# Patient Record
Sex: MALE | Race: WHITE | NOT HISPANIC OR LATINO | Employment: FULL TIME | URBAN - METROPOLITAN AREA
[De-identification: names, ages, dates, MRNs, and addresses within clinical notes are randomized per-mention and may not be internally consistent; named-entity substitution may affect disease eponyms.]

---

## 2020-07-22 ENCOUNTER — HOSPITAL ENCOUNTER (OUTPATIENT)
Dept: LAB | Facility: MEDICAL CENTER | Age: 55
End: 2020-07-22
Payer: COMMERCIAL

## 2020-07-23 LAB
SARS-COV-2 RNA RESP QL NAA+PROBE: DETECTED
SPECIMEN SOURCE: ABNORMAL

## 2021-06-11 ENCOUNTER — APPOINTMENT (OUTPATIENT)
Dept: RADIOLOGY | Facility: MEDICAL CENTER | Age: 56
DRG: 065 | End: 2021-06-11
Attending: EMERGENCY MEDICINE
Payer: COMMERCIAL

## 2021-06-11 ENCOUNTER — APPOINTMENT (OUTPATIENT)
Dept: CARDIOLOGY | Facility: MEDICAL CENTER | Age: 56
DRG: 065 | End: 2021-06-11
Attending: INTERNAL MEDICINE
Payer: COMMERCIAL

## 2021-06-11 ENCOUNTER — APPOINTMENT (OUTPATIENT)
Dept: CARDIOLOGY | Facility: MEDICAL CENTER | Age: 56
DRG: 065 | End: 2021-06-11
Attending: STUDENT IN AN ORGANIZED HEALTH CARE EDUCATION/TRAINING PROGRAM
Payer: COMMERCIAL

## 2021-06-11 ENCOUNTER — HOSPITAL ENCOUNTER (INPATIENT)
Facility: MEDICAL CENTER | Age: 56
LOS: 6 days | DRG: 065 | End: 2021-06-18
Attending: EMERGENCY MEDICINE | Admitting: INTERNAL MEDICINE
Payer: COMMERCIAL

## 2021-06-11 DIAGNOSIS — R93.1 ABNORMAL ECHOCARDIOGRAM: ICD-10-CM

## 2021-06-11 DIAGNOSIS — I63.89 CEREBROVASCULAR ACCIDENT (CVA) DUE TO OTHER MECHANISM (HCC): ICD-10-CM

## 2021-06-11 DIAGNOSIS — R47.9 SPEECH DISTURBANCE, UNSPECIFIED TYPE: ICD-10-CM

## 2021-06-11 DIAGNOSIS — I63.9 CEREBROVASCULAR ACCIDENT (CVA), UNSPECIFIED MECHANISM (HCC): ICD-10-CM

## 2021-06-11 DIAGNOSIS — R07.9 CHEST PAIN, UNSPECIFIED TYPE: ICD-10-CM

## 2021-06-11 LAB
ALBUMIN SERPL BCP-MCNC: 4 G/DL (ref 3.2–4.9)
ALBUMIN/GLOB SERPL: 1.3 G/DL
ALP SERPL-CCNC: 73 U/L (ref 30–99)
ALT SERPL-CCNC: 23 U/L (ref 2–50)
ANION GAP SERPL CALC-SCNC: 10 MMOL/L (ref 7–16)
APTT PPP: 26.4 SEC (ref 24.7–36)
AST SERPL-CCNC: 26 U/L (ref 12–45)
BASOPHILS # BLD AUTO: 0.4 % (ref 0–1.8)
BASOPHILS # BLD: 0.03 K/UL (ref 0–0.12)
BILIRUB SERPL-MCNC: 0.7 MG/DL (ref 0.1–1.5)
BUN SERPL-MCNC: 14 MG/DL (ref 8–22)
CALCIUM SERPL-MCNC: 8.9 MG/DL (ref 8.5–10.5)
CHLORIDE SERPL-SCNC: 102 MMOL/L (ref 96–112)
CO2 SERPL-SCNC: 24 MMOL/L (ref 20–33)
CREAT SERPL-MCNC: 1.37 MG/DL (ref 0.5–1.4)
EKG IMPRESSION: NORMAL
EOSINOPHIL # BLD AUTO: 0.07 K/UL (ref 0–0.51)
EOSINOPHIL NFR BLD: 0.9 % (ref 0–6.9)
ERYTHROCYTE [DISTWIDTH] IN BLOOD BY AUTOMATED COUNT: 47.4 FL (ref 35.9–50)
EST. AVERAGE GLUCOSE BLD GHB EST-MCNC: 100 MG/DL
GLOBULIN SER CALC-MCNC: 3.2 G/DL (ref 1.9–3.5)
GLUCOSE SERPL-MCNC: 169 MG/DL (ref 65–99)
HBA1C MFR BLD: 5.1 % (ref 4–5.6)
HCT VFR BLD AUTO: 40.6 % (ref 42–52)
HGB BLD-MCNC: 14.8 G/DL (ref 14–18)
IMM GRANULOCYTES # BLD AUTO: 0.02 K/UL (ref 0–0.11)
IMM GRANULOCYTES NFR BLD AUTO: 0.3 % (ref 0–0.9)
INR PPP: 0.99 (ref 0.87–1.13)
LV EJECT FRACT  99904: 60
LV EJECT FRACT MOD 2C 99903: 65.05
LV EJECT FRACT MOD 4C 99902: 55.79
LV EJECT FRACT MOD BP 99901: 61.53
LYMPHOCYTES # BLD AUTO: 1.44 K/UL (ref 1–4.8)
LYMPHOCYTES NFR BLD: 18.8 % (ref 22–41)
MCH RBC QN AUTO: 36.7 PG (ref 27–33)
MCHC RBC AUTO-ENTMCNC: 36.5 G/DL (ref 33.7–35.3)
MCV RBC AUTO: 100.7 FL (ref 81.4–97.8)
MONOCYTES # BLD AUTO: 0.29 K/UL (ref 0–0.85)
MONOCYTES NFR BLD AUTO: 3.8 % (ref 0–13.4)
NEUTROPHILS # BLD AUTO: 5.79 K/UL (ref 1.82–7.42)
NEUTROPHILS NFR BLD: 75.8 % (ref 44–72)
NRBC # BLD AUTO: 0 K/UL
NRBC BLD-RTO: 0 /100 WBC
PLATELET # BLD AUTO: 214 K/UL (ref 164–446)
PMV BLD AUTO: 8.5 FL (ref 9–12.9)
POTASSIUM SERPL-SCNC: 4.1 MMOL/L (ref 3.6–5.5)
PROT SERPL-MCNC: 7.2 G/DL (ref 6–8.2)
PROTHROMBIN TIME: 12.8 SEC (ref 12–14.6)
RBC # BLD AUTO: 4.03 M/UL (ref 4.7–6.1)
SARS-COV-2 RNA RESP QL NAA+PROBE: NOTDETECTED
SODIUM SERPL-SCNC: 136 MMOL/L (ref 135–145)
SPECIMEN SOURCE: NORMAL
TROPONIN T SERPL-MCNC: <6 NG/L (ref 6–19)
WBC # BLD AUTO: 7.6 K/UL (ref 4.8–10.8)

## 2021-06-11 PROCEDURE — 93306 TTE W/DOPPLER COMPLETE: CPT

## 2021-06-11 PROCEDURE — 94760 N-INVAS EAR/PLS OXIMETRY 1: CPT

## 2021-06-11 PROCEDURE — 99220 PR INITIAL OBSERVATION CARE,LEVL III: CPT | Mod: GC | Performed by: INTERNAL MEDICINE

## 2021-06-11 PROCEDURE — 80307 DRUG TEST PRSMV CHEM ANLYZR: CPT

## 2021-06-11 PROCEDURE — 92610 EVALUATE SWALLOWING FUNCTION: CPT

## 2021-06-11 PROCEDURE — 96372 THER/PROPH/DIAG INJ SC/IM: CPT

## 2021-06-11 PROCEDURE — 80053 COMPREHEN METABOLIC PANEL: CPT

## 2021-06-11 PROCEDURE — 70450 CT HEAD/BRAIN W/O DYE: CPT

## 2021-06-11 PROCEDURE — 85730 THROMBOPLASTIN TIME PARTIAL: CPT

## 2021-06-11 PROCEDURE — U0003 INFECTIOUS AGENT DETECTION BY NUCLEIC ACID (DNA OR RNA); SEVERE ACUTE RESPIRATORY SYNDROME CORONAVIRUS 2 (SARS-COV-2) (CORONAVIRUS DISEASE [COVID-19]), AMPLIFIED PROBE TECHNIQUE, MAKING USE OF HIGH THROUGHPUT TECHNOLOGIES AS DESCRIBED BY CMS-2020-01-R: HCPCS

## 2021-06-11 PROCEDURE — G0378 HOSPITAL OBSERVATION PER HR: HCPCS

## 2021-06-11 PROCEDURE — 84484 ASSAY OF TROPONIN QUANT: CPT

## 2021-06-11 PROCEDURE — 93005 ELECTROCARDIOGRAM TRACING: CPT | Performed by: EMERGENCY MEDICINE

## 2021-06-11 PROCEDURE — 36415 COLL VENOUS BLD VENIPUNCTURE: CPT

## 2021-06-11 PROCEDURE — 85610 PROTHROMBIN TIME: CPT

## 2021-06-11 PROCEDURE — U0005 INFEC AGEN DETEC AMPLI PROBE: HCPCS

## 2021-06-11 PROCEDURE — 99285 EMERGENCY DEPT VISIT HI MDM: CPT

## 2021-06-11 PROCEDURE — 85025 COMPLETE CBC W/AUTO DIFF WBC: CPT

## 2021-06-11 PROCEDURE — A9270 NON-COVERED ITEM OR SERVICE: HCPCS | Performed by: STUDENT IN AN ORGANIZED HEALTH CARE EDUCATION/TRAINING PROGRAM

## 2021-06-11 PROCEDURE — 70498 CT ANGIOGRAPHY NECK: CPT

## 2021-06-11 PROCEDURE — 700117 HCHG RX CONTRAST REV CODE 255: Performed by: EMERGENCY MEDICINE

## 2021-06-11 PROCEDURE — 71045 X-RAY EXAM CHEST 1 VIEW: CPT

## 2021-06-11 PROCEDURE — 83036 HEMOGLOBIN GLYCOSYLATED A1C: CPT

## 2021-06-11 PROCEDURE — 700102 HCHG RX REV CODE 250 W/ 637 OVERRIDE(OP): Performed by: STUDENT IN AN ORGANIZED HEALTH CARE EDUCATION/TRAINING PROGRAM

## 2021-06-11 PROCEDURE — 93306 TTE W/DOPPLER COMPLETE: CPT | Mod: 26 | Performed by: INTERNAL MEDICINE

## 2021-06-11 PROCEDURE — 0042T CT-CEREBRAL PERFUSION ANALYSIS: CPT

## 2021-06-11 PROCEDURE — 99204 OFFICE O/P NEW MOD 45 MIN: CPT | Performed by: PSYCHIATRY & NEUROLOGY

## 2021-06-11 PROCEDURE — 70496 CT ANGIOGRAPHY HEAD: CPT

## 2021-06-11 PROCEDURE — 99204 OFFICE O/P NEW MOD 45 MIN: CPT | Performed by: INTERNAL MEDICINE

## 2021-06-11 RX ORDER — HYDRALAZINE HYDROCHLORIDE 20 MG/ML
10 INJECTION INTRAMUSCULAR; INTRAVENOUS
Status: DISCONTINUED | OUTPATIENT
Start: 2021-06-11 | End: 2021-06-12

## 2021-06-11 RX ORDER — ATORVASTATIN CALCIUM 80 MG/1
80 TABLET, FILM COATED ORAL EVERY EVENING
Status: DISCONTINUED | OUTPATIENT
Start: 2021-06-11 | End: 2021-06-18 | Stop reason: HOSPADM

## 2021-06-11 RX ORDER — LABETALOL HYDROCHLORIDE 5 MG/ML
10 INJECTION, SOLUTION INTRAVENOUS
Status: DISCONTINUED | OUTPATIENT
Start: 2021-06-11 | End: 2021-06-12

## 2021-06-11 RX ADMIN — IOHEXOL 40 ML: 350 INJECTION, SOLUTION INTRAVENOUS at 09:10

## 2021-06-11 RX ADMIN — ATORVASTATIN CALCIUM 80 MG: 80 TABLET, FILM COATED ORAL at 17:15

## 2021-06-11 RX ADMIN — IOHEXOL 80 ML: 350 INJECTION, SOLUTION INTRAVENOUS at 09:11

## 2021-06-11 ASSESSMENT — ENCOUNTER SYMPTOMS
SORE THROAT: 0
CHILLS: 0
TINGLING: 1
LOSS OF CONSCIOUSNESS: 0
COUGH: 0
BACK PAIN: 0
POLYDIPSIA: 0
FEVER: 0
SHORTNESS OF BREATH: 0
INSOMNIA: 0
PALPITATIONS: 0
VOMITING: 0
SENSORY CHANGE: 1
PHOTOPHOBIA: 0
SPEECH CHANGE: 1
BLURRED VISION: 0
ABDOMINAL PAIN: 0
MEMORY LOSS: 0
HEADACHES: 0
FOCAL WEAKNESS: 1
BRUISES/BLEEDS EASILY: 0

## 2021-06-11 ASSESSMENT — LIFESTYLE VARIABLES
TOTAL SCORE: 0
TOTAL SCORE: 0
ON A TYPICAL DAY WHEN YOU DRINK ALCOHOL HOW MANY DRINKS DO YOU HAVE: 2
EVER HAD A DRINK FIRST THING IN THE MORNING TO STEADY YOUR NERVES TO GET RID OF A HANGOVER: NO
HOW MANY TIMES IN THE PAST YEAR HAVE YOU HAD 5 OR MORE DRINKS IN A DAY: 0
HAVE PEOPLE ANNOYED YOU BY CRITICIZING YOUR DRINKING: NO
AVERAGE NUMBER OF DAYS PER WEEK YOU HAVE A DRINK CONTAINING ALCOHOL: 1
ALCOHOL_USE: YES
EVER FELT BAD OR GUILTY ABOUT YOUR DRINKING: NO
CONSUMPTION TOTAL: NEGATIVE
TOTAL SCORE: 0
HAVE YOU EVER FELT YOU SHOULD CUT DOWN ON YOUR DRINKING: NO
DO YOU DRINK ALCOHOL: NO

## 2021-06-11 ASSESSMENT — COGNITIVE AND FUNCTIONAL STATUS - GENERAL
HELP NEEDED FOR BATHING: A LITTLE
STANDING UP FROM CHAIR USING ARMS: A LITTLE
MOVING FROM LYING ON BACK TO SITTING ON SIDE OF FLAT BED: A LITTLE
SUGGESTED CMS G CODE MODIFIER DAILY ACTIVITY: CJ
TURNING FROM BACK TO SIDE WHILE IN FLAT BAD: A LITTLE
TOILETING: A LITTLE
DRESSING REGULAR LOWER BODY CLOTHING: A LITTLE
CLIMB 3 TO 5 STEPS WITH RAILING: A LITTLE
MOVING TO AND FROM BED TO CHAIR: A LITTLE
CLIMB 3 TO 5 STEPS WITH RAILING: A LITTLE
SUGGESTED CMS G CODE MODIFIER MOBILITY: CK
DAILY ACTIVITIY SCORE: 21
DRESSING REGULAR LOWER BODY CLOTHING: A LITTLE
HELP NEEDED FOR BATHING: A LITTLE
STANDING UP FROM CHAIR USING ARMS: A LITTLE
TURNING FROM BACK TO SIDE WHILE IN FLAT BAD: A LITTLE
MOVING TO AND FROM BED TO CHAIR: A LITTLE
TOILETING: A LITTLE
MOVING FROM LYING ON BACK TO SITTING ON SIDE OF FLAT BED: A LITTLE
MOBILITY SCORE: 18
WALKING IN HOSPITAL ROOM: A LITTLE

## 2021-06-11 ASSESSMENT — FIBROSIS 4 INDEX: FIB4 SCORE: 1.34

## 2021-06-11 ASSESSMENT — PATIENT HEALTH QUESTIONNAIRE - PHQ9
2. FEELING DOWN, DEPRESSED, IRRITABLE, OR HOPELESS: NOT AT ALL
SUM OF ALL RESPONSES TO PHQ9 QUESTIONS 1 AND 2: 0
1. LITTLE INTEREST OR PLEASURE IN DOING THINGS: NOT AT ALL

## 2021-06-11 NOTE — ED NOTES
Per EMS radio report, STEMI pre alert.    Upon arrival, possible stroke IR.  Paged stroke IR per ERP.

## 2021-06-11 NOTE — ED NOTES
56 y/o male biba from home pt reports onset CP apx 1130 last night, left chest intermittent non radiating. Pt also reports onset of slurred speech.   On assessment pt has right facial droop and weakness to right arm. Pt reports pain to right arm. Code stemi canceled by Dr Milton, stroke IR nazario KIM to bedside. NIH 5.

## 2021-06-11 NOTE — ED PROVIDER NOTES
ED Provider Note    Scribed for Sharif Feliciano M.D. by Oscar Leija. 6/11/2021  8:40 AM    Primary care provider: Pcp Pt States None  Means of arrival: EMS  History obtained from: patient  History limited by: none    CHIEF COMPLAINT  Chief Complaint   Patient presents with    Chest Pain     intermittent onset 11-12 last night non radiating    Slurred Speech     last noc    Facial Droop     right side       HPI  Paul Hopper is a 55 y.o. female who presents to the Emergency Department for intermittent chest pain since around 2230 last night.  Also noticed that he had slurred speech starting at 1030 last night.  Patient called the ambulance.  EMS did an EKG.  There is felt to be some slight ST segment elevation in 1 and aVL as well as some depression inferiorly.  Therefore a code STEMI was called.  Patient has a slurred speech started last night.  Is not have any fever headache.  He has some weakness of the right upper extremity.  No trauma.  No chest pain at the time of evaluation.  No back pain.  No migration of pain.    REVIEW OF SYSTEMS  Pertinent positives include slurred speech, chest pain, right arm weakness.   Pertinent negatives include no current chest pain, back pain, migration of pain, headache, fevers.    All other systems reviewed and negative. See HPI for further details.     PAST MEDICAL HISTORY       SURGICAL HISTORY  patient denies any surgical history    SOCIAL HISTORY  Social History     Tobacco Use    Smoking status: None noted   Substance Use Topics    Alcohol use: None noted    Drug use: None noted      Social History     Substance and Sexual Activity   Drug Use None noted       FAMILY HISTORY  None noted    CURRENT MEDICATIONS  Home Medications       Reviewed by Ami Raza R.N. (Registered Nurse) on 06/11/21 at 0851  Med List Status: Complete     Medication Last Dose Status        Patient Alexei Taking any Medications                           ALLERGIES  No Known  "Allergies    PHYSICAL EXAM  VITAL SIGNS: BP (!) 161/88   Pulse (!) 59   Resp (!) 25   Ht 1.727 m (5' 8\")   Wt 86.2 kg (190 lb)   SpO2 93%   BMI 28.89 kg/m²     Nursing note and vitals reviewed.  Constitutional: Well-developed and well-nourished. No distress.   HENT: Head is normocephalic and atraumatic. Oropharynx is clear and moist without exudate or erythema.   Eyes: Pupils are equal, round, and reactive to light. Conjunctiva are normal.   Cardiovascular: Normal rate and regular rhythm. No murmur heard. Normal radial pulses.   Pulmonary/Chest: Breath sounds normal. No wheezes or rales. No chest wall tenderness.   Abdominal: Soft and non-tender. No distention   Musculoskeletal: Extremities exhibit normal range of motion without edema or tenderness. No calf tenderness or palpable cords.   Neurological: Awake, alert and oriented to person, place, and time.  4 out of 5 strength in the right upper extremity, mildly slurred speech, seems to have some mild word finding difficulty although he is able to identify pictures on the neuro examination performed without difficulty.  Skin: Skin is warm and dry. No rash.   Psychiatric: Normal mood and affect. Appropriate for clinical situation      DIAGNOSTIC STUDIES / PROCEDURES    EKG Interpretation  Interpreted by me as below    LABS  Results for orders placed or performed during the hospital encounter of 06/11/21   CBC WITH DIFFERENTIAL   Result Value Ref Range    WBC 7.6 4.8 - 10.8 K/uL    RBC 4.03 (L) 4.70 - 6.10 M/uL    Hemoglobin 14.8 14.0 - 18.0 g/dL    Hematocrit 40.6 (L) 42.0 - 52.0 %    .7 (H) 81.4 - 97.8 fL    MCH 36.7 (H) 27.0 - 33.0 pg    MCHC 36.5 (H) 33.7 - 35.3 g/dL    RDW 47.4 35.9 - 50.0 fL    Platelet Count 214 164 - 446 K/uL    MPV 8.5 (L) 9.0 - 12.9 fL    Neutrophils-Polys 75.80 (H) 44.00 - 72.00 %    Lymphocytes 18.80 (L) 22.00 - 41.00 %    Monocytes 3.80 0.00 - 13.40 %    Eosinophils 0.90 0.00 - 6.90 %    Basophils 0.40 0.00 - 1.80 %    " Immature Granulocytes 0.30 0.00 - 0.90 %    Nucleated RBC 0.00 /100 WBC    Neutrophils (Absolute) 5.79 1.82 - 7.42 K/uL    Lymphs (Absolute) 1.44 1.00 - 4.80 K/uL    Monos (Absolute) 0.29 0.00 - 0.85 K/uL    Eos (Absolute) 0.07 0.00 - 0.51 K/uL    Baso (Absolute) 0.03 0.00 - 0.12 K/uL    Immature Granulocytes (abs) 0.02 0.00 - 0.11 K/uL    NRBC (Absolute) 0.00 K/uL   COMP METABOLIC PANEL   Result Value Ref Range    Sodium 136 135 - 145 mmol/L    Potassium 4.1 3.6 - 5.5 mmol/L    Chloride 102 96 - 112 mmol/L    Co2 24 20 - 33 mmol/L    Anion Gap 10.0 7.0 - 16.0    Glucose 169 (H) 65 - 99 mg/dL    Bun 14 8 - 22 mg/dL    Creatinine 1.37 0.50 - 1.40 mg/dL    Calcium 8.9 8.5 - 10.5 mg/dL    AST(SGOT) 26 12 - 45 U/L    ALT(SGPT) 23 2 - 50 U/L    Alkaline Phosphatase 73 30 - 99 U/L    Total Bilirubin 0.7 0.1 - 1.5 mg/dL    Albumin 4.0 3.2 - 4.9 g/dL    Total Protein 7.2 6.0 - 8.2 g/dL    Globulin 3.2 1.9 - 3.5 g/dL    A-G Ratio 1.3 g/dL   PROTHROMBIN TIME   Result Value Ref Range    PT 12.8 12.0 - 14.6 sec    INR 0.99 0.87 - 1.13   APTT   Result Value Ref Range    APTT 26.4 24.7 - 36.0 sec   TROPONIN   Result Value Ref Range    Troponin T <6 6 - 19 ng/L   ESTIMATED GFR   Result Value Ref Range    GFR If African American >60 >60 mL/min/1.73 m 2    GFR If Non African American 54 (A) >60 mL/min/1.73 m 2   EKG (NOW)   Result Value Ref Range    Report       [facility] Emergency Dept.    Test Date:  2021  Pt Name:    MIRANDA REARDON                Department: ER  MRN:        [id number]                      Room:       RD 01  Gender:     Female                       Technician: [id number]  :        1965                   Requested By:ER TRIAGE PROTOCOL  Order #:    [id number]                    Reading MD:    Measurements  Intervals                                Axis  Rate:       65                           P:          52  KS:         152                          QRS:        -1  QRSD:       80                            T:          35  QT:         448  QTc:        466    Interpretive Statements  SINUS RHYTHM  CONSIDER LEFT VENTRICULAR HYPERTROPHY  BASELINE WANDER IN LEAD(S) II,aVR,V5  No previous ECG available for comparison         All labs reviewed by me.    RADIOLOGY  EC-ECHOCARDIOGRAM COMPLETE W/O CONT         CT-CTA NECK WITH & W/O-POST PROCESSING   Final Result      1.  Atherosclerotic plaque in the proximal right ICA with less than 50% stenosis.   2.  Atherosclerotic plaque in the proximal left subclavian artery with less than 50% stenosis.      CT-CTA HEAD WITH & W/O-POST PROCESS   Final Result      No thrombosis is seen within the [place].      CT-CEREBRAL PERFUSION ANALYSIS   Final Result      1.  Cerebral blood flow less than 30% likely representing completed infarct = 0 mL.      2.  T Max more than 6 seconds likely representing combination of completed infarct and ischemia = 16 mL.      3.  Mismatched volume likely representing ischemic brain/penumbra = 16 mL      4.  Please note that the cerebral perfusion was performed on the limited brain tissue around the basal ganglia region. Infarct/ischemia outside the CT perfusion sections can be missed in this study.      CT-HEAD W/O   Final Result         1. No acute intracranial abnormality. No evidence of acute intracranial hemorrhage or mass lesion.               DX-CHEST-PORTABLE (1 VIEW)   Final Result         1. No acute cardiopulmonary abnormalities are identified.        The radiologist's interpretation of all radiological studies have been reviewed by me.    COURSE & MEDICAL DECISION MAKING  Nursing notes, VS, PMSFHx reviewed in chart.         8:40 AM - Patient seen and examined at bedside. Ordered EC echocardiogram complete, DX chest, CT head, CT cerebral perfusion analysis, CT CTA head w/ and w/o post process, CT CTA neck w/ and w/o post process, CBC w/, CMP, prothrombin time, APTT, troponin, EKG to evaluate her symptoms. The differential  diagnoses include but are not limited to: CVA, ACS, aortic dissection, metabolic encephalopathy    9:34 AM Paged for Neurology at this time.     9:39 AM Spoke with Dr. Cortes, Neurology, about the patient's condition. He agreed to evaluate the patient at bedside.    10:35 AM - Re-paged for hospitalist since the urgent nature of a patient prevented me from fielding their first call.    10:40 AM Spoke with Dr. Lauren, Hospitalist, about the patient's condition. He agrees to evaluate the patient for hospitalization.    CT scan shows no evidence of infarct.  Spoke with the neurologist.  He feels that the perfusion mismatch is more likely to be artifact.  May be metabolic.  Patient is not an alteplase candidate due to the duration of symptoms.  He will be admitted to the hospital for further evaluation and treatment.    DISPOSITION:  Patient will be hospitalized by Dr. Lauren in guarded condition.      FINAL IMPRESSION  1. Speech disturbance, unspecified type    2. Chest pain, unspecified type          I, Oscar Leija (Amarjit), am scribing for, and in the presence of, Sharif Feliciano M.D..    Electronically signed by: Oscar Leija (Jessicaibethel), 6/11/2021    ISharif M.D. personally performed the services described in this documentation, as scribed by Oscar Leija in my presence, and it is both accurate and complete.C    The note accurately reflects work and decisions made by me.  Sharif Feliciano M.D.  6/11/2021  11:21 AM

## 2021-06-11 NOTE — ED NOTES
Med rec completed per Pt at bedside.  Allergies reviewed with Pt. No known drug allergies.  Pt denies taking any prescription medications. No vitamins/supplements. No recent over-the-counter medications. No oral antibiotics in last 14 days.  Pt's preferred pharmacy: [facility] on [address].

## 2021-06-11 NOTE — CONSULTS
Chief Complaint   Patient presents with   • Chest Pain     intermittent onset 11-12 last night non radiating   • Slurred Speech     last noc   • Facial Droop     right side       Problem List Items Addressed This Visit     None        Neurology Stroke Consultation     History of present illness:  This is a 55-year old male with no known Pmhx who presented to [facility]/11/21 for a chief complaint of chest pain. Patient reports that chest pain started appx 2300 last night 6/10/21; subsequently went to sleep. When he awoke this morning, chest pain was still present, thus he called EMS. On scene, SBP 160s; EKG not consistent with STEMI. On arrival here, patient was noted to have Right facial weakness, RUE weakness and dysarthria, thus Stroke Alert IR was called. Patient reports that he may have noted Right facial weakness last night, but he is unsure when the symptoms started. Stat CT head has revealed no acute intracranial abnormality; CTA head/neck with no LVO. CT perfusion study with likely artifactual findings; no large territorial perfusion mismatch.   Currently, patient is laying in bed; awake and alert. Admits to mild headache, holohemispheric, dull in nature. He denies dizziness or room spinning sensation. He denies numbness, paresthesia, or problem with vision or swallowing. He denies SOB, nausea or vomiting. NIHSS is currently 5.     Neurology has been consulted by Dr. Sharif Feliciano to further evaluate the findings noted above.     Past medical history:   No past medical history on file.    Past surgical history:   No past surgical history on file.    Family history:   No family history on file.    Social history:   Social History     Socioeconomic History   • Marital status: Unknown     Spouse name: Not on file   • Number of children: Not on file   • Years of education: Not on file   • Highest education level: Not on file   Occupational History   • Not on file   Tobacco Use   • Smoking status:  Not on file   Substance and Sexual Activity   • Alcohol use: Not on file   • Drug use: Not on file   • Sexual activity: Not on file   Other Topics Concern   • Not on file   Social History Narrative   • Not on file     Social Determinants of Health     Financial Resource Strain:    • Difficulty of Paying Living Expenses:    Food Insecurity:    • Worried About Running Out of Food in the Last Year:    • Ran Out of Food in the Last Year:    Transportation Needs:    • Lack of Transportation (Medical):    • Lack of Transportation (Non-Medical):    Physical Activity:    • Days of Exercise per Week:    • Minutes of Exercise per Session:    Stress:    • Feeling of Stress :    Social Connections:    • Frequency of Communication with Friends and Family:    • Frequency of Social Gatherings with Friends and Family:    • Attends Muslim Services:    • Active Member of Clubs or Organizations:    • Attends Club or Organization Meetings:    • Marital Status:    Intimate Partner Violence:    • Fear of Current or Ex-Partner:    • Emotionally Abused:    • Physically Abused:    • Sexually Abused:        Current medications:   Current Facility-Administered Medications   Medication Dose   • aspirin EC (ECOTRIN) tablet 325 mg  325 mg     No current outpatient medications on file.       Medication Allergy:  No Known Allergies    Review of systems:   Constitutional: denies fever, night sweats, weight loss.   Eyes: denies acute vision change, eye pain or secretion.   Ears, Nose, Mouth, Throat: denies nasal secretion, nasal bleeding, difficulty swallowing, hearing loss, tinnitus, vertigo, ear pain, acute dental problems, oral ulcers or lesions.   Endocrine: denies recent weight changes, heat or cold intolerance, polyuria, polydypsia, polyphagia,abnormal hair growth.  Cardiovascular: As noted above. Denies dyspnea.   Pulmonary: denies shortness of breath, new onset of cough, hemoptysis, wheezing, chest pain or flu-like symptoms.   GI: denies  "nausea, vomiting, diarrhea, GI bleeding, change in appetite, abdominal pain, and change in bowel habits.  : denies dysuria, urinary incontinence, hematuria.  Heme/oncology: denies history of easy bruising or bleeding. No history of cancer, DVTor PE.  Allergy/immunology: denies hives/urticaria, or itching.   Dermatologic: denies new rash, or new skin lesions.  Musculoskeletal:denies joint swelling or pain, muscle pain, neck and back pain.   Neurologic:As noted in detail above.   Psychiatric: denies symptoms of depression, anxiety, hallucinations, mood swings or changes, suicidal or homicidal thoughts.     Physical examination:   Vitals:    06/11/21 0831 06/11/21 0833 06/11/21 0835   BP: 160/91 (!) 161/88    Pulse: 66 (!) 59    Resp: 20 20    Temp:  36.6 °C (97.9 °F)    TempSrc:  Temporal    SpO2: 96% 93%    Weight:   86.2 kg (190 lb)   Height:   1.727 m (5' 8\")     General: Patient in no acute distress, pleasant and cooperative.  HEENT: Normocephalic, no signs of acute trauma.   Neck: supple, no meningeal signs or carotid bruits. There is normal range of motion. No tenderness on exam.   Chest: clear to auscultation. No cough.   CV: RRR, no murmurs.   Skin: no signs of acute rashes or trauma.   Musculoskeletal: joints exhibit full range of motion, without any pain to palpation. There are no signs of joint or muscle swelling. There is no tenderness to deep palpation of muscles.   Psychiatric: No hallucinatory behavior. Denies symptoms of depression or suicidal ideation. Mood and affect appear normal on exam.     NEUROLOGICAL EXAM:   Mental status, orientation: Awake, alert and fully oriented.   Speech and language: speech is fluent, mildly dysarthric. The patient is able to name, repeat and comprehend.   Cranial nerve exam: Pupils are 3-4 mm bilaterally and equally reactive to light. Visual fields are intact by confrontation. There is no nystagmus on primary or secondary gaze. Intact full EOM in all directions of " gaze. Right lower facial weakness appreciated with decreased sensation to Right face to light touch. Uvula is midline. Palate elevates symmetrically. Tongue is midline and without any signs of tongue biting or fasciculations.Shoulder shrug is intact bilaterally.   Motor exam: Strength is 5/5 in LUE/LLE; 4/5 to RUE with drift; 4+/ 5 to RLE with no drift. Tone is normal. No abnormal movements were seen on exam.   Sensory exam reveals normal sense of light touch and pinprick in all extremities.   Deep tendon reflexes:  Plantar responses are flexor. There is no clonus.   Coordination: Slight RUE dysmetria appreciated. Otherwise no ataxia.   Gait: Not assessed at this time as patient is a fall risk.       NIH Stroke Scale    1a Level of Consciousness   1b Orientation Questions   1c Response to Commands   2 Gaze   3 Visual Fields   4 Facial Movement 1  5 Motor Function (arm)   a Left   b Right 1  6 Motor Function (leg)   a Left   b Right   7 Limb Ataxia 1  8 Sensory 1  9 Language   10 Articulation 1  11 Extinction/Inattention     Score: 5      ANCILLARY DATA REVIEWED:     Lab Data Review:  Recent Results (from the past 24 hour(s))   CBC WITH DIFFERENTIAL    Collection Time: 06/11/21  8:27 AM   Result Value Ref Range    WBC 7.6 4.8 - 10.8 K/uL    RBC 4.03 (L) 4.70 - 6.10 M/uL    Hemoglobin 14.8 14.0 - 18.0 g/dL    Hematocrit 40.6 (L) 42.0 - 52.0 %    .7 (H) 81.4 - 97.8 fL    MCH 36.7 (H) 27.0 - 33.0 pg    MCHC 36.5 (H) 33.7 - 35.3 g/dL    RDW 47.4 35.9 - 50.0 fL    Platelet Count 214 164 - 446 K/uL    MPV 8.5 (L) 9.0 - 12.9 fL    Neutrophils-Polys 75.80 (H) 44.00 - 72.00 %    Lymphocytes 18.80 (L) 22.00 - 41.00 %    Monocytes 3.80 0.00 - 13.40 %    Eosinophils 0.90 0.00 - 6.90 %    Basophils 0.40 0.00 - 1.80 %    Immature Granulocytes 0.30 0.00 - 0.90 %    Nucleated RBC 0.00 /100 WBC    Neutrophils (Absolute) 5.79 1.82 - 7.42 K/uL    Lymphs (Absolute) 1.44 1.00 - 4.80 K/uL    Monos (Absolute) 0.29 0.00 - 0.85 K/uL     Eos (Absolute) 0.07 0.00 - 0.51 K/uL    Baso (Absolute) 0.03 0.00 - 0.12 K/uL    Immature Granulocytes (abs) 0.02 0.00 - 0.11 K/uL    NRBC (Absolute) 0.00 K/uL   COMP METABOLIC PANEL    Collection Time: 21  8:27 AM   Result Value Ref Range    Sodium 136 135 - 145 mmol/L    Potassium 4.1 3.6 - 5.5 mmol/L    Chloride 102 96 - 112 mmol/L    Co2 24 20 - 33 mmol/L    Anion Gap 10.0 7.0 - 16.0    Glucose 169 (H) 65 - 99 mg/dL    Bun 14 8 - 22 mg/dL    Creatinine 1.37 0.50 - 1.40 mg/dL    Calcium 8.9 8.5 - 10.5 mg/dL    AST(SGOT) 26 12 - 45 U/L    ALT(SGPT) 23 2 - 50 U/L    Alkaline Phosphatase 73 30 - 99 U/L    Total Bilirubin 0.7 0.1 - 1.5 mg/dL    Albumin 4.0 3.2 - 4.9 g/dL    Total Protein 7.2 6.0 - 8.2 g/dL    Globulin 3.2 1.9 - 3.5 g/dL    A-G Ratio 1.3 g/dL   PROTHROMBIN TIME    Collection Time: 21  8:27 AM   Result Value Ref Range    PT 12.8 12.0 - 14.6 sec    INR 0.99 0.87 - 1.13   APTT    Collection Time: 21  8:27 AM   Result Value Ref Range    APTT 26.4 24.7 - 36.0 sec   TROPONIN    Collection Time: 21  8:27 AM   Result Value Ref Range    Troponin T <6 6 - 19 ng/L   ESTIMATED GFR    Collection Time: 21  8:27 AM   Result Value Ref Range    GFR If African American >60 >60 mL/min/1.73 m 2    GFR If Non African American 54 (A) >60 mL/min/1.73 m 2   EKG (NOW)    Collection Time: 21  8:27 AM   Result Value Ref Range    Report       [facility] Emergency Dept.    Test Date:  2021  Pt Name:    MIRANDA REARDON                Department: ER  MRN:        [id number]                      Room:       RD 01  Gender:     Female                       Technician: [id number]  :        1965                   Requested By:ER TRIAGE PROTOCOL  Order #:    [id number]                    Reading MD:    Measurements  Intervals                                Axis  Rate:       65                           P:          52  RI:         152                          QRS:         -1  QRSD:       80                           T:          35  QT:         448  QTc:        466    Interpretive Statements  SINUS RHYTHM  CONSIDER LEFT VENTRICULAR HYPERTROPHY  BASELINE WANDER IN LEAD(S) II,aVR,V5  No previous ECG available for comparison     EC-ECHOCARDIOGRAM COMPLETE W/O CONT    Collection Time: 06/11/21  9:33 AM   Result Value Ref Range    Eject.Frac. MOD BP 61.53     Eject.Frac. MOD 4C 55.79     Eject.Frac. MOD 2C 65.05     Left Ventrical Ejection Fraction 60        Labs reviewed by me.       Imaging reviewed by me:     EC-ECHOCARDIOGRAM COMPLETE W/O CONT   Final Result      CT-CTA NECK WITH & W/O-POST PROCESSING   Final Result      1.  Atherosclerotic plaque in the proximal right ICA with less than 50% stenosis.   2.  Atherosclerotic plaque in the proximal left subclavian artery with less than 50% stenosis.      CT-CTA HEAD WITH & W/O-POST PROCESS   Final Result      No thrombosis is seen within the [place].      CT-CEREBRAL PERFUSION ANALYSIS   Final Result      1.  Cerebral blood flow less than 30% likely representing completed infarct = 0 mL.      2.  T Max more than 6 seconds likely representing combination of completed infarct and ischemia = 16 mL.      3.  Mismatched volume likely representing ischemic brain/penumbra = 16 mL      4.  Please note that the cerebral perfusion was performed on the limited brain tissue around the basal ganglia region. Infarct/ischemia outside the CT perfusion sections can be missed in this study.      CT-HEAD W/O   Final Result         1. No acute intracranial abnormality. No evidence of acute intracranial hemorrhage or mass lesion.               DX-CHEST-PORTABLE (1 VIEW)   Final Result         1. No acute cardiopulmonary abnormalities are identified.      MR-BRAIN-W/O    (Results Pending)       Presumed mechanism by TOAST:  __Large Artery Atherosclerosis  __Small Vessel ([place])  __Cardioembolic  __Other (Sickle Cell, Vasculitis,  Hypercoagulable)  _X_Unknown    Modified [place] Scale (MRS): 0 = No symptoms      ASSESSMENT AND PLAN:  55-year old male with no known Pmhx who presented to [facility] on 6/11/21 for a chief complaint of chest pain, onset 2300 last night; on arrival here, no obvious STEMI per EKG. Shortly after presentation, patient was noted to have Right facial weakness, RUE weakness and dysarthria; patient reports that he noted Right facial weakness last night before going to sleep as well; patient thus not a candidate for IV tPA given presentation time greater than 4.5 hours from time of symptom onset. STAT CT head revealed no acute intracranial abnormality; CTA head/neck with no LVO. NIHSS 5; suspect evolving small acute ischemic stroke, likely small vessel or posterior circulation.    Recommendations/Plan:     -q4h and PRN neuro assessment. VS per nursing/unit protocol. Permissive HTN ok until 6/13/21, not to exceed SBP > 220, DBP > 105. Then, BP goal < 140/90. Antihypertensives per primary team.   -Obtain MRI Brain wo contrast.   -Telemetry; currently SR. Screen for Afib/arrhythmia. Note TTE with EF 60%; no gross structural abnormalities.   - mg PO now and q day and Atorvastatin 80 mg PO q HS. Check lipid panel.   -Recommend aggressive BG management per primary team. Avoid IVF with Dextrose. BG goal 140-180. Check hemoglobin A1c.   -PT/OT/SLP eval and treat.    -Counseled patient at length regarding life style and risk factor modification for secondary stroke prevention.   -All other medical management per primary team.   -DVT PPX: SCDs.      The plan of care above has been discussed with Dr. Cortes.     GLENDA Zavala.P.R.MAGALY.  [facility]

## 2021-06-11 NOTE — CONSULTS
Cardiology Consult Note:    Ron Milton M.D.  Date & Time note created:    6/11/2021   8:55 AM     Referring MD:  Dr. Feliciano    Patient ID:   Name:             Paul Hopper     YOB: 1965  Age:                 55 y.o.  male   MRN:               [id number]                                                             Reason for Consult:      STEMI    History of Present Illness:    55-year-old male with no significant past medical history was last seen normal last night about 11 PM.  He woke up this morning and had chest pain and noticed he was also having slurred speech and word finding difficulties.  Because that he called EMS.  In transport here he was then noted to have ST elevation in one of his leads but was chest pain-free.  He did have word finding difficulties and slurred speech.  Because of this a code stroke was called.  His ECG was unremarkable.  He denies any previous cardiac history.    Review of Systems:      Constitutional: Denies fevers, Denies weight changes  Eyes: Denies changes in vision, no eye pain  Ears/Nose/Throat/Mouth: Denies nasal congestion or sore throat   Cardiovascular: + chest pain, no palpitations   Respiratory: no shortness of breath , Denies cough  Gastrointestinal/Hepatic: Denies abdominal pain, nausea, vomiting, diarrhea, constipation or GI bleeding   Genitourinary: Denies dysuria or frequency  Musculoskeletal/Rheum: Denies  joint pain and swelling, no edema  Skin: Denies rash  Neurological: Denies headache, confusion, memory loss or focal weakness/parasthesias  Psychiatric: denies mood disorder   Endocrine: Ariana thyroid problems  Heme/Oncology/Lymph Nodes: Denies enlarged lymph nodes, denies brusing or known bleeding disorder  All other systems were reviewed and are negative (AMA/CMS criteria)                Past Medical History:   No past medical history on file.  There are no active hospital problems to display for this patient.      Past Surgical  "History:  No past surgical history on file.    Hospital Medications:  No current facility-administered medications for this encounter.     No current outpatient medications on file.         Current Outpatient Medications:  (Not in a hospital admission)      Medication Allergy:  No Known Allergies    Family History:  No family history on file.    Social History:  Social History     Socioeconomic History   • Marital status: Unknown     Spouse name: Not on file   • Number of children: Not on file   • Years of education: Not on file   • Highest education level: Not on file   Occupational History   • Not on file   Tobacco Use   • Smoking status: Not on file   Substance and Sexual Activity   • Alcohol use: Not on file   • Drug use: Not on file   • Sexual activity: Not on file   Other Topics Concern   • Not on file   Social History Narrative   • Not on file     Social Determinants of Health     Financial Resource Strain:    • Difficulty of Paying Living Expenses:    Food Insecurity:    • Worried About Running Out of Food in the Last Year:    • Ran Out of Food in the Last Year:    Transportation Needs:    • Lack of Transportation (Medical):    • Lack of Transportation (Non-Medical):    Physical Activity:    • Days of Exercise per Week:    • Minutes of Exercise per Session:    Stress:    • Feeling of Stress :    Social Connections:    • Frequency of Communication with Friends and Family:    • Frequency of Social Gatherings with Friends and Family:    • Attends [employer] Services:    • Active Member of Clubs or Organizations:    • Attends Club or Organization Meetings:    • Marital Status:    Intimate Partner Violence:    • Fear of Current or Ex-Partner:    • Emotionally Abused:    • Physically Abused:    • Sexually Abused:          Physical Exam:  Vitals/ General Appearance:   Weight/BMI: Body mass index is 28.89 kg/m².  BP (!) 161/88   Pulse (!) 59   Resp (!) 25   Ht 1.727 m (5' 8\")   Wt 86.2 kg (190 lb)   SpO2 93% " "  Vitals:    06/11/21 0831 06/11/21 0833 06/11/21 0835   BP: 160/91 (!) 161/88    Pulse: 66 (!) 59    Resp: 20 (!) 25    SpO2: 96% 93%    Weight:   86.2 kg (190 lb)   Height:   1.727 m (5' 8\")     Oxygen Therapy:  Pulse Oximetry: 93 %    Constitutional:   Well developed, Well nourished, No acute distress  HENMT:  Normocephalic, Atraumatic, Oropharynx moist mucous membranes, No oral exudates, Nose normal.  No thyromegaly.  Eyes:  EOMI, Conjunctiva normal, No discharge.  Neck:  Normal range of motion, No cervical tenderness,  no JVD.  Cardiovascular:  Normal heart rate, Normal rhythm, No murmurs, No rubs, No gallops.   Extremitites with intact distal pulses, no cyanosis, or edema.  Lungs:  Normal breath sounds, breath sounds clear to auscultation bilaterally,  no crackles, no wheezing.   Abdomen: Bowel sounds normal, Soft, No tenderness, No guarding, No rebound, No masses, No hepatosplenomegaly.  Skin: Warm, Dry, No erythema, No rash, no induration.  Neurologic: Alert & oriented x 3, No focal deficits noted, cranial nerves II through X are grossly intact.  Psychiatric: Affect normal, Judgment normal, Mood normal.      MDM (Data Review):     Records reviewed and summarized in current documentation    Lab Data Review:  Recent Results (from the past 24 hour(s))   CBC WITH DIFFERENTIAL    Collection Time: 06/11/21  8:27 AM   Result Value Ref Range    WBC 7.6 4.8 - 10.8 K/uL    RBC 4.03 (L) 4.70 - 6.10 M/uL    Hemoglobin 14.8 14.0 - 18.0 g/dL    Hematocrit 40.6 (L) 42.0 - 52.0 %    .7 (H) 81.4 - 97.8 fL    MCH 36.7 (H) 27.0 - 33.0 pg    MCHC 36.5 (H) 33.7 - 35.3 g/dL    RDW 47.4 35.9 - 50.0 fL    Platelet Count 214 164 - 446 K/uL    MPV 8.5 (L) 9.0 - 12.9 fL    Neutrophils-Polys 75.80 (H) 44.00 - 72.00 %    Lymphocytes 18.80 (L) 22.00 - 41.00 %    Monocytes 3.80 0.00 - 13.40 %    Eosinophils 0.90 0.00 - 6.90 %    Basophils 0.40 0.00 - 1.80 %    Immature Granulocytes 0.30 0.00 - 0.90 %    Nucleated RBC 0.00 /100 WBC "    Neutrophils (Absolute) 5.79 1.82 - 7.42 K/uL    Lymphs (Absolute) 1.44 1.00 - 4.80 K/uL    Monos (Absolute) 0.29 0.00 - 0.85 K/uL    Eos (Absolute) 0.07 0.00 - 0.51 K/uL    Baso (Absolute) 0.03 0.00 - 0.12 K/uL    Immature Granulocytes (abs) 0.02 0.00 - 0.11 K/uL    NRBC (Absolute) 0.00 K/uL   EKG (NOW)    Collection Time: 21  8:27 AM   Result Value Ref Range    Report       [facility]    Test Date:  2021  Pt Name:    MIRANDA REARDON                Department: ER  MRN:        [id number]                      Room:        01  Gender:     Female                       Technician: [id number]  :        1965                   Requested By:ER TRIAGE PROTOCOL  Order #:    [id number]                    Reading MD:    Measurements  Intervals                                Axis  Rate:       65                           P:          52  VA:         152                          QRS:        -1  QRSD:       80                           T:          35  QT:         448  QTc:        466    Interpretive Statements  SINUS RHYTHM  CONSIDER LEFT VENTRICULAR HYPERTROPHY  BASELINE WANDER IN LEAD(S) II,aVR,V5  No previous ECG available for comparison         Imaging/Procedures Review:    Chest Xray:  Reviewed    EKG:   Dated 2021 personally viewed interpret myself showing normal sinus rhythm with ST elevation in lead II.    ECHO:  Pending    MDM (Assessment and Plan):     There are no active hospital problems to display for this patient.    55-year-old male with some chest pain and ST elevation in 1-lead not meeting criteria for STEMI.  Given he has possible strokelike symptoms word finding difficulty and slurred speech we will cancel the STEMI.  The risks of anticoagulation in a patient with an unknown stroke history are higher than the benefit of proceeding to invasive angiogram.  I have ordered a stat echocardiogram to rule out LV thrombus or heart failure to assist us as an  evaluation.

## 2021-06-11 NOTE — THERAPY
Speech Language Pathology   Clinical Swallow Evaluation     Patient Name: Paul Hopper  AGE:  55 y.o., SEX:  male  Medical Record #: [id number]  Today's Date: 6/11/2021     Precautions  Precautions: (P) Swallow Precautions ( See Comments)    Assessment  55-year-old male with no significant past medical history was last seen normal last night about 11 PM.  He woke up this morning and had chest pain and noticed he was also having slurred speech and word finding difficulties.  Because that he called EMS.  In transport here he was then noted to have ST elevation in one of his leads but was chest pain-free.  He did have word finding difficulties and slurred speech.  Because of this a code stroke was called.  His ECG was unremarkable.  Stat CT head has revealed no acute intracranial abnormality; CTA head/neck with no LVO. CT perfusion study with likely artifactual findings; no large territorial perfusion mismatch.     Pt seen at bedside alert and oriented x 4. Pt's girlfriend also present at bedside.   Oral motor exam revealed limited dentition, but no orofacial weakness. Mild dysarthria observed; pt reports lisp at baseline.  Pt completed trials of ice chips without overt s/sx of pen/asp. 3oz Water Challenge completed x2 (via cup and straw) without overt s/sx of pen/asp. Trials of pureed, bite sized and easy to chew textures completed without overt s/sx of pen/asp. Pt with good mastication skills and timely A-P transit of bolus. No pocketing/oral stasis observed. Pt reports eating softer diet 2' missing dentition.  SLP reviewed safe swallowing strategies prior to leaving the room.    Plan    Easy to chew diet w/ thin liquids. Ok to use straws.  Recommend Speech Therapy 5 times per week until therapy goals are met for the following treatments:  Dysphagia Training.    Discharge Recommendations: (P) Anticipate that the patient will have no further speech therapy needs after discharge from the hospital       Objective        "06/11/21 1512   Oral Motor Eval    Is Patient Able to Complete Oral Motor Eval Yes, Within Normal Limits   Laryngeal Function   Voice Quality Within Functional Limits   Volutional Cough Within Functional Limits   Excursion Upon Swallow Complete   Oral Food Presentation   Ice Chips Within Functional Limits   Single Swallow Moderately Thick (3) - (Honey Thick)  Not Tested   Serial Swallow Moderately Thick (3) - (Honey Thick)  Not Tested   Single Swallow Mildly Thick (2) - (Nectar Thick)  Not Tested   Serial Swallow Mildly Thick (2) - (Nectar Thick) Not Tested   Single Swallow Slightly Thick (1)  Not Tested   Serial Swallow Slightly Thick (1) Not Tested   Single Swallow Thin (0) Within Functional Limits   Serial Swallow Thin (0) Within Functional Limits   Liquidised (3) Not Tested   Pureed (4) Within Functional Limits   Soft & Bite-Sized (6) - (Dysphagia III) Within Functional Limits   Regular (7) Not Tested   Regular-Easy to Chew (7) Within Functional Limits   Self Feeding Independent   Tracheostomy   Tracheostomy  No   Dysphagia Strategies / Recommendations   Strategies / Interventions Recommended (Yes / No) Yes   Compensatory Strategies Head of Bed 90 Degrees During Eating / Drinking;Single Sips / Bites;Alternate Solids / Liquids;Head of Bed 45 Degrees After Meals   Diet / Liquid Recommendation Regular - Easy to Chew (7);Thin (0)   Medication Administration  Whole with Liquid Wash   Therapy Interventions Dysphagia Therapy By Speech Language Pathologist   Dysphagia Rating   Nutritional Liquid Intake Rating Scale Non thickened beverages   Nutritional Food Intake Rating Scale Total oral diet with multiple consistencies without special preparation but with specific food limitations   Patient / Family Goals   Patient / Family Goal #1 \"I want something to drink\"   Goal #1 Outcome Progressing as expected   Short Term Goals   Short Term Goal # 1 Pt will tolerate EC7/TN0 diet without overt s/sx of pen/asp and min cuing "   Short Term Goal # 2 Pt will follow simple swallowing strategies with min cuing and 90% accuracy

## 2021-06-12 ENCOUNTER — APPOINTMENT (OUTPATIENT)
Dept: CARDIOLOGY | Facility: MEDICAL CENTER | Age: 56
DRG: 065 | End: 2021-06-12
Attending: INTERNAL MEDICINE
Payer: COMMERCIAL

## 2021-06-12 LAB
AMPHET UR QL SCN: NEGATIVE
ANION GAP SERPL CALC-SCNC: 15 MMOL/L (ref 7–16)
BARBITURATES UR QL SCN: NEGATIVE
BENZODIAZ UR QL SCN: NEGATIVE
BUN SERPL-MCNC: 13 MG/DL (ref 8–22)
BZE UR QL SCN: NEGATIVE
CALCIUM SERPL-MCNC: 9.4 MG/DL (ref 8.5–10.5)
CANNABINOIDS UR QL SCN: NEGATIVE
CHLORIDE SERPL-SCNC: 103 MMOL/L (ref 96–112)
CHOLEST SERPL-MCNC: 165 MG/DL (ref 100–199)
CO2 SERPL-SCNC: 22 MMOL/L (ref 20–33)
CREAT SERPL-MCNC: 1.54 MG/DL (ref 0.5–1.4)
GLUCOSE SERPL-MCNC: 96 MG/DL (ref 65–99)
HDLC SERPL-MCNC: 29 MG/DL
LDLC SERPL CALC-MCNC: 118 MG/DL
METHADONE UR QL SCN: NEGATIVE
OPIATES UR QL SCN: NEGATIVE
OXYCODONE UR QL SCN: NEGATIVE
PCP UR QL SCN: NEGATIVE
POTASSIUM SERPL-SCNC: 4.3 MMOL/L (ref 3.6–5.5)
PROPOXYPH UR QL SCN: NEGATIVE
SODIUM SERPL-SCNC: 140 MMOL/L (ref 135–145)
TRIGL SERPL-MCNC: 91 MG/DL (ref 0–149)

## 2021-06-12 PROCEDURE — 99232 SBSQ HOSP IP/OBS MODERATE 35: CPT | Performed by: NURSE PRACTITIONER

## 2021-06-12 PROCEDURE — 97161 PT EVAL LOW COMPLEX 20 MIN: CPT

## 2021-06-12 PROCEDURE — A9270 NON-COVERED ITEM OR SERVICE: HCPCS | Performed by: NURSE PRACTITIONER

## 2021-06-12 PROCEDURE — A9270 NON-COVERED ITEM OR SERVICE: HCPCS | Performed by: STUDENT IN AN ORGANIZED HEALTH CARE EDUCATION/TRAINING PROGRAM

## 2021-06-12 PROCEDURE — 97166 OT EVAL MOD COMPLEX 45 MIN: CPT

## 2021-06-12 PROCEDURE — 99232 SBSQ HOSP IP/OBS MODERATE 35: CPT | Performed by: INTERNAL MEDICINE

## 2021-06-12 PROCEDURE — 770020 HCHG ROOM/CARE - TELE (206)

## 2021-06-12 PROCEDURE — 80048 BASIC METABOLIC PNL TOTAL CA: CPT

## 2021-06-12 PROCEDURE — 700111 HCHG RX REV CODE 636 W/ 250 OVERRIDE (IP): Performed by: STUDENT IN AN ORGANIZED HEALTH CARE EDUCATION/TRAINING PROGRAM

## 2021-06-12 PROCEDURE — 700102 HCHG RX REV CODE 250 W/ 637 OVERRIDE(OP): Performed by: NURSE PRACTITIONER

## 2021-06-12 PROCEDURE — 99232 SBSQ HOSP IP/OBS MODERATE 35: CPT | Mod: GC | Performed by: INTERNAL MEDICINE

## 2021-06-12 PROCEDURE — 700102 HCHG RX REV CODE 250 W/ 637 OVERRIDE(OP): Performed by: STUDENT IN AN ORGANIZED HEALTH CARE EDUCATION/TRAINING PROGRAM

## 2021-06-12 PROCEDURE — 93308 TTE F-UP OR LMTD: CPT

## 2021-06-12 PROCEDURE — 80061 LIPID PANEL: CPT

## 2021-06-12 PROCEDURE — 36415 COLL VENOUS BLD VENIPUNCTURE: CPT

## 2021-06-12 PROCEDURE — 93308 TTE F-UP OR LMTD: CPT | Mod: 26 | Performed by: INTERNAL MEDICINE

## 2021-06-12 RX ORDER — HYDRALAZINE HYDROCHLORIDE 20 MG/ML
20 INJECTION INTRAMUSCULAR; INTRAVENOUS
Status: DISCONTINUED | OUTPATIENT
Start: 2021-06-12 | End: 2021-06-12

## 2021-06-12 RX ORDER — ACETAMINOPHEN 325 MG/1
650 TABLET ORAL EVERY 6 HOURS PRN
Status: DISCONTINUED | OUTPATIENT
Start: 2021-06-12 | End: 2021-06-12

## 2021-06-12 RX ORDER — HYDRALAZINE HYDROCHLORIDE 20 MG/ML
20 INJECTION INTRAMUSCULAR; INTRAVENOUS
Status: DISCONTINUED | OUTPATIENT
Start: 2021-06-12 | End: 2021-06-13

## 2021-06-12 RX ORDER — ACETAMINOPHEN 325 MG/1
650 TABLET ORAL EVERY 6 HOURS PRN
Status: DISCONTINUED | OUTPATIENT
Start: 2021-06-12 | End: 2021-06-18 | Stop reason: HOSPADM

## 2021-06-12 RX ORDER — LABETALOL HYDROCHLORIDE 5 MG/ML
10 INJECTION, SOLUTION INTRAVENOUS
Status: DISCONTINUED | OUTPATIENT
Start: 2021-06-12 | End: 2021-06-12

## 2021-06-12 RX ADMIN — ACETAMINOPHEN 650 MG: 325 TABLET, FILM COATED ORAL at 19:43

## 2021-06-12 RX ADMIN — ASPIRIN 325 MG: 325 TABLET, COATED ORAL at 06:07

## 2021-06-12 RX ADMIN — ENOXAPARIN SODIUM 40 MG: 40 INJECTION SUBCUTANEOUS at 07:41

## 2021-06-12 RX ADMIN — HYDRALAZINE HYDROCHLORIDE 20 MG: 20 INJECTION INTRAMUSCULAR; INTRAVENOUS at 21:14

## 2021-06-12 RX ADMIN — ATORVASTATIN CALCIUM 80 MG: 80 TABLET, FILM COATED ORAL at 17:56

## 2021-06-12 ASSESSMENT — ENCOUNTER SYMPTOMS
EYE ITCHING: 0
BRUISES/BLEEDS EASILY: 0
ADENOPATHY: 0
BLURRED VISION: 0
AGITATION: 0
SENSORY CHANGE: 1
DOUBLE VISION: 0
SPEECH DIFFICULTY: 0
CONSTIPATION: 0
EYE PAIN: 0
EYE DISCHARGE: 0
CONFUSION: 0
NECK PAIN: 0
MYALGIAS: 0
NUMBNESS: 0
DECREASED CONCENTRATION: 0
JOINT SWELLING: 0
DIZZINESS: 0
DIARRHEA: 0
PALPITATIONS: 0
COUGH: 0
TREMORS: 0
LIGHT-HEADEDNESS: 0
ABDOMINAL DISTENTION: 0
HEADACHES: 0
ABDOMINAL PAIN: 0
HEADACHES: 1
EYE REDNESS: 0
APPETITE CHANGE: 0
SHORTNESS OF BREATH: 0
POLYDIPSIA: 0
DIAPHORESIS: 0
COLOR CHANGE: 0
CHOKING: 0
BACK PAIN: 0
FOCAL WEAKNESS: 0
FLANK PAIN: 0
STRIDOR: 0
VOMITING: 0
WEAKNESS: 0
SPEECH CHANGE: 1
CHEST TIGHTNESS: 0
ACTIVITY CHANGE: 0
NERVOUS/ANXIOUS: 0
FEVER: 0
CHILLS: 0
POLYPHAGIA: 0
NAUSEA: 0
SEIZURES: 0
HALLUCINATIONS: 0

## 2021-06-12 ASSESSMENT — COGNITIVE AND FUNCTIONAL STATUS - GENERAL
WALKING IN HOSPITAL ROOM: A LITTLE
SUGGESTED CMS G CODE MODIFIER MOBILITY: CJ
PERSONAL GROOMING: A LITTLE
SUGGESTED CMS G CODE MODIFIER DAILY ACTIVITY: CJ
STANDING UP FROM CHAIR USING ARMS: A LITTLE
MOBILITY SCORE: 21
DAILY ACTIVITIY SCORE: 22
CLIMB 3 TO 5 STEPS WITH RAILING: A LITTLE
HELP NEEDED FOR BATHING: A LITTLE

## 2021-06-12 ASSESSMENT — PAIN DESCRIPTION - PAIN TYPE: TYPE: ACUTE PAIN

## 2021-06-12 ASSESSMENT — GAIT ASSESSMENTS
DEVIATION: BRADYKINETIC
GAIT LEVEL OF ASSIST: SUPERVISED
DISTANCE (FEET): 200

## 2021-06-12 NOTE — THERAPY
"Physical Therapy   Initial Evaluation     Patient Name: Paul Hopper  Age:  55 y.o., Sex:  male  Medical Record #: [id number]  Today's Date: 6/12/2021     Precautions: Fall Risk, Swallow Precautions ( See Comments)    Assessment  Patient is a 56 yo M who presented with chest pain and dizziness.  Today he demos impaired balance, functional mobility and R UE weakness. He is independent at baseline but today needs close supervision in the hallway for gait. Stroke work-up is still on-going. Patient has dysarthria and word finding difficulty. Will continue to follow while in house but may need increased assistance from his girlfriend at home and possible SPC    Plan    Recommend Physical Therapy 3 times per week until therapy goals are met for the following treatments:  Equipment, Gait Training, Neuro Re-Education / Balance, Stair Training, Therapeutic Activities and Therapeutic Exercises    DC Equipment Recommendations: Single Point Cane  Discharge Recommendations: Recommend outpatient physical therapy services to address higher level deficits       Subjective    \"I'm much better than last night\"     Objective       06/12/21 1400   Precautions   Precautions Fall Risk;Swallow Precautions ( See Comments)   Pain 0 - 10 Group   Pain Rating Scale (NPRS) 0   Prior Living Situation   Prior Services None   Housing / Facility Motel   Steps Into Home 0   Steps In Home 0   Elevator Yes   Bathroom Set up Bathtub / Shower Combination   Equipment Owned None   Lives with - Patient's Self Care Capacity Alone and Able to Care For Self   Comments Works in a [employer], has a girlfriend who can assist as needed    Prior Level of Functional Mobility   Bed Mobility Independent   Transfer Status Independent   Ambulation Independent   Distance Ambulation (Feet)   (community distances )   Assistive Devices Used None   Stairs Independent   History of Falls   History of Falls No   Cognition    Cognition / Consciousness X   Speech/ Communication " Dysarthric;Word Finding Impairment   Level of Consciousness Alert   Passive ROM Lower Body   Passive ROM Lower Body WDL   Active ROM Lower Body    Active ROM Lower Body  WDL   Strength Lower Body   Lower Body Strength  WDL   Sensation Lower Body   Lower Extremity Sensation   X   Comments baseline PN in B feet    Strength Upper Body   Upper Body Strength  X   Comments R UE with generalized weakness    Coordination Upper Body   Coordination WDL   Coordination Lower Body    Coordination Lower Body  WDL   Vision   Vision Comments wears glasses for reading    Balance Assessment   Sitting Balance (Static) Fair +   Sitting Balance (Dynamic) Fair +   Standing Balance (Static) Fair   Standing Balance (Dynamic) Fair -   Weight Shift Sitting Good   Weight Shift Standing Fair   Comments without AD   Gait Analysis   Gait Level Of Assist Supervised   Assistive Device None   Distance (Feet) 200   # of Times Distance was Traveled 2   Deviation Bradykinetic   Comments ocassional scissoring and decreased gait speed with hallway obstacles    Bed Mobility    Supine to Sit Supervised   Sit to Supine Supervised   Functional Mobility   Sit to Stand Supervised   How much difficulty does the patient currently have...   Turning over in bed (including adjusting bedclothes, sheets and blankets)? 4   Sitting down on and standing up from a chair with arms (e.g., wheelchair, bedside commode, etc.) 4   Moving from lying on back to sitting on the side of the bed? 4   How much help from another person does the patient currently need...   Moving to and from a bed to a chair (including a wheelchair)? 3   Need to walk in a hospital room? 3   Climbing 3-5 steps with a railing? 3   6 clicks Mobility Score 21   Activity Tolerance   Sitting in Chair NT   Sitting Edge of Bed 10 min   Standing 10 min    Patient / Family Goals    Patient / Family Goal #1 to go home    Short Term Goals    Short Term Goal # 1 in 6 visits patient will demo all transfers Jorge for  safe DC home    Short Term Goal # 2 in 6 visits patient will ambulate 400' Jorge for safe DC home    Education Group   Education Provided Gait Training;Role of Physical Therapist   Role of Physical Therapist Patient Response Patient;Acceptance;Explanation;Demonstration;Verbal Demonstration   Gait Training Patient Response Patient;Acceptance;Explanation;Demonstration;Action Demonstration;Verbal Demonstration   Problem List    Problems Impaired Ambulation;Impaired Transfers;Impaired Balance;Impaired Coordination   Anticipated Discharge Equipment and Recommendations   DC Equipment Recommendations Single Point Cane   Discharge Recommendations Recommend outpatient physical therapy services to address higher level deficits     Caryn Shields, PT, DPT, GCS

## 2021-06-12 NOTE — PROGRESS NOTES
4 Eyes Skin Assessment Completed by OBEY Adames and OBEY Byrnes.    Head WDL  Ears WDL  Nose WDL  Mouth WDL  Neck WDL  Breast/Chest WDL  Shoulder Blades WDL  Spine WDL  (R) Arm/Elbow/Hand WDL  (L) Arm/Elbow/Hand WDL  Abdomen WDL  Groin WDL  Scrotum/Coccyx/Buttocks WDL  (R) Leg WDL  (L) Leg WDL  (R) Heel/Foot/Toe WDL  (L) Heel/Foot/Toe WDL          Devices In Places Tele Box      Interventions In Place Pillows    Possible Skin Injury No    Pictures Uploaded Into Epic N/A  Wound Consult Placed N/A  RN Wound Prevention Protocol Ordered No

## 2021-06-12 NOTE — H&P
History & Physical Note    Date of Admission: 6/11/2021  Admission Status: Observation-Outpatient  Attending: Carlos Lauren M.D.   Senior Resident: Dr. Lauren  Intern: Dr. Live  Contact Number: [phone]    Chief Complaint: slurred speech, chest pain, and facial droop    History of Present Illness (HPI):   Paul is a 55 y.o. male with PMH of HTN who presented 6/11/2021 after experiencing slurred speech and mild confusion at his workplace. Last night at around 11:30 pm he had gone to bed and about ten minutes later he experienced tingling and numbness in his R arm and R leg, light headedness, and R hand finger numbness. He had never had symptoms like this prior. He then got up to go to the bathroom and had ataxia and trouble walking. He was able to go to sleep but when he woke up at 6:15 am to go to work the symptoms persisted and he noticed he had slurred speech. When he walked from the bus stop to his workplace he noticed he was walking slow with ataxia. At his work, he had mild confusion and his coworkers noticed his slurred speech and had difficulty understanding his words so they called EMS. On arrival in the ED reported chest pain. On evaluation troponin was negative and EKG was not concerning for ACS.    Review of Systems:   Review of Systems   Constitutional: Negative for chills and fever.   HENT: Negative for hearing loss and sore throat.    Eyes: Negative for blurred vision and photophobia.   Respiratory: Negative for cough and shortness of breath.    Cardiovascular: Positive for chest pain. Negative for palpitations.   Gastrointestinal: Negative for abdominal pain and vomiting.   Genitourinary: Negative for dysuria and urgency.   Musculoskeletal: Negative for back pain and joint pain.   Skin: Negative for itching and rash.   Neurological: Positive for tingling, sensory change, speech change and focal weakness. Negative for loss of consciousness and headaches.   Endo/Heme/Allergies: Negative for  polydipsia. Does not bruise/bleed easily.   Psychiatric/Behavioral: Negative for memory loss. The patient does not have insomnia.        Past Medical History:   Past Medical History was reviewed with patient.   has a past medical history of Hypertension.    Past Surgical History: Past Surgical History was reviewed with patient.   has no past surgical history on file.    Medications: Medications have been reviewed with patient.  None        Allergies: Allergies have been reviewed with patient.  No Known Allergies    Family History: denies any family hx of chronic diseases  family history is not on file.     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  HENT:      Head: Normocephalic.      Mouth/Throat:      Pharynx: No oropharyngeal exudate or posterior oropharyngeal erythema.   Eyes:      General: No scleral icterus.     Pupils: Pupils are equal, round, and reactive to light.      Comments: EOM not intact, difficulty tracking moving object   Cardiovascular:      Rate and Rhythm: Normal rate and regular rhythm.      Heart sounds: No murmur heard.     Pulmonary:      Effort: No respiratory distress.      Breath sounds: No wheezing.   Abdominal:      General: Bowel sounds are normal. There is no distension.      Palpations: Abdomen is  soft.      Tenderness: There is no abdominal tenderness.   Musculoskeletal:      Right lower leg: No edema.      Left lower leg: No edema.   Skin:     Findings: No erythema or rash.   Neurological:      Mental Status: He is alert and oriented to person, place, and time.      Comments: Very mild RUE weakness compared to left. On cerebellar exam does demonstrate some overshoot of target, possibly suggesting a visual defect present   Psychiatric:         Mood and Affect: Mood normal.         Behavior: Behavior normal.         Labs:   Recent Labs     06/11/21  0827   WBC 7.6   RBC 4.03*   HEMOGLOBIN 14.8   HEMATOCRIT 40.6*   .7*   MCH 36.7*   RDW 47.4   PLATELETCT 214   MPV 8.5*   NEUTSPOLYS 75.80*   LYMPHOCYTES 18.80*   MONOCYTES 3.80   EOSINOPHILS 0.90   BASOPHILS 0.40     Recent Labs     06/11/21  0827   SODIUM 136   POTASSIUM 4.1   CHLORIDE 102   CO2 24   GLUCOSE 169*   BUN 14     Recent Labs     06/11/21  0827   ALBUMIN 4.0   TBILIRUBIN 0.7   ALKPHOSPHAT 73   TOTPROTEIN 7.2   ALTSGPT 23   ASTSGOT 26   CREATININE 1.37     EKG: Per my read, sinus rhythm    Imaging:   EC-ECHOCARDIOGRAM COMPLETE W/O CONT   Final Result      CT-CTA NECK WITH & W/O-POST PROCESSING   Final Result      1.  Atherosclerotic plaque in the proximal right ICA with less than 50% stenosis.   2.  Atherosclerotic plaque in the proximal left subclavian artery with less than 50% stenosis.      CT-CTA HEAD WITH & W/O-POST PROCESS   Final Result      No thrombosis is seen within the [place].      CT-CEREBRAL PERFUSION ANALYSIS   Final Result      1.  Cerebral blood flow less than 30% likely representing completed infarct = 0 mL.      2.  T Max more than 6 seconds likely representing combination of completed infarct and ischemia = 16 mL.      3.  Mismatched volume likely representing ischemic brain/penumbra = 16 mL      4.  Please note that the cerebral perfusion was performed on the limited brain tissue around the basal ganglia region.  Infarct/ischemia outside the CT perfusion sections can be missed in this study.      CT-HEAD W/O   Final Result         1. No acute intracranial abnormality. No evidence of acute intracranial hemorrhage or mass lesion.               DX-CHEST-PORTABLE (1 VIEW)   Final Result         1. No acute cardiopulmonary abnormalities are identified.      EC-ECHOCARDIOGRAM LTD W/O CONT    (Results Pending)   MR-CERVICAL SPINE-WITH & W/O    (Results Pending)   MR-BRAIN-WITH & W/O    (Results Pending)       Previous Data Review: reviewed    Problem Representation: .  This is a 54 yo M who presents with a 1 day history of slurred speech and R sided weakness that are suggestive of a possible stroke. He will be hospitalized for further evaluation and will be allowed permissive hypertension in the first 48 hours since the onset of his symptoms.     * CVA (cerebral vascular accident) ([facility])  Assessment & Plan  -permissive HTN for first 48 hours  -UDS ordered  -echocardiogram  -lipid panel  -HbA1c  -TSH  -PT and OT  -Aspirin  -Statin  -MRI Brain w/ and w/o contrast  -telemetry monitoring    Abnormal echocardiogram  Assessment & Plan  Echo from 6/11/21 shows inferior hypokinsis and thinning.    -outpatient cardiology follow up should be arranged      Chest pain  Assessment & Plan  Patient presented with some chest pain and ST elevation in 1-lead, not meeting criteria for STEMI.      -troponin <6  -Echocardiogram ordered to rule out LV thrombus or heart failure

## 2021-06-12 NOTE — PROGRESS NOTES
Cardiology Follow Up Progress Note    Date of Service  6/12/2021    Attending Physician  Rozina Newberry M.D.    Chief Complaint   STEMI    XANDER Hopper is a 55 y.o. male admitted 6/11/2021 with STEMI    Interim Events  Echo yesterday showed inferior wall motion abnormalities  No chest pain  Carotids showed no obstructive disease  No afib yet    Review of Systems  Review of Systems   Constitutional: Negative for activity change, appetite change, chills and diaphoresis.   Eyes: Negative for pain, discharge, redness and itching.   Respiratory: Negative for cough, choking, chest tightness and stridor.    Cardiovascular: Negative for palpitations.   Gastrointestinal: Negative for abdominal distention, abdominal pain, constipation, diarrhea and nausea.   Endocrine: Negative for cold intolerance, heat intolerance, polydipsia and polyphagia.   Genitourinary: Negative for difficulty urinating, dysuria, enuresis, flank pain, frequency, genital sores and hematuria.   Musculoskeletal: Negative for back pain, gait problem, joint swelling and myalgias.   Skin: Negative for color change, pallor and rash.   Neurological: Positive for headaches. Negative for tremors, seizures, syncope, speech difficulty, weakness, light-headedness and numbness.   Hematological: Negative for adenopathy. Does not bruise/bleed easily.   Psychiatric/Behavioral: Negative for agitation, behavioral problems, confusion, decreased concentration and hallucinations. The patient is not nervous/anxious.        Vital signs in last 24 hours  Temp:  [36.7 °C (98.1 °F)-37.4 °C (99.4 °F)] 37.4 °C (99.4 °F)  Pulse:  [41-75] 62  Resp:  [9-21] 16  BP: (131-207)/() 164/98  SpO2:  [96 %-99 %] 98 %    Physical Exam  Physical Exam  Vitals and nursing note reviewed.   Constitutional:       General: He is not in acute distress.     Appearance: Normal appearance. He is normal weight. He is not ill-appearing, toxic-appearing or diaphoretic.   HENT:      Head:  Normocephalic and atraumatic.      Right Ear: Ear canal and external ear normal.      Left Ear: Ear canal and external ear normal.      Nose: Nose normal. No congestion or rhinorrhea.      Mouth/Throat:      Mouth: Mucous membranes are moist.      Pharynx: Oropharynx is clear. No oropharyngeal exudate or posterior oropharyngeal erythema.   Eyes:      General: No scleral icterus.        Right eye: No discharge.         Left eye: No discharge.      Extraocular Movements: Extraocular movements intact.      Conjunctiva/sclera: Conjunctivae normal.      Pupils: Pupils are equal, round, and reactive to light.   Neck:      Vascular: No carotid bruit.   Cardiovascular:      Rate and Rhythm: Normal rate and regular rhythm.      Pulses: Normal pulses.      Heart sounds: Normal heart sounds. No murmur heard.   No gallop.    Pulmonary:      Effort: Pulmonary effort is normal. No respiratory distress.      Breath sounds: Normal breath sounds. No stridor. No wheezing, rhonchi or rales.   Abdominal:      General: Abdomen is flat. Bowel sounds are normal. There is no distension.      Palpations: Abdomen is soft. There is no mass.      Tenderness: There is no abdominal tenderness. There is no guarding or rebound.      Hernia: No hernia is present.   Musculoskeletal:         General: No swelling or tenderness. Normal range of motion.      Cervical back: Normal range of motion and neck supple. No rigidity. No muscular tenderness.      Right lower leg: No edema.      Left lower leg: No edema.   Lymphadenopathy:      Cervical: No cervical adenopathy.   Skin:     General: Skin is warm and dry.      Capillary Refill: Capillary refill takes 2 to 3 seconds.      Coloration: Skin is not jaundiced or pale.      Findings: No bruising or lesion.   Neurological:      General: No focal deficit present.      Mental Status: He is alert and oriented to person, place, and time. Mental status is at baseline.      Cranial Nerves: No cranial nerve  deficit.      Motor: No weakness.   Psychiatric:         Mood and Affect: Mood normal.         Behavior: Behavior normal.         Thought Content: Thought content normal.         Judgment: Judgment normal.         Lab Review  Lab Results   Component Value Date/Time    WBC 7.6 06/11/2021 08:27 AM    RBC 4.03 (L) 06/11/2021 08:27 AM    HEMOGLOBIN 14.8 06/11/2021 08:27 AM    HEMATOCRIT 40.6 (L) 06/11/2021 08:27 AM    .7 (H) 06/11/2021 08:27 AM    MCH 36.7 (H) 06/11/2021 08:27 AM    MCHC 36.5 (H) 06/11/2021 08:27 AM    MPV 8.5 (L) 06/11/2021 08:27 AM      Lab Results   Component Value Date/Time    SODIUM 136 06/11/2021 08:27 AM    POTASSIUM 4.1 06/11/2021 08:27 AM    CHLORIDE 102 06/11/2021 08:27 AM    CO2 24 06/11/2021 08:27 AM    GLUCOSE 169 (H) 06/11/2021 08:27 AM    BUN 14 06/11/2021 08:27 AM    CREATININE 1.37 06/11/2021 08:27 AM      Lab Results   Component Value Date/Time    ASTSGOT 26 06/11/2021 08:27 AM    ALTSGPT 23 06/11/2021 08:27 AM     Lab Results   Component Value Date/Time    CHOLSTRLTOT 165 06/12/2021 01:09 AM     (H) 06/12/2021 01:09 AM    HDL 29 (A) 06/12/2021 01:09 AM    TRIGLYCERIDE 91 06/12/2021 01:09 AM    TROPONINT <6 06/11/2021 08:27 AM       No results for input(s): NTPROBNP in the last 72 hours.    Cardiac Imaging and Procedures Review  Dated 6/11/2021 personally viewed interpret myself showing normal sinus rhythm with ST elevation in lead II.    Echocardiogram:  Dated 6/11/2021 personally reviewed and interpreted by myself showing EF 50%, inferior wall motion abnormality    Cardiac Catheterization:  NA    Imaging  Chest X-Ray:  NA     Stress Test:  NA    Assessment/Plan  No new Assessment & Plan notes have been filed under this hospital service since the last note was generated.  Service: Cardiology  56 yo M with acute ischemic stroke.  He will need an outpatient workup for his cardiac issues but he did not have a STEMI or NSTEMI so we can hold off.  Please start a low dose beta  blocker and lisinopril as DC and continue atorvastatin.  PLease have him follow up in cardiology clinic.    Thank you for allowing me to participate in the care of this patient.  Cardiology will sign off on this patient    Please contact me with any questions.    Ron Milton M.D.   Cardiologist, [facility]  [phone]

## 2021-06-12 NOTE — THERAPY
"Occupational Therapy   Initial Evaluation     Patient Name: Paul Hopper  Age:  55 y.o., Sex:  male  Medical Record #: [id number]  Today's Date: 6/12/2021     Precautions  Precautions: Fall Risk, Swallow Precautions ( See Comments)    Assessment  Patient is 55 y.o. male with a diagnosis of chest pain, slurred speech, facial droop. No findings on CT, still awaiting MRI results.  Additional factors influencing patient status / progress: Hx of hypertension. Patient doing fairly well today, he presents with minimal speech deficits, has equal strength bilaterally. His balance is mildly impacted, we used a FWW for in room mobility, which helped overall. He seems to have slightly decreased sequencing as well as visual deficits - patient unable to track finger from left to right, ran into items in the room; when asked, he reported double vision. When instructed to close on eye for improvement, he said that he can only see clearly out of his R eye. Will follow while in house.       Plan    Recommend Occupational Therapy 3 times per week until therapy goals are met for the following treatments:  Adaptive Equipment, Cognitive Skill Development, Manual Therapy Techniques, Neuro Re-Education / Balance, Self Care/Activities of Daily Living, Therapeutic Activities and Therapeutic Exercises.       Discharge Recommendations: Recommend home health for continued occupational therapy services     Subjective    \"I'm a lot better from yesterday.\"     Objective       06/12/21 0953   Prior Living Situation   Prior Services None   Housing / Facility Motel   Steps Into Home 0   Steps In Home 0   Elevator Yes   Bathroom Set up Bathtub / Shower Combination   Equipment Owned None   Lives with - Patient's Self Care Capacity Alone and Able to Care For Self   Prior Level of ADL Function   Comments Ind prior   Prior Level of IADL Function   Medication Management Independent   Laundry Independent   Kitchen Mobility Independent   Finances Independent "   Home Management Independent   Shopping Independent   Prior Level Of Mobility Independent Without Device in Community;Independent Without Device in Home   Driving / Transportation Walks;Utilizes Public Transportation   Occupation (Pre-Hospital Vocational) Other (Comments)  (works in [email or web address])   Cognition    Cognition / Consciousness X   Level of Consciousness Alert   Safety Awareness Impulsive;Impaired   Sequencing Impaired   Initiation Impaired   Balance Assessment   Sitting Balance (Static) Fair +   Sitting Balance (Dynamic) Fair +   Standing Balance (Static) Fair   Standing Balance (Dynamic) Fair -   Weight Shift Sitting Good   Weight Shift Standing Fair   Comments With FWW   Bed Mobility    Supine to Sit Supervised   Sit to Supine Supervised   ADL Assessment   Eating Supervision   Grooming Standing;Minimal Assist  (for sequencing)   Lower Body Dressing Supervision  (extra time for socks)   Functional Mobility   Sit to Stand Supervised   Transfer Method Stand Pivot   Mobility sup>sit, STS   Visual Perception   Visual Perception  X   Comments Patient running into things when walking around room, reporst double vision. With one eye closed, he says only the vision from his right eye is clear   Activity Tolerance   Sitting in Chair 10+ mins after session   Sitting Edge of Bed 5 mins   Standing 10 mins   Short Term Goals   Short Term Goal # 1 Patient will complete standing G/H without cues for sequencing   Short Term Goal # 2 Patient will complete ADL xfers Mod I

## 2021-06-12 NOTE — PROGRESS NOTES
Chief Complaint   Patient presents with   • Chest Pain     intermittent onset 11-12 last night non radiating   • Slurred Speech     last noc   • Facial Droop     right side       Problem List Items Addressed This Visit     Chest pain     Patient presented with some chest pain and ST elevation in 1-lead, not meeting criteria for STEMI.      -troponin <6  -Echocardiogram ordered to rule out LV thrombus or heart failure           Other Visit Diagnoses     Speech disturbance, unspecified type            Neurology Stroke Progress Note    History of present illness:  This is a 55-year old male with no known Pmhx who presented to [facility] on 6/11/21 for a chief complaint of chest pain. Patient reports that chest pain started appx 2300 last night 6/10/21; subsequently went to sleep. When he awoke this morning, chest pain was still present, thus he called EMS. On scene, SBP 160s; EKG not consistent with STEMI. On arrival here, patient was noted to have Right facial weakness, RUE weakness and dysarthria, thus Stroke Alert IR was called. Patient reports that he may have noted Right facial weakness last night, but he is unsure when the symptoms started. Stat CT head has revealed no acute intracranial abnormality; CTA head/neck with no LVO. CT perfusion study with likely artifactual findings; no large territorial perfusion mismatch.   Currently, patient is laying in bed; awake and alert. Admits to mild headache, holohemispheric, dull in nature. He denies dizziness or room spinning sensation. He denies numbness, paresthesia, or problem with vision or swallowing. He denies SOB, nausea or vomiting. NIHSS is currently 5.     Neurology has been consulted by Dr. Sharif Feliciano to further evaluate the findings noted above.     Interval, 6/12/21:  Patient sitting up in chair at bedside; awake and alert; eating breakfast. Feels well; facial droop improved; admits to mild persistent slurred speech and RUE weakness (also  improved). He denies headache, dizziness. Awaiting MRI Brain wo contrast today. No events overnight.     No changes to HPI as was previously documented.     Past medical history:   Past Medical History:   Diagnosis Date   • Hypertension        Past surgical history:   No past surgical history on file.    Family history:   No family history on file.    Social history:   Social History     Socioeconomic History   • Marital status: Unknown     Spouse name: Not on file   • Number of children: Not on file   • Years of education: Not on file   • Highest education level: Not on file   Occupational History   • Not on file   Tobacco Use   • Smoking status: Never Smoker   • Smokeless tobacco: Never Used   Vaping Use   • Vaping Use: Never assessed   Substance and Sexual Activity   • Alcohol use: Yes     Alcohol/week: 1.2 oz     Types: 2 Standard drinks or equivalent per week   • Drug use: Never   • Sexual activity: Not on file   Other Topics Concern   • Not on file   Social History Narrative   • Not on file     Social Determinants of Health     Financial Resource Strain:    • Difficulty of Paying Living Expenses:    Food Insecurity:    • Worried About Running Out of Food in the Last Year:    • Ran Out of Food in the Last Year:    Transportation Needs:    • Lack of Transportation (Medical):    • Lack of Transportation (Non-Medical):    Physical Activity:    • Days of Exercise per Week:    • Minutes of Exercise per Session:    Stress:    • Feeling of Stress :    Social Connections:    • Frequency of Communication with Friends and Family:    • Frequency of Social Gatherings with Friends and Family:    • Attends [employer] Services:    • Active Member of Clubs or Organizations:    • Attends Club or Organization Meetings:    • Marital Status:    Intimate Partner Violence:    • Fear of Current or Ex-Partner:    • Emotionally Abused:    • Physically Abused:    • Sexually Abused:        Current medications:   Current  Facility-Administered Medications   Medication Dose   • [START ON 6/13/2021] aspirin EC (ECOTRIN) tablet 81 mg  81 mg   • enoxaparin (LOVENOX) inj 40 mg  40 mg   • labetalol (NORMODYNE/TRANDATE) injection 10 mg  10 mg   • Allow for permissive hypertension: SBP up to 220 mmHg/DBP to 120 mmHg; If SBP greater than 220 mmHg or DBP greater than 120 mmHg administer PRN antihypertensive medications.     • atorvastatin (LIPITOR) tablet 80 mg  80 mg       Medication Allergy:  No Known Allergies    Review of systems:   Constitutional: denies fever, night sweats, weight loss.   Eyes: denies acute vision change, eye pain or secretion.   Ears, Nose, Mouth, Throat: denies nasal secretion, nasal bleeding, difficulty swallowing, hearing loss, tinnitus, vertigo, ear pain, acute dental problems, oral ulcers or lesions.   Endocrine: denies recent weight changes, heat or cold intolerance, polyuria, polydypsia, polyphagia,abnormal hair growth.  Cardiovascular: As noted above. Denies dyspnea.   Pulmonary: denies shortness of breath, new onset of cough, hemoptysis, wheezing, chest pain or flu-like symptoms.   GI: denies nausea, vomiting, diarrhea, GI bleeding, change in appetite, abdominal pain, and change in bowel habits.  : denies dysuria, urinary incontinence, hematuria.  Heme/oncology: denies history of easy bruising or bleeding. No history of cancer, DVTor PE.  Allergy/immunology: denies hives/urticaria, or itching.   Dermatologic: denies new rash, or new skin lesions.  Musculoskeletal:denies joint swelling or pain, muscle pain, neck and back pain.   Neurologic:As noted in detail above.   Psychiatric: denies symptoms of depression, anxiety, hallucinations, mood swings or changes, suicidal or homicidal thoughts.     Physical examination:   Vitals:    06/11/21 2100 06/12/21 0000 06/12/21 0500 06/12/21 0738   BP: (!) 207/117 (!) 190/104 151/93 (!) 164/98   Pulse: (!) 58 (!) 48 (!) 55 62   Resp: 16 16 16 16   Temp: 37 °C (98.6 °F) 37.1  °C (98.7 °F) 36.8 °C (98.2 °F) 37.4 °C (99.4 °F)   TempSrc: Temporal Temporal Temporal Temporal   SpO2: 97% 97% 97% 98%   Weight:       Height:         General: Patient in no acute distress, pleasant and cooperative.  HEENT: Normocephalic, no signs of acute trauma.   Neck: supple, no meningeal signs or carotid bruits. There is normal range of motion. No tenderness on exam.   Chest: clear to auscultation. No cough.   CV: RRR, no murmurs.   Skin: no signs of acute rashes or trauma.   Musculoskeletal: joints exhibit full range of motion, without any pain to palpation. There are no signs of joint or muscle swelling. There is no tenderness to deep palpation of muscles.   Psychiatric: No hallucinatory behavior. Denies symptoms of depression or suicidal ideation. Mood and affect appear normal on exam.     NEUROLOGICAL EXAM:   Mental status, orientation: Awake, alert and fully oriented.   Speech and language: speech is fluent, mildly dysarthric. The patient is able to name, repeat and comprehend.   Cranial nerve exam: Pupils are 3-4 mm bilaterally and equally reactive to light. Visual fields are intact by confrontation. There is no nystagmus on primary or secondary gaze. Intact full EOM in all directions of gaze. Right NLF present, improved/near resolved today; decreased sensation to Right face to light touch. Uvula is midline. Palate elevates symmetrically. Tongue is midline and without any signs of tongue biting or fasciculations.Shoulder shrug is intact bilaterally.   Motor exam: Strength is 5/5 in LUE/LLE; 4/5 to RUE with drift; 4+/ 5 to RLE with no drift. Tone is normal. No abnormal movements were seen on exam.   Sensory exam reveals normal sense of light touch and pinprick in all extremities.   Deep tendon reflexes:  Plantar responses are flexor. There is no clonus.   Coordination: No dysmetria/ataxia today.    Gait: Not assessed at this time as patient is a fall risk.       NIH Stroke Scale    1a Level of  Consciousness   1b Orientation Questions   1c Response to Commands   2 Gaze   3 Visual Fields   4 Facial Movement   5 Motor Function (arm)   a Left   b Right 1  6 Motor Function (leg)   a Left   b Right   7 Limb Ataxia   8 Sensory 1  9 Language   10 Articulation 1  11 Extinction/Inattention     Score: 3      ANCILLARY DATA REVIEWED:     Lab Data Review:  Recent Results (from the past 24 hour(s))   URINE DRUG SCREEN    Collection Time: 06/11/21 11:45 PM   Result Value Ref Range    Amphetamines Urine Negative Negative    Barbiturates Negative Negative    Benzodiazepines Negative Negative    Cocaine Metabolite Negative Negative    Methadone Negative Negative    Opiates Negative Negative    Oxycodone Negative Negative    Phencyclidine -Pcp Negative Negative    Propoxyphene Negative Negative    Cannabinoid Metab Negative Negative   Lipid Profile    Collection Time: 06/12/21  1:09 AM   Result Value Ref Range    Cholesterol,Tot 165 100 - 199 mg/dL    Triglycerides 91 0 - 149 mg/dL    HDL 29 (A) >=40 mg/dL     (H) <100 mg/dL   Basic Metabolic Panel    Collection Time: 06/12/21  1:09 AM   Result Value Ref Range    Sodium 140 135 - 145 mmol/L    Potassium 4.3 3.6 - 5.5 mmol/L    Chloride 103 96 - 112 mmol/L    Co2 22 20 - 33 mmol/L    Glucose 96 65 - 99 mg/dL    Bun 13 8 - 22 mg/dL    Creatinine 1.54 (H) 0.50 - 1.40 mg/dL    Calcium 9.4 8.5 - 10.5 mg/dL    Anion Gap 15.0 7.0 - 16.0   ESTIMATED GFR    Collection Time: 06/12/21  1:09 AM   Result Value Ref Range    GFR If  57 (A) >60 mL/min/1.73 m 2    GFR If Non  47 (A) >60 mL/min/1.73 m 2       Labs reviewed by me.       Imaging reviewed by me:     EC-ECHOCARDIOGRAM COMPLETE W/O CONT   Final Result      CT-CTA NECK WITH & W/O-POST PROCESSING   Final Result      1.  Atherosclerotic plaque in the proximal right ICA with less than 50% stenosis.   2.  Atherosclerotic plaque in the proximal left subclavian artery with less than 50% stenosis.       CT-CTA HEAD WITH & W/O-POST PROCESS   Final Result      No thrombosis is seen within the [place].      CT-CEREBRAL PERFUSION ANALYSIS   Final Result      1.  Cerebral blood flow less than 30% likely representing completed infarct = 0 mL.      2.  T Max more than 6 seconds likely representing combination of completed infarct and ischemia = 16 mL.      3.  Mismatched volume likely representing ischemic brain/penumbra = 16 mL      4.  Please note that the cerebral perfusion was performed on the limited brain tissue around the basal ganglia region. Infarct/ischemia outside the CT perfusion sections can be missed in this study.      CT-HEAD W/O   Final Result         1. No acute intracranial abnormality. No evidence of acute intracranial hemorrhage or mass lesion.               DX-CHEST-PORTABLE (1 VIEW)   Final Result         1. No acute cardiopulmonary abnormalities are identified.      MR-CERVICAL SPINE-WITH & W/O    (Results Pending)   MR-BRAIN-WITH & W/O    (Results Pending)   EC-ECHOCARDIOGRAM LTD W/O CONT    (Results Pending)       Presumed mechanism by TOAST:  __Large Artery Atherosclerosis  __Small Vessel (Lacunar)  __Cardioembolic  __Other (Sickle Cell, Vasculitis, Hypercoagulable)  _X_Unknown    Modified [place] Scale (MRS): 0 = No symptoms      ASSESSMENT AND PLAN:  55-year old male with no known Pmhx who presented to [facility] on 6/11/21 for a chief complaint of chest pain, onset 2300 6/10; on arrival here, no obvious STEMI per EKG. Shortly after presentation, patient was noted to have Right facial weakness, RUE weakness and dysarthria; patient reports that he noted Right facial weakness on 6/10 before going to sleep as well; patient thus not a candidate for IV tPA given presentation time greater than 4.5 hours from time of symptom onset (outside of window). STAT CT head revealed no acute intracranial abnormality; CTA head/neck with no LVO. NIHSS 5-->3 today; Right facial weakness  nearly resolved; RUE weakness, dysarthria persists. MRI Brain wo contrast is pending.  Suspect evolving small acute ischemic stroke, likely small vessel or posterior circulation.    Recommendations/Plan:     -q4h and PRN neuro assessment. VS per nursing/unit protocol. Permissive HTN ok until 6/13/21, not to exceed SBP > 220, DBP > 105. Then, BP goal < 140/90. Antihypertensives per primary team.   -Obtain MRI Brain wo contrast-- still pending.   -Telemetry; currently SR. Screen for Afib/arrhythmia. Note TTE with EF 60%; no gross structural abnormalities.   - mg PO now and q day and Atorvastatin 80 mg PO q HS. Note LDL is 118, goal < 100.   -Recommend aggressive BG management per primary team. Avoid IVF with Dextrose. BG goal 140-180. hemoglobin A1c is 5.1.   -PT/OT/SLP eval and treat.    -Counseled patient at length regarding life style and risk factor modification for secondary stroke prevention.   -All other medical management per primary team.   -DVT PPX: SCDs.      Will follow up with results of the above and make additional recommendations accordingly. The plan of care above has been discussed with Dr. Cortes.     GLENDA Zavala.P.R.N.  [facility]

## 2021-06-12 NOTE — ASSESSMENT & PLAN NOTE
Resolved. Patient presented with some chest pain and ST elevation in 1-lead, not meeting criteria for STEMI.  ACS unlikely  -troponin <6. EKGs here without acute ST-T changes.  -TTE rules out heart failure

## 2021-06-12 NOTE — CARE PLAN
Problem: Optimal Care of the Stroke Patient  Goal: Optimal emergency care for the stroke patient  Outcome: Progressing  Goal: Optimal acute care for the stroke patient  Outcome: Progressing     Problem: Knowledge Deficit - Stroke Education  Goal: Patient's knowledge of stroke and risk factors will improve  Outcome: Progressing     Problem: Psychosocial - Patient Condition  Goal: Patient's ability to verbalize feelings about condition will improve  Outcome: Progressing  Goal: Patient's ability to re-evaluate and adapt role responsibilities will improve  Outcome: Progressing     Problem: Discharge Planning - Stroke  Goal: Ensure Stroke Core Measures are met prior to discharge  Outcome: Progressing  Goal: Patient’s continuum of care needs will be met  Outcome: Progressing     Problem: Neuro Status  Goal: Neuro status will remain stable or improve  Outcome: Progressing     Problem: Hemodynamic Monitoring  Goal: Patient's hemodynamics, fluid balance and neurologic status will be stable or improve  Outcome: Progressing     Problem: Respiratory - Stroke Patient  Goal: Patient will achieve/maintain optimum respiratory rate/effort  Outcome: Progressing     Problem: Dysphagia  Goal: Dysphagia will improve  Outcome: Progressing     Problem: Risk for Aspiration  Goal: Patient's risk for aspiration will be absent or decrease  Outcome: Progressing     Problem: Urinary Elimination  Goal: Establish and maintain regular urinary output  Outcome: Progressing     Problem: Bowel Elimination  Goal: Establish and maintain regular bowel function  Outcome: Progressing     Problem: Mobility - Stroke  Goal: Patient's capacity to carry out activities will improve  Outcome: Progressing  Goal: Spasticity will be prevented or improved  Outcome: Progressing  Goal: Subluxation will be prevented or improved  Outcome: Progressing     Problem: Self Care  Goal: Patient will have the ability to perform ADLs independently or with assistance (bathe,  groom, dress, toilet and feed)  Outcome: Progressing     Problem: Knowledge Deficit - Standard  Goal: Patient and family/care givers will demonstrate understanding of plan of care, disease process/condition, diagnostic tests and medications  Outcome: Progressing   The patient is Stable - Low risk of patient condition declining or worsening    Shift Goals  Clinical Goals: complete MRI  Patient Goals: sleep  Family Goals: NA    Progress made toward(s) clinical / shift goals:  Improvement in neurological systems    Patient is not progressing towards the following goals:

## 2021-06-12 NOTE — ASSESSMENT & PLAN NOTE
Stroke was likely secondary to very uncontrolled hypertension.  UDS negative.  TTE without inter atrial shunt, 60% EF, no LVH, inferior hypokinesis, ascending aorta 3.1 cm  LDL elevated. HA1c wnl.   MRI brain with left parasagittal aspen stroke    PLAN:  -Tele monitoring for arrhythmias  -Encourage good p.o. intake of fluids, IVFs if needed  and compression stockings.   -Labile blood pressure likely due to stroke territory causing autonomic instability.  Avoid hypotension for better brain perfusion.  -ASA, statin  -PT and OT recommended post-acute placement  -PM&R consulted on 6/17  -SLP: likely no needs post-hospitalization  -Outpatient neurology stroke bridge clinic referral has been placed; plan to follow up in 2-4 weeks.   -Outpatient cardiac monitoring with Zio patch at time of discharge to formally rule out cardioembolic/cardiac arrhythmia as source of stroke.

## 2021-06-12 NOTE — CARE PLAN
The patient is Stable - Low risk of patient condition declining or worsening    Shift Goals  Clinical Goals: complete MRI  Patient Goals: rest  Family Goals: NA    Progress made toward(s) clinical / shift goals:    Problem: Optimal Care of the Stroke Patient  Goal: Optimal acute care for the stroke patient  Outcome: Progressing  Note: Stroke core measures in place. NIH score of 5. Patient educated and aware of POC. Q4 neuro checks in place.     Problem: Neuro Status  Goal: Neuro status will remain stable or improve  Outcome: Progressing  Note: Right sided weakness, tingling, and slurred speech are improving.     Problem: Mobility - Stroke  Goal: Patient's capacity to carry out activities will improve  Outcome: Progressing       Patient is not progressing towards the following goals:

## 2021-06-12 NOTE — ASSESSMENT & PLAN NOTE
Echo from 6/11/21 shows inferior hypokinsis and thinning.    -outpatient cardiology follow up possible MPI

## 2021-06-12 NOTE — CARE PLAN
The patient is Stable - Low risk of patient condition declining or worsening    Shift Goals  Clinical Goals: MRI   Patient Goals: Comfort & MRI  Family Goals: NA    Progress made toward(s) clinical / shift goals:  Plan for MRI asap. Screening form complete.    Patient is not progressing towards the following goals:

## 2021-06-12 NOTE — PROGRESS NOTES
Monitor summary: SB/SR 50-68, MN 0.17, QRS 0.09, QT 0.35, with rare PACs and bradycardic episodes as low as 42 per strip from monitor room.

## 2021-06-13 ENCOUNTER — APPOINTMENT (OUTPATIENT)
Dept: RADIOLOGY | Facility: MEDICAL CENTER | Age: 56
DRG: 065 | End: 2021-06-13
Attending: PSYCHIATRY & NEUROLOGY
Payer: COMMERCIAL

## 2021-06-13 ENCOUNTER — APPOINTMENT (OUTPATIENT)
Dept: RADIOLOGY | Facility: MEDICAL CENTER | Age: 56
DRG: 065 | End: 2021-06-13
Attending: STUDENT IN AN ORGANIZED HEALTH CARE EDUCATION/TRAINING PROGRAM
Payer: COMMERCIAL

## 2021-06-13 PROBLEM — R56.9 SEIZURE-LIKE ACTIVITY (HCC): Status: ACTIVE | Noted: 2021-06-13

## 2021-06-13 PROBLEM — R79.89 BLOOD CREATININE INCREASED COMPARED WITH PRIOR MEASUREMENT: Status: ACTIVE | Noted: 2021-06-13

## 2021-06-13 PROBLEM — E03.9 HYPOTHYROIDISM: Status: ACTIVE | Noted: 2021-06-13

## 2021-06-13 PROBLEM — I10 HYPERTENSION: Status: ACTIVE | Noted: 2021-06-13

## 2021-06-13 LAB
ALBUMIN SERPL BCP-MCNC: 3.9 G/DL (ref 3.2–4.9)
ALBUMIN/GLOB SERPL: 1.3 G/DL
ALP SERPL-CCNC: 69 U/L (ref 30–99)
ALT SERPL-CCNC: 20 U/L (ref 2–50)
ANION GAP SERPL CALC-SCNC: 14 MMOL/L (ref 7–16)
AST SERPL-CCNC: 16 U/L (ref 12–45)
BASOPHILS # BLD AUTO: 0.6 % (ref 0–1.8)
BASOPHILS # BLD: 0.05 K/UL (ref 0–0.12)
BILIRUB SERPL-MCNC: 1 MG/DL (ref 0.1–1.5)
BUN SERPL-MCNC: 18 MG/DL (ref 8–22)
CALCIUM SERPL-MCNC: 8.9 MG/DL (ref 8.5–10.5)
CHLORIDE SERPL-SCNC: 102 MMOL/L (ref 96–112)
CO2 SERPL-SCNC: 21 MMOL/L (ref 20–33)
CREAT SERPL-MCNC: 1.63 MG/DL (ref 0.5–1.4)
CREAT UR-MCNC: 162.64 MG/DL
EKG IMPRESSION: NORMAL
EOSINOPHIL # BLD AUTO: 0.22 K/UL (ref 0–0.51)
EOSINOPHIL NFR BLD: 2.5 % (ref 0–6.9)
ERYTHROCYTE [DISTWIDTH] IN BLOOD BY AUTOMATED COUNT: 46.7 FL (ref 35.9–50)
GLOBULIN SER CALC-MCNC: 3 G/DL (ref 1.9–3.5)
GLUCOSE BLD-MCNC: 124 MG/DL (ref 65–99)
GLUCOSE SERPL-MCNC: 133 MG/DL (ref 65–99)
HCT VFR BLD AUTO: 41.4 % (ref 42–52)
HGB BLD-MCNC: 15.5 G/DL (ref 14–18)
IMM GRANULOCYTES # BLD AUTO: 0.04 K/UL (ref 0–0.11)
IMM GRANULOCYTES NFR BLD AUTO: 0.5 % (ref 0–0.9)
LYMPHOCYTES # BLD AUTO: 3.3 K/UL (ref 1–4.8)
LYMPHOCYTES NFR BLD: 38.2 % (ref 22–41)
MAGNESIUM SERPL-MCNC: 2 MG/DL (ref 1.5–2.5)
MAGNESIUM SERPL-MCNC: 2 MG/DL (ref 1.5–2.5)
MCH RBC QN AUTO: 37.2 PG (ref 27–33)
MCHC RBC AUTO-ENTMCNC: 37.4 G/DL (ref 33.7–35.3)
MCV RBC AUTO: 99.3 FL (ref 81.4–97.8)
MONOCYTES # BLD AUTO: 0.56 K/UL (ref 0–0.85)
MONOCYTES NFR BLD AUTO: 6.5 % (ref 0–13.4)
NEUTROPHILS # BLD AUTO: 4.48 K/UL (ref 1.82–7.42)
NEUTROPHILS NFR BLD: 51.7 % (ref 44–72)
NRBC # BLD AUTO: 0 K/UL
NRBC BLD-RTO: 0 /100 WBC
PLATELET # BLD AUTO: 256 K/UL (ref 164–446)
PMV BLD AUTO: 8.8 FL (ref 9–12.9)
POTASSIUM SERPL-SCNC: 3.3 MMOL/L (ref 3.6–5.5)
PROT SERPL-MCNC: 6.9 G/DL (ref 6–8.2)
RBC # BLD AUTO: 4.17 M/UL (ref 4.7–6.1)
SODIUM SERPL-SCNC: 137 MMOL/L (ref 135–145)
T4 FREE SERPL-MCNC: 0.9 NG/DL (ref 0.93–1.7)
TROPONIN T SERPL-MCNC: <6 NG/L (ref 6–19)
TSH SERPL DL<=0.005 MIU/L-ACNC: 5.55 UIU/ML (ref 0.38–5.33)
WBC # BLD AUTO: 8.7 K/UL (ref 4.8–10.8)

## 2021-06-13 PROCEDURE — 80053 COMPREHEN METABOLIC PANEL: CPT

## 2021-06-13 PROCEDURE — A9576 INJ PROHANCE MULTIPACK: HCPCS | Performed by: PSYCHIATRY & NEUROLOGY

## 2021-06-13 PROCEDURE — 83735 ASSAY OF MAGNESIUM: CPT

## 2021-06-13 PROCEDURE — 700102 HCHG RX REV CODE 250 W/ 637 OVERRIDE(OP): Performed by: STUDENT IN AN ORGANIZED HEALTH CARE EDUCATION/TRAINING PROGRAM

## 2021-06-13 PROCEDURE — 700117 HCHG RX CONTRAST REV CODE 255: Performed by: PSYCHIATRY & NEUROLOGY

## 2021-06-13 PROCEDURE — 770020 HCHG ROOM/CARE - TELE (206)

## 2021-06-13 PROCEDURE — 70553 MRI BRAIN STEM W/O & W/DYE: CPT

## 2021-06-13 PROCEDURE — A9270 NON-COVERED ITEM OR SERVICE: HCPCS | Performed by: STUDENT IN AN ORGANIZED HEALTH CARE EDUCATION/TRAINING PROGRAM

## 2021-06-13 PROCEDURE — 700111 HCHG RX REV CODE 636 W/ 250 OVERRIDE (IP): Performed by: STUDENT IN AN ORGANIZED HEALTH CARE EDUCATION/TRAINING PROGRAM

## 2021-06-13 PROCEDURE — 93005 ELECTROCARDIOGRAM TRACING: CPT | Performed by: STUDENT IN AN ORGANIZED HEALTH CARE EDUCATION/TRAINING PROGRAM

## 2021-06-13 PROCEDURE — 99232 SBSQ HOSP IP/OBS MODERATE 35: CPT | Mod: GC | Performed by: INTERNAL MEDICINE

## 2021-06-13 PROCEDURE — 85025 COMPLETE CBC W/AUTO DIFF WBC: CPT

## 2021-06-13 PROCEDURE — 84520 ASSAY OF UREA NITROGEN: CPT

## 2021-06-13 PROCEDURE — 84484 ASSAY OF TROPONIN QUANT: CPT

## 2021-06-13 PROCEDURE — 72156 MRI NECK SPINE W/O & W/DYE: CPT

## 2021-06-13 PROCEDURE — 84439 ASSAY OF FREE THYROXINE: CPT

## 2021-06-13 PROCEDURE — 84443 ASSAY THYROID STIM HORMONE: CPT

## 2021-06-13 PROCEDURE — 71045 X-RAY EXAM CHEST 1 VIEW: CPT

## 2021-06-13 PROCEDURE — 700105 HCHG RX REV CODE 258: Performed by: STUDENT IN AN ORGANIZED HEALTH CARE EDUCATION/TRAINING PROGRAM

## 2021-06-13 PROCEDURE — 99232 SBSQ HOSP IP/OBS MODERATE 35: CPT | Performed by: NURSE PRACTITIONER

## 2021-06-13 PROCEDURE — 93010 ELECTROCARDIOGRAM REPORT: CPT | Performed by: INTERNAL MEDICINE

## 2021-06-13 PROCEDURE — 82570 ASSAY OF URINE CREATININE: CPT

## 2021-06-13 PROCEDURE — 36415 COLL VENOUS BLD VENIPUNCTURE: CPT

## 2021-06-13 PROCEDURE — 74018 RADEX ABDOMEN 1 VIEW: CPT

## 2021-06-13 PROCEDURE — 82962 GLUCOSE BLOOD TEST: CPT

## 2021-06-13 PROCEDURE — 70450 CT HEAD/BRAIN W/O DYE: CPT

## 2021-06-13 RX ORDER — CAPTOPRIL 12.5 MG/1
12.5 TABLET ORAL TWICE DAILY
Status: DISCONTINUED | OUTPATIENT
Start: 2021-06-13 | End: 2021-06-13

## 2021-06-13 RX ORDER — ENALAPRILAT 1.25 MG/ML
1.25 INJECTION INTRAVENOUS EVERY 6 HOURS PRN
Status: DISCONTINUED | OUTPATIENT
Start: 2021-06-13 | End: 2021-06-13

## 2021-06-13 RX ORDER — PROCHLORPERAZINE EDISYLATE 5 MG/ML
5 INJECTION INTRAMUSCULAR; INTRAVENOUS EVERY 12 HOURS PRN
Status: DISCONTINUED | OUTPATIENT
Start: 2021-06-13 | End: 2021-06-18 | Stop reason: HOSPADM

## 2021-06-13 RX ORDER — LORAZEPAM 2 MG/ML
1-2 INJECTION INTRAMUSCULAR
Status: DISCONTINUED | OUTPATIENT
Start: 2021-06-13 | End: 2021-06-14

## 2021-06-13 RX ORDER — HYDRALAZINE HYDROCHLORIDE 20 MG/ML
10 INJECTION INTRAMUSCULAR; INTRAVENOUS EVERY 6 HOURS PRN
Status: DISCONTINUED | OUTPATIENT
Start: 2021-06-13 | End: 2021-06-13

## 2021-06-13 RX ORDER — SODIUM CHLORIDE 9 MG/ML
500 INJECTION, SOLUTION INTRAVENOUS ONCE
Status: COMPLETED | OUTPATIENT
Start: 2021-06-13 | End: 2021-06-13

## 2021-06-13 RX ORDER — LEVOTHYROXINE SODIUM 0.05 MG/1
50 TABLET ORAL
Status: DISCONTINUED | OUTPATIENT
Start: 2021-06-13 | End: 2021-06-16

## 2021-06-13 RX ORDER — AMLODIPINE BESYLATE 5 MG/1
5 TABLET ORAL
Status: DISCONTINUED | OUTPATIENT
Start: 2021-06-13 | End: 2021-06-14

## 2021-06-13 RX ORDER — OMEPRAZOLE 20 MG/1
20 CAPSULE, DELAYED RELEASE ORAL DAILY
Status: DISCONTINUED | OUTPATIENT
Start: 2021-06-13 | End: 2021-06-18 | Stop reason: HOSPADM

## 2021-06-13 RX ORDER — POTASSIUM CHLORIDE 20 MEQ/1
40 TABLET, EXTENDED RELEASE ORAL ONCE
Status: COMPLETED | OUTPATIENT
Start: 2021-06-13 | End: 2021-06-13

## 2021-06-13 RX ORDER — HYDROCHLOROTHIAZIDE 25 MG/1
12.5 TABLET ORAL
Status: DISCONTINUED | OUTPATIENT
Start: 2021-06-13 | End: 2021-06-14

## 2021-06-13 RX ORDER — LORAZEPAM 2 MG/ML
INJECTION INTRAMUSCULAR
Status: ACTIVE
Start: 2021-06-13 | End: 2021-06-13

## 2021-06-13 RX ORDER — HYDRALAZINE HYDROCHLORIDE 20 MG/ML
10 INJECTION INTRAMUSCULAR; INTRAVENOUS EVERY 6 HOURS PRN
Status: DISCONTINUED | OUTPATIENT
Start: 2021-06-13 | End: 2021-06-14

## 2021-06-13 RX ADMIN — ATORVASTATIN CALCIUM 80 MG: 80 TABLET, FILM COATED ORAL at 17:08

## 2021-06-13 RX ADMIN — AMLODIPINE BESYLATE 5 MG: 5 TABLET ORAL at 10:29

## 2021-06-13 RX ADMIN — ASPIRIN 81 MG: 81 TABLET, COATED ORAL at 05:40

## 2021-06-13 RX ADMIN — SODIUM CHLORIDE 500 ML: 9 INJECTION, SOLUTION INTRAVENOUS at 02:24

## 2021-06-13 RX ADMIN — ENOXAPARIN SODIUM 40 MG: 40 INJECTION SUBCUTANEOUS at 05:41

## 2021-06-13 RX ADMIN — OMEPRAZOLE 20 MG: 20 CAPSULE, DELAYED RELEASE ORAL at 09:59

## 2021-06-13 RX ADMIN — PROCHLORPERAZINE EDISYLATE 5 MG: 5 INJECTION INTRAMUSCULAR; INTRAVENOUS at 09:59

## 2021-06-13 RX ADMIN — POTASSIUM BICARBONATE 50 MEQ: 978 TABLET, EFFERVESCENT ORAL at 05:40

## 2021-06-13 RX ADMIN — ACETAMINOPHEN 650 MG: 325 TABLET, FILM COATED ORAL at 10:37

## 2021-06-13 RX ADMIN — HYDROCHLOROTHIAZIDE 12.5 MG: 25 TABLET ORAL at 17:58

## 2021-06-13 RX ADMIN — GADOTERIDOL 18 ML: 279.3 INJECTION, SOLUTION INTRAVENOUS at 19:19

## 2021-06-13 RX ADMIN — POTASSIUM CHLORIDE 40 MEQ: 1500 TABLET, EXTENDED RELEASE ORAL at 07:57

## 2021-06-13 RX ADMIN — LEVOTHYROXINE SODIUM 50 MCG: 0.05 TABLET ORAL at 13:03

## 2021-06-13 RX ADMIN — POTASSIUM CHLORIDE 40 MEQ: 1500 TABLET, EXTENDED RELEASE ORAL at 11:03

## 2021-06-13 RX ADMIN — ENALAPRILAT 1.25 MG: 1.25 INJECTION INTRAVENOUS at 12:48

## 2021-06-13 ASSESSMENT — ENCOUNTER SYMPTOMS
HEMOPTYSIS: 0
VOMITING: 0
FEVER: 0
LOSS OF CONSCIOUSNESS: 1
COUGH: 0
EYE PAIN: 0
SPUTUM PRODUCTION: 0
TREMORS: 0
MYALGIAS: 1
EYE DISCHARGE: 0
DIARRHEA: 0
NERVOUS/ANXIOUS: 1
EYE REDNESS: 0
BRUISES/BLEEDS EASILY: 0
INSOMNIA: 0
WEAKNESS: 0
BLURRED VISION: 0
WHEEZING: 0
POLYDIPSIA: 0
WEIGHT LOSS: 0
DIAPHORESIS: 1
BACK PAIN: 1
DIZZINESS: 1
PALPITATIONS: 0
MEMORY LOSS: 0
NECK PAIN: 1
NAUSEA: 0
SEIZURES: 1
VOMITING: 1
FOCAL WEAKNESS: 0
HEADACHES: 0
SHORTNESS OF BREATH: 0
DIZZINESS: 0
HEARTBURN: 0
SPEECH CHANGE: 1
CHILLS: 0
FALLS: 1
PHOTOPHOBIA: 0
ABDOMINAL PAIN: 0
DEPRESSION: 0
CLAUDICATION: 0
DOUBLE VISION: 0
SENSORY CHANGE: 1
NAUSEA: 1
ORTHOPNEA: 0
NECK PAIN: 0
BLOOD IN STOOL: 0
MYALGIAS: 0
HEADACHES: 1
TINGLING: 1
CONSTIPATION: 0

## 2021-06-13 ASSESSMENT — PAIN DESCRIPTION - PAIN TYPE
TYPE: ACUTE PAIN

## 2021-06-13 ASSESSMENT — LIFESTYLE VARIABLES: SUBSTANCE_ABUSE: 0

## 2021-06-13 NOTE — CARE PLAN
The patient is Stable - Low risk of patient condition declining or worsening    Shift Goals  Clinical Goals: complete MRI  Patient Goals: MRI, alleviate headache  Family Goals: MRI, comfort    Progress made toward(s) clinical / shift goals:    Problem: Knowledge Deficit - Stroke Education  Goal: Patient's knowledge of stroke and risk factors will improve  Outcome: Progressing  Note: Updated patient and family on POC, including NIH, vitals, and MRI     Problem: Neuro Status  Goal: Neuro status will remain stable or improve  Outcome: Progressing     Problem: Hemodynamic Monitoring  Goal: Patient's hemodynamics, fluid balance and neurologic status will be stable or improve  Outcome: Progressing  Note: Medicated for blood pressure and collaborated with MD regarding permissive hypertension orders       Patient is not progressing towards the following goals:

## 2021-06-13 NOTE — CODE DOCUMENTATION
Rapid response called after patient had brief LOC while walking to bathroom, bedside RN assisted patient to floor. RRT arrived to find patient sitting on floor, awake with slurred speech. Then patient had a seizure last approx 60 seconds, upper extremities became very stiff and eyes had leftward fixed gaze. Post ictal phase lasted approx 5 minutes, eyes open but nonverbal. Patient assisted back to bed then patient became more responsive and oriented x4 but slurred speech seems worse, right facial droop slightly worse.

## 2021-06-13 NOTE — PROGRESS NOTES
0150: Patient called RN into the room to assist to the bathroom. Upon walking, the patient became unsteady, diaphoretic, and unresponsive. Lowered to the floor, episode quickly resolved. Staff assist initiated. Patient's speech noted to be more slurred and incomprehensable. Rapid response initiated. Patient then had another episode of seizure like activity - rigid muscles, drooling, unresponsiveness, and bladder incontinence. MD to bedside, orders received. Patient off the floor to stat CT at approx 0225. Dr. Mayo ([facility] team) updated.

## 2021-06-13 NOTE — DISCHARGE PLANNING
Anticipated Discharge Disposition: Home with HH    Action: Order for HH received. Spoke with the patient at the bedside about HH choices. Patient's choices were as follows:  1 [facility]  2 [facility]  3 [facility]  4 [facility]    The patient reported that his PCP is Dr. Sheikh but he has not seen him in over 2 years.    Choice form faxed [place] Сергей RUFF at [phone]    Barriers to Discharge: HH acceptance, medical clearance    Plan: Will need to follow up with HH

## 2021-06-13 NOTE — PROGRESS NOTES
"Daily Progress Note:     Date of Service: 6/12/2021  Primary Team: [facility] Team   Attending: Rozina Newberry M.D.   Senior Resident: Dr. Mendes   Intern: Dr. Jose  Contact:  [phone]    Interval duration:   -NAEO    Subjective:  Patient states he is feeling much better than he did yesterday, that his speech is more clear.  He states that his weakness has resolved, however he is still feeling numbness and tingling in both his right upper extremity as well as his right lower extremity.    He states that he has a history of hypertension not he was hospitalized several years ago at [facility] with a \"high\" blood pressure of 232/134.  He states that the last time he took antihypertensive medication was several years ago and that he knows he should be on it. He does not check his blood pressure at home.    He denies chest pain or palpitations.     Consultants/Specialty:  neurology  Review of Systems:  Review of Systems   Constitutional: Negative for chills and fever.   Eyes: Negative for blurred vision and double vision.   Respiratory: Negative for cough and shortness of breath.    Cardiovascular: Negative for chest pain, palpitations and leg swelling.   Gastrointestinal: Negative for abdominal pain, nausea and vomiting.   Genitourinary: Negative for dysuria and urgency.   Musculoskeletal: Negative for myalgias and neck pain.   Skin: Negative for itching and rash.   Neurological: Positive for sensory change (N/T in RUE and RLE) and speech change. Negative for dizziness, focal weakness and headaches.       Objective Data:   Physical Exam:   Vitals:   Temp:  [36.8 °C (98.2 °F)-37.4 °C (99.4 °F)] 37.1 °C (98.7 °F)  Pulse:  [48-62] 56  Resp:  [16] 16  BP: (151-207)/() 206/118  SpO2:  [96 %-98 %] 97 %    Physical Exam  Constitutional:       Appearance: Normal appearance.   HENT:      Head: Normocephalic and atraumatic.      Mouth/Throat:      Mouth: Mucous membranes are moist.      Pharynx: " Oropharynx is clear.   Eyes:      General: No scleral icterus.        Right eye: No discharge.         Left eye: No discharge.      Extraocular Movements: Extraocular movements intact.      Conjunctiva/sclera: Conjunctivae normal.      Pupils: Pupils are equal, round, and reactive to light.      Comments: Rightward gaze with some saccadic movement   Cardiovascular:      Rate and Rhythm: Normal rate and regular rhythm.      Pulses: Normal pulses.      Heart sounds: No murmur heard.   No gallop.    Pulmonary:      Effort: Pulmonary effort is normal. No respiratory distress.      Breath sounds: Normal breath sounds. No wheezing.   Abdominal:      General: There is no distension.      Palpations: Abdomen is soft.      Tenderness: There is no abdominal tenderness.   Musculoskeletal:      Cervical back: Normal range of motion.      Right lower leg: No edema.      Left lower leg: No edema.   Skin:     General: Skin is warm and dry.   Neurological:      Mental Status: He is alert.      Cranial Nerves: No cranial nerve deficit.      Sensory: Sensory deficit (numbness/decreased sensation in RUE up to upper arm and in RLE (foot and lower leg tested)) present.      Motor: No weakness.      Comments: AOx4   Psychiatric:         Mood and Affect: Mood normal.         Behavior: Behavior normal.           Labs:   See results    Imaging:   CT head wnl, CTA with left proximal subclavian and right proximal ICA stenosis of less than 50% CTA  CT perfusion study reveals infarct/ischemia in bilateral frontal lobe  MRI pending    Problem Representation:  55-year-old male with past medical history significant for uncontrolled hypertension presenting with slurred speech, confusion, gait abnormality and right upper extremity weakness concerning for ischemic stroke.    * CVA (cerebral vascular accident) (HCC)  Assessment & Plan  Neurologically improved.  Likely secondary to very uncontrolled hypertension.  UDS negative.  TTE without inter  atrial shunt, 60% EF, no LVH, inferior hypokinesis, ascending aorta 3.1 cm  LDL elevated. HA1c wnl.   PLAN:  -tele  -f/u MRI brain  -permissive HTN for first 48 hours (ending 6/13), then will start 2 BP medication regimen (lisinopril, amlodipine)  -ASA, statin  -PT and OT: home health  -f/u SLP: likely no needs post-hospitalization    Abnormal echocardiogram  Assessment & Plan  Echo from 6/11/21 shows inferior hypokinsis and thinning.    -outpatient cardiology follow up should be arranged      Chest pain  Assessment & Plan  Resolved. Patient presented with some chest pain and ST elevation in 1-lead, not meeting criteria for STEMI.    -troponin <6. EKGs here without acute ST-T changes.  -TTE rules out heart failure

## 2021-06-13 NOTE — DISCHARGE PLANNING
Received Choice form at [phone]  Agency/Facility Name: 1)[facility], 2)[facility]. 3)[facility], 4)[facility]  Referral sent per Choice form @ [phone]

## 2021-06-13 NOTE — PROGRESS NOTES
Cross Coverage Note    Saw patient after return from CT. Rapid was called for two brief seizure-like episodes w/ bladder incontinence. Blood pressure back to 150/50 post-bolus, other vitals wnl. He notes that he feels sleepy, otherwise denies any focal weakness, headache, numbness, or syncope.     Neuro exam: AOx4, R sided facial droop and slurred speech which is baseline or near baseline per RN. PERRLA, unable to cooperate with prompts for extraocular motions, vision intact. Tongue midline and moves appropriately. Rest of CN exam wnl. Mallampati class 3, unable to properly visualize uvula. L sided hearing loss which is also apparently baseline. 5+ strength bilateral upper and lower extremities, no focal weaknesses, muscle tone normal.  On cerebellar exam, found to have dysmetria with finger-to-nose testing, as well as disdiadochokinesia w/ alternating hand movements. Per chart review there was documented cerebellar deficits on admit as well. Only mild history of alcohol use per history.    CV/pulm exam: RRR, CTAB  Abd: nontender, soft    A/P    #Seizure  Loss of bladder continence, post-ictal. Patient denies any precipitating symptoms I.e feeling warm, numbness, tingling, etc. Possible post-stroke seizure vs electrolyte abnormalities  -Magnesium level ordered  -Very mild hypothyroidism day team to f/u  -CTH normal, glucose normal  -MRI still pending, suspect subacute stroke in posterior circulation per my exam, no acute worsening of neurologic status at this time, continue asa/statin daily   -Day team to follow-up with neurology to consider EEG and/or initiation of AEDs  -Seizure precautions

## 2021-06-13 NOTE — FACE TO FACE
Face to Face Supporting Documentation - Home Health    The encounter with this patient was in whole or in part the primary reason for home health admission.    Date of encounter:   Patient:                    MRN:                       YOB: 2019  Paul Hopper  [id number]  1965     Home health to see patient for:  Skilled Nursing care for assessment, interventions & education, Physical Therapy evaluation and treatment and Occupational therapy evaluation and treatment    Skilled need for:  Comment: stroke, need for PT and OT    Skilled nursing interventions to include:  Comment: PT/OT    Community Physician to provide follow up care: Pcp Pt States None     Optional Interventions? No      I certify the face to face encounter for this home health care referral meets the CMS requirements and the encounter/clinical assessment with the patient was, in whole, or in part, for the medical condition(s) listed above, which is the primary reason for home health care. Based on my clinical findings: the service(s) are medically necessary, support the need for home health care, and the homebound criteria are met.  I certify that this patient has had a face to face encounter by myself.  Fuad Mendes M.D. - NPI: [id number]

## 2021-06-13 NOTE — PROGRESS NOTES
Monitor summary: SR 47-81, DC 0.17, QRS 0.09, QT 0.37, with rare PVCs/PACs and rare trigeminal PVCs, and a 2 sec pause per strip from monitor room.

## 2021-06-13 NOTE — PROGRESS NOTES
RRT called overnight for possible seizure activity. Pt is a 54yo M w/ HTN. Admitted on 6/11 with complai nts of chest pain,  right arm numbness and slurred speech.  Admitted for possible CVA with MRI currently pending. RRT called as pt had possible seizure like activity with diffuse muscle rigidity x2. Incidences lasted ~30 seconds each, and pt with possible postictal state with confusion. Incidences resolved w/o ativan. On arrival, it appears confusion has somewhat improved, and he is AAOx3. Pt is noted to have some slurring of his speech as well as right sided facial droop. Facial droop previously documented, unclear if worsened. Per nurse at bedside, slurring speech is mildly worsened than prior. Pt follows all commands and no other obvious focal neuro deficits appreciated. Pt currently denies any further complaints.Of note, pt prevoiusly HTN with SBP into the 170-180s ~9pm the previous night, he was given Hydralazine 20mg IV x1.    PE:  Vitals: HR 48-55, BP 88/52, RR 20, O2 95%  Constitution: No acute distress  HENT: Right sided facial droop, slurring of speech  EYES: pupils equally round, reactive to light  Cardio: Bradycardic  Pulm: Clear to ascultation b/l  Abd: Soft, non distended, non tender  Muskuloskeletal: grossly normal  Neuro: AAOx 4, Decreased sensation RUE and RLE. Slurred speech and facial droop, otherwise no other focal neuro deficits  Psych: mood normal    - Will give NS 500cc bolus and monitor vitals  - c/w tele monitor  - check TSH/FT4  - stat CBC, CMP, and glucose  - will get STAT CT head  - Ativan PRN for seizure activity  - Seizure precautions

## 2021-06-13 NOTE — PROGRESS NOTES
Chief Complaint   Patient presents with   • Chest Pain     intermittent onset 11-12 last night non radiating   • Slurred Speech     last noc   • Facial Droop     right side       Problem List Items Addressed This Visit     * (Principal) CVA (cerebral vascular accident) (HCC)     Neurologically improved.  Likely secondary to very uncontrolled hypertension.  UDS negative.  TTE without inter atrial shunt, 60% EF, no LVH, inferior hypokinesis, ascending aorta 3.1 cm  LDL elevated. HA1c wnl.   PLAN:  -tele  -f/u MRI brain  -permissive HTN for first 48 hours (ending 6/13), then will start 2 BP medication regimen (lisinopril, amlodipine)  -ASA, statin  -PT and OT: home health  -f/u SLP: likely no needs post-hospitalization         Relevant Medications    atorvastatin (LIPITOR) tablet 80 mg    enoxaparin (LOVENOX) inj 40 mg    hydrALAZINE (APRESOLINE) injection 20 mg    amLODIPine (NORVASC) tablet 5 mg    Other Relevant Orders    REFERRAL TO HOME HEALTH    Chest pain     Resolved. Patient presented with some chest pain and ST elevation in 1-lead, not meeting criteria for STEMI.    -troponin <6. EKGs here without acute ST-T changes.  -TTE rules out heart failure           Other Visit Diagnoses     Speech disturbance, unspecified type            Neurology Stroke Progress Note    History of present illness:  This is a 55-year old male with no known Pmhx who presented to [facility] on 6/11/21 for a chief complaint of chest pain. Patient reports that chest pain started appx 2300 last night 6/10/21; subsequently went to sleep. When he awoke this morning, chest pain was still present, thus he called EMS. On scene, SBP 160s; EKG not consistent with STEMI. On arrival here, patient was noted to have Right facial weakness, RUE weakness and dysarthria, thus Stroke Alert IR was called. Patient reports that he may have noted Right facial weakness last night, but he is unsure when the symptoms started. Stat CT head has revealed no  "acute intracranial abnormality; CTA head/neck with no LVO. CT perfusion study with likely artifactual findings; no large territorial perfusion mismatch.   Currently, patient is laying in bed; awake and alert. Admits to mild headache, holohemispheric, dull in nature. He denies dizziness or room spinning sensation. He denies numbness, paresthesia, or problem with vision or swallowing. He denies SOB, nausea or vomiting. NIHSS is currently 5.     Neurology has been consulted by Dr. Sharif Feliciano to further evaluate the findings noted above.     Interval, 6/12/21:  Patient sitting up in chair at bedside; awake and alert; eating breakfast. Feels well; facial droop improved; admits to mild persistent slurred speech and RUE weakness (also improved). He denies headache, dizziness. Awaiting MRI Brain wo contrast today. No events overnight.     Interval, 6/13/21:  Patient sitting up in bed; awaken and alert. Reports speech, RUE weakness is improved today.     Overnight, patient had a seizure versus syncopal event; per report, the patient had to use the bathroom, stood from laying down; thereafter fell to the ground and had seizure-like activity (\"diffuse muscle rigidity\" appx 30 seconds x 2) and bladder incontinence. No trauma/head trauma. CT head with no acute intracranial abnormality. Of note, patient reports that he had febrile seizures as a child/infant, no recent or remote AEDs nor recent similar events. Still awaiting MRI Brain wo contrast.     No changes to HPI as was previously documented.     Past medical history:   Past Medical History:   Diagnosis Date   • Hypertension        Past surgical history:   No past surgical history on file.    Family history:   No family history on file.    Social history:   Social History     Socioeconomic History   • Marital status: Unknown     Spouse name: Not on file   • Number of children: Not on file   • Years of education: Not on file   • Highest education level: Not on file "   Occupational History   • Not on file   Tobacco Use   • Smoking status: Never Smoker   • Smokeless tobacco: Never Used   Vaping Use   • Vaping Use: Never assessed   Substance and Sexual Activity   • Alcohol use: Yes     Alcohol/week: 1.2 oz     Types: 2 Standard drinks or equivalent per week   • Drug use: Never   • Sexual activity: Not on file   Other Topics Concern   • Not on file   Social History Narrative   • Not on file     Social Determinants of Health     Financial Resource Strain:    • Difficulty of Paying Living Expenses:    Food Insecurity:    • Worried About Running Out of Food in the Last Year:    • Ran Out of Food in the Last Year:    Transportation Needs:    • Lack of Transportation (Medical):    • Lack of Transportation (Non-Medical):    Physical Activity:    • Days of Exercise per Week:    • Minutes of Exercise per Session:    Stress:    • Feeling of Stress :    Social Connections:    • Frequency of Communication with Friends and Family:    • Frequency of Social Gatherings with Friends and Family:    • Attends Jew Services:    • Active Member of Clubs or Organizations:    • Attends Club or Organization Meetings:    • Marital Status:    Intimate Partner Violence:    • Fear of Current or Ex-Partner:    • Emotionally Abused:    • Physically Abused:    • Sexually Abused:        Current medications:   Current Facility-Administered Medications   Medication Dose   • LORAZEPAM 2 MG/ML INJ SOLN     • LORazepam (ATIVAN) injection 1-2 mg  1-2 mg   • omeprazole (PRILOSEC) capsule 20 mg  20 mg   • prochlorperazine (COMPAZINE) injection 5 mg  5 mg   • amLODIPine (NORVASC) tablet 5 mg  5 mg   • aspirin EC (ECOTRIN) tablet 81 mg  81 mg   • enoxaparin (LOVENOX) inj 40 mg  40 mg   • acetaminophen (Tylenol) tablet 650 mg  650 mg   • [Held by provider] hydrALAZINE (APRESOLINE) injection 20 mg  20 mg   • atorvastatin (LIPITOR) tablet 80 mg  80 mg       Medication Allergy:  No Known Allergies    Review of systems:    Constitutional: denies fever, night sweats, weight loss.   Eyes: denies acute vision change, eye pain or secretion.   Ears, Nose, Mouth, Throat: denies nasal secretion, nasal bleeding, difficulty swallowing, hearing loss, tinnitus, vertigo, ear pain, acute dental problems, oral ulcers or lesions.   Endocrine: denies recent weight changes, heat or cold intolerance, polyuria, polydypsia, polyphagia,abnormal hair growth.  Cardiovascular: As noted above. Denies dyspnea.   Pulmonary: denies shortness of breath, new onset of cough, hemoptysis, wheezing, chest pain or flu-like symptoms.   GI: denies nausea, vomiting, diarrhea, GI bleeding, change in appetite, abdominal pain, and change in bowel habits.  : denies dysuria, urinary incontinence, hematuria.  Heme/oncology: denies history of easy bruising or bleeding. No history of cancer, DVTor PE.  Allergy/immunology: denies hives/urticaria, or itching.   Dermatologic: denies new rash, or new skin lesions.  Musculoskeletal:denies joint swelling or pain, muscle pain, neck and back pain.   Neurologic:As noted in detail above.   Psychiatric: denies symptoms of depression, anxiety, hallucinations, mood swings or changes, suicidal or homicidal thoughts.     Physical examination:   Vitals:    06/13/21 0215 06/13/21 0217 06/13/21 0252 06/13/21 1029   BP: (!) 88/52 109/66 155/49 (!) 174/92   Pulse: (!) 48 (!) 52 (!) 53    Resp: 20 20 18    Temp:   36.1 °C (97 °F)    TempSrc:   Temporal    SpO2: 95% 95% 100%    Weight:       Height:         General: Patient in no acute distress, pleasant and cooperative.  HEENT: Normocephalic, no signs of acute trauma.   Neck: supple, no meningeal signs or carotid bruits. There is normal range of motion. No tenderness on exam.   Chest: clear to auscultation. No cough.   CV: RRR, no murmurs.   Skin: no signs of acute rashes or trauma.   Musculoskeletal: joints exhibit full range of motion, without any pain to palpation. There are no signs of joint  or muscle swelling. There is no tenderness to deep palpation of muscles.   Psychiatric: No hallucinatory behavior. Denies symptoms of depression or suicidal ideation. Mood and affect appear normal on exam.     NEUROLOGICAL EXAM:   Mental status, orientation: Awake, alert and fully oriented.   Speech and language: speech is fluent, mildly dysarthric. The patient is able to name, repeat and comprehend.   Cranial nerve exam: Pupils are 3-4 mm bilaterally and equally reactive to light. Visual fields are intact by confrontation. There is no nystagmus on primary or secondary gaze. Intact full EOM in all directions of gaze. Face symmetrical; normal sensation. Uvula is midline. Palate elevates symmetrically. Tongue is midline and without any signs of tongue biting or fasciculations.Shoulder shrug is intact bilaterally.   Motor exam: Strength is 5/5 in LUE/LLE; 4+/5 to RUE with minimal drift; 5/ 5 to RLE with no drift. Tone is normal. No abnormal movements were seen on exam.   Sensory exam reveals normal sense of light touch and pinprick in all extremities.   Deep tendon reflexes:  Plantar responses are flexor. There is no clonus.   Coordination: No dysmetria/ataxia    Gait: Not assessed at this time as patient is a fall risk.     NIH Stroke Scale    1a Level of Consciousness   1b Orientation Questions   1c Response to Commands   2 Gaze   3 Visual Fields   4 Facial Movement   5 Motor Function (arm)   a Left   b Right 1  6 Motor Function (leg)   a Left   b Right   7 Limb Ataxia   8 Sensory   9 Language   10 Articulation 1  11 Extinction/Inattention     Score: 2      ANCILLARY DATA REVIEWED:     Lab Data Review:  Recent Results (from the past 24 hour(s))   POCT glucose device results    Collection Time: 06/13/21  2:00 AM   Result Value Ref Range    Glucose - Accu-Ck 124 (H) 65 - 99 mg/dL   TSH WITH REFLEX TO FT4    Collection Time: 06/13/21  2:23 AM   Result Value Ref Range    TSH 5.550 (H) 0.380 - 5.330 uIU/mL   CBC WITH  DIFFERENTIAL    Collection Time: 06/13/21  2:23 AM   Result Value Ref Range    WBC 8.7 4.8 - 10.8 K/uL    RBC 4.17 (L) 4.70 - 6.10 M/uL    Hemoglobin 15.5 14.0 - 18.0 g/dL    Hematocrit 41.4 (L) 42.0 - 52.0 %    MCV 99.3 (H) 81.4 - 97.8 fL    MCH 37.2 (H) 27.0 - 33.0 pg    MCHC 37.4 (H) 33.7 - 35.3 g/dL    RDW 46.7 35.9 - 50.0 fL    Platelet Count 256 164 - 446 K/uL    MPV 8.8 (L) 9.0 - 12.9 fL    Neutrophils-Polys 51.70 44.00 - 72.00 %    Lymphocytes 38.20 22.00 - 41.00 %    Monocytes 6.50 0.00 - 13.40 %    Eosinophils 2.50 0.00 - 6.90 %    Basophils 0.60 0.00 - 1.80 %    Immature Granulocytes 0.50 0.00 - 0.90 %    Nucleated RBC 0.00 /100 WBC    Neutrophils (Absolute) 4.48 1.82 - 7.42 K/uL    Lymphs (Absolute) 3.30 1.00 - 4.80 K/uL    Monos (Absolute) 0.56 0.00 - 0.85 K/uL    Eos (Absolute) 0.22 0.00 - 0.51 K/uL    Baso (Absolute) 0.05 0.00 - 0.12 K/uL    Immature Granulocytes (abs) 0.04 0.00 - 0.11 K/uL    NRBC (Absolute) 0.00 K/uL   Comp Metabolic Panel    Collection Time: 06/13/21  2:23 AM   Result Value Ref Range    Sodium 137 135 - 145 mmol/L    Potassium 3.3 (L) 3.6 - 5.5 mmol/L    Chloride 102 96 - 112 mmol/L    Co2 21 20 - 33 mmol/L    Anion Gap 14.0 7.0 - 16.0    Glucose 133 (H) 65 - 99 mg/dL    Bun 18 8 - 22 mg/dL    Creatinine 1.63 (H) 0.50 - 1.40 mg/dL    Calcium 8.9 8.5 - 10.5 mg/dL    AST(SGOT) 16 12 - 45 U/L    ALT(SGPT) 20 2 - 50 U/L    Alkaline Phosphatase 69 30 - 99 U/L    Total Bilirubin 1.0 0.1 - 1.5 mg/dL    Albumin 3.9 3.2 - 4.9 g/dL    Total Protein 6.9 6.0 - 8.2 g/dL    Globulin 3.0 1.9 - 3.5 g/dL    A-G Ratio 1.3 g/dL   ESTIMATED GFR    Collection Time: 06/13/21  2:23 AM   Result Value Ref Range    GFR If  54 (A) >60 mL/min/1.73 m 2    GFR If Non  44 (A) >60 mL/min/1.73 m 2   FREE THYROXINE    Collection Time: 06/13/21  2:23 AM   Result Value Ref Range    Free T-4 0.90 (L) 0.93 - 1.70 ng/dL   MAGNESIUM    Collection Time: 06/13/21  2:23 AM   Result Value Ref  Range    Magnesium 2.0 1.5 - 2.5 mg/dL   EKG    Collection Time: 21  8:44 AM   Result Value Ref Range    Report       [facility]    Test Date:  2021  Pt Name:    MIRANDA REARDON                Department: [facility]  MRN:        [id number]                      Room:       [facility]  Gender:     Male                         Technician: MANOHAR  :        1965                   Requested By:RONAN SWIFT  Order #:    [id number]                    Reading MD:    Measurements  Intervals                                Axis  Rate:       60                           P:          38  SC:         152                          QRS:        -24  QRSD:       86                           T:          -5  QT:         472  QTc:        472    Interpretive Statements  SINUS RHYTHM  BORDERLINE LEFT AXIS DEVIATION  BORDERLINE T ABNORMALITIES, INFERIOR LEADS  BASELINE WANDER IN LEAD(S) V3  Compared to ECG 2021 08:27:59  T-wave abnormality now present     TROPONIN    Collection Time: 21  9:32 AM   Result Value Ref Range    Troponin T <6 6 - 19 ng/L   MAGNESIUM    Collection Time: 21  9:32 AM   Result Value Ref Range    Magnesium 2.0 1.5 - 2.5 mg/dL   URINE CREATININE RANDOM    Collection Time: 21 10:30 AM   Result Value Ref Range    Creatinine, Random Urine 162.64 mg/dL   FLUID UREA NITROGEN    Collection Time: 21 10:30 AM   Result Value Ref Range    SR Source Peritoneal        Labs reviewed by me.       Imaging reviewed by me:     CT-HEAD W/O   Final Result      No CT evidence of acute infarct, hemorrhage or mass.      EC-ECHOCARDIOGRAM LTD W/O CONT   Final Result      EC-ECHOCARDIOGRAM COMPLETE W/O CONT   Final Result      CT-CTA NECK WITH & W/O-POST PROCESSING   Final Result      1.  Atherosclerotic plaque in the proximal right ICA with less than 50% stenosis.   2.  Atherosclerotic plaque in the proximal left subclavian artery with less than 50% stenosis.      CT-CTA HEAD WITH & W/O-POST PROCESS   Final  Result      No thrombosis is seen within the [place].      CT-CEREBRAL PERFUSION ANALYSIS   Final Result      1.  Cerebral blood flow less than 30% likely representing completed infarct = 0 mL.      2.  T Max more than 6 seconds likely representing combination of completed infarct and ischemia = 16 mL.      3.  Mismatched volume likely representing ischemic brain/penumbra = 16 mL      4.  Please note that the cerebral perfusion was performed on the limited brain tissue around the basal ganglia region. Infarct/ischemia outside the CT perfusion sections can be missed in this study.      CT-HEAD W/O   Final Result         1. No acute intracranial abnormality. No evidence of acute intracranial hemorrhage or mass lesion.               DX-CHEST-PORTABLE (1 VIEW)   Final Result         1. No acute cardiopulmonary abnormalities are identified.      MR-CERVICAL SPINE-WITH & W/O    (Results Pending)   MR-BRAIN-WITH & W/O    (Results Pending)   DX-CHEST-PORTABLE (1 VIEW)    (Results Pending)   PO-RDLHSAV-0 VIEW    (Results Pending)       Presumed mechanism by TOAST:  __Large Artery Atherosclerosis  __Small Vessel ([place])  __Cardioembolic  __Other (Sickle Cell, Vasculitis, Hypercoagulable)  _X_Unknown    Modified Obdulio Scale (MRS): 0 = No symptoms      ASSESSMENT AND PLAN:  55-year old male with no known Pmhx who presented to [facility] on 6/11/21 for a chief complaint of chest pain, onset 2300 6/10; on arrival here, no obvious STEMI per EKG. Shortly after presentation, patient was noted to have Right facial weakness, RUE weakness and dysarthria; patient reports that he noted Right facial weakness on 6/10 before going to sleep as well; patient thus not a candidate for IV tPA given presentation time greater than 4.5 hours from time of symptom onset (outside of window). STAT CT head revealed no acute intracranial abnormality; CTA head/neck with no LVO. NIHSS 5-->3-->2 today; Right facial weakness resolved; RUE  weakness near baseline now; mild dysarthria persists. MRI Brain wo contrast is still pending.      Overnight, patient had a seizure versus convulsive syncopal event in which he was witnessed to stand to use the bathroom, fell to the floor, had 30 seconds of whole body rigidity x 2. Received Ativan. Patient reports that he had febrile seizures as a child; he also admits to consuming alcohol 2-3 times per week (2-4 shots whiskey); thus, could consider breakthrough seizure or ETOH withdrawal seizure; could also consider a convulsive syncopal event given nature of event (occurred following position change from laying to standing).     Impression:   RUE weakness, Right facial weakness, dysarthria, improving; possible small vessel acute ischemic stroke.   Breakthrough seizure versus convulsive syncope.    History of febrile seizures.   Alcohol use/abuse.     Recommendations/Plan:     -q4h and PRN neuro assessment. VS per nursing/unit protocol.  BP goal < 140/90. Antihypertensives per primary team.   -Obtain MRI Brain wo contrast-- still pending.   -Telemetry; currently SR. Screen for Afib/arrhythmia. Note TTE with EF 60%; no gross structural abnormalities.   - mg PO now and q day and Atorvastatin 80 mg PO q HS. Note LDL is 118, goal < 100.   -Recommend aggressive BG management per primary team. Avoid IVF with Dextrose. BG goal 140-180. hemoglobin A1c is 5.1.   -PT/OT/SLP eval and treat.    -Obtain orthostatic VS.  -Obtain routine EEG. Will follow up with results and make additional recommendations accordingly. No AEDs at this time.   -Counseled patient at length regarding life style and risk factor modification for secondary stroke prevention.   -All other medical management per primary team.   -DVT PPX: SCDs.      Will follow up with results of the above and make additional recommendations accordingly. The plan of care above has been discussed with Dr. Cuevas.     Sandy Wagoner, A.P.R.N.  [facility]

## 2021-06-13 NOTE — CARE PLAN
Problem: Neuro Status  Goal: Neuro status will remain stable or improve  Outcome: Progressing     Problem: Hemodynamic Monitoring  Goal: Patient's hemodynamics, fluid balance and neurologic status will be stable or improve  Outcome: Progressing   The patient is Stable - Low risk of patient condition declining or worsening    Shift Goals  Clinical Goals: complete MRI  Patient Goals: MRI, alleviate headache  Family Goals: MRI, comfort    Progress made toward(s) clinical / shift goals:  Blood pressure monitoring and treatment, pain management    Patient is not progressing towards the following goals:

## 2021-06-14 ENCOUNTER — PATIENT OUTREACH (OUTPATIENT)
Dept: HEALTH INFORMATION MANAGEMENT | Facility: OTHER | Age: 56
End: 2021-06-14

## 2021-06-14 ENCOUNTER — APPOINTMENT (OUTPATIENT)
Dept: CARDIOLOGY | Facility: MEDICAL CENTER | Age: 56
DRG: 065 | End: 2021-06-14
Attending: NURSE PRACTITIONER
Payer: COMMERCIAL

## 2021-06-14 DIAGNOSIS — I63.9 LEFT PONTINE STROKE (HCC): ICD-10-CM

## 2021-06-14 PROBLEM — R55 SYNCOPE AND COLLAPSE: Status: ACTIVE | Noted: 2021-06-14

## 2021-06-14 LAB
ANION GAP SERPL CALC-SCNC: 10 MMOL/L (ref 7–16)
BUN SERPL-MCNC: 13 MG/DL (ref 8–22)
CALCIUM SERPL-MCNC: 8.9 MG/DL (ref 8.5–10.5)
CHLORIDE SERPL-SCNC: 105 MMOL/L (ref 96–112)
CO2 SERPL-SCNC: 22 MMOL/L (ref 20–33)
CREAT SERPL-MCNC: 1.46 MG/DL (ref 0.5–1.4)
GLUCOSE BLD-MCNC: 109 MG/DL (ref 65–99)
GLUCOSE SERPL-MCNC: 87 MG/DL (ref 65–99)
MAGNESIUM SERPL-MCNC: 2.1 MG/DL (ref 1.5–2.5)
POTASSIUM SERPL-SCNC: 3.9 MMOL/L (ref 3.6–5.5)
SODIUM SERPL-SCNC: 137 MMOL/L (ref 135–145)

## 2021-06-14 PROCEDURE — 700102 HCHG RX REV CODE 250 W/ 637 OVERRIDE(OP): Performed by: STUDENT IN AN ORGANIZED HEALTH CARE EDUCATION/TRAINING PROGRAM

## 2021-06-14 PROCEDURE — 306313 ANTI-EMBOLISM STOCKINGS XXXLG REG: Performed by: STUDENT IN AN ORGANIZED HEALTH CARE EDUCATION/TRAINING PROGRAM

## 2021-06-14 PROCEDURE — 700111 HCHG RX REV CODE 636 W/ 250 OVERRIDE (IP): Performed by: STUDENT IN AN ORGANIZED HEALTH CARE EDUCATION/TRAINING PROGRAM

## 2021-06-14 PROCEDURE — 700105 HCHG RX REV CODE 258: Performed by: STUDENT IN AN ORGANIZED HEALTH CARE EDUCATION/TRAINING PROGRAM

## 2021-06-14 PROCEDURE — 95819 EEG AWAKE AND ASLEEP: CPT | Performed by: STUDENT IN AN ORGANIZED HEALTH CARE EDUCATION/TRAINING PROGRAM

## 2021-06-14 PROCEDURE — 99232 SBSQ HOSP IP/OBS MODERATE 35: CPT | Mod: 25 | Performed by: NURSE PRACTITIONER

## 2021-06-14 PROCEDURE — 97164 PT RE-EVAL EST PLAN CARE: CPT

## 2021-06-14 PROCEDURE — 97530 THERAPEUTIC ACTIVITIES: CPT

## 2021-06-14 PROCEDURE — 95816 EEG AWAKE AND DROWSY: CPT | Mod: 26 | Performed by: STUDENT IN AN ORGANIZED HEALTH CARE EDUCATION/TRAINING PROGRAM

## 2021-06-14 PROCEDURE — 770020 HCHG ROOM/CARE - TELE (206)

## 2021-06-14 PROCEDURE — 83735 ASSAY OF MAGNESIUM: CPT

## 2021-06-14 PROCEDURE — 86376 MICROSOMAL ANTIBODY EACH: CPT

## 2021-06-14 PROCEDURE — 99233 SBSQ HOSP IP/OBS HIGH 50: CPT | Mod: GC | Performed by: INTERNAL MEDICINE

## 2021-06-14 PROCEDURE — A9270 NON-COVERED ITEM OR SERVICE: HCPCS | Performed by: STUDENT IN AN ORGANIZED HEALTH CARE EDUCATION/TRAINING PROGRAM

## 2021-06-14 PROCEDURE — 36415 COLL VENOUS BLD VENIPUNCTURE: CPT

## 2021-06-14 PROCEDURE — 97168 OT RE-EVAL EST PLAN CARE: CPT

## 2021-06-14 PROCEDURE — 82962 GLUCOSE BLOOD TEST: CPT

## 2021-06-14 PROCEDURE — 97535 SELF CARE MNGMENT TRAINING: CPT

## 2021-06-14 PROCEDURE — 80048 BASIC METABOLIC PNL TOTAL CA: CPT

## 2021-06-14 PROCEDURE — 4A10X4Z MONITORING OF CENTRAL NERVOUS ELECTRICAL ACTIVITY, EXTERNAL APPROACH: ICD-10-PCS | Performed by: STUDENT IN AN ORGANIZED HEALTH CARE EDUCATION/TRAINING PROGRAM

## 2021-06-14 RX ORDER — SODIUM CHLORIDE 9 MG/ML
1000 INJECTION, SOLUTION INTRAVENOUS ONCE
Status: COMPLETED | OUTPATIENT
Start: 2021-06-14 | End: 2021-06-14

## 2021-06-14 RX ORDER — POTASSIUM CHLORIDE 20 MEQ/1
20 TABLET, EXTENDED RELEASE ORAL ONCE
Status: COMPLETED | OUTPATIENT
Start: 2021-06-14 | End: 2021-06-14

## 2021-06-14 RX ADMIN — LEVOTHYROXINE SODIUM 50 MCG: 0.05 TABLET ORAL at 05:23

## 2021-06-14 RX ADMIN — POTASSIUM CHLORIDE 20 MEQ: 1500 TABLET, EXTENDED RELEASE ORAL at 10:36

## 2021-06-14 RX ADMIN — HYDROCHLOROTHIAZIDE 12.5 MG: 25 TABLET ORAL at 05:24

## 2021-06-14 RX ADMIN — HYDRALAZINE HYDROCHLORIDE 10 MG: 20 INJECTION INTRAMUSCULAR; INTRAVENOUS at 08:22

## 2021-06-14 RX ADMIN — ENOXAPARIN SODIUM 40 MG: 40 INJECTION SUBCUTANEOUS at 05:24

## 2021-06-14 RX ADMIN — SODIUM CHLORIDE 1000 ML: 9 INJECTION, SOLUTION INTRAVENOUS at 14:38

## 2021-06-14 RX ADMIN — ASPIRIN 81 MG: 81 TABLET, COATED ORAL at 05:24

## 2021-06-14 RX ADMIN — AMLODIPINE BESYLATE 5 MG: 5 TABLET ORAL at 05:24

## 2021-06-14 RX ADMIN — PROCHLORPERAZINE EDISYLATE 5 MG: 5 INJECTION INTRAMUSCULAR; INTRAVENOUS at 10:36

## 2021-06-14 RX ADMIN — OMEPRAZOLE 20 MG: 20 CAPSULE, DELAYED RELEASE ORAL at 05:24

## 2021-06-14 RX ADMIN — ACETAMINOPHEN 650 MG: 325 TABLET, FILM COATED ORAL at 10:36

## 2021-06-14 RX ADMIN — ATORVASTATIN CALCIUM 80 MG: 80 TABLET, FILM COATED ORAL at 16:17

## 2021-06-14 ASSESSMENT — ENCOUNTER SYMPTOMS
NAUSEA: 0
FEVER: 0
DIZZINESS: 0
MYALGIAS: 0
CHILLS: 0
SPEECH CHANGE: 1
ABDOMINAL PAIN: 0
FOCAL WEAKNESS: 1
SHORTNESS OF BREATH: 0
VOMITING: 0
SENSORY CHANGE: 1
BLURRED VISION: 1
DOUBLE VISION: 0
COUGH: 0
PALPITATIONS: 0
NECK PAIN: 0
HEADACHES: 0

## 2021-06-14 ASSESSMENT — COGNITIVE AND FUNCTIONAL STATUS - GENERAL
SUGGESTED CMS G CODE MODIFIER DAILY ACTIVITY: CK
TOILETING: A LOT
DRESSING REGULAR UPPER BODY CLOTHING: A LITTLE
PERSONAL GROOMING: A LITTLE
HELP NEEDED FOR BATHING: A LOT
DAILY ACTIVITIY SCORE: 15
DRESSING REGULAR LOWER BODY CLOTHING: A LOT
EATING MEALS: A LITTLE

## 2021-06-14 ASSESSMENT — GAIT ASSESSMENTS
DISTANCE (FEET): 5
DEVIATION: DECREASED BASE OF SUPPORT;ATAXIC
GAIT LEVEL OF ASSIST: MINIMAL ASSIST
ASSISTIVE DEVICE: HAND HELD ASSIST

## 2021-06-14 ASSESSMENT — PAIN DESCRIPTION - PAIN TYPE: TYPE: ACUTE PAIN

## 2021-06-14 NOTE — PROGRESS NOTES
Upon chart review, orthostatic bp was obtained on 6/13 with the following results:    Supine at 1535, HR 60 and /99  Sitting at 1537, HR 74 and /99  Standing at 1538. HR 77 and /99

## 2021-06-14 NOTE — THERAPY
Occupational Therapy  Re-evaluation     Patient Name: Paul Hopper  Age:  55 y.o., Sex:  male  Medical Record #: [id number]  Today's Date: 6/14/2021     Precautions  Precautions: Fall Risk, Swallow Precautions ( See Comments)  Comments: orthostatic hypotension    Assessment    Pt seen for OT re-evaluation at request of nursing staff. Pt appears to have significantly declined in function since previous session. Pt required modA for LB dressing, Diego for UB dressing, and Diego to feed self. Pt with significant gross and fine motor impairments in BUE, with RUE more affected than LUE. Delayed motor responses during AROM of RUE. Pt with left gaze preference and mild R neglect but able to scan towards right when cued. Pt able to take side steps but further mobility deferred due to symptomatic orthostatic hypotension and syncopal episode earlier today. Pt lives alone and currently not able to care for self. Recommend post-acute placement for additional occupational therapy services prior to discharge home. Will continue to work with pt while here to address independence with ADLs, ADL transfers, and functional mobility.     Plan    Treatment plan modified to 4 times per week until therapy goals are met for the following treatments:  Adaptive Equipment, Cognitive Skill Development, Manual Therapy Techniques, Neuro Re-Education / Balance, Self Care/Activities of Daily Living, Therapeutic Activities and Therapeutic Exercises.    DC Equipment Recommendations: Unable to determine at this time  Discharge Recommendations: Recommend post-acute placement for additional occupational therapy services prior to discharge home.     Subjective    Pt pleasant and cooperative, intermittently lethargic. No c/o pain, however c/o dizziness throughout session.      Objective       06/14/21 1420   Vitals   Vitals Comments supine /73 BPM 60, sitting initial was 101/67 with BPM of 68, after 5 mins seated /77 with BPM 78, standing 100/72  BPM 81, symptomatic with c/o dizziness and blurred vision   Cognition    Cognition / Consciousness X   Speech/ Communication Dysarthric;Word Finding Impairment   Level of Consciousness Alert   Sequencing Impaired   Comments pleasant, cooperative, intermittently lethargic, childlike affect, unclear if baseline with cognition, slurred speech, increased oral secretion present   Active ROM Upper Body   Active ROM Upper Body  WDL   Dominant Hand Right   Comments delayed motor response on R side   Strength Upper Body   Upper Body Strength  X   Comments slight RUE weakness however still 4/5   Fine Motor / Dexterity    Comments  significant gross and fine motor incoordination BUE, however R is more impaired than L   Balance   Comments intermittent Diego for dynamic seated balance, standing with min physical assist   Bed Mobility    Supine to Sit Minimal Assist   Activities of Daily Living   Eating Minimal Assist  (due to impaired gross motor)   Upper Body Dressing Minimal Assist  (gown)   Lower Body Dressing Moderate Assist  (socks)   Toileting   (unable to get to toilet)   Modified [place] (mRS)   Modified Obdulio Score 4   Functional Mobility   Sit to Stand Minimal Assist   Mobility STS x1 with Diego, side steps toward HOB   Visual Perception   Visual Perception  X   Neglect Mild Right   Comments left gaze preference, able to scan towards right when cued   Activity Tolerance   Comments limited by orthostatic hypotension, fatigue, coordination deficits   Patient / Family Goals   Patient / Family Goal #1 To go home   Short Term Goals   Short Term Goal # 1 Pt will complete toilet transfer using BSC if needed with Diego by discharge.   Short Term Goal # 2 Pt will complete seated grooming/hygiene with setup/spv by discharge.   Short Term Goal # 3 Pt will feed self with setup/spv by discharge.   Short Term Goal # 4 Pt will complete LB dressing with Diego using AE if needed by discharge.

## 2021-06-14 NOTE — PROGRESS NOTES
Daily Progress Note:     Date of Service: 6/13/2021  Primary Team: [facility] Team   Attending: Leonid Khan M.D.   Senior Resident: Dr. Mendes   Intern: Dr. Jose  Contact:  [phone]    Interval duration:   -Possible seizure-like activity overnight. Also had hypotension several hours after hydralazine. See cross coverage note for details    Subjective:  Patient states he is feeling better currently. He knows he had a seizure last night.     He denies chest pain or palpitations, SOB, HA, vision changes.    Consultants/Specialty:  neurology  Review of Systems:  Review of Systems   Constitutional: Negative for chills and fever.   Eyes: Negative for blurred vision and double vision.   Respiratory: Negative for cough and shortness of breath.    Cardiovascular: Negative for chest pain, palpitations and leg swelling.   Gastrointestinal: Negative for abdominal pain, nausea and vomiting.   Genitourinary: Negative for dysuria and urgency.   Musculoskeletal: Negative for myalgias and neck pain.   Skin: Negative for itching and rash.   Neurological: Positive for sensory change (N/T in RUE and RLE) and speech change. Negative for dizziness, focal weakness and headaches.       Objective Data:   Physical Exam:   Vitals:   Temp:  [36.1 °C (97 °F)-36.9 °C (98.4 °F)] 36.7 °C (98.1 °F)  Pulse:  [48-77] 77  Resp:  [15-20] 18  BP: ()/() 166/99  SpO2:  [94 %-100 %] 97 %    Physical Exam  Constitutional:       Appearance: Normal appearance.   HENT:      Head: Normocephalic and atraumatic.      Mouth/Throat:      Mouth: Mucous membranes are moist.      Pharynx: Oropharynx is clear.   Eyes:      General: No scleral icterus.        Right eye: No discharge.         Left eye: No discharge.      Extraocular Movements: Extraocular movements intact.      Conjunctiva/sclera: Conjunctivae normal.      Pupils: Pupils are equal, round, and reactive to light.      Comments: Rightward gaze with some saccadic movement    Cardiovascular:      Rate and Rhythm: Normal rate and regular rhythm.      Pulses: Normal pulses.      Heart sounds: No murmur heard.   No gallop.    Pulmonary:      Effort: Pulmonary effort is normal. No respiratory distress.      Breath sounds: Normal breath sounds. No wheezing.   Abdominal:      General: There is no distension.      Palpations: Abdomen is soft.      Tenderness: There is no abdominal tenderness.   Musculoskeletal:      Cervical back: Normal range of motion.      Right lower leg: No edema.      Left lower leg: No edema.   Skin:     General: Skin is warm and dry.   Neurological:      Mental Status: He is alert.      Cranial Nerves: No cranial nerve deficit.      Sensory: Sensory deficit (numbness/decreased sensation in RUE up to upper arm and in RLE (foot and lower leg tested)) present.      Motor: No weakness.      Comments: AOx4   Psychiatric:         Mood and Affect: Mood normal.         Behavior: Behavior normal.           Labs:   See results    Imaging:   CT head wnl, CTA with left proximal subclavian and right proximal ICA stenosis of less than 50% CTA  CT perfusion study reveals infarct/ischemia in bilateral frontal lobe  MRI pending    Problem Representation:  55-year-old male with past medical history significant for uncontrolled hypertension presenting with slurred speech, confusion, gait abnormality and right upper extremity weakness concerning for ischemic stroke.    * CVA (cerebral vascular accident) (HCC)  Assessment & Plan  Neurologically improved.  Likely secondary to very uncontrolled hypertension.  UDS negative.  TTE without inter atrial shunt, 60% EF, no LVH, inferior hypokinesis, ascending aorta 3.1 cm  LDL elevated. HA1c wnl.   -Status post permissive hypertension  PLAN:  -tele  -f/u MRI brain  -Control hypertension with BP goal less than 140/90.  Medications as in hypertension.  -ASA, statin  -PT and OT: home health  -f/u SLP: likely no needs  post-hospitalization    Hypertension  Assessment & Plan  Improved blood pressure still above goal.  Avoiding ACE and ARB due to increased creatinine  -Amlodipine, HCTZ  -Hydralazine as needed    Seizure-like activity (HCC)  Assessment & Plan  Had 2 episodes of seizure-like activity on 6/13  in early a.m. Each lasting approximately 30 seconds in length.  Patient had muscle rigidity and possible post ictal state, was given IV lorazepam.  Likely secondary to alcohol withdrawal.  Has facial features and somewhat slow comprehension which are somewhat suggestive of an underlying genetic disease such as trisomy 21  -karyotype   -EEG per neurology  -Ativan as needed  -Follow-up neuro    Hypothyroidism  Assessment & Plan  Likely true hypothyroidism given his bradycardia. TSH low, FT4 elevated.  Euthyroid sick syndrome less likely given that he does not appear to have any significant enough illness to cause this  -anti TPO ab to rule out Hashimoto thyroiditis  -Starting low-dose levothyroxine given his possible CAD, suggested by inferior wall hypokinesis on TTE    Blood creatinine increased compared with prior measurement  Assessment & Plan  Not yet AURELIANO per KDIGO criteria.  Likely secondary to decreased EABV due to uncontrolled hypertension  -monitor    Abnormal echocardiogram  Assessment & Plan  Echo from 6/11/21 shows inferior hypokinsis and thinning.    -outpatient cardiology follow up should be arranged      Chest pain  Assessment & Plan  Resolved. Patient presented with some chest pain and ST elevation in 1-lead, not meeting criteria for STEMI.    -troponin <6. EKGs here without acute ST-T changes.  -TTE rules out heart failure

## 2021-06-14 NOTE — ASSESSMENT & PLAN NOTE
Likely true hypothyroidism given his bradycardia. TSH low, FT4 elevated.  Euthyroid sick syndrome less likely given that he does not appear to have any significant enough illness to cause this  anti TPO negative, rules out Hashimoto thyroiditis  PLAN:  -continue low-dose levothyroxine given his possible CAD, suggested by inferior wall hypokinesis on TTE

## 2021-06-14 NOTE — PROGRESS NOTES
Chief Complaint   Patient presents with   • Chest Pain     intermittent onset 11-12 last night non radiating   • Slurred Speech     last noc   • Facial Droop     right side       Problem List Items Addressed This Visit     * (Principal) CVA (cerebral vascular accident) (HCC)     Neurologically improved.  Likely secondary to very uncontrolled hypertension.  UDS negative.  TTE without inter atrial shunt, 60% EF, no LVH, inferior hypokinesis, ascending aorta 3.1 cm  LDL elevated. HA1c wnl.   -Status post permissive hypertension  PLAN:  -tele  -f/u MRI brain  -Control hypertension with BP goal less than 140/90.  Medications as in hypertension.  -ASA, statin  -PT and OT: home health  -f/u SLP: likely no needs post-hospitalization  -f/u neuro         Relevant Medications    atorvastatin (LIPITOR) tablet 80 mg    enoxaparin (LOVENOX) inj 40 mg    amLODIPine (NORVASC) tablet 5 mg    hydrALAZINE (APRESOLINE) injection 10 mg    hydroCHLOROthiazide (HYDRODIURIL) tablet 12.5 mg    Other Relevant Orders    REFERRAL TO HOME HEALTH    Chest pain     Resolved. Patient presented with some chest pain and ST elevation in 1-lead, not meeting criteria for STEMI.    -troponin <6. EKGs here without acute ST-T changes.  -TTE rules out heart failure           Other Visit Diagnoses     Speech disturbance, unspecified type            Neurology Stroke Progress Note    History of present illness:  This is a 55-year old male with no known Pmhx who presented [facility] on [facility] for a chief complaint of chest pain. Patient reports that chest pain started appx 2300 last night 6/10/21; subsequently went to sleep. When he awoke this morning, chest pain was still present, thus he called EMS. On scene, SBP 160s; EKG not consistent with STEMI. On arrival here, patient was noted to have Right facial weakness, RUE weakness and dysarthria, thus Stroke Alert IR was called. Patient reports that he may have noted Right facial weakness last night,  "but he is unsure when the symptoms started. Stat CT head has revealed no acute intracranial abnormality; CTA head/neck with no LVO. CT perfusion study with likely artifactual findings; no large territorial perfusion mismatch.   Currently, patient is laying in bed; awake and alert. Admits to mild headache, holohemispheric, dull in nature. He denies dizziness or room spinning sensation. He denies numbness, paresthesia, or problem with vision or swallowing. He denies SOB, nausea or vomiting. NIHSS is currently 5.     Neurology has been consulted by Dr. Sharif Feliciano to further evaluate the findings noted above.     Interval, 6/12/21:  Patient sitting up in chair at bedside; awake and alert; eating breakfast. Feels well; facial droop improved; admits to mild persistent slurred speech and RUE weakness (also improved). He denies headache, dizziness. Awaiting MRI Brain wo contrast today. No events overnight.     Interval, 6/13/21:  Patient sitting up in bed; awaken and alert. Reports speech, RUE weakness is improved today.     Overnight, patient had a seizure versus syncopal event; per report, the patient had to use the bathroom, stood from laying down; thereafter fell to the ground and had seizure-like activity (\"diffuse muscle rigidity\" appx 30 seconds x 2) and bladder incontinence. No trauma/head trauma. CT head with no acute intracranial abnormality. Of note, patient reports that he had febrile seizures as a child/infant, no recent or remote AEDs nor recent similar events. Still awaiting MRI Brain wo contrast.     Interval 6/14/21:  Patient laying in bed; drowsy. During my assessment/exam this morning, patient was ambulated to the bathroom with assistance of nursing; while on the toilet, patient had what appeared to be a syncopal event; had brief LOC, no report of generalized tonic/clonic motor activity; patient was somnolent for a brief period after, but not obviously post-ictal, no sonorous respirations nor agonal " breathing. Most interestingly, SBP at time of event 80s. Thus, suspect syncope. EEG planned and pending.     MRI Brain wo contrast has revealed small acute ischemic stroke involving Left paramedian aspen, well correlating with patient's presenting deficits (RUE weakness/ataxia; dysarthria, Right facial droop). No other events overnight per nursing. RUE weakness continues to improve; mild dysarthria still present.     No changes to HPI as was previously documented.     Past medical history:   Past Medical History:   Diagnosis Date   • Hypertension        Past surgical history:   No past surgical history on file.    Family history:   No family history on file.    Social history:   Social History     Socioeconomic History   • Marital status: Unknown     Spouse name: Not on file   • Number of children: Not on file   • Years of education: Not on file   • Highest education level: Not on file   Occupational History   • Not on file   Tobacco Use   • Smoking status: Never Smoker   • Smokeless tobacco: Never Used   Vaping Use   • Vaping Use: Never assessed   Substance and Sexual Activity   • Alcohol use: Yes     Alcohol/week: 1.2 oz     Types: 2 Standard drinks or equivalent per week   • Drug use: Never   • Sexual activity: Not on file   Other Topics Concern   • Not on file   Social History Narrative   • Not on file     Social Determinants of Health     Financial Resource Strain:    • Difficulty of Paying Living Expenses:    Food Insecurity:    • Worried About Running Out of Food in the Last Year:    • Ran Out of Food in the Last Year:    Transportation Needs:    • Lack of Transportation (Medical):    • Lack of Transportation (Non-Medical):    Physical Activity:    • Days of Exercise per Week:    • Minutes of Exercise per Session:    Stress:    • Feeling of Stress :    Social Connections:    • Frequency of Communication with Friends and Family:    • Frequency of Social Gatherings with Friends and Family:    • Attends Christianity  Services:    • Active Member of Clubs or Organizations:    • Attends Club or Organization Meetings:    • Marital Status:    Intimate Partner Violence:    • Fear of Current or Ex-Partner:    • Emotionally Abused:    • Physically Abused:    • Sexually Abused:        Current medications:   Current Facility-Administered Medications   Medication Dose   • LORazepam (ATIVAN) injection 1-2 mg  1-2 mg   • omeprazole (PRILOSEC) capsule 20 mg  20 mg   • prochlorperazine (COMPAZINE) injection 5 mg  5 mg   • amLODIPine (NORVASC) tablet 5 mg  5 mg   • levothyroxine (SYNTHROID) tablet 50 mcg  50 mcg   • hydrALAZINE (APRESOLINE) injection 10 mg  10 mg   • hydroCHLOROthiazide (HYDRODIURIL) tablet 12.5 mg  12.5 mg   • aspirin EC (ECOTRIN) tablet 81 mg  81 mg   • enoxaparin (LOVENOX) inj 40 mg  40 mg   • acetaminophen (Tylenol) tablet 650 mg  650 mg   • atorvastatin (LIPITOR) tablet 80 mg  80 mg       Medication Allergy:  No Known Allergies    Review of systems:   Constitutional: denies fever, night sweats, weight loss.   Eyes: denies acute vision change, eye pain or secretion.   Ears, Nose, Mouth, Throat: denies nasal secretion, nasal bleeding, difficulty swallowing, hearing loss, tinnitus, vertigo, ear pain, acute dental problems, oral ulcers or lesions.   Endocrine: denies recent weight changes, heat or cold intolerance, polyuria, polydypsia, polyphagia,abnormal hair growth.  Cardiovascular: As noted above. Denies dyspnea.   Pulmonary: denies shortness of breath, new onset of cough, hemoptysis, wheezing, chest pain or flu-like symptoms.   GI: denies nausea, vomiting, diarrhea, GI bleeding, change in appetite, abdominal pain, and change in bowel habits.  : denies dysuria, urinary incontinence, hematuria.  Heme/oncology: denies history of easy bruising or bleeding. No history of cancer, DVTor PE.  Allergy/immunology: denies hives/urticaria, or itching.   Dermatologic: denies new rash, or new skin lesions.  Musculoskeletal:denies  joint swelling or pain, muscle pain, neck and back pain.   Neurologic:As noted in detail above.   Psychiatric: denies symptoms of depression, anxiety, hallucinations, mood swings or changes, suicidal or homicidal thoughts.     Physical examination:   Vitals:    06/14/21 0000 06/14/21 0400 06/14/21 0751 06/14/21 0903   BP: 119/78 152/95 (!) 195/101 (!) 82/20   Pulse: (!) 55 (!) 50 60 (!) 59   Resp: 16 17 18 20   Temp: 37.3 °C (99.1 °F) 37.3 °C (99.1 °F) 36.7 °C (98 °F) 36.3 °C (97.3 °F)   TempSrc: Temporal Temporal Temporal Temporal   SpO2: 97% 96% 97% 98%   Weight:       Height:         General: Patient in no acute distress, pleasant and cooperative.  HEENT: Normocephalic, no signs of acute trauma.   Neck: supple, no meningeal signs or carotid bruits. There is normal range of motion. No tenderness on exam.   Chest: clear to auscultation. No cough.   CV: RRR, no murmurs.   Skin: no signs of acute rashes or trauma.   Musculoskeletal: joints exhibit full range of motion, without any pain to palpation. There are no signs of joint or muscle swelling. There is no tenderness to deep palpation of muscles.   Psychiatric: No hallucinatory behavior. Denies symptoms of depression or suicidal ideation. Mood and affect appear normal on exam.     NEUROLOGICAL EXAM:   Mental status, orientation: Drowsy, fully oriented.   Speech and language: speech is fluent, mildly dysarthric. The patient is able to name, repeat and comprehend.   Cranial nerve exam: Pupils are 3-4 mm bilaterally and equally reactive to light. Visual fields are intact by confrontation. There is no nystagmus on primary or secondary gaze. Intact full EOM in all directions of gaze. Face symmetrical; normal sensation. Uvula is midline. Palate elevates symmetrically. Tongue is midline and without any signs of tongue biting or fasciculations.Shoulder shrug is intact bilaterally.   Motor exam: Strength is 5/5 in LUE/LLE; 4+/5 to RUE with minimal drift; 5/ 5 to RLE with no  drift. Tone is normal. No abnormal movements were seen on exam.   Sensory exam reveals normal sense of light touch and pinprick in all extremities.   Deep tendon reflexes:  Plantar responses are flexor. There is no clonus.   Coordination: No dysmetria/ataxia    Gait: Not assessed at this time as patient is a fall risk.     No significant changes to exam as was documented on 6/13/21.     NIH Stroke Scale    1a Level of Consciousness   1b Orientation Questions   1c Response to Commands   2 Gaze   3 Visual Fields   4 Facial Movement   5 Motor Function (arm)   a Left   b Right 1  6 Motor Function (leg)   a Left   b Right   7 Limb Ataxia   8 Sensory   9 Language   10 Articulation 1  11 Extinction/Inattention     Score: 2      ANCILLARY DATA REVIEWED:     Lab Data Review:  Recent Results (from the past 24 hour(s))   URINE CREATININE RANDOM    Collection Time: 06/13/21 10:30 AM   Result Value Ref Range    Creatinine, Random Urine 162.64 mg/dL   FLUID UREA NITROGEN    Collection Time: 06/13/21 10:30 AM   Result Value Ref Range    SR Source Peritoneal    Basic Metabolic Panel    Collection Time: 06/14/21 12:57 AM   Result Value Ref Range    Sodium 137 135 - 145 mmol/L    Potassium 3.9 3.6 - 5.5 mmol/L    Chloride 105 96 - 112 mmol/L    Co2 22 20 - 33 mmol/L    Glucose 87 65 - 99 mg/dL    Bun 13 8 - 22 mg/dL    Creatinine 1.46 (H) 0.50 - 1.40 mg/dL    Calcium 8.9 8.5 - 10.5 mg/dL    Anion Gap 10.0 7.0 - 16.0   ESTIMATED GFR    Collection Time: 06/14/21 12:57 AM   Result Value Ref Range    GFR If African American >60 >60 mL/min/1.73 m 2    GFR If Non African American 50 (A) >60 mL/min/1.73 m 2       Labs reviewed by me.       Imaging reviewed by me:     MR-CERVICAL SPINE-WITH & W/O   Final Result      1.  Bandlike T2 hyperintensity in the left parasagittal aspen representing acute brainstem infarction which is best seen on the MRI brain study from today's date.   2.  No myelopathic cord signal abnormality in the cervical spinal  "cord.   3.  Multilevel degenerative disc disease and spondylotic changes most notable for C3-4 disc/osteophyte complex with moderate central stenosis and ventral contour impression on the cervical spinal cord.   4.  Additional details as outlined for each level above in the body of the report.      MR-BRAIN-WITH & W/O   Final Result      1.  Mild posterior ventriculomegaly most consistent with developmental colpocephaly. Doubtful clinical significance.   2.  Mild supratentorial white matter disease. Nonspecific findings consistent with microvascular ischemic change versus demyelination or gliosis. There are no \"active\" enhancing lesions.   3.  Acute brainstem infarct in the left parasagittal aspen. No hemorrhagic transformation.   4.  No enhancing mass lesions, acute hemorrhage, or epileptogenic focus identified.      [id number] VIEW   Final Result         No specific finding to suggest small bowel obstruction.      DX-CHEST-PORTABLE (1 VIEW)   Final Result         Mild interstitial prominence could relate to hypoventilatory change or mild fluid overload.      CT-HEAD W/O   Final Result      No CT evidence of acute infarct, hemorrhage or mass.      EC-ECHOCARDIOGRAM LTD W/O CONT   Final Result      EC-ECHOCARDIOGRAM COMPLETE W/O CONT   Final Result      CT-CTA NECK WITH & W/O-POST PROCESSING   Final Result      1.  Atherosclerotic plaque in the proximal right ICA with less than 50% stenosis.   2.  Atherosclerotic plaque in the proximal left subclavian artery with less than 50% stenosis.      CT-CTA HEAD WITH & W/O-POST PROCESS   Final Result      No thrombosis is seen within the [place].      CT-CEREBRAL PERFUSION ANALYSIS   Final Result      1.  Cerebral blood flow less than 30% likely representing completed infarct = 0 mL.      2.  T Max more than 6 seconds likely representing combination of completed infarct and ischemia = 16 mL.      3.  Mismatched volume likely representing ischemic brain/penumbra = " 16 mL      4.  Please note that the cerebral perfusion was performed on the limited brain tissue around the basal ganglia region. Infarct/ischemia outside the CT perfusion sections can be missed in this study.      CT-HEAD W/O   Final Result         1. No acute intracranial abnormality. No evidence of acute intracranial hemorrhage or mass lesion.               DX-CHEST-PORTABLE (1 VIEW)   Final Result         1. No acute cardiopulmonary abnormalities are identified.          Presumed mechanism by TOAST:  __Large Artery Atherosclerosis  __Small Vessel (L[place]unar)  __Cardioembolic  __Other (Sickle Cell, Vasculitis, Hypercoagulable)  _X_Unknown    Small vessel versus embolic.     Modified [place] Scale (MRS): 0 = No symptoms      ASSESSMENT AND PLAN:  55-year old male with no known Pmhx who presented to [facility] on 6/11/21 for a chief complaint of chest pain, onset 2300 6/10; on arrival here, no obvious STEMI per EKG. Shortly after presentation, patient was noted to have Right facial weakness, RUE weakness and dysarthria; patient reports that he noted Right facial weakness on 6/10 before going to sleep as well; patient thus not a candidate for IV tPA given presentation time greater than 4.5 hours from time of symptom onset (outside of window). STAT CT head revealed no acute intracranial abnormality; CTA head/neck with no LVO. NIHSS 5-->3-->2 today; Right facial weakness resolved; RUE weakness near baseline now; mild dysarthria persists. MRI Brain wo contrast has revealed acute ischemic stroke involving Left paramedian aspen; etiology likely small vessel (versus embolic, less likely).      Overnight on 6/12-6/13, patient had a seizure versus convulsive syncopal event in which he was witnessed to stand to use the bathroom, fell to the floor, had 30 seconds of whole body rigidity x 2. Received Ativan. This morning, patient had an additional, similar event in which he ambulated to the bathroom, there after had brief  LOC, no report of convulsions. SBP 80s at time of event. Thus, have higher suspicion for syncope rather than seizure; regardless, will obtain routine EEG, no need for AEDs at this time.     Impression:   Left paramedian pontine acute ischemic stroke. Likely small vessel versus embolic/cardioembolic.    Convulsive syncope (more likely) versus breakthrough seizure.    History of febrile seizures.   Alcohol use/abuse.     Recommendations/Plan:     -q4h and PRN neuro assessment. VS per nursing/unit protocol.  BP goal < 140/90. Antihypertensives per primary team.   -Telemetry; currently SR. Screen for Afib/arrhythmia. Note TTE with EF 60%; no gross structural abnormalities. Given uncertain etiology of stroke (no obvious intracranial or other atherosclerosis), will recommend outpatient cardiac monitoring with Zio patch at time of discharge to formally rule out cardioembolic/cardiac arrhythmia as source of stroke.    - mg PO now and q day and Atorvastatin 80 mg PO q HS. Note LDL is 118, goal < 100.   -Recommend aggressive BG management per primary team. Avoid IVF with Dextrose. BG goal 140-180. hemoglobin A1c is 5.1.   -PT/OT/SLP eval and treat.    -Obtain orthostatic VS-- complete; no obvious orthostatic hypotension. Will recommend outpatient cardiology follow up for syncope work up.   -Obtain routine EEG. Will follow up with results and make additional recommendations accordingly. No AEDs at this time.   -Counseled patient at length regarding life style and risk factor modification for secondary stroke prevention.   -Outpatient neurology stroke bridge clinic referral has been placed; plan to follow up in 2-4 weeks.   -All other medical management per primary team.   -DVT PPX: SCDs.      The plan of care above has been discussed with Dr. Cuevas. Will follow up with results of EEG; Other than the above, no further recommendations from a neurological perspective. Please call with questions.     Sandy Wagoner,  DEBORAH  [place] [facility]

## 2021-06-14 NOTE — ASSESSMENT & PLAN NOTE
BP increasing again. As of this morning was actually hypotensive at times. Improved blood pressure but still above goal.  Avoiding ACE and ARB due to AURELIANO  -no current BP meds  -SBP goal currently > 140. If less than this, consider IVF bolus 1L.   -if BP > 220/120, consider clonidine. Avoid beta-blockers due to bradycardia and hydralazine due to patient's sensitive venous tone. Avoid ACEI due to AURELIANO.

## 2021-06-14 NOTE — CARE PLAN
The patient is Watcher - Medium risk of patient condition declining or worsening    Shift Goals  Clinical Goals: BP control      Progress made toward(s) clinical / shift goals:  See note below.    Patient is not progressing towards the following goals:      Problem: Hemodynamic Monitoring  Goal: Patient's hemodynamics, fluid balance and neurologic status will be stable or improve  Outcome: Not Progressing  Note: Pt BP monitored closely following syncopal episode this shift. 1 Liter bolus administered per MAR and neuro status assessed more frequently. JESSICA hose ordered and place on pt. OT/PT asked to reevaluate pt as he is less able to stay focused on tasks and is not as steady on his feet or at EOB.

## 2021-06-14 NOTE — ASSESSMENT & PLAN NOTE
Had 2 episodes of seizure-like activity on 6/13  in early a.m. Each lasting approximately 30 seconds in length.  Patient had muscle rigidity and possible post ictal state, was given IV lorazepam.  Likely secondary to alcohol withdrawal which can lower seizure threshold.  Has facial features and somewhat slow comprehension which are somewhat suggestive of an underlying genetic disease such as trisomy 21 which can also lower seizure threshold  EEG wnl.    PLAN:  -No new episodes  -f/u karyotype   -Follow-up neuro outpatient

## 2021-06-14 NOTE — DISCHARGE PLANNING
Care Transition Team Discharge Planning    Anticipated Discharge Disposition: TBD    Action: Lsw met with patient to complete CTT assessment. Pt was able to confirm the information on the facesheet as being accurate. He reported that he does not have a PCP. Lsw contacted Scheduling who will attempt to find a schedule an appointment with a PCP. Patient was informed that he needs a PCP for the  referral to [facility].  Pt verbalized being able to perform IADLs and ADLs with no assistance. He denies smoking or using alcoholic beverages.      Barriers to Discharge: Awaiting medical clearance.    Plan: Lsw will assist medical team with discharge planning.    Care Transition Team Assessment    Information Source  Orientation Level: Oriented X4  Information Given By: Patient  Informant's Name: Paul Hopper  Who is responsible for making decisions for patient? : Patient    Readmission Evaluation  Is this a readmission?: No    Elopement Risk  Legal Hold: No  Ambulatory or Self Mobile in Wheelchair: Yes  Disoriented: No  Psychiatric Symptoms: None  History of Wandering: No  Elopement this Admit: No  Vocalizing Wanting to Leave: No  Displays Behaviors, Body Language Wanting to Leave: No-Not at Risk for Elopement  Elopement Risk: Not at Risk for Elopement    Interdisciplinary Discharge Planning  Lives with - Patient's Self Care Capacity: Alone and Able to Care For Self  Patient or legal guardian wants to designate a caregiver: No  Housing / Facility: [facility]  Prior Services: None  Patient Prefers to be Discharged to:: [facility]  Durable Medical Equipment: Not Applicable    Discharge Preparedness  What is your plan after discharge?: Home with help  What are your discharge supports?: Spouse  Prior Functional Level: Ambulatory, Independent with Activities of Daily Living, Independent with Medication Management  Difficulity with ADLs: None  Difficulity with IADLs: None    Functional Assesment  Prior Functional Level: Ambulatory, Independent  with Activities of Daily Living, Independent with Medication Management    Finances  Financial Barriers to Discharge: No  Prescription Coverage: Yes    Vision / Hearing Impairment  Vision Impairment : Yes  Right Eye Vision: Wears Glasses  Left Eye Vision: Wears Glasses  Hearing Impairment : Yes  Hearing Impairment: Both Ears (left is worse than the right)  Does Pt Need Special Equipment for the Hearing Impaired?: No         Advance Directive  Advance Directive?: None  Advance Directive offered?: AD Booklet refused    Domestic Abuse  Have you ever been the victim of abuse or violence?: No  Physical Abuse or Sexual Abuse: No  Verbal Abuse or Emotional Abuse: No  Possible Abuse/Neglect Reported to:: Not Applicable    Psychological Assessment  History of Substance Abuse: None  History of Psychiatric Problems: No  Non-compliant with Treatment: No  Newly Diagnosed Illness: Yes    Discharge Risks or Barriers  Discharge risks or barriers?: No PCP  Patient risk factors: No PCP    Anticipated Discharge Information  Discharge Disposition: Still a Patient (30)  Discharge Address:  ([address] [place] [address] [place] [address])  Discharge Contact Phone Number: [phone]

## 2021-06-14 NOTE — ASSESSMENT & PLAN NOTE
Likely prerenal secondary to low volume state.  -Encourage improved p.o. fluid intake  -Supplemental IVF as needed  -CTM

## 2021-06-14 NOTE — PROCEDURES
ROUTINE VIDEO ELECTROENCEPHALOGRAM REPORT      Referring provider:   JULIO Locke     DOS: 06/14/21 (0 hours and 32 minutes of total recording time).     INDICATION:  Paul Hopper 55 y.o. male presenting with altered mental status    CURRENT ANTIEPILEPTIC AND/OR SEDATING REGIMEN: No AEDs    TECHNIQUE: Routine VEEG was set up by a Neurodiagnostic technologist who performed education to the patient and staff. A minimum of 23 electrodes and 23 channel recording was setup and performed by Neurodiagnostic technologist, in accordance with the international 10-20 system. The study was reviewed in bipolar and referential montages. The recording examined the patient in the  awake, drowsy, and sleep state(s).     DESCRIPTION OF THE RECORD:  During wakefulness, the background was continuous and showed a 9 Hz posterior dominant rhythm.  There was reactivity to eye closure/opening.  An anterior-posterior gradient was noted with faster beta frequencies seen anteriorly.  During drowsiness, theta/delta frequencies were seen.    EEG Sleep: Sleep was not captured.    ACTIVATION PROCEDURES:   Intermittent Photic stimulation was performed in a stepwise fashion from 1 to 30 Hz, and did not elicit any abnormal responses.      ICTAL AND INTERICTAL FINDINGS:   No focal or generalized epileptiform activity noted.     No regional slowing was seen during this routine study.      No clinical events or seizures were reported or recorded during the study.     EKG: sampling of the EKG recording did not demonstrate any abnormalities      EVENTS:  None    INTERPRETATION:   Normal video EEG recording in the awake and drowsy state(s):  - No persistent focal asymmetries seen.  - No epileptiform discharges seen   - No seizures. Clinical correlation is recommended.      Note: A normal EEG does not rule out epilepsy.  If the clinical suspicion remains high for seizures, a prolonged recording to capture clinical or subclinical events may be  helpful.        Corby Mercado MD  Epilepsy and General Neurology  Department of Neurology  Instructor of Clinical Neurology [facility].   Office: [phone]  Fax: [phone]

## 2021-06-14 NOTE — THERAPY
Physical Therapy   Re-eval     Patient Name: Paul Hopper  Age:  55 y.o., Sex:  male  Medical Record #: [id number]  Today's Date: 6/14/2021     Precautions: Fall Risk, Swallow Precautions ( See Comments)    Assessment    PT re-eval requested as patient with two syncopal episodes, one last night and one this AM, with seizure like activity. Per RN pt has remained altered since episode this AM. Pt extremely lethargic and required frequent verbal stim to maintain alertness. Noted slurred speech with RN stated has been consistent since this AM. Pt very tremulous and with significant incoordination in B LEs, however with R LE being more significant than L. Noted to be hypotensive with results as follows : 117/73 mmHg supine, 101/67 mmHg EOB, 128/77 mmHg EOB 7 min, and 100/72 mmHg once standing. Only performed lateral side steps due to hypotension. Extreme narrow MICH and appeared to have proprioceptive impairments. Not safe to discharge home, based on today's assessment recommend post acute placement. Acute PT to follow    Plan    Treatment plan modified to 4 times per week until therapy goals are met for the following treatments:  Bed Mobility, Gait Training, Neuro Re-Education / Balance, Stair Training, Therapeutic Activities and Therapeutic Exercises.    DC Equipment Recommendations: Unable to determine at this time  Discharge Recommendations: Recommend post-acute placement for additional physical therapy services prior to discharge home           Objective       06/14/21 1417   Cognition    Speech/ Communication Dysarthric;Word Finding Impairment   Level of Consciousness Alert   Safety Awareness Impaired   Sequencing Impaired   Initiation Impaired   Comments lethargic and difficulty maintaining arousal. Child like behavior throughout. Appeared to have difficulty managing saliva   Passive ROM Lower Body   Passive ROM Lower Body WDL   Active ROM Lower Body    Active ROM Lower Body  WDL   Strength Lower Body   Lower Body  Strength  WDL   Sensation Lower Body   Lower Extremity Sensation   WDL   Comments able to discern light touch in all dermatomes    Neurological Concerns   Neurological Concerns Yes   Comments Impaired heel to shin testing bilaterally with R more impaired than L    Balance   Sitting Balance (Static) Fair   Sitting Balance (Dynamic) Fair -   Standing Balance (Static) Poor +   Standing Balance (Dynamic) Poor   Weight Shift Sitting Fair   Weight Shift Standing Poor   Comments hand held assist when standing. Intermittent LOB when seated and performing dynamic tasks   Gait Analysis   Gait Level Of Assist Minimal Assist   Assistive Device Hand Held Assist   Distance (Feet) 5   # of Times Distance was Traveled 1   Deviation Decreased Base Of Support;Ataxic   Weight Bearing Status no restrictions   Comments gait limited by therapist due to orthostatics when standing   Bed Mobility    Supine to Sit Minimal Assist   Sit to Supine Minimal Assist   Functional Mobility   Sit to Stand Minimal Assist   Short Term Goals    Short Term Goal # 1 Pt will perform supine <> sit with SPV in 6 visits to get in/out of bed   Short Term Goal # 2 Pt will perform functional transfers with SPV in 6 visits to increase independence   Short Term Goal # 3 Pt will ambulate 150ft with FWW and SPV in 6 visits to increase independence

## 2021-06-14 NOTE — PROGRESS NOTES
Pt up to BR with CNA. Pt became unresponsive and sweaty on the toilet. BP dropped to 82/20, O2 sats in the 90s and HR 51-68. After safely transferring to shower chair and then back to bed, pt woke up and was able to answer all orientation questions and interact with care team. MD notified and Neurology APRSandy MCKENZIE, at bedside during event.

## 2021-06-14 NOTE — NON-PROVIDER
"Daily Progress Note:     Date of Service: 6/13/2021  Primary Team: [facility] Team  Attending: Leonid Khan M.D.   Senior Resident: Dr. Mendes   Intern: Dr. Jose    ID:   Mr. Hopper is a 56 y/o male who presented with slurred speech, right arm and right leg paresthesias, dizziness with ataxia, and confusion; with PMHx of HTN.       Subjective:  Overnight BP over permissive HTN goal in terms of DBP >105 and was given dose of hydralazine 20 then had severe hypotension of 88/52. A rapid was then called shortly after that, when he was being helped to the bathroom he fell and had a 30sec episode of LOC and full body rigidity. Had another episode in the bed, also lasting about 30 seconds and resolving on their own. He did have bladder incontinence during these events but seemed to return to baseline following shortly after. No ativan was given because episodes self resolved. No loading dose of anti-seziure medications given either. Stat head CT was unremarkable. Did note to have history of seizure as a child, unsure if ever treated said he believes it was more than one time, and unsure if due to illness/fever; hasnt had any seizures since then.     This morning stated that he feels that he is still having numbness in his right hand, but its only in his thumb, pointer and middle finger today and he has had this numbness in the past. No other numbness, weakness present. He said that he feels that his speech is much better to, but that the drooping of his face feels like its still \"somewhat there\". During talking he became red in the face, and started sweating and noted that he was having severe 8-9/10 chest pain directly in the middle of his chest radiating straight through to his back, also radiated to his bilateral neck. Shortly after he vomited 2x both being liquid yellow vomit with no hematemesis. He said after vomiting his CP improved to a 6-7/10 but was still radiating to his back and hutting pretty significantly. " Stat EKG and CXR didn't show any signs per the reads of aortic dissection or acute coronary syndrome. Pt also noted to be feeling lightheaded and also noted he had a headache in bilateral frontal region 5/10 pain, constant that started this morning as well, but no sensitivity to light or sound. NO SOB, or pain with deep breaths.     Consultants/Specialty:  Neuro     Review of Systems:   Review of Systems   Constitutional: Positive for diaphoresis and malaise/fatigue. Negative for chills, fever and weight loss.   HENT: Negative for congestion, ear discharge, ear pain, hearing loss, nosebleeds and tinnitus.    Eyes: Negative for blurred vision, double vision, photophobia, pain, discharge and redness.   Respiratory: Negative for cough, hemoptysis, sputum production, shortness of breath and wheezing.    Cardiovascular: Positive for chest pain. Negative for palpitations, orthopnea, claudication and leg swelling.   Gastrointestinal: Positive for nausea and vomiting. Negative for abdominal pain, blood in stool, constipation, diarrhea, heartburn and melena.   Genitourinary: Negative for dysuria, frequency, hematuria and urgency.   Musculoskeletal: Positive for back pain, falls, myalgias and neck pain.   Skin: Negative for itching and rash.   Neurological: Positive for dizziness, tingling, sensory change, speech change, seizures, loss of consciousness and headaches. Negative for tremors, focal weakness and weakness.   Endo/Heme/Allergies: Negative for environmental allergies and polydipsia. Does not bruise/bleed easily.   Psychiatric/Behavioral: Negative for depression, memory loss, substance abuse and suicidal ideas. The patient is nervous/anxious. The patient does not have insomnia.        Objective Data:   Vitals:   Temp:  [36.1 °C (97 °F)-36.9 °C (98.4 °F)] 36.7 °C (98.1 °F)  Pulse:  [48-77] 77  Resp:  [15-20] 18  BP: ()/() 166/99  SpO2:  [94 %-100 %] 97 %  Body mass index is 29.6 kg/m².      Physical Exam:    Physical Exam  Constitutional:       Comments: Pt does have facies, large tongue, and speech patterns suggestive of downs syndrome.    HENT:      Head: Normocephalic and atraumatic.      Nose: Nose normal.      Mouth/Throat:      Mouth: Mucous membranes are dry.   Eyes:      Extraocular Movements: Extraocular movements intact.      Conjunctiva/sclera: Conjunctivae normal.      Pupils: Pupils are equal, round, and reactive to light.   Cardiovascular:      Rate and Rhythm: Regular rhythm. Bradycardia present.      Pulses: Normal pulses.      Heart sounds: Normal heart sounds.   Pulmonary:      Effort: Pulmonary effort is normal.      Breath sounds: Normal breath sounds.   Abdominal:      General: Abdomen is flat. Bowel sounds are normal.      Palpations: Abdomen is soft.   Musculoskeletal:         General: Normal range of motion.      Cervical back: Normal range of motion and neck supple.   Skin:     General: Skin is warm.      Capillary Refill: Capillary refill takes less than 2 seconds.   Neurological:      General: No focal deficit present.      Mental Status: He is alert and oriented to person, place, and time.      Cranial Nerves: No cranial nerve deficit.      Sensory: No sensory deficit.      Motor: No weakness.      Coordination: Coordination normal.      Gait: Gait normal.      Deep Tendon Reflexes: Reflexes normal.      Comments: Only neuro finding is inability to track with smooth gaze, uses very choppy saccades in order to follow finger. Although all EOMi, PERRLA, and no other localizing findings.    Psychiatric:         Mood and Affect: Mood normal.         Behavior: Behavior normal.       Labs:   Recent Results (from the past 24 hour(s))   POCT glucose device results    Collection Time: 06/13/21  2:00 AM   Result Value Ref Range    Glucose - Accu-Ck 124 (H) 65 - 99 mg/dL   TSH WITH REFLEX TO FT4    Collection Time: 06/13/21  2:23 AM   Result Value Ref Range    TSH 5.550 (H) 0.380 - 5.330 uIU/mL   CBC  WITH DIFFERENTIAL    Collection Time: 06/13/21  2:23 AM   Result Value Ref Range    WBC 8.7 4.8 - 10.8 K/uL    RBC 4.17 (L) 4.70 - 6.10 M/uL    Hemoglobin 15.5 14.0 - 18.0 g/dL    Hematocrit 41.4 (L) 42.0 - 52.0 %    MCV 99.3 (H) 81.4 - 97.8 fL    MCH 37.2 (H) 27.0 - 33.0 pg    MCHC 37.4 (H) 33.7 - 35.3 g/dL    RDW 46.7 35.9 - 50.0 fL    Platelet Count 256 164 - 446 K/uL    MPV 8.8 (L) 9.0 - 12.9 fL    Neutrophils-Polys 51.70 44.00 - 72.00 %    Lymphocytes 38.20 22.00 - 41.00 %    Monocytes 6.50 0.00 - 13.40 %    Eosinophils 2.50 0.00 - 6.90 %    Basophils 0.60 0.00 - 1.80 %    Immature Granulocytes 0.50 0.00 - 0.90 %    Nucleated RBC 0.00 /100 WBC    Neutrophils (Absolute) 4.48 1.82 - 7.42 K/uL    Lymphs (Absolute) 3.30 1.00 - 4.80 K/uL    Monos (Absolute) 0.56 0.00 - 0.85 K/uL    Eos (Absolute) 0.22 0.00 - 0.51 K/uL    Baso (Absolute) 0.05 0.00 - 0.12 K/uL    Immature Granulocytes (abs) 0.04 0.00 - 0.11 K/uL    NRBC (Absolute) 0.00 K/uL   Comp Metabolic Panel    Collection Time: 06/13/21  2:23 AM   Result Value Ref Range    Sodium 137 135 - 145 mmol/L    Potassium 3.3 (L) 3.6 - 5.5 mmol/L    Chloride 102 96 - 112 mmol/L    Co2 21 20 - 33 mmol/L    Anion Gap 14.0 7.0 - 16.0    Glucose 133 (H) 65 - 99 mg/dL    Bun 18 8 - 22 mg/dL    Creatinine 1.63 (H) 0.50 - 1.40 mg/dL    Calcium 8.9 8.5 - 10.5 mg/dL    AST(SGOT) 16 12 - 45 U/L    ALT(SGPT) 20 2 - 50 U/L    Alkaline Phosphatase 69 30 - 99 U/L    Total Bilirubin 1.0 0.1 - 1.5 mg/dL    Albumin 3.9 3.2 - 4.9 g/dL    Total Protein 6.9 6.0 - 8.2 g/dL    Globulin 3.0 1.9 - 3.5 g/dL    A-G Ratio 1.3 g/dL   ESTIMATED GFR    Collection Time: 06/13/21  2:23 AM   Result Value Ref Range    GFR If  54 (A) >60 mL/min/1.73 m 2    GFR If Non  44 (A) >60 mL/min/1.73 m 2   FREE THYROXINE    Collection Time: 06/13/21  2:23 AM   Result Value Ref Range    Free T-4 0.90 (L) 0.93 - 1.70 ng/dL   MAGNESIUM    Collection Time: 06/13/21  2:23 AM   Result Value  Ref Range    Magnesium 2.0 1.5 - 2.5 mg/dL   EKG    Collection Time: 21  8:44 AM   Result Value Ref Range    Report       [facility]    Test Date:  2021  Pt Name:    MIRANDA REARDON                Department: [facility]  MRN:        [id number]                      Room:       [facility]  Gender:     Male                         Technician: MANOHAR  :        [id number]                   Requested By:RONAN SWIFT  Order #:    [id number]                    Reading MD: Ron Milton MD    Measurements  Intervals                                Axis  Rate:       60                           P:          38  DC:         152                          QRS:        -24  QRSD:       86                           T:          -5  QT:         472  QTc:        472    Interpretive Statements  SINUS RHYTHM  BORDERLINE LEFT AXIS DEVIATION  BORDERLINE T ABNORMALITIES, INFERIOR LEADS  Compared to ECG 2021 08:27:59  T-wave abnormality now present  Electronically Signed On 2021 13:48:29 PDT by Ron Milton MD     TROPONIN    Collection Time: 21  9:32 AM   Result Value Ref Range    Troponin T <6 6 - 19 ng/L   MAGNESIUM    Collection Time: 21  9:32 AM   Result Value Ref Range    Magnesium 2.0 1.5 - 2.5 mg/dL   URINE CREATININE RANDOM    Collection Time: 21 10:30 AM   Result Value Ref Range    Creatinine, Random Urine 162.64 mg/dL   FLUID UREA NITROGEN    Collection Time: 21 10:30 AM   Result Value Ref Range    SR Source Peritoneal        Imaging:   [id number] [id number]   Final Result         No specific finding to suggest small bowel obstruction.      DX-CHEST-PORTABLE (1 VIEW)   Final Result         Mild interstitial prominence could relate to hypoventilatory change or mild fluid overload.      CT-HEAD W/O   Final Result      No CT evidence of acute infarct, hemorrhage or mass.      EC-ECHOCARDIOGRAM LTD W/O CONT   Final Result      EC-ECHOCARDIOGRAM COMPLETE W/O CONT   Final Result      CT-CTA NECK  WITH & W/O-POST PROCESSING   Final Result      1.  Atherosclerotic plaque in the proximal right ICA with less than 50% stenosis.   2.  Atherosclerotic plaque in the proximal left subclavian artery with less than 50% stenosis.      CT-CTA HEAD WITH & W/O-POST PROCESS   Final Result      No thrombosis is seen within the [place].      CT-CEREBRAL PERFUSION ANALYSIS   Final Result      1.  Cerebral blood flow less than 30% likely representing completed infarct = 0 mL.      2.  T Max more than 6 seconds likely representing combination of completed infarct and ischemia = 16 mL.      3.  Mismatched volume likely representing ischemic brain/penumbra = 16 mL      4.  Please note that the cerebral perfusion was performed on the limited brain tissue around the basal ganglia region. Infarct/ischemia outside the CT perfusion sections can be missed in this study.      CT-HEAD W/O   Final Result         1. No acute intracranial abnormality. No evidence of acute intracranial hemorrhage or mass lesion.               DX-CHEST-PORTABLE (1 VIEW)   Final Result         1. No acute cardiopulmonary abnormalities are identified.      MR-CERVICAL SPINE-WITH & W/O    (Results Pending)   MR-BRAIN-WITH & W/O    (Results Pending)       Meds:    Current Facility-Administered Medications:   •  LORazepam (ATIVAN) injection 1-2 mg, 1-2 mg, Intravenous, Q5 MIN PRN, Calin Carlisle M.D.  •  omeprazole (PRILOSEC) capsule 20 mg, 20 mg, Oral, DAILY, Leander Jose M.D., 20 mg at 06/13/21 0959  •  prochlorperazine (COMPAZINE) injection 5 mg, 5 mg, Intravenous, Q12HRS PRN, Leander Jose M.D., 5 mg at 06/13/21 0959  •  amLODIPine (NORVASC) tablet 5 mg, 5 mg, Oral, Q DAY, Leander Jose M.D., 5 mg at 06/13/21 1029  •  levothyroxine (SYNTHROID) tablet 50 mcg, 50 mcg, Oral, AM ES, Leander Jose M.D., 50 mcg at 06/13/21 1303  •  hydrALAZINE (APRESOLINE) injection 10 mg, 10 mg, Intravenous, Q6HRS PRN, Fuad Mendes M.D.  •  hydroCHLOROthiazide (HYDRODIURIL)  tablet 12.5 mg, 12.5 mg, Oral, Q DAY, Fuad Mendes M.D.  •  aspirin EC (ECOTRIN) tablet 81 mg, 81 mg, Oral, DAILY, Leander Jose M.D., 81 mg at 06/13/21 0540  •  enoxaparin (LOVENOX) inj 40 mg, 40 mg, Subcutaneous, DAILY, Leander Jose M.D., 40 mg at 06/13/21 0541  •  acetaminophen (Tylenol) tablet 650 mg, 650 mg, Oral, Q6HRS PRN, Leander Jose M.D., 650 mg at 06/13/21 1037  •  atorvastatin (LIPITOR) tablet 80 mg, 80 mg, Oral, Q EVENING, Mike Lauren M.D., 80 mg at 06/13/21 1708    Problem Representation:     #New onset Chest pain radiating to back.   Big concern was aortic dissection vs ACS. Per my CXR read compared to admission I feel the aortic silhouette is about 10mm larger compared to admission CXR; possible due to CXR today having less inspiration but per my read looks suspicious. CXR also showed increased pulmonary congestion/vascular markings compared to CXR on admission. Still worried about aortic dissection not being completley ruled out although no signs of any ischemic cardiac events; trop/EKG all negative. Also BP in both arms was pretty similar during the event, although still doesn't rule out acute aortic dissection which is highly possible given possible downs, and severe BP fluctuations. Could also just be GI in nature since it resolved with vomiting, possibly gastritis, GERD, or some other intrinsic GI process.    CTM if pain not improving or any signs of vital instability, or BP variations in arms/legs, then stat CT with contrast to fully rule out aortic dissection; but will need to load with fluids before if possible to avoid contrast nephropathy due to increasing Cr, but also showing some fluid overload signs on CXR today. Will depend on acuity, if pain persists/worsens/etc aortic dissection is much more life threatening if not diagnosed.     #Possible CVA   Still waiting for MRI to get done. Permissive HTN for 48hours over. Follow up once MRI done.    PT/OT consult.    Cont ASA 81 and Statin.     q4 neuro checks.    Keep on Tele.     Possible candidate for dual antiplatelet: will depend on MRI findings.    Lovenox for DVT proph.    Follow up on MRI delay.     #HTN  Long standing history of uncontrolled HTN, with SBP at home commonly in 200's+. Was very sensitive to hydralazine dose though which sent him from severe HTN to hypotensive rather quickly. So rather than starting dual Lisinopril and amlodipine, will start with single medication that is short acting and titrate up starting today.    No longer on permissive HTN due to possible stroke.    Start Captopril low dose and titrate up; upon discharge can switch to Lisinopril equivalent.    Close follow up outpt including yearly optho exams.      #AURELIANO   Cr was 1.37 on admission to 1.64 today., BUN:Cr ratio ~10; likely mix of intrinsic azotemia due to longstanding HTN with prerenal secondary to hypovolemia/poor PO intake leading to poor renal perfusion as well.    CTM.    Avoid nephrotoxic drugs.    Renally dose medications as needed.      #Hypokalemia   3.3 today down from 4.1 yesterday.    Cont to replete K to goal >4.0     #Possible Seizure vs Syncope secondary to Orthostatic hypotension   Not completely convinced these episodes were seizures, especially with significant drop from normal SBP in 170's -220's most of the time with sudden drop to SBP in the 80's with persistent bradycardia during this hypotensive bout. Could also be explained as provoked seizure due to decreased cerebral perfusion (+/- with downs). Very low suspicion for post stroke seizure.    CTM, low threshold to start Keppra and order EEG.    Order UA and bladder scan   Do orthostatic BP's - WITH Heart rates.      #Possible Downs syndrome  Multiple features support trisomy 21 diagnosis which could alter care in the long term/screening and risk factors. No family history and pt doesn't report any learning disability/etc - but poor historian. Could also explain hypothyroidism, past  stroke, seizure and severe HTN.    Order Karyotype.     #Hypothyroidism   New onset with high TSH and borderline low T4, with bradycardia.   Start 50mcg levothyroxine and place order for TSH and fT4 in 6-8 weeks with outpt PCP follow up.     Disposition: Waiting for MRI to finalize plan for discharge.     Vinicius Jaffe, MS3

## 2021-06-14 NOTE — CARE PLAN
The patient is Watcher - Medium risk of patient condition declining or worsening    Shift Goals  Clinical Goals: BP control, seizure management  Patient Goals: MRI results  Family Goals: safety/updates    Progress made toward(s) clinical / shift goals:    Problem: Optimal Care of the Stroke Patient  Goal: Optimal acute care for the stroke patient  Outcome: Progressing     Problem: Knowledge Deficit - Stroke Education  Goal: Patient's knowledge of stroke and risk factors will improve  Outcome: Progressing     Problem: Neuro Status  Goal: Neuro status will remain stable or improve  Outcome: Progressing  Note: Tingling has subsided, right sided weakness is persistent but improving. Continues to be AO x4.       Patient is not progressing towards the following goals:

## 2021-06-15 PROBLEM — E53.8 FOLATE DEFICIENCY: Status: ACTIVE | Noted: 2021-06-15

## 2021-06-15 PROBLEM — E53.8 B12 DEFICIENCY: Status: ACTIVE | Noted: 2021-06-15

## 2021-06-15 PROBLEM — M48.02 CERVICAL STENOSIS OF SPINAL CANAL: Status: ACTIVE | Noted: 2021-06-15

## 2021-06-15 PROBLEM — N17.9 AKI (ACUTE KIDNEY INJURY) (HCC): Status: ACTIVE | Noted: 2021-06-13

## 2021-06-15 LAB
ANION GAP SERPL CALC-SCNC: 11 MMOL/L (ref 7–16)
BUN SERPL-MCNC: 15 MG/DL (ref 8–22)
CALCIUM SERPL-MCNC: 9.2 MG/DL (ref 8.5–10.5)
CHLORIDE SERPL-SCNC: 105 MMOL/L (ref 96–112)
CO2 SERPL-SCNC: 24 MMOL/L (ref 20–33)
CREAT SERPL-MCNC: 1.82 MG/DL (ref 0.5–1.4)
FOLATE SERPL-MCNC: 3.5 NG/ML
GLUCOSE SERPL-MCNC: 91 MG/DL (ref 65–99)
MAGNESIUM SERPL-MCNC: 2 MG/DL (ref 1.5–2.5)
PHOSPHATE SERPL-MCNC: 3.3 MG/DL (ref 2.5–4.5)
POTASSIUM SERPL-SCNC: 4.2 MMOL/L (ref 3.6–5.5)
SODIUM SERPL-SCNC: 140 MMOL/L (ref 135–145)
THYROPEROXIDASE AB SERPL-ACNC: 0.3 IU/ML (ref 0–9)
VIT B12 SERPL-MCNC: 180 PG/ML (ref 211–911)

## 2021-06-15 PROCEDURE — 700102 HCHG RX REV CODE 250 W/ 637 OVERRIDE(OP): Performed by: STUDENT IN AN ORGANIZED HEALTH CARE EDUCATION/TRAINING PROGRAM

## 2021-06-15 PROCEDURE — 82607 VITAMIN B-12: CPT

## 2021-06-15 PROCEDURE — 700111 HCHG RX REV CODE 636 W/ 250 OVERRIDE (IP): Performed by: STUDENT IN AN ORGANIZED HEALTH CARE EDUCATION/TRAINING PROGRAM

## 2021-06-15 PROCEDURE — A9270 NON-COVERED ITEM OR SERVICE: HCPCS | Performed by: STUDENT IN AN ORGANIZED HEALTH CARE EDUCATION/TRAINING PROGRAM

## 2021-06-15 PROCEDURE — 92523 SPEECH SOUND LANG COMPREHEN: CPT

## 2021-06-15 PROCEDURE — 700111 HCHG RX REV CODE 636 W/ 250 OVERRIDE (IP): Performed by: INTERNAL MEDICINE

## 2021-06-15 PROCEDURE — 83735 ASSAY OF MAGNESIUM: CPT

## 2021-06-15 PROCEDURE — 700102 HCHG RX REV CODE 250 W/ 637 OVERRIDE(OP): Performed by: INTERNAL MEDICINE

## 2021-06-15 PROCEDURE — 82746 ASSAY OF FOLIC ACID SERUM: CPT

## 2021-06-15 PROCEDURE — 99232 SBSQ HOSP IP/OBS MODERATE 35: CPT | Mod: GC | Performed by: INTERNAL MEDICINE

## 2021-06-15 PROCEDURE — A9270 NON-COVERED ITEM OR SERVICE: HCPCS | Performed by: INTERNAL MEDICINE

## 2021-06-15 PROCEDURE — 84100 ASSAY OF PHOSPHORUS: CPT

## 2021-06-15 PROCEDURE — 770020 HCHG ROOM/CARE - TELE (206)

## 2021-06-15 PROCEDURE — 80048 BASIC METABOLIC PNL TOTAL CA: CPT

## 2021-06-15 PROCEDURE — 36415 COLL VENOUS BLD VENIPUNCTURE: CPT

## 2021-06-15 RX ORDER — CHOLECALCIFEROL (VITAMIN D3) 125 MCG
2000 CAPSULE ORAL DAILY
Status: DISCONTINUED | OUTPATIENT
Start: 2021-06-15 | End: 2021-06-15

## 2021-06-15 RX ORDER — CHOLECALCIFEROL (VITAMIN D3) 125 MCG
2000 CAPSULE ORAL DAILY
Status: DISCONTINUED | OUTPATIENT
Start: 2021-06-18 | End: 2021-06-18 | Stop reason: HOSPADM

## 2021-06-15 RX ORDER — FOLIC ACID 1 MG/1
1 TABLET ORAL DAILY
Status: DISCONTINUED | OUTPATIENT
Start: 2021-06-15 | End: 2021-06-18 | Stop reason: HOSPADM

## 2021-06-15 RX ORDER — CYANOCOBALAMIN 1000 UG/ML
1000 INJECTION, SOLUTION INTRAMUSCULAR; SUBCUTANEOUS DAILY
Status: COMPLETED | OUTPATIENT
Start: 2021-06-15 | End: 2021-06-17

## 2021-06-15 RX ADMIN — CYANOCOBALAMIN 1000 MCG: 1000 INJECTION, SOLUTION INTRAMUSCULAR; SUBCUTANEOUS at 16:41

## 2021-06-15 RX ADMIN — FOLIC ACID 1 MG: 1 TABLET ORAL at 16:41

## 2021-06-15 RX ADMIN — ASPIRIN 81 MG: 81 TABLET, COATED ORAL at 05:30

## 2021-06-15 RX ADMIN — LEVOTHYROXINE SODIUM 50 MCG: 0.05 TABLET ORAL at 05:30

## 2021-06-15 RX ADMIN — ENOXAPARIN SODIUM 40 MG: 40 INJECTION SUBCUTANEOUS at 05:30

## 2021-06-15 RX ADMIN — OMEPRAZOLE 20 MG: 20 CAPSULE, DELAYED RELEASE ORAL at 05:29

## 2021-06-15 RX ADMIN — ATORVASTATIN CALCIUM 80 MG: 80 TABLET, FILM COATED ORAL at 16:41

## 2021-06-15 ASSESSMENT — ENCOUNTER SYMPTOMS
FEVER: 0
DIZZINESS: 0
PALPITATIONS: 0
COUGH: 0
VOMITING: 0
SPEECH CHANGE: 1
BLURRED VISION: 1
DOUBLE VISION: 0
SENSORY CHANGE: 1
CHILLS: 0
ABDOMINAL PAIN: 0
NECK PAIN: 0
NAUSEA: 0
SHORTNESS OF BREATH: 0
MYALGIAS: 0
HEADACHES: 0
FOCAL WEAKNESS: 1

## 2021-06-15 ASSESSMENT — PATIENT HEALTH QUESTIONNAIRE - PHQ9
1. LITTLE INTEREST OR PLEASURE IN DOING THINGS: NOT AT ALL
2. FEELING DOWN, DEPRESSED, IRRITABLE, OR HOPELESS: NOT AT ALL
SUM OF ALL RESPONSES TO PHQ9 QUESTIONS 1 AND 2: 0

## 2021-06-15 ASSESSMENT — PAIN DESCRIPTION - PAIN TYPE: TYPE: ACUTE PAIN

## 2021-06-15 NOTE — DISCHARGE PLANNING
@3522  Agency/Facility Name: [facility]  Spoke To: Shelley  Outcome: Reviewing referral.    Received Choice form at 1000  Agency/Facility Name: [facility], [place]  Referral sent per Choice form @ 1008

## 2021-06-15 NOTE — CARE PLAN
Problem: Neuro Status  Goal: Neuro status will remain stable or improve  Outcome: Progressing  Note: Pt's neuro status remained stable throughout shift.      Problem: Self Care  Goal: Patient will have the ability to perform ADLs independently or with assistance (bathe, groom, dress, toilet and feed)  Outcome: Progressing  Note: Pt able to participate in all ADLs.   The patient is Stable - Low risk of patient condition declining or worsening    Shift Goals  Clinical Goals: BP control  Patient Goals: Safety  Family Goals: safety/updates    Progress made toward(s) clinical / shift goals: BP remained stable.

## 2021-06-15 NOTE — THERAPY
"Speech Language Pathology   Initial Assessment     Patient Name: Paul Hopper  AGE:  55 y.o., SEX:  male  Medical Record #: [id number]  Today's Date: 6/15/2021     Precautions  Precautions: (P) Fall Risk, Swallow Precautions ( See Comments)  Comments: orthostatic hypotension    Assessment  56 YO male admitted 6/11 2/2 slurred speech and mild confusion. PMHx: HTN. CMHx:CVA, HTN, seizure-like activity, syncope and collapse, hypothyroidism, chest pain. Brain MRI 6/13 \"Acute brainstem infarct in the left parasagittal aspen. No hemorrhagic transformation.\"    Pt seen this date for cognitive-linguistic evaluation 2/2 CVA. Pt presenting with mild right labial droop and right-sided weakness. 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 Pt does not drive, and walks or takes the bus to appointments or the grocery store. The Cognistat was administered in its entirety in conjunction with nonstandardized assessments. Pt scored WNL on all subtests of the Cognistat (Orientation, Attention, Comprehension, Naming, Repetition, Constructional Ability, Memory, Calculations, Reasoning). Strengths include working memory, short term memory, problem solving, and math. Pt recalled 4/4 words after 5 minute delay. Pt able to formulate written sentence and complete clock drawing task with high accuracy, but demonstrated reduced legibility due to RUE weakness. Pt reporting limited PO intake due to RUE weakness and difficulty self-feeding, OT aware. Pt presenting with mild dysarthria, characterized by decreased intelligibility impacting listener comprehension at the sentence level.    Cognitive-linguistic function appreciated to be WNL, however, pt presenting with mild dysarthria. Pt would benefit from intervention targeting dysarthria and dysphagia. SLP following.     Plan    Recommend Speech Therapy 5 times per week until therapy goals " are met for the following treatments:  Dysphagia Training, Expression Training and Patient / Family / Caregiver Education.    Discharge Recommendations: (P) Recommend outpatient speech therapy services    Subjective    Pt A&Ox4, followed directions and participated fully in evaluation. Significant other present for end of session, all questions answered.      Objective       06/15/21 1015   Verbal Expression   Verbal Expression / Aphasia Eval (WDL) X   Vocal Quality Clear   Verbal Output Automatic Within Functional Limits (6-7)   Verbal Output Conversation Within Functional Limits (6-7)   Repetition: Single Words Within Functional Limits (6-7)   Repetition: Phrases Within Functional Limits (6-7)   Repetition: Sentences Supervision (5)   Naming Within Functional Limits (6-7)   Dysarthria Minimal (4)   Word Finding Deficits Within Functional Limits (6-7)   Skilled Intervention Verbal Cueing;Compensatory Strategies   Auditory Comprehension   Auditory Comprehension (WDL) WDL   Written Expression   Written Expression (WDL) X   Formulates: Sentence Within Functional Limits (6-7)   Overall Legibility Moderate (3)   Dominant Hand Right   Skilled Intervention Compensatory Strategies;Verbal Cueing   Cognitive-Linguistic   Cognitive-Linguistic (WDL) X   Level of Consciousness Alert   Orientation Level Oriented x 4   Sustained Attention Within Functional Limits (6-7)   Alternating Attention Within Functional Limits (6-7)   Short Term Memory Within Functional Limits (6-7)   Immediate Memory Within Functional Limits (6-7)   Long Term Memory / Reminiscing Within Functional Limits (6-7)   Simple Reasoning / Problem Solving Within Functional Limits (6-7)   Complex Reasoning  / Problem Solving Within Functional Limits (6-7)   Insight into Deficits Within Functional Limits (6-7)   Auditory Math Within Functional Limits (6-7)   Functional Math / Financial Management Within Functional Limits (6-7)   Medication Management  Within  "Functional Limits (6-7)   Clock Drawing Other (See Comments)  (poor legibility due to RUE weakness)   Skilled Intervention Compensatory Strategies;Verbal Cueing   Patient / Family Goals   Patient / Family Goal #1 \"I want something to drink\"   Goal #1 Outcome Progressing as expected   Short Term Goals   Short Term Goal # 3 Pt will utilize speech intelligibility strategies in 8/10 opportunities with min cues.         "

## 2021-06-15 NOTE — DISCHARGE PLANNING
ADDENDUM  6/15/21  1600  \[facility] [place] [facility]\"" # [id number]    Care Transition Team Discharge Planning    Anticipated Discharge Disposition: SNF - approved    Action: Lsw met with patient to discuss SNF choices. Pt elected [facility], [place], and [facility]. Lsw faxed CHOICE form to [facility]. Lsw placed CHOICE form in CM basket.    Lsw was informed by [facility] that the patient has been accepted at [facility].    Barriers to Discharge: Awaiting medical clearance.    Plan: Lsw will assist medical team with DC planning.

## 2021-06-15 NOTE — ASSESSMENT & PLAN NOTE
Occurred while on toilet on 6/14. Likely vasovagal VS orthostatic hypotension  -tele  -orthostatics: positive. Will give IVF  -compression stockings  -zio patch outpatient

## 2021-06-15 NOTE — DIETARY
Nutrition Services: Update   Day 3 of admit. Paul Hopper is a 55 y.o. male with admitting DX of stroke determined by clinical assessment.    Pt seen since nutrition representative reported concern since pt is refusing most foods. Pt is currently on easy to chew diet. PO <25% x 3 meals per flow sheet since 6/13. Pt reports feeling hungry but not eating. Pt attributes poor PO intake to anxiety and difficulty feeding himself. Pt noted with right upper extremity weakness. Resident contacted to add 1:1 feeding to diet order. Pt agreeable to Boost Plus BID and Magic Cup daily to support adequate PO intake.     Wt 6/11: 88.3 kg (194 lb 10.7 oz) via bed scale.     Malnutrition Risk: No criteria noted at this time. Pt appears well-nourished. However, pt is at-risk due to poor PO intake.     Recommendations/Plan:  1. Addition of 1:1 feeding to diet order.  2. Boost Plus BID and Magic Cup daily per poor PO protocol.   3. Encourage intake of meals/supplements.  4. Document intake of all meals/supplements as % taken in ADL's to provide interdisciplinary communication across all shifts.   5. Monitor weight.  6. Nutrition rep will continue to see patient for ongoing meal and snack preferences.    RD following.

## 2021-06-15 NOTE — CARE PLAN
Problem: Nutritional:  Goal: Achieve adequate nutritional intake  Description: Patient will consume 50% of meals/supplements  Outcome: Not Met

## 2021-06-15 NOTE — PROGRESS NOTES
Daily Progress Note:     Date of Service: 6/14/2021  Primary Team: [facility] Purple Team   Attending: Oni Puri M.D.   Senior Resident: Dr. Michael  Intern: Dr. Jose  Contact:  [phone]    Interval duration:   -NAEO    Subjective:  Patient states he is feeling better currently.     Later during the day he became hypotensive and had re-demonstration of his dysarthria and RUE weakness. Nursing states he has had decreased PO intake.  Repeat orthostatics were positive.    He denies chest pain or palpitations, SOB, HA, vision changes.    Consultants/Specialty:  neurology  Review of Systems:  Review of Systems   Constitutional: Negative for chills and fever.   Eyes: Positive for blurred vision. Negative for double vision.   Respiratory: Negative for cough and shortness of breath.    Cardiovascular: Negative for chest pain, palpitations and leg swelling.   Gastrointestinal: Negative for abdominal pain, nausea and vomiting.   Genitourinary: Negative for dysuria and urgency.   Musculoskeletal: Negative for myalgias and neck pain.   Skin: Negative for itching and rash.   Neurological: Positive for sensory change (N/T in RUE), speech change and focal weakness (RUE). Negative for dizziness and headaches.       Objective Data:   Physical Exam:   Vitals:   Temp:  [36.3 °C (97.3 °F)-37.3 °C (99.1 °F)] 36.9 °C (98.4 °F)  Pulse:  [50-75] 67  Resp:  [16-20] 16  BP: ()/() 133/70  SpO2:  [96 %-100 %] 97 %    Physical Exam  Constitutional:       Appearance: Normal appearance.   HENT:      Head: Normocephalic and atraumatic.      Mouth/Throat:      Mouth: Mucous membranes are moist.      Pharynx: Oropharynx is clear.   Eyes:      General: No scleral icterus.        Right eye: No discharge.         Left eye: No discharge.      Conjunctiva/sclera: Conjunctivae normal.      Comments: Unable to assess eyes adequately due to not being able to open patient's eyes enough    Cardiovascular:      Rate and Rhythm: Normal rate  and regular rhythm.      Pulses: Normal pulses.      Heart sounds: No murmur heard.   No gallop.    Pulmonary:      Effort: Pulmonary effort is normal. No respiratory distress.      Breath sounds: Normal breath sounds. No wheezing.   Abdominal:      General: There is no distension.      Palpations: Abdomen is soft.      Tenderness: There is no abdominal tenderness.   Musculoskeletal:      Cervical back: Normal range of motion.      Right lower leg: No edema.      Left lower leg: No edema.   Skin:     General: Skin is warm and dry.   Neurological:      Mental Status: He is alert.      Cranial Nerves: No cranial nerve deficit.      Sensory: Sensory deficit (numbness/decreased sensation in right hand) present.      Motor: Weakness (RUE 4/5, LUE 5/5; BLE 5/5) present.      Comments: AOx4   Psychiatric:         Mood and Affect: Mood normal.         Behavior: Behavior normal.           Labs:   See results    Imaging:   CT head wnl, CTA with left proximal subclavian and right proximal ICA stenosis of less than 50% CTA  CT perfusion study reveals infarct/ischemia in bilateral frontal lobe  MRI with acute left parasaggital aspen stroke.    Problem Representation:  55-year-old male with past medical history significant for uncontrolled hypertension presenting acute ischemic infarct of left parasagittal aspen. Also on 6/14 had some return of prior stroke symptoms likely secondary to hypoperfusion of infarcted area due to hypotension.     * CVA (cerebral vascular accident) (HCC)  Assessment & Plan  Likely had hypoperfusion to infarcted area given hypotension today. Neurologically similar to presentation.  Stroke was likely secondary to very uncontrolled hypertension.  UDS negative.  TTE without inter atrial shunt, 60% EF, no LVH, inferior hypokinesis, ascending aorta 3.1 cm  LDL elevated. HA1c wnl.   -Status post permissive hypertension  MRI brain with left parasagittal aspen stroke  PLAN:  -tele  -IVF bolus and compression  stockings. Longterm goal will be to control hypertension with BP goal less than 140/90.   -ASA, statin  -PT and OT: post-acute placement  -SLP: likely no needs post-hospitalization  -f/u neuro  -Outpatient neurology stroke bridge clinic referral has been placed; plan to follow up in 2-4 weeks.   -outpatient cardiac monitoring with Zio patch at time of discharge to formally rule out cardioembolic/cardiac arrhythmia as source of stroke.      Hypertension  Assessment & Plan  Not currently a problem, actually hypotensive at times. Improved blood pressure still above goal.  Avoiding ACE and ARB due to increased creatinine  -no current BP meds  -SBP goal currently > 140. If less than this, consider IVF bolus 500cc.    Seizure-like activity (HCC)  Assessment & Plan  Had 2 episodes of seizure-like activity on 6/13  in early a.m. Each lasting approximately 30 seconds in length.  Patient had muscle rigidity and possible post ictal state, was given IV lorazepam.  Likely secondary to alcohol withdrawal which can lower seizure threshold.  Has facial features and somewhat slow comprehension which are somewhat suggestive of an underlying genetic disease such as trisomy 21 which can also lower seizure threshold  EEG wnl.  PLAN:  -f/u karyotype   -Follow-up neuro    Syncope and collapse  Assessment & Plan  Occurred while on toilet on 6/14. Likely vasovagal.  -tele  -orthostatics: positive. Will give IVF  -compression stockings  -zio patch outpatient    Hypothyroidism  Assessment & Plan  Likely true hypothyroidism given his bradycardia. TSH low, FT4 elevated.  Euthyroid sick syndrome less likely given that he does not appear to have any significant enough illness to cause this  -anti TPO ab to rule out Hashimoto thyroiditis  -Starting low-dose levothyroxine given his possible CAD, suggested by inferior wall hypokinesis on TTE    Blood creatinine increased compared with prior measurement  Assessment & Plan  Improved.  Likely secondary  to decreased EABV due to hypovolemia from decreased PO intake  -monitor    Abnormal echocardiogram  Assessment & Plan  Echo from 6/11/21 shows inferior hypokinsis and thinning.    -outpatient cardiology follow up should be arranged      Chest pain  Assessment & Plan  Resolved. Patient presented with some chest pain and ST elevation in 1-lead, not meeting criteria for STEMI.  ACS unlikely  -troponin <6. EKGs here without acute ST-T changes.  -TTE rules out heart failure

## 2021-06-15 NOTE — CARE PLAN
Problem: Knowledge Deficit - Stroke Education  Goal: Patient's knowledge of stroke and risk factors will improve  Outcome: Progressing     Problem: Psychosocial - Patient Condition  Goal: Patient's ability to verbalize feelings about condition will improve  Outcome: Progressing   The patient is Stable - Low risk of patient condition declining or worsening    Shift Goals  Clinical Goals: BP control  Patient Goals: Safety  Family Goals: safety/updates    Progress made toward(s) clinical / shift goals:  na    Patient is not progressing towards the following goals:

## 2021-06-16 LAB
ANION GAP SERPL CALC-SCNC: 13 MMOL/L (ref 7–16)
BUN SERPL-MCNC: 21 MG/DL (ref 8–22)
CALCIUM SERPL-MCNC: 9.4 MG/DL (ref 8.5–10.5)
CHLORIDE SERPL-SCNC: 104 MMOL/L (ref 96–112)
CO2 SERPL-SCNC: 21 MMOL/L (ref 20–33)
CREAT SERPL-MCNC: 1.89 MG/DL (ref 0.5–1.4)
GLUCOSE SERPL-MCNC: 89 MG/DL (ref 65–99)
POTASSIUM SERPL-SCNC: 3.9 MMOL/L (ref 3.6–5.5)
SODIUM SERPL-SCNC: 138 MMOL/L (ref 135–145)

## 2021-06-16 PROCEDURE — 97116 GAIT TRAINING THERAPY: CPT

## 2021-06-16 PROCEDURE — 99232 SBSQ HOSP IP/OBS MODERATE 35: CPT | Mod: GC | Performed by: INTERNAL MEDICINE

## 2021-06-16 PROCEDURE — A9270 NON-COVERED ITEM OR SERVICE: HCPCS | Performed by: STUDENT IN AN ORGANIZED HEALTH CARE EDUCATION/TRAINING PROGRAM

## 2021-06-16 PROCEDURE — 80048 BASIC METABOLIC PNL TOTAL CA: CPT

## 2021-06-16 PROCEDURE — 700102 HCHG RX REV CODE 250 W/ 637 OVERRIDE(OP): Performed by: STUDENT IN AN ORGANIZED HEALTH CARE EDUCATION/TRAINING PROGRAM

## 2021-06-16 PROCEDURE — 700111 HCHG RX REV CODE 636 W/ 250 OVERRIDE (IP): Performed by: STUDENT IN AN ORGANIZED HEALTH CARE EDUCATION/TRAINING PROGRAM

## 2021-06-16 PROCEDURE — 700102 HCHG RX REV CODE 250 W/ 637 OVERRIDE(OP): Performed by: INTERNAL MEDICINE

## 2021-06-16 PROCEDURE — A9270 NON-COVERED ITEM OR SERVICE: HCPCS | Performed by: INTERNAL MEDICINE

## 2021-06-16 PROCEDURE — 700111 HCHG RX REV CODE 636 W/ 250 OVERRIDE (IP): Performed by: INTERNAL MEDICINE

## 2021-06-16 PROCEDURE — 97530 THERAPEUTIC ACTIVITIES: CPT

## 2021-06-16 PROCEDURE — 82941 ASSAY OF GASTRIN: CPT

## 2021-06-16 PROCEDURE — 97535 SELF CARE MNGMENT TRAINING: CPT

## 2021-06-16 PROCEDURE — 36415 COLL VENOUS BLD VENIPUNCTURE: CPT

## 2021-06-16 PROCEDURE — 700105 HCHG RX REV CODE 258: Performed by: STUDENT IN AN ORGANIZED HEALTH CARE EDUCATION/TRAINING PROGRAM

## 2021-06-16 PROCEDURE — 770020 HCHG ROOM/CARE - TELE (206)

## 2021-06-16 RX ORDER — SODIUM CHLORIDE, SODIUM LACTATE, POTASSIUM CHLORIDE, CALCIUM CHLORIDE 600; 310; 30; 20 MG/100ML; MG/100ML; MG/100ML; MG/100ML
500 INJECTION, SOLUTION INTRAVENOUS CONTINUOUS
Status: DISCONTINUED | OUTPATIENT
Start: 2021-06-16 | End: 2021-06-18 | Stop reason: HOSPADM

## 2021-06-16 RX ORDER — LEVOTHYROXINE SODIUM 0.03 MG/1
25 TABLET ORAL
Status: DISCONTINUED | OUTPATIENT
Start: 2021-06-17 | End: 2021-06-18 | Stop reason: HOSPADM

## 2021-06-16 RX ADMIN — LEVOTHYROXINE SODIUM 50 MCG: 0.05 TABLET ORAL at 04:58

## 2021-06-16 RX ADMIN — SODIUM CHLORIDE, POTASSIUM CHLORIDE, SODIUM LACTATE AND CALCIUM CHLORIDE 500 ML: 600; 310; 30; 20 INJECTION, SOLUTION INTRAVENOUS at 08:36

## 2021-06-16 RX ADMIN — FOLIC ACID 1 MG: 1 TABLET ORAL at 04:57

## 2021-06-16 RX ADMIN — ATORVASTATIN CALCIUM 80 MG: 80 TABLET, FILM COATED ORAL at 16:45

## 2021-06-16 RX ADMIN — CYANOCOBALAMIN 1000 MCG: 1000 INJECTION, SOLUTION INTRAMUSCULAR; SUBCUTANEOUS at 04:58

## 2021-06-16 RX ADMIN — OMEPRAZOLE 20 MG: 20 CAPSULE, DELAYED RELEASE ORAL at 04:58

## 2021-06-16 RX ADMIN — ACETAMINOPHEN 650 MG: 325 TABLET, FILM COATED ORAL at 10:03

## 2021-06-16 RX ADMIN — ASPIRIN 81 MG: 81 TABLET, COATED ORAL at 04:57

## 2021-06-16 RX ADMIN — ENOXAPARIN SODIUM 40 MG: 40 INJECTION SUBCUTANEOUS at 04:58

## 2021-06-16 ASSESSMENT — COGNITIVE AND FUNCTIONAL STATUS - GENERAL
SUGGESTED CMS G CODE MODIFIER MOBILITY: CJ
TOILETING: A LITTLE
DRESSING REGULAR LOWER BODY CLOTHING: A LOT
HELP NEEDED FOR BATHING: A LOT
DAILY ACTIVITIY SCORE: 16
SUGGESTED CMS G CODE MODIFIER DAILY ACTIVITY: CK
PERSONAL GROOMING: A LITTLE
STANDING UP FROM CHAIR USING ARMS: A LITTLE
DRESSING REGULAR UPPER BODY CLOTHING: A LITTLE
EATING MEALS: A LITTLE
MOBILITY SCORE: 20
WALKING IN HOSPITAL ROOM: A LITTLE
MOVING FROM LYING ON BACK TO SITTING ON SIDE OF FLAT BED: A LITTLE
CLIMB 3 TO 5 STEPS WITH RAILING: A LITTLE

## 2021-06-16 ASSESSMENT — ENCOUNTER SYMPTOMS
NECK PAIN: 0
PALPITATIONS: 0
HEADACHES: 0
BLURRED VISION: 1
NAUSEA: 0
DIZZINESS: 0
SENSORY CHANGE: 1
SPEECH CHANGE: 1
COUGH: 0
SHORTNESS OF BREATH: 0
FEVER: 0
DOUBLE VISION: 0
VOMITING: 0
FOCAL WEAKNESS: 1
ABDOMINAL PAIN: 0
MYALGIAS: 0
CHILLS: 0

## 2021-06-16 ASSESSMENT — GAIT ASSESSMENTS
DEVIATION: DECREASED BASE OF SUPPORT;DECREASED HEEL STRIKE;DECREASED TOE OFF;OTHER (COMMENT)
ASSISTIVE DEVICE: FRONT WHEEL WALKER
GAIT LEVEL OF ASSIST: MINIMAL ASSIST
DISTANCE (FEET): 80
DISTANCE (FEET): 10

## 2021-06-16 ASSESSMENT — PAIN DESCRIPTION - PAIN TYPE
TYPE: ACUTE PAIN
TYPE: ACUTE PAIN

## 2021-06-16 NOTE — PROGRESS NOTES
Daily Progress Note:     Date of Service: 6/16/2021  Primary Team: [facility] Team   Attending: Oni Puri M.D.   Senior Resident: Dr. Michael      Intern: Dr. Jose   Contact:  [phone]    Chief Complaint:   Strokelike symptoms    Subjective:   No acute events overnight.  Patient reports of tingling on the left nasolabial fold.   Discussed worsening creatinine, patient admitted he is not drinking adequate amount of fluids.  Educated on importance of good p.o. intake of fluids.  Encouraged to get out of the bed frequently, PT/OT.  Accepted by [facility]-rehab, submitted for authorization with insurance.  Anticipate 1-2 days for discharge.    Consultants/Specialty:  Neurology     Review of Systems:    Review of Systems   Constitutional: Negative for chills and fever.   Eyes: Positive for blurred vision. Negative for double vision.   Respiratory: Negative for cough and shortness of breath.    Cardiovascular: Negative for chest pain, palpitations and leg swelling.   Gastrointestinal: Negative for abdominal pain, nausea and vomiting.   Genitourinary: Negative for dysuria and urgency.   Musculoskeletal: Negative for myalgias and neck pain.   Skin: Negative for itching and rash.   Neurological: Positive for sensory change (N/T in RUE), speech change and focal weakness (RUE). Negative for dizziness and headaches.       Objective Data:   Physical Exam:   Vitals:   Temp:  [36.1 °C (97 °F)-36.7 °C (98.1 °F)] 36.5 °C (97.7 °F)  Pulse:  [60-89] 60  Resp:  [16-17] 17  BP: (139-170)/() 168/94  SpO2:  [96 %-100 %] 100 %     Physical Exam  Constitutional:       Appearance: Normal appearance.   HENT:      Head: Normocephalic and atraumatic.      Mouth/Throat:      Mouth: Mucous membranes are moist.      Pharynx: Oropharynx is clear.   Eyes:      General: No scleral icterus.        Right eye: No discharge.         Left eye: No discharge.      Conjunctiva/sclera: Conjunctivae normal.   Cardiovascular:      Rate and  Rhythm: Normal rate and regular rhythm.      Pulses: Normal pulses.      Heart sounds: No murmur heard.   No gallop.    Pulmonary:      Effort: Pulmonary effort is normal. No respiratory distress.      Breath sounds: Normal breath sounds. No wheezing.   Abdominal:      General: There is no distension.      Palpations: Abdomen is soft.      Tenderness: There is no abdominal tenderness.   Musculoskeletal:      Cervical back: Normal range of motion.      Right lower leg: No edema.      Left lower leg: No edema.   Skin:     General: Skin is warm and dry.   Neurological:      Mental Status: He is alert.      Cranial Nerves: No cranial nerve deficit.      Sensory: Sensory deficit (numbness/decreased sensation in right hand) present.      Motor: Weakness (RUE 4/5, LUE 5/5; BLE 5/5) present.      Comments: AOx4   Psychiatric:         Mood and Affect: Mood normal.         Behavior: Behavior normal.           Problem Representation:  55-year-old male with past medical history significant for uncontrolled hypertension presenting acute ischemic infarct of left parasagittal aspen. Also on 6/14 had some return of prior stroke symptoms likely secondary to hypoperfusion of infarcted area due to hypotension.   Monitored on neurology floor, no new symptoms.  Pending discharge to SNF.     * CVA (cerebral vascular accident) (HCC)  Assessment & Plan  Stroke was likely secondary to very uncontrolled hypertension.  UDS negative.  TTE without inter atrial shunt, 60% EF, no LVH, inferior hypokinesis, ascending aorta 3.1 cm  LDL elevated. HA1c wnl.   MRI brain with left parasagittal aspen stroke    PLAN:  -Tele monitoring for arrhythmias  -Encourage good p.o. intake of fluids, IVFs if needed  and compression stockings.   -Labile blood pressure likely due to stroke territory causing autonomic instability.  Avoid hypotension for better brain perfusion.  -ASA, statin  -PT and OT recommended post-acute placement  -SLP: likely no needs  post-hospitalization  -Outpatient neurology stroke bridge clinic referral has been placed; plan to follow up in 2-4 weeks.   -Outpatient cardiac monitoring with Zio patch at time of discharge to formally rule out cardioembolic/cardiac arrhythmia as source of stroke.      AURELIANO (acute kidney injury) (HCC)  Assessment & Plan  Likely prerenal secondary to low volume state.  -IV fluid bolus 500 mL once   -Encourage improved p.o. fluid intake  We will monitor for response.-    Hypertension  Assessment & Plan  BP increasing again. As of this morning was actually hypotensive at times. Improved blood pressure but still above goal.  Avoiding ACE and ARB due to AURELIANO  -no current BP meds  -SBP goal currently > 140. If less than this, consider IVF bolus 1L.   -if BP > 220/120, consider clonidine. Avoid beta-blockers due to bradycardia and hydralazine due to patient's sensitive venous tone. Avoid ACEI due to AURELIANO.    Syncope and collapse  Assessment & Plan  Occurred while on toilet on 6/14. Likely vasovagal VS orthostatic hypotension  -tele  -orthostatics: positive. Will give IVF  -compression stockings  -zio patch outpatient    Abnormal echocardiogram  Assessment & Plan  Echo from 6/11/21 shows inferior hypokinsis and thinning.    -outpatient cardiology follow up possible MPI       Folate deficiency  Assessment & Plan  -Folate supplementation    B12 deficiency  Assessment & Plan  -IM and p.o. supplementation    Cervical stenosis of spinal canal  Assessment & Plan  Moderate central canal stenosis of C3/C4.   -f/u PCP    Hypothyroidism  Assessment & Plan  Likely true hypothyroidism given his bradycardia. TSH low, FT4 elevated.  Euthyroid sick syndrome less likely given that he does not appear to have any significant enough illness to cause this  anti TPO negative, rules out Hashimoto thyroiditis  PLAN:  -continue low-dose levothyroxine given his possible CAD, suggested by inferior wall hypokinesis on TTE    Seizure-like activity  ([facility])  Assessment & Plan  Had 2 episodes of seizure-like activity on 6/13  in early a.m. Each lasting approximately 30 seconds in length.  Patient had muscle rigidity and possible post ictal state, was given IV lorazepam.  Likely secondary to alcohol withdrawal which can lower seizure threshold.  Has facial features and somewhat slow comprehension which are somewhat suggestive of an underlying genetic disease such as trisomy 21 which can also lower seizure threshold  EEG wnl.    PLAN:  -No new episodes  -f/u karyotype   -Follow-up neuro outpatient    Chest pain  Assessment & Plan  Resolved. Patient presented with some chest pain and ST elevation in 1-lead, not meeting criteria for STEMI.  ACS unlikely  -troponin <6. EKGs here without acute ST-T changes.  -TTE rules out heart failure

## 2021-06-16 NOTE — DISCHARGE PLANNING
Anticipated Discharge Disposition:   [facility]    Action:    Per [place] with [facility] admissions, they will submit for authorization with insurance today.  It will take a day or two.     Voalte msg to Dr. Michael, bedside SPEEDY Rudd and Charge OBEY Bansal.    Barriers to Discharge:    Insurance authorization    Plan:    F/U with [facility].

## 2021-06-16 NOTE — CARE PLAN
The patient is Watcher - Medium risk of patient condition declining or worsening    Shift Goals  Clinical Goals: Discharge to [facility]  Patient Goals: Comfort  Family Goals: NA    Progress made toward(s) clinical / shift goals:  Plan to discharge to [facility] 6/17/2021. Pt agreeable to discharge plane. Comfort needs addressed.     Patient is not progressing towards the following goals:

## 2021-06-16 NOTE — CARE PLAN
The patient is Stable - Low risk of patient condition declining or worsening    Shift Goals  Clinical Goals: Bp control safetly  Patient Goals: safety   Family Goals: safety/update    Progress made toward(s) clinical / shift goals:  PT Bp has been above 140 and less than 220. Pt is able to call nurse when ambulating. Bed at lowest position and call light within reach. No labored breath and no distress noticed at this time.    Patient is not progressing towards the following goals:

## 2021-06-16 NOTE — PROGRESS NOTES
Daily Progress Note:     Date of Service: 6/15/2021  Primary Team: [facility] Team   Attending: Oni Puri M.D.   Senior Resident: Dr. Michael  Intern: Dr. Jose  Contact:  [phone]    Interval duration:   -NAEO    Subjective:  Patient states he is feeling better currently.     Later during the day he became hypotensive and had re-demonstration of his dysarthria and RUE weakness. Nursing states he has had decreased PO intake.  Repeat orthostatics were positive.    He denies chest pain or palpitations, SOB, HA, vision changes.    Consultants/Specialty:  neurology  Review of Systems:  Review of Systems   Constitutional: Negative for chills and fever.   Eyes: Positive for blurred vision. Negative for double vision.   Respiratory: Negative for cough and shortness of breath.    Cardiovascular: Negative for chest pain, palpitations and leg swelling.   Gastrointestinal: Negative for abdominal pain, nausea and vomiting.   Genitourinary: Negative for dysuria and urgency.   Musculoskeletal: Negative for myalgias and neck pain.   Skin: Negative for itching and rash.   Neurological: Positive for sensory change (N/T in RUE), speech change and focal weakness (RUE). Negative for dizziness and headaches.       Objective Data:   Physical Exam:   Vitals:   Temp:  [36.2 °C (97.1 °F)-36.9 °C (98.4 °F)] 36.7 °C (98.1 °F)  Pulse:  [50-64] 64  Resp:  [16] 16  BP: (134-170)/() 170/106  SpO2:  [92 %-98 %] 98 %    Physical Exam  Constitutional:       Appearance: Normal appearance.   HENT:      Head: Normocephalic and atraumatic.      Mouth/Throat:      Mouth: Mucous membranes are moist.      Pharynx: Oropharynx is clear.   Eyes:      General: No scleral icterus.        Right eye: No discharge.         Left eye: No discharge.      Conjunctiva/sclera: Conjunctivae normal.      Comments: Unable to assess eyes adequately due to not being able to open patient's eyes enough    Cardiovascular:      Rate and Rhythm: Normal rate and  regular rhythm.      Pulses: Normal pulses.      Heart sounds: No murmur heard.   No gallop.    Pulmonary:      Effort: Pulmonary effort is normal. No respiratory distress.      Breath sounds: Normal breath sounds. No wheezing.   Abdominal:      General: There is no distension.      Palpations: Abdomen is soft.      Tenderness: There is no abdominal tenderness.   Musculoskeletal:      Cervical back: Normal range of motion.      Right lower leg: No edema.      Left lower leg: No edema.   Skin:     General: Skin is warm and dry.   Neurological:      Mental Status: He is alert.      Cranial Nerves: No cranial nerve deficit.      Sensory: Sensory deficit (numbness/decreased sensation in right hand) present.      Motor: Weakness (RUE 4/5, LUE 5/5; BLE 5/5) present.      Comments: AOx4   Psychiatric:         Mood and Affect: Mood normal.         Behavior: Behavior normal.           Labs:   See results    Imaging:   CT head wnl, CTA with left proximal subclavian and right proximal ICA stenosis of less than 50% CTA  CT perfusion study reveals infarct/ischemia in bilateral frontal lobe  MRI with acute left parasaggital aspen stroke.    Problem Representation:  55-year-old male with past medical history significant for uncontrolled hypertension presenting acute ischemic infarct of left parasagittal aspen. Also on 6/14 had some return of prior stroke symptoms likely secondary to hypoperfusion of infarcted area due to hypotension.     * CVA (cerebral vascular accident) (HCC)  Assessment & Plan  Likely had hypoperfusion to infarcted area given hypotension today. Neurologically similar to presentation.  Stroke was likely secondary to very uncontrolled hypertension.  UDS negative.  TTE without inter atrial shunt, 60% EF, no LVH, inferior hypokinesis, ascending aorta 3.1 cm  LDL elevated. HA1c wnl.   -Status post permissive hypertension  MRI brain with left parasagittal aspen stroke  PLAN:  -tele  -IVF bolus and compression stockings.  "  -Current BP goal in \"hypertension.\" Longterm goal will be to control hypertension with BP goal less than 140/90.   -ASA, statin  -PT and OT: post-acute placement  -SLP: likely no needs post-hospitalization  -f/u neuro  -Outpatient neurology stroke bridge clinic referral has been placed; plan to follow up in 2-4 weeks.   -outpatient cardiac monitoring with Zio patch at time of discharge to formally rule out cardioembolic/cardiac arrhythmia as source of stroke.      Hypertension  Assessment & Plan  BP increasing again. As of this morning was actually hypotensive at times. Improved blood pressure but still above goal.  Avoiding ACE and ARB due to AURELIANO  -no current BP meds  -SBP goal currently > 140. If less than this, consider IVF bolus 1L.   -if BP > 220/120, consider clonidine. Avoid labetalol due to bradycardia and hydralazine due to patient's sensitive venous tone. Avoid captopril due to AURELIANO.    Seizure-like activity (HCC)  Assessment & Plan  Had 2 episodes of seizure-like activity on 6/13  in early a.m. Each lasting approximately 30 seconds in length.  Patient had muscle rigidity and possible post ictal state, was given IV lorazepam.  Likely secondary to alcohol withdrawal which can lower seizure threshold.  Has facial features and somewhat slow comprehension which are somewhat suggestive of an underlying genetic disease such as trisomy 21 which can also lower seizure threshold  EEG wnl.  PLAN:  -f/u karyotype   -Follow-up neuro    Syncope and collapse  Assessment & Plan  Occurred while on toilet on 6/14. Likely vasovagal.  -tele  -orthostatics: positive. Will give IVF  -compression stockings  -zio patch outpatient    AURELIANO (acute kidney injury) (HCC)  Assessment & Plan    Likely secondary to decreased EABV due to hypovolemia from decreased PO intake  -labs for FeNa  -encourage oral fluids and 1:1 sitter for feeding  -monitor    Abnormal echocardiogram  Assessment & Plan  Echo from 6/11/21 shows inferior hypokinsis " and thinning.    -outpatient cardiology follow up should be arranged      Folate deficiency  Assessment & Plan  -folate supplement    B12 deficiency  Assessment & Plan  -B12 injections and oral B12    Cervical stenosis of spinal canal  Assessment & Plan  Moderate central canal stenosis of C3/C4.   -f/u PCP    Hypothyroidism  Assessment & Plan  Likely true hypothyroidism given his bradycardia. TSH low, FT4 elevated.  Euthyroid sick syndrome less likely given that he does not appear to have any significant enough illness to cause this  anti TPO negative, rules out Hashimoto thyroiditis  PLAN:  -continue low-dose levothyroxine given his possible CAD, suggested by inferior wall hypokinesis on TTE    Chest pain  Assessment & Plan  Resolved. Patient presented with some chest pain and ST elevation in 1-lead, not meeting criteria for STEMI.  ACS unlikely  -troponin <6. EKGs here without acute ST-T changes.  -TTE rules out heart failure

## 2021-06-17 ENCOUNTER — HOSPITAL ENCOUNTER (INPATIENT)
Facility: REHABILITATION | Age: 56
End: 2021-06-17
Attending: PHYSICAL MEDICINE & REHABILITATION | Admitting: PHYSICAL MEDICINE & REHABILITATION
Payer: COMMERCIAL

## 2021-06-17 LAB
ANION GAP SERPL CALC-SCNC: 12 MMOL/L (ref 7–16)
BUN SERPL-MCNC: 24 MG/DL (ref 8–22)
CALCIUM SERPL-MCNC: 9.2 MG/DL (ref 8.5–10.5)
CHLORIDE SERPL-SCNC: 101 MMOL/L (ref 96–112)
CO2 SERPL-SCNC: 21 MMOL/L (ref 20–33)
CREAT SERPL-MCNC: 1.7 MG/DL (ref 0.5–1.4)
GLUCOSE SERPL-MCNC: 81 MG/DL (ref 65–99)
POTASSIUM SERPL-SCNC: 3.9 MMOL/L (ref 3.6–5.5)
SODIUM SERPL-SCNC: 134 MMOL/L (ref 135–145)

## 2021-06-17 PROCEDURE — 36415 COLL VENOUS BLD VENIPUNCTURE: CPT

## 2021-06-17 PROCEDURE — 80048 BASIC METABOLIC PNL TOTAL CA: CPT

## 2021-06-17 PROCEDURE — 700102 HCHG RX REV CODE 250 W/ 637 OVERRIDE(OP): Performed by: STUDENT IN AN ORGANIZED HEALTH CARE EDUCATION/TRAINING PROGRAM

## 2021-06-17 PROCEDURE — 700111 HCHG RX REV CODE 636 W/ 250 OVERRIDE (IP): Performed by: INTERNAL MEDICINE

## 2021-06-17 PROCEDURE — A9270 NON-COVERED ITEM OR SERVICE: HCPCS | Performed by: STUDENT IN AN ORGANIZED HEALTH CARE EDUCATION/TRAINING PROGRAM

## 2021-06-17 PROCEDURE — 99232 SBSQ HOSP IP/OBS MODERATE 35: CPT | Mod: GC | Performed by: INTERNAL MEDICINE

## 2021-06-17 PROCEDURE — 700105 HCHG RX REV CODE 258: Performed by: STUDENT IN AN ORGANIZED HEALTH CARE EDUCATION/TRAINING PROGRAM

## 2021-06-17 PROCEDURE — 99223 1ST HOSP IP/OBS HIGH 75: CPT | Performed by: PHYSICAL MEDICINE & REHABILITATION

## 2021-06-17 PROCEDURE — A9270 NON-COVERED ITEM OR SERVICE: HCPCS | Performed by: INTERNAL MEDICINE

## 2021-06-17 PROCEDURE — A9270 NON-COVERED ITEM OR SERVICE: HCPCS | Performed by: PHYSICAL MEDICINE & REHABILITATION

## 2021-06-17 PROCEDURE — 770020 HCHG ROOM/CARE - TELE (206)

## 2021-06-17 PROCEDURE — 700102 HCHG RX REV CODE 250 W/ 637 OVERRIDE(OP): Performed by: INTERNAL MEDICINE

## 2021-06-17 PROCEDURE — 88262 CHROMOSOME ANALYSIS 15-20: CPT

## 2021-06-17 PROCEDURE — 700102 HCHG RX REV CODE 250 W/ 637 OVERRIDE(OP): Performed by: PHYSICAL MEDICINE & REHABILITATION

## 2021-06-17 PROCEDURE — 700111 HCHG RX REV CODE 636 W/ 250 OVERRIDE (IP): Performed by: STUDENT IN AN ORGANIZED HEALTH CARE EDUCATION/TRAINING PROGRAM

## 2021-06-17 RX ORDER — GABAPENTIN 100 MG/1
200 CAPSULE ORAL 2 TIMES DAILY
Status: DISCONTINUED | OUTPATIENT
Start: 2021-06-17 | End: 2021-06-18 | Stop reason: HOSPADM

## 2021-06-17 RX ORDER — CLONIDINE HYDROCHLORIDE 0.1 MG/1
0.1 TABLET ORAL EVERY 6 HOURS PRN
Status: DISCONTINUED | OUTPATIENT
Start: 2021-06-17 | End: 2021-06-18 | Stop reason: HOSPADM

## 2021-06-17 RX ORDER — SODIUM CHLORIDE 9 MG/ML
INJECTION, SOLUTION INTRAVENOUS CONTINUOUS
Status: DISCONTINUED | OUTPATIENT
Start: 2021-06-17 | End: 2021-06-18 | Stop reason: HOSPADM

## 2021-06-17 RX ADMIN — CLONIDINE HYDROCHLORIDE 0.1 MG: 0.1 TABLET ORAL at 17:18

## 2021-06-17 RX ADMIN — ACETAMINOPHEN 650 MG: 325 TABLET, FILM COATED ORAL at 14:28

## 2021-06-17 RX ADMIN — SODIUM CHLORIDE: 9 INJECTION, SOLUTION INTRAVENOUS at 06:41

## 2021-06-17 RX ADMIN — ASPIRIN 81 MG: 81 TABLET, COATED ORAL at 04:46

## 2021-06-17 RX ADMIN — ENOXAPARIN SODIUM 40 MG: 40 INJECTION SUBCUTANEOUS at 04:45

## 2021-06-17 RX ADMIN — GABAPENTIN 200 MG: 100 CAPSULE ORAL at 17:17

## 2021-06-17 RX ADMIN — ATORVASTATIN CALCIUM 80 MG: 80 TABLET, FILM COATED ORAL at 17:17

## 2021-06-17 RX ADMIN — FOLIC ACID 1 MG: 1 TABLET ORAL at 04:46

## 2021-06-17 RX ADMIN — OMEPRAZOLE 20 MG: 20 CAPSULE, DELAYED RELEASE ORAL at 04:46

## 2021-06-17 RX ADMIN — CYANOCOBALAMIN 1000 MCG: 1000 INJECTION, SOLUTION INTRAMUSCULAR; SUBCUTANEOUS at 04:46

## 2021-06-17 RX ADMIN — LEVOTHYROXINE SODIUM 25 MCG: 25 TABLET ORAL at 05:01

## 2021-06-17 ASSESSMENT — ENCOUNTER SYMPTOMS
ABDOMINAL PAIN: 0
DIARRHEA: 0
MYALGIAS: 0
FOCAL WEAKNESS: 1
SPEECH CHANGE: 1
PALPITATIONS: 0
COUGH: 0
NECK PAIN: 0
FEVER: 0
SHORTNESS OF BREATH: 0
DOUBLE VISION: 0
CONSTIPATION: 0
DIZZINESS: 0
HEADACHES: 0
NAUSEA: 0
VOMITING: 0
BLURRED VISION: 1
SENSORY CHANGE: 1
CHILLS: 0

## 2021-06-17 ASSESSMENT — PAIN DESCRIPTION - PAIN TYPE: TYPE: ACUTE PAIN

## 2021-06-17 NOTE — DISCHARGE PLANNING
[facility] Transitional Care Coordination    Referral from:  Dr. Nichole    Insurance Provider on Facesheet: [facility]    Potential Rehab Diagnosis: CVA    Chart review indicates patient may have on going medical management and may have therapy needs to possibly meet inpatient rehab facility criteria with the goal of returning to community.    D/C support: TBD     Physiatry consultation forwarded per protocol.     CVA.  Physiatry to consult.  Waiting on additional information to determine appropriateness for acute inpatient rehabilitation. Will continue to follow.      Thank you for the referral.

## 2021-06-17 NOTE — THERAPY
Occupational Therapy  Daily Treatment     Patient Name: Paul Hopper  Age:  55 y.o., Sex:  male  Medical Record #: [id number]  Today's Date: 6/16/2021     Precautions  Precautions: Fall Risk, Swallow Precautions ( See Comments)  Comments: orthostatic hypotension    Assessment    Pt seen for OT tx today, making progress with set POC, improved RUE motor control, stable slight c/o dizziness no exacerbation noted with activity, BP monitored throughout and BP in 150/100's throughout. Pt continues to be limited by decreased activity tolerance, motor control, sequencing and slightly delayed responses, and decreased strength. Will continue to follow while in house and post acute placement recommended.     Plan    Continue current treatment plan.    DC Equipment Recommendations: Unable to determine at this time  Discharge Recommendations: Recommend post-acute placement for additional occupational therapy services prior to discharge home     Objective       06/16/21 1631   Cognition    Cognition / Consciousness X   Speech/ Communication Word Finding Impairment   Orientation Level Oriented x 4   Level of Consciousness Alert   Comments Pleasant, delyaed responses but following commands and sequencing appropriately    Other Treatments   Other Treatments Provided Pt and family educated on rest and recovery post stroke    Balance   Sitting Balance (Static) Fair   Sitting Balance (Dynamic) Fair   Standing Balance (Static) Fair -   Standing Balance (Dynamic) Fair -   Weight Shift Sitting Fair   Weight Shift Standing Fair   Bed Mobility    Supine to Sit Supervised   Sit to Supine   (up in chair post session)   Scooting Supervised   Rolling Supervised   Skilled Intervention Verbal Cuing;Sequencing;Tactile Cuing;Compensatory Strategies   Comments raised hob and use of bed rail   Activities of Daily Living   Eating Supervision   Grooming Minimal Assist;Standing   Bathing   (NT)   Upper Body Dressing Minimal Assist   Lower Body Dressing  Minimal Assist  (socks)   Toileting Minimal Assist   Skilled Intervention Verbal Cuing;Tactile Cuing;Sequencing;Compensatory Strategies   Comments intermittent vc's for sequencing and hand placement    Functional Mobility   Sit to Stand Minimal Assist  (cga)   Bed, Chair, Wheelchair Transfer Minimal Assist   Toilet Transfers Minimal Assist   Transfer Method Stand Pivot   Mobility bed mobility, STS eob, ambulated to BR and chair t/f    Comments w/FWW, intermittent vc's for sequencing   Short Term Goals   Short Term Goal # 1 Pt will complete toilet transfer using BSC if needed with Diego by discharge.   Goal Outcome # 1 Goal met, new goal added   Short Term Goal # 1 B  Pt will complete toilet t/f with supervision   Short Term Goal # 2 Pt will complete seated grooming/hygiene with setup/spv by discharge.   Goal Outcome # 2 Progressing as expected   Short Term Goal # 3 Pt will feed self with setup/spv by discharge.   Goal Outcome # 3 Progressing as expected   Short Term Goal # 4 Pt will complete LB dressing with Diego using AE if needed by discharge.   Goal Outcome # 4 Progressing as expected   Anticipated Discharge Equipment and Recommendations   DC Equipment Recommendations Unable to determine at this time

## 2021-06-17 NOTE — THERAPY
Physical Therapy   Daily Treatment     Patient Name: Paul Hopper  Age:  55 y.o., Sex:  male  Medical Record #: [id number]  Today's Date: 6/16/2021     Precautions: Fall Risk, Swallow Precautions ( See Comments) (has had recent abnormal hemodynamics; would monitor symptoms)    Assessment    Pt progressing as expected. He is improving with re: ambulatory activity and function since prior visit and note that his BP/symptoms are much improved at this time. However he still required consistent cueing/facilitation for mechanics to assist with motor control and fall prevention. Will continue to visit with details/recs as below.     Plan    Continue current treatment plan.    DC Equipment Recommendations: Unable to determine at this time  Discharge Recommendations: Recommend post-acute placement for additional physical therapy services prior to discharge home       06/16/21 2546   Cognition    Cognition / Consciousness X   Level of Consciousness Alert   Safety Awareness Impaired   Comments pleasant and cooperative; appears somewhat delayed with re: processing and needed increased time   Balance   Sitting Balance (Static) Fair   Sitting Balance (Dynamic) Fair   Standing Balance (Static) Fair -   Standing Balance (Dynamic) Fair -   Weight Shift Sitting Fair   Weight Shift Standing Fair   Skilled Intervention Verbal Cuing;Compensatory Strategies;Tactile Cuing;Sequencing   Comments cueing for mechanics with use of FWW; noted at times inconsistent with placement of RLE and gait mechanics   Gait Analysis   Gait Level Of Assist Minimal Assist  (mostly CGA with VC/TCs)   Assistive Device Front Wheel Walker   Distance (Feet) 80   # of Times Distance was Traveled 3   Deviation Decreased Base Of Support;Decreased Heel Strike;Decreased Toe Off;Other (Comment)  (reciprocal gait; inconsistent RLE placement; see below)   # of Stairs Climbed 0   Weight Bearing Status no restrictions   Skilled Intervention Verbal Cuing;Tactile  Cuing;Sequencing;Postural Facilitation;Compensatory Strategies   Comments see balance; cueing for mechanics and placement of RLE ; noted somewhat worsening when fatigued with re: ataxic mechanics   Bed Mobility    Comments found in chair pre/post   Functional Mobility   Sit to Stand   (CGA)   Bed, Chair, Wheelchair Transfer   (CGA)   Transfer Method Stand Step   Mobility with FWW   Skilled Intervention Verbal Cuing;Tactile Cuing;Compensatory Strategies   How much difficulty does the patient currently have...   Turning over in bed (including adjusting bedclothes, sheets and blankets)? 4   Sitting down on and standing up from a chair with arms (e.g., wheelchair, bedside commode, etc.) 3   Moving from lying on back to sitting on the side of the bed? 4   How much help from another person does the patient currently need...   Moving to and from a bed to a chair (including a wheelchair)? 3   Need to walk in a hospital room? 3   Climbing 3-5 steps with a railing? 3   6 clicks Mobility Score 20   Activity Tolerance   Sitting in Chair at least 10 mins   Sitting Edge of Bed NT   Standing at least 5 mins    Comments no overt/acute fatigue; though see gait/balance above and noted worsening mechanics with fatigue   Short Term Goals    Short Term Goal # 1 Pt will perform supine <> sit with SPV in 6 visits to get in/out of bed   Goal Outcome # 1 Progressing as expected   Short Term Goal # 2 Pt will perform functional transfers with SPV in 6 visits to increase independence   Goal Outcome # 2 Progressing as expected   Short Term Goal # 3 Pt will ambulate 150ft with FWW and SPV in 6 visits to increase independence   Goal Outcome # 3 Progressing as expected   Education Group   Role of Physical Therapist Patient Response Patient;Acceptance;Explanation;Demonstration;Verbal Demonstration   Gait Training Patient Response Patient;Acceptance;Explanation;Demonstration;Action Demonstration;Verbal Demonstration;Reinforcement Needed   Use of  Assistive Device Patient Response Patient;Acceptance;Explanation;Verbal Demonstration;Action Demonstration;Reinforcement Needed

## 2021-06-17 NOTE — DISCHARGE PLANNING
Agency/Facility Name: [facility]  Spoke To: Quyen  Outcome: Insurance auth obtained, bed available today.     ALMA Pinto notified.

## 2021-06-17 NOTE — DISCHARGE PLANNING
Anticipated Discharge Disposition: IRF vs SNF    Action:   Pt has been accepted at [facility]. Pt's Insurance is [facility] per Dr Toth s notes [facility] no longer has primary benefit with [facility].    Barriers to Discharge:   Transport to SNF    Plan:   Will continue to assist as needed

## 2021-06-17 NOTE — CONSULTS
Physical Medicine and Rehabilitation Consultation              Date of initial consultation: 6/17/2021  Consulting provider: Harvey Nichole MD  Reason for consultation: assess for acute inpatient rehab appropriateness  LOS: 5 Day(s)    Chief complaint: Dysarthria    HPI: The patient is a 55 y.o. right hand dominant male with a past medical history of hypertension;  who presented on 6/11/2021  8:26 AM with chest pain.  Patient called EMS, and on arrival to [facility] he was noted to have right facial weakness, RUE weakness and dysarthria.  Stroke alert was called.  CTA head and neck showed no LVO, CT perfusion showed no large territorial perfusion mismatch.  Seen by neurology, found to have NIH score of 5.  Follow-up MRI on 6/13 found acute brainstem infarct in the left parasagittal aspen.  TTE was performed which found no left to right shunt, no LV thrombus, and ejection fraction 60%.  Stroke etiology likely to be related to hypertension and small vessel disease.  Recovery complicated by seizure like activity on the night of 6/12-13, however neurology felt more consistent with syncope.  Follow-up EEG was normal.    The patient currently reports numbness and tingling in his right hand that is bothersome, headache, blurry vision, double vision, dysarthria.  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  Patient also endorses weakness on the right side of his body.  He states his symptoms are improving.    Social Hx:  Patient was living in a motel, alone.      Of note, patient reports he has a girlfriend who has expressed interest in caring for him and apparently has invited him to live with her on discharge.  The interesting part is that they have only been dating for 3 weeks and have only been on 2 dates, so this support must be verified.  Patient states she works in the ER at [employer], but he is not very clear on what her  "actual job title is.    Employment: Works in a [employer]    THERAPY:  PT: Functional mobility   6/16: Walking 80 feet x 3 with front wheel walker at min assist    OT: ADLs  6/16 min assist for dressing, toileting, grooming    SLP:   6/15: Mild dysarthria    IMAGING:    MR brain 6/13/2021  1.  Mild posterior ventriculomegaly most consistent with developmental colpocephaly. Doubtful clinical significance.  2.  Mild supratentorial white matter disease. Nonspecific findings consistent with microvascular ischemic change versus demyelination or gliosis. There are no \"active\" enhancing lesions.  3.  Acute brainstem infarct in the left parasagittal aspen. No hemorrhagic transformation.  4.  No enhancing mass lesions, acute hemorrhage, or epileptogenic focus identified.    PROCEDURES:  EEG 6/14   Normal video EEG recording in the awake and drowsy state(s):  - No persistent focal asymmetries seen.  - No epileptiform discharges seen   - No seizures. Clinical correlation is recommended.    PMH:  Past Medical History:   Diagnosis Date   • Hypertension      PSH:  No past surgical history on file.    FHX:  Non-pertinent to today's issues    Medications:  Current Facility-Administered Medications   Medication Dose   • NS infusion     • lactated ringers infusion  500 mL   • levothyroxine (SYNTHROID) tablet 25 mcg  25 mcg   • folic acid (FOLVITE) tablet 1 mg  1 mg   • [START ON 6/18/2021] cyanocobalamin (VITAMIN B-12) tablet 2,000 mcg  2,000 mcg   • omeprazole (PRILOSEC) capsule 20 mg  20 mg   • prochlorperazine (COMPAZINE) injection 5 mg  5 mg   • aspirin EC (ECOTRIN) tablet 81 mg  81 mg   • enoxaparin (LOVENOX) inj 40 mg  40 mg   • acetaminophen (Tylenol) tablet 650 mg  650 mg   • atorvastatin (LIPITOR) tablet 80 mg  80 mg       Allergies:  Allergies   Allergen Reactions   • Other Food Anaphylaxis     All fresh fruit causes throat swelling per brother.        Physical Exam:  Vitals: BP (!) 172/98   Pulse (!) 50   Temp 36.4 °C (97.6 " "°F) (Temporal)   Resp 17   Ht 1.727 m (5' 8\")   Wt 88.3 kg (194 lb 10.7 oz)   SpO2 97%   Gen: NAD  Head: NC/AT  Eyes/ Nose/ Mouth: PERRLA, moist mucous membranes  Cardio: RRR, good distal perfusion, warm extremities  Pulm: normal respiratory effort, no cyanosis   Abd: Soft NTND, negative borborygmi   Ext: No peripheral edema. No calf tenderness. No clubbing.    Mental status: answers questions appropriately follows commands  Speech: Moderate dysarthria    CRANIAL NERVES:  2,3: visual acuity grossly intact, PERRL  3,4,6: EOMI bilaterally, no nystagmus or diplopia  5: sensation intact to light touch bilaterally and symmetric  7: no facial asymmetry  8: hearing grossly intact  9,10: symmetric palate elevation  11: SCM/Trapezius strength decreased on right  12: tongue protrudes midline      Motor:      Upper Extremity  Myotome R L   Shoulder flexion C5 4/5 5   Elbow flexion C5 4/5 5   Wrist extension C6 4/5 5   Elbow extension C7 4/5 5   Finger flexion C8 4/5 5   Finger abduction T1 4/5 5     Lower Extremity Myotome R L   Hip flexion L2 4/5 5   Knee extension L3 4/5 5   Ankle dorsiflexion L4 4/5 5   Toe extension L5 4/5 5   Ankle plantarflexion S1 4/5 5     Sensory:   Altered sensation to light touch right hand      DTRs:  Right  Left    Brachioradialis  2+  2+   Patella tendon  2+ 2+     Positive Daniels right    Tone: no spasticity noted, no cogwheeling noted    Labs: Reviewed and significant for   No results for input(s): RBC, HEMOGLOBIN, HEMATOCRIT, PLATELETCT, PROTHROMBTM, APTT, INR, IRON, FERRITIN, TOTIRONBC in the last 72 hours.  Recent Labs     06/15/21  0108 06/16/21  0140 06/17/21  0214   SODIUM 140 138 134*   POTASSIUM 4.2 3.9 3.9   CHLORIDE 105 104 101   CO2 24 21 21   GLUCOSE 91 89 81   BUN 15 21 24*   CREATININE 1.82* 1.89* 1.70*   CALCIUM 9.2 9.4 9.2     Recent Results (from the past 24 hour(s))   Basic Metabolic Panel    Collection Time: 06/17/21  2:14 AM   Result Value Ref Range    Sodium 134 (L) " 135 - 145 mmol/L    Potassium 3.9 3.6 - 5.5 mmol/L    Chloride 101 96 - 112 mmol/L    Co2 21 20 - 33 mmol/L    Glucose 81 65 - 99 mg/dL    Bun 24 (H) 8 - 22 mg/dL    Creatinine 1.70 (H) 0.50 - 1.40 mg/dL    Calcium 9.2 8.5 - 10.5 mg/dL    Anion Gap 12.0 7.0 - 16.0   ESTIMATED GFR    Collection Time: 06/17/21  2:14 AM   Result Value Ref Range    GFR If  51 (A) >60 mL/min/1.73 m 2    GFR If Non  42 (A) >60 mL/min/1.73 m 2         ASSESSMENT:  Patient is a 55 y.o. male admitted with right-sided weakness, dysarthria, found to have left brainstem stroke     [facility] Code / Diagnosis to Support: 0001.2 - Stroke: Right Body Involvement (Left Brain)    Rehabilitation: Impaired ADLs and mobility  Patient is a good candidate for inpatient rehab based on needs for PT, OT, and speech therapy.  Patient will also benefit from family training.  Patient has a good discharge situation which will be home with girlfriend.     [employer] insurance no longer has primary benefit with [facility].  If [facility] wants to [facility] to [facility] he will have to use is out of network benefits.  Alternatively, he can go to [facility] which may be a better scenario for him as his girlfriend works there as well.    Additional Recommendations:  -Good candidate for IPR, he has functional deficits with mobility and ADLs and significant dysarthria which is affecting his ability to return to work.  Patient states he has support from his girlfriend, however she works 4 PM to 2:30 AM Monday through Thursday at [employer].  -Patient may be better suited from inpatient rehab at [employer] given social situation  -Support needs to be verified through girlfriend  -Continue aspirin, statin for secondary stroke prevention  -Continue PT OT while in-house  -Starting gabapentin 200 mg twice daily.  High risk medication.  Labs reviewed, creatinine clearance 54.3, GFR 42.  Patient has reduced renal function, therefore is  receiving a reduced dose of gabapentin.  As his renal function improves he may be able to tolerate increased doses.  See information below for dosing guidelines.    Guidelines for Gabapentin dosing based on GFR   GFR >60 ml/min: Give usual dosage  [30-59]: 400-1400mg/day  [15-29]: Dosage range: 200-700mg/day.  [<15]: 100-300 mg/day      Medical Complexity:  Stroke      DVT PPX: Lovenox 40 mg injection daily      Thank you for allowing us to participate in the care of this patient.     Bean Toth, DO   Physical Medicine and Rehabilitation

## 2021-06-17 NOTE — DISCHARGE PLANNING
Current documentation reveals a potential for acute inpatient rehabilitation, pending D/C resources/support.  Please consider a PMR referral to assist with discharge planning. Msg placed to Dr. Michael & Dr. Jose.

## 2021-06-17 NOTE — CARE PLAN
The patient is Stable - Low risk of patient condition declining or worsening    Shift Goals  Clinical Goals: Safety  Patient Goals: discharge to [facility]  Family Goals: NA    Progress made toward(s) clinical / shift goals: Pt resting comfortably throughout shift with stable neuro status, discussed POC with pt.     Patient is not progressing towards the following goals:

## 2021-06-17 NOTE — PROGRESS NOTES
"Daily Progress Note:     Date of Service: 6/17/2021  Primary Team: [facility] IM Purple Team   Attending: Oni Puri M.D.   Senior Resident: Dr. Michael  Intern: Dr. Harvey Nichole  Contact:  [phone]    ID Statement:  55 year old man here for infarct of left parasagittal aspen.     Subjective:  -No acute events overnight  -Patient states he has been feeling okay  -Shares he has been able to take in food without any choking events, however says he appetite is still not great  -Reports he feels like his strength has improved from presentation, although \"still far from what it used to be\"    Updates:  PM&R Consulted  IVF given for improving creatinine    Telemetry:  Per nursing documentation  \"SB-SR 38-69, SD 0.16, QRS 0.09, QT 0.44, with PVCs and PACs\"    Consultants/Specialty:  Neurology  PM&R    Review of Systems:    Review of Systems   Constitutional: Negative for chills and fever.   HENT: Negative for hearing loss.    Eyes: Positive for blurred vision. Negative for double vision.   Respiratory: Negative for cough and shortness of breath.    Cardiovascular: Negative for chest pain, palpitations and leg swelling.   Gastrointestinal: Negative for abdominal pain, constipation, diarrhea, nausea and vomiting.   Genitourinary: Negative for dysuria and urgency.   Musculoskeletal: Negative for myalgias and neck pain.   Skin: Negative for itching and rash.   Neurological: Positive for sensory change, speech change and focal weakness (RUE). Negative for dizziness and headaches.   All other systems reviewed and are negative.      Objective Data:   Physical Exam:   Vitals:   Temp:  [36.1 °C (97 °F)-36.6 °C (97.8 °F)] 36.4 °C (97.6 °F)  Pulse:  [40-66] 50  Resp:  [17-18] 17  BP: (138-172)/() 172/98  SpO2:  [96 %-100 %] 97 %     Physical Exam  Constitutional:       Appearance: Normal appearance.   HENT:      Head: Normocephalic and atraumatic.      Mouth/Throat:      Mouth: Mucous membranes are moist.      Pharynx: Oropharynx " is clear.   Eyes:      General: No scleral icterus.     Conjunctiva/sclera: Conjunctivae normal.   Cardiovascular:      Rate and Rhythm: Normal rate and regular rhythm.      Pulses: Normal pulses.      Heart sounds: No murmur heard.     Pulmonary:      Effort: Pulmonary effort is normal. No respiratory distress.      Breath sounds: Normal breath sounds. No wheezing.   Abdominal:      General: There is no distension.      Palpations: Abdomen is soft.      Tenderness: There is no abdominal tenderness.      Hernia: No hernia is present.   Musculoskeletal:      Cervical back: Normal range of motion.      Right lower leg: No edema.      Left lower leg: No edema.   Skin:     General: Skin is warm and dry.      Coloration: Skin is not jaundiced.      Findings: No bruising.   Neurological:      Mental Status: He is alert.      Cranial Nerves: No cranial nerve deficit.      Sensory: Sensory deficit (numbness/decreased sensation in right hand) present.      Motor: Weakness (RUE 4/5, LUE 5/5; BLE 5/5) present.      Comments: AOx4   Psychiatric:         Mood and Affect: Mood normal.         Behavior: Behavior normal.         Labs:  Results for MIRANDA REARDON (MRN [id number]) as of 6/17/2021 11:27   Ref. Range 6/17/2021 02:14   Sodium Latest Ref Range: 135 - 145 mmol/L 134 (L)   Potassium Latest Ref Range: 3.6 - 5.5 mmol/L 3.9   Chloride Latest Ref Range: 96 - 112 mmol/L 101   Co2 Latest Ref Range: 20 - 33 mmol/L 21   Anion Gap Latest Ref Range: 7.0 - 16.0  12.0   Glucose Latest Ref Range: 65 - 99 mg/dL 81   Bun Latest Ref Range: 8 - 22 mg/dL 24 (H)   Creatinine Latest Ref Range: 0.50 - 1.40 mg/dL 1.70 (H)   GFR If  Latest Ref Range: >60 mL/min/1.73 m 2 51 (A)   GFR If Non  Latest Ref Range: >60 mL/min/1.73 m 2 42 (A)   Calcium Latest Ref Range: 8.5 - 10.5 mg/dL 9.2       Imaging:   No new imaging    Problem Representation: 55-year-old male with past medical history significant  for uncontrolled hypertension presenting acute ischemic infarct of left parasagittal aspen. Also on 6/14 had some return of prior stroke symptoms likely secondary to hypoperfusion of infarcted area due to hypotension.   Monitored on neurology floor, no new symptoms. Now pending placement/ discharge to SNF.    * CVA (cerebral vascular accident) (HCC)  Assessment & Plan  Stroke was likely secondary to very uncontrolled hypertension.  UDS negative.  TTE without inter atrial shunt, 60% EF, no LVH, inferior hypokinesis, ascending aorta 3.1 cm  LDL elevated. HA1c wnl.   MRI brain with left parasagittal aspen stroke    PLAN:  -Tele monitoring for arrhythmias  -Encourage good p.o. intake of fluids, IVFs if needed  and compression stockings.   -Labile blood pressure likely due to stroke territory causing autonomic instability.  Avoid hypotension for better brain perfusion.  -ASA, statin  -PT and OT recommended post-acute placement  -PM&R consulted on 6/17  -SLP: likely no needs post-hospitalization  -Outpatient neurology stroke bridge clinic referral has been placed; plan to follow up in 2-4 weeks.   -Outpatient cardiac monitoring with Zio patch at time of discharge to formally rule out cardioembolic/cardiac arrhythmia as source of stroke.        Folate deficiency  Assessment & Plan  -Folate supplementation    B12 deficiency  Assessment & Plan  -IM and p.o. supplementation    Cervical stenosis of spinal canal  Assessment & Plan  Moderate central canal stenosis of C3/C4.   -f/u PCP    Syncope and collapse  Assessment & Plan  Occurred while on toilet on 6/14. Likely vasovagal VS orthostatic hypotension  -tele  -orthostatics: positive. Will give IVF  -compression stockings  -zio patch outpatient    Hypothyroidism  Assessment & Plan  Likely true hypothyroidism given his bradycardia. TSH low, FT4 elevated.  Euthyroid sick syndrome less likely given that he does not appear to have any significant enough illness to cause this  anti  TPO negative, rules out Hashimoto thyroiditis  PLAN:  -continue low-dose levothyroxine given his possible CAD, suggested by inferior wall hypokinesis on TTE    AURELIANO (acute kidney injury) (HCC)  Assessment & Plan  Likely prerenal secondary to low volume state.  -Encourage improved p.o. fluid intake  -Supplemental IVF as needed  -CTM    Hypertension  Assessment & Plan  BP increasing again. As of this morning was actually hypotensive at times. Improved blood pressure but still above goal.  Avoiding ACE and ARB due to AURELIANO  -no current BP meds  -SBP goal currently > 140. If less than this, consider IVF bolus 1L.   -if BP > 220/120, consider clonidine. Avoid beta-blockers due to bradycardia and hydralazine due to patient's sensitive venous tone. Avoid ACEI due to AURELIANO.    Seizure-like activity (HCC)  Assessment & Plan  Had 2 episodes of seizure-like activity on 6/13  in early a.m. Each lasting approximately 30 seconds in length.  Patient had muscle rigidity and possible post ictal state, was given IV lorazepam.  Likely secondary to alcohol withdrawal which can lower seizure threshold.  Has facial features and somewhat slow comprehension which are somewhat suggestive of an underlying genetic disease such as trisomy 21 which can also lower seizure threshold  EEG wnl.    PLAN:  -No new episodes  -f/u karyotype   -Follow-up neuro outpatient    Abnormal echocardiogram  Assessment & Plan  Echo from 6/11/21 shows inferior hypokinsis and thinning.    -outpatient cardiology follow up possible MPI       Chest pain  Assessment & Plan  Resolved. Patient presented with some chest pain and ST elevation in 1-lead, not meeting criteria for STEMI.  ACS unlikely  -troponin <6. EKGs here without acute ST-T changes.  -TTE rules out heart failure        Quality Measures:  Code: FULL  VTE: Lovenox  Diet: Easy to Chew  Disposition: Placement

## 2021-06-17 NOTE — PROGRESS NOTES
Strip reviewed in chart.   Monitor summary: SB-SR 38-69, TN 0.16, QRS 0.09, QT 0.44, with PVCs and PACs per strip from monitor room.

## 2021-06-17 NOTE — CARE PLAN
The patient is Stable - Low risk of patient condition declining or worsening    Shift Goals  Clinical Goals: Fall & safety precautions  Patient Goals: Comfort  Family Goals: NA    Progress made toward(s) clinical / shift goals:  Fall & safety precautions in place. Comfort needs addressed.     Patient is not progressing towards the following goals:

## 2021-06-17 NOTE — DISCHARGE PLANNING
Agency/Facility Name: [facility]  Spoke To: Quyen  Outcome: Bed also available tomorrow. Will need to get transport schedule for tomorrow and update DPA on a time.     ALMA Pinto notified.

## 2021-06-18 ENCOUNTER — NON-PROVIDER VISIT (OUTPATIENT)
Dept: CARDIOLOGY | Facility: MEDICAL CENTER | Age: 56
End: 2021-06-18
Payer: COMMERCIAL

## 2021-06-18 VITALS
HEIGHT: 68 IN | TEMPERATURE: 97.6 F | WEIGHT: 194.67 LBS | OXYGEN SATURATION: 99 % | SYSTOLIC BLOOD PRESSURE: 162 MMHG | BODY MASS INDEX: 29.5 KG/M2 | HEART RATE: 59 BPM | DIASTOLIC BLOOD PRESSURE: 95 MMHG | RESPIRATION RATE: 18 BRPM

## 2021-06-18 DIAGNOSIS — I47.29 NSVT (NONSUSTAINED VENTRICULAR TACHYCARDIA) (HCC): ICD-10-CM

## 2021-06-18 DIAGNOSIS — R00.2 PALPITATIONS: ICD-10-CM

## 2021-06-18 LAB
ANION GAP SERPL CALC-SCNC: 12 MMOL/L (ref 7–16)
BUN SERPL-MCNC: 22 MG/DL (ref 8–22)
CALCIUM SERPL-MCNC: 9.3 MG/DL (ref 8.5–10.5)
CHLORIDE SERPL-SCNC: 105 MMOL/L (ref 96–112)
CO2 SERPL-SCNC: 18 MMOL/L (ref 20–33)
CREAT SERPL-MCNC: 1.44 MG/DL (ref 0.5–1.4)
GASTRIN SERPL-MCNC: 52 PG/ML (ref 0–100)
GLUCOSE SERPL-MCNC: 91 MG/DL (ref 65–99)
MAGNESIUM SERPL-MCNC: 2.1 MG/DL (ref 1.5–2.5)
POTASSIUM SERPL-SCNC: 4.3 MMOL/L (ref 3.6–5.5)
SODIUM SERPL-SCNC: 135 MMOL/L (ref 135–145)

## 2021-06-18 PROCEDURE — 83735 ASSAY OF MAGNESIUM: CPT

## 2021-06-18 PROCEDURE — A9270 NON-COVERED ITEM OR SERVICE: HCPCS | Performed by: PHYSICAL MEDICINE & REHABILITATION

## 2021-06-18 PROCEDURE — 80048 BASIC METABOLIC PNL TOTAL CA: CPT

## 2021-06-18 PROCEDURE — 36415 COLL VENOUS BLD VENIPUNCTURE: CPT

## 2021-06-18 PROCEDURE — A9270 NON-COVERED ITEM OR SERVICE: HCPCS | Performed by: STUDENT IN AN ORGANIZED HEALTH CARE EDUCATION/TRAINING PROGRAM

## 2021-06-18 PROCEDURE — 99239 HOSP IP/OBS DSCHRG MGMT >30: CPT | Mod: GC | Performed by: INTERNAL MEDICINE

## 2021-06-18 PROCEDURE — 92507 TX SP LANG VOICE COMM INDIV: CPT

## 2021-06-18 PROCEDURE — 700102 HCHG RX REV CODE 250 W/ 637 OVERRIDE(OP): Performed by: INTERNAL MEDICINE

## 2021-06-18 PROCEDURE — 700102 HCHG RX REV CODE 250 W/ 637 OVERRIDE(OP): Performed by: STUDENT IN AN ORGANIZED HEALTH CARE EDUCATION/TRAINING PROGRAM

## 2021-06-18 PROCEDURE — 97535 SELF CARE MNGMENT TRAINING: CPT

## 2021-06-18 PROCEDURE — 700111 HCHG RX REV CODE 636 W/ 250 OVERRIDE (IP): Performed by: STUDENT IN AN ORGANIZED HEALTH CARE EDUCATION/TRAINING PROGRAM

## 2021-06-18 PROCEDURE — A9270 NON-COVERED ITEM OR SERVICE: HCPCS | Performed by: INTERNAL MEDICINE

## 2021-06-18 PROCEDURE — 700102 HCHG RX REV CODE 250 W/ 637 OVERRIDE(OP): Performed by: PHYSICAL MEDICINE & REHABILITATION

## 2021-06-18 RX ORDER — LEVOTHYROXINE SODIUM 0.03 MG/1
25 TABLET ORAL
Qty: 30 TABLET | Refills: 0 | Status: SHIPPED
Start: 2021-06-19 | End: 2023-03-09

## 2021-06-18 RX ORDER — GABAPENTIN 100 MG/1
200 CAPSULE ORAL 2 TIMES DAILY
Qty: 90 CAPSULE | Refills: 0 | Status: SHIPPED
Start: 2021-06-18 | End: 2023-03-09

## 2021-06-18 RX ORDER — ASPIRIN 81 MG/1
81 TABLET ORAL DAILY
Qty: 30 TABLET | Refills: 0 | Status: SHIPPED
Start: 2021-06-19 | End: 2023-03-09

## 2021-06-18 RX ORDER — ACETAMINOPHEN 325 MG/1
650 TABLET ORAL EVERY 6 HOURS PRN
Qty: 30 TABLET | Refills: 0 | Status: SHIPPED
Start: 2021-06-18 | End: 2023-03-09

## 2021-06-18 RX ORDER — FOLIC ACID 1 MG/1
1 TABLET ORAL DAILY
Qty: 30 TABLET | Refills: 0 | Status: SHIPPED
Start: 2021-06-19 | End: 2023-03-09

## 2021-06-18 RX ORDER — OMEPRAZOLE 20 MG/1
20 CAPSULE, DELAYED RELEASE ORAL DAILY
Qty: 30 CAPSULE | Refills: 0 | Status: SHIPPED
Start: 2021-06-19 | End: 2023-03-09

## 2021-06-18 RX ORDER — ATORVASTATIN CALCIUM 80 MG/1
80 TABLET, FILM COATED ORAL EVERY EVENING
Qty: 30 TABLET | Refills: 0 | Status: SHIPPED
Start: 2021-06-18 | End: 2023-03-09

## 2021-06-18 RX ORDER — CLONIDINE HYDROCHLORIDE 0.1 MG/1
0.1 TABLET ORAL EVERY 6 HOURS PRN
Qty: 60 TABLET | Refills: 0 | Status: SHIPPED
Start: 2021-06-18 | End: 2023-03-09

## 2021-06-18 RX ADMIN — CYANOCOBALAMIN TAB 500 MCG 2000 MCG: 500 TAB at 05:27

## 2021-06-18 RX ADMIN — FOLIC ACID 1 MG: 1 TABLET ORAL at 05:28

## 2021-06-18 RX ADMIN — ENOXAPARIN SODIUM 40 MG: 40 INJECTION SUBCUTANEOUS at 05:28

## 2021-06-18 RX ADMIN — GABAPENTIN 200 MG: 100 CAPSULE ORAL at 05:28

## 2021-06-18 RX ADMIN — ACETAMINOPHEN 650 MG: 325 TABLET, FILM COATED ORAL at 08:01

## 2021-06-18 RX ADMIN — ASPIRIN 81 MG: 81 TABLET, COATED ORAL at 05:28

## 2021-06-18 RX ADMIN — OMEPRAZOLE 20 MG: 20 CAPSULE, DELAYED RELEASE ORAL at 05:27

## 2021-06-18 RX ADMIN — LEVOTHYROXINE SODIUM 25 MCG: 25 TABLET ORAL at 05:28

## 2021-06-18 ASSESSMENT — COGNITIVE AND FUNCTIONAL STATUS - GENERAL
TOILETING: A LITTLE
DRESSING REGULAR LOWER BODY CLOTHING: A LITTLE
HELP NEEDED FOR BATHING: A LITTLE
DAILY ACTIVITIY SCORE: 18
EATING MEALS: A LITTLE
SUGGESTED CMS G CODE MODIFIER DAILY ACTIVITY: CK
PERSONAL GROOMING: A LITTLE
DRESSING REGULAR UPPER BODY CLOTHING: A LITTLE

## 2021-06-18 ASSESSMENT — PAIN DESCRIPTION - PAIN TYPE
TYPE: ACUTE PAIN
TYPE: ACUTE PAIN

## 2021-06-18 ASSESSMENT — GAIT ASSESSMENTS: DISTANCE (FEET): 25

## 2021-06-18 NOTE — CARE PLAN
The patient is Stable - Low risk of patient condition declining or worsening    Shift Goals  Clinical Goals: sleep  Patient Goals: comfort, rest  Family Goals: n/a    Progress made toward(s) clinical / shift goals:  educated on POC and discharge, slept throughout night    Patient is not progressing towards the following goals:

## 2021-06-18 NOTE — DISCHARGE PLANNING
Anticipated Discharge Disposition:   [facility]    Action:    Bed available at [facility].  Pt agreeable to transfer via [facility] van at 1630 today.  Verbal for Cobra obtained.      Cobra signed by Dr. Jose.    DC packet given to bedside RN Kristan.    Barriers to Discharge:    None    Plan:    DC

## 2021-06-18 NOTE — THERAPY
Speech Language Pathology  Daily Treatment     Patient Name: Paul Hopper  Age:  55 y.o., Sex:  male  Medical Record #: [id number]  Today's Date: 6/18/2021     Precautions  Precautions: Fall Risk, Swallow Precautions ( See Comments)  Comments: orthostatic hypotension    Assessment    Pt seen near the end of his bfast meal. Pt requesting jello. No swallow difficulty observed with sips of thin soda using a straw and one cup of jello. Following tx and when SLP delivering word drill work to target dysarthria, pt eating tater tots and had eaten a hamburger that his SO had brought in from [employer]. Pt and his SO denied any mastication or swallow difficulty with meal brought in. Pt with limited and poor condition dentition. Pt with moderate dysarthria with fair intensity and imprecise articulation during spont speech. Pt and his SO note that it is improved from last week. Pt educ and demonstration provided regarding slowing rate of speech and over-articulating. Pt recommended to target this type of speech for the first few minutes when visiting with friends or family. Pt provided with written drill lists consisting of bisyllabic to mult-syllabic words to target speech intelligibility. Pt requires intermittent cues to slow rate and to over articulate to increase his speech intelligibility. Pt verbalized and demonstrated understanding using the drill sheets to target dysarthria frequently during the day.     Plan  Monitor during intake of EC7/TNO, dysarthria drill work at the bisyllabic to multi-syllabic word level.   Treatment plan modified to 3 times per week until therapy goals are met for the following treatments:  Expression Training./dysarthria    Discharge Recommendations: Recommend outpatient speech therapy services       06/18/21 1400   Speech / Dysarthria   Speech / Dysarthria X   Articulation Training Moderate (3)   Intensity / Loudness Training Minimal (4)   Skilled Intervention Compensatory Strategies;Verbal  "Cueing;Other (See Comments)  (written drills list bisyllabic to multisyllabic words)   Dysphagia    Comments Pt seen near the end of bfast with jellow provided. No difficulty with thins using a straw. Later when SLP providing additonal drill work for dysarthria, pt's SO stating she had provided a soft hamburger and tater tots with no difficulty during intake. Pt stating he had no difficulty. SLP colllaborated with nurs regarding monitoring x3 during oral intake.    Recommended Route of Medication Administration   Medication Administration  Whole with Liquid Wash   Patient / Family Goals   Patient / Family Goal #1 \"I want something to drink\"   Goal #1 Outcome Progressing as expected   Short Term Goals   Short Term Goal # 1 Pt will tolerate EC7/TN0 diet without overt s/sx of pen/asp and min cuing   Goal Outcome # 1 Progressing as expected   Short Term Goal # 2 Pt will follow simple swallowing strategies with min cuing and 90% accuracy   Goal Outcome # 2  Progressing as expected   Short Term Goal # 3 Pt will utilize speech intelligibility strategies in 8/10 opportunities with min cues.   Goal Outcome  # 3 Progressing as expected   Session Information   Priority 3         "

## 2021-06-18 NOTE — DISCHARGE INSTRUCTIONS
Discharge Instructions    Follow up with your new primary care provider on 6/22.   Take your blood pressure daily and record this in a log. Take this to your appointment.    Follow up with neurology in the stroke bridge clinic in 2-4 weeks. They will call you for this. If you do not hear from them in the next week, call this number: [phone]    We have ordered a zio patch to record your heart rhythm.   Follow up with cardiology regarding this and regarding your echocardiogram results.    Take your medications as prescribed.        Discharged to home by car with friend. Discharged via wheelchair, hospital escort: Yes.  Special equipment needed: Not Applicable    Be sure to schedule a follow-up appointment with your primary care doctor or any specialists as instructed.     Discharge Plan:   Diet Plan: Discussed  Activity Level: Discussed  Confirmed Symptoms Management: Discussed  Medication Reconciliation Updated: Yes    I understand that a diet low in cholesterol, fat, and sodium is recommended for good health. Unless I have been given specific instructions below for another diet, I accept this instruction as my diet prescription.   Other diet: Regular    Special Instructions:     Stroke/CVA/TIA/Hemorrhagic Ischemia Discharge Instructions  You have had a stroke. Your risk factors have been identified as follows:  Gender - Men are at a higher risk than women  High blood pressure  High Cholesterol and lipids  Overweight  It is important that you reduce your risk factors to avoid another stroke in the future. Here are some general guidelines to follow:  · Eat healthy - avoid food high in fat.  · Get regular exercise.  · Maintain a healthy weight.  · Avoid smoking.  · Avoid alcohol and illegal drug use.  · Take your medications as directed.  For more information regarding risk factors, refer to pages 17-19 in your Stroke Patient Education Guide. Stroke Education Guide was given to patient.    Warning signs of a  stroke include (which can also be found on page 3 of your Stroke Patient Education Guide):  · Sudden numbness of weakness of the face, arm or leg (especially on one side of the body).  · Sudden confusion, trouble speaking or understanding.  · Sudden trouble seeing in one or both eyes.  · Sudden trouble walking, dizziness, loss of balance or coordination.  · Sudden severe headache with no known cause.  It is very important to get treatment quickly when a stroke occurs. If you experience any of the above warning signs, call 911 immediately.     Some patients who have had a stroke will be going home on a blood thinner medication called Warfarin (Coumadin).  This medication requires very close monitoring and follow up.  This follow up can be provided by either your Primary Care Physician or by [facility]'s Outpatient Anticoagulation Service.  The Outpatient Anticoagulation Service is located at the [place] [facility] at [facility] ([facility]).  If you do not know when your follow up appointment is scheduled, call [phone] to verify your appointment time.      · Is patient discharged on Warfarin / Coumadin?   No     Depression / Suicide Risk    As you are discharged from this [facility], it is important to learn how to keep safe from harming yourself.    Recognize the warning signs:  · Abrupt changes in personality, positive or negative- including increase in energy   · Giving away possessions  · Change in eating patterns- significant weight changes-  positive or negative  · Change in sleeping patterns- unable to sleep or sleeping all the time   · Unwillingness or inability to communicate  · Depression  · Unusual sadness, discouragement and loneliness  · Talk of wanting to die  · Neglect of personal appearance   · Rebelliousness- reckless behavior  · Withdrawal from people/activities they love  · Confusion- inability to concentrate     If you or a loved one  observes any of these behaviors or has concerns about self-harm, here's what you can do:  · Talk about it- your feelings and reasons for harming yourself  · Remove any means that you might use to hurt yourself (examples: pills, rope, extension cords, firearm)  · Get professional help from the community (Mental Health, Substance Abuse, psychological counseling)  · Do not be alone:Call your Safe Contact- someone whom you trust who will be there for you.  · Call your local CRISIS HOTLINE 930-7119 or 588-187-3318  · Call your local Children's Mobile Crisis Response Team [place] (289) 308-2687 or www.[employer]ee  · Call the toll free National Suicide Prevention Hotlines   · National Suicide Prevention Lifeline 844-789-GCSE (8928)  · [place] Hope Line Network 800-SUICIDE (643-3139)    Discharge Instructions    Discharged to other by medical transportation with escort. Discharged via ambulance, hospital escort: Yes.  Special equipment needed: Not Applicable    Be sure to schedule a follow-up appointment with your primary care doctor or any specialists as instructed.     Discharge Plan:   Diet Plan: Discussed  Activity Level: Discussed  Confirmed Symptoms Management: Discussed  Medication Reconciliation Updated: Yes    I understand that a diet low in cholesterol, fat, and sodium is recommended for good health. Unless I have been given specific instructions below for another diet, I accept this instruction as my diet prescription.   Other diet: Regular     Special Instructions: None    · Is patient discharged on Warfarin / Coumadin?   No     Depression / Suicide Risk    As you are discharged from this [facility] facility, it is important to learn how to keep safe from harming yourself.    Recognize the warning signs:  · Abrupt changes in personality, positive or negative- including increase in energy   · Giving away possessions  · Change in eating patterns- significant weight changes-  positive or  negative  · Change in sleeping patterns- unable to sleep or sleeping all the time   · Unwillingness or inability to communicate  · Depression  · Unusual sadness, discouragement and loneliness  · Talk of wanting to die  · Neglect of personal appearance   · Rebelliousness- reckless behavior  · Withdrawal from people/activities they love  · Confusion- inability to concentrate     If you or a loved one observes any of these behaviors or has concerns about self-harm, here's what you can do:  · Talk about it- your feelings and reasons for harming yourself  · Remove any means that you might use to hurt yourself (examples: pills, rope, extension cords, firearm)  · Get professional help from the community (Mental Health, Substance Abuse, psychological counseling)  · Do not be alone:Call your Safe Contact- someone whom you trust who will be there for you.  · Call your local CRISIS HOTLINE 272-5730 or 698-424-3583  · Call your local Children's Mobile Crisis Response Team [place] (472) 550-0355 or www.[employer]  · Call the toll free National Suicide Prevention Hotlines   · National Suicide Prevention Lifeline 349-878-JCNI (6224)  · National Hope Line Network 800-SUICIDE (274-1943)

## 2021-06-18 NOTE — DISCHARGE PLANNING
Agency/Facility Name: [facility]  Outcome: Left voice message for admissions to arrange transport. Requested a call back.      DPA spoke to [facility], transport arranged via facility wheelchair van for 1630. Needs social security number.    ALMA Vargas notified.

## 2021-06-18 NOTE — DISCHARGE SUMMARY
Discharge Summary    Date of Admission: 6/11/2021  Date of Discharge: No discharge date for patient encounter.  Discharging Attending: Oni Puri M.D.   Discharging Intern: Dr. Jose    CHIEF COMPLAINT ON ADMISSION  Chief Complaint   Patient presents with   • Chest Pain     intermittent onset 11-12 last night non radiating   • Slurred Speech     last noc   • Facial Droop     right side       Reason for Admission  Acute stroke    Admission Date  6/11/2021    CODE STATUS  Full Code    HPI & HOSPITAL COURSE  This is a 55 y.o. male with with past medical history significant for uncontrolled hypertension presenting to ED on 6/11/21 after his coworkers noticed slurred speech, confusion, and gait abnormality. He was admitted with acute ischemic infarct of the left parasagittal aspen.     In the ED his labs were largely unremarkable.   Noncontrast CT head was unremarkable. CTA head and neck was also not remarkable for significant (>50%) stenosis of any arteries.   CT cerebral perfusion study was suggestive of infarct and ischemia.   MRI brain on 6/13/2021 revealed acute brainstem infarct in the left parasagittal aspen.   TTE revealed EF 60%, no PFO, and inferior hypokinesis and thinning.  Neurology was consulted upon admission. He was started on aspirin and high dose atorvastatin, and permissive hypertension was allowed for 48 hours. Physical and occupational therapy recommended post-acute placement, so he is being discharged to a skilled nursing facility.  A Zio-patch was ordered by neurology, and he will follow up with cardiology regarding the results for this as well as for the inferior hypokinesis seen on his TTE. He will also follow up with the stroke bridge clinic in 2-4 weeks.     He also reported having had chest pain in the morning on 6/11 and was noted to have an ST elevation in one lead, so cardiology was consulted in the ED.   ECG here was unremarkable and troponin was normal. He will follow up with  "cardiology as noted above.    On 6/13 he had two brief (30 second) seizure-like episodes (\"diffuse muscle rigidity\") in the early morning. He was noted to be hypotensive prior to this occurring, so this was most likely convulsive syncope. However, given he was drinking more alcohol than he normally does in the week prior to admission, it is possible he had an alcohol withdrawal seizure. AEDs were not initiated for this.    On 6/14 he had re-demonstration of some of his prior stroke symptoms (right sided weakness and dysarthria), thought to be stroke recrudescence secondary to hypotension. As his blood pressure was fairly labile while hospitalized, this is likely due to autonomic instability from the stroke territory as well as hypovolemia from poor PO intake. His anti-hypertensive medication was held, and IV fluids were given until his PO intake improved.  Given his still labile blood pressure, he will record his BP in a log and follow up with his PCP and/or cardiology regarding an anti-hypertensive regimen.    Additionally, he was diagnosed with hypothyroidism given his high TSH, low free T4 and bradycardia. Anti-TPO antibody was negative, ruling out Hashimoto's thyroiditis. Levothyroxine low dose was initiated due to suspected coronary artery disease. PCP should follow up on this.  He was diagnosed with folate and B12 deficiencies, so supplementation for these were started.    He also developed an acute kidney injury likely secondary to hypovolemia from poor PO intake. This has resolved.        Therefore, he is discharged in fair and stable condition to skilled nursing facility.    The patient met 2-midnight criteria for an inpatient stay at the time of discharge.    PHYSICAL EXAM ON DISCHARGE  Temp:  [36.1 °C (97 °F)-36.9 °C (98.4 °F)] 36.7 °C (98 °F)  Pulse:  [52-79] 62  Resp:  [17-18] 18  BP: (136-194)/() 155/91  SpO2:  [95 %-98 %] 98 %    Physical Exam  Constitutional:       Appearance: Normal " appearance.   HENT:      Head: Normocephalic and atraumatic.      Mouth/Throat:      Mouth: Mucous membranes are moist.      Pharynx: Oropharynx is clear.   Eyes:      General: No scleral icterus.     Conjunctiva/sclera: Conjunctivae normal.   Cardiovascular:      Rate and Rhythm: Normal rate and regular rhythm.      Pulses: Normal pulses.      Heart sounds: No murmur heard.     Pulmonary:      Effort: Pulmonary effort is normal. No respiratory distress.      Breath sounds: Normal breath sounds. No wheezing.   Abdominal:      General: There is no distension.      Palpations: Abdomen is soft.      Tenderness: There is no abdominal tenderness.      Hernia: No hernia is present.   Musculoskeletal:      Cervical back: Normal range of motion.      Right lower leg: No edema.      Left lower leg: No edema.   Skin:     General: Skin is warm and dry.      Coloration: Skin is not jaundiced.      Findings: No bruising.   Neurological:      Mental Status: He is alert.      Cranial Nerves: No cranial nerve deficit.      Sensory: Sensory deficit (numbness/decreased sensation in right hand) present.      Motor: no weakness present.      Comments: AOx4   Psychiatric:         Mood and Affect: Mood normal.         Behavior: Behavior normal.       Discharge Date  6/18/2021    FOLLOW UP ITEMS POST DISCHARGE  -PCP for chronic conditions, newly diagnosed hypothyroidism and hypertension management  -Neurology - follow up with stroke bridge clinic  -Cardiology for zio patch analysis and TTE result (inferior   hypokinsis and thinning)    DISCHARGE DIAGNOSES  Principal Problem:    CVA (cerebral vascular accident) ([facility]) POA: Unknown  Active Problems:    Seizure-like activity ([facility]) POA: Unknown    Hypertension POA: Unknown    Abnormal echocardiogram POA: Unknown      Overview: Inferior hypokinesis on echocardiogram 6/11/2021    AURELIANO (acute kidney injury) ([facility]) POA: Unknown    Syncope and collapse POA: Unknown    Cervical stenosis of spinal  canal POA: Unknown    B12 deficiency POA: Unknown    Folate deficiency POA: Unknown    Chest pain POA: Unknown    Hypothyroidism POA: Unknown  Resolved Problems:    * No resolved hospital problems. *      FOLLOW UP  No future appointments.  DEBORAH Garcia  [address]  [place] [address]  [phone]    On 6/22/2021  Please go to your hospital follow up appointment with your new Primary Care Provider on 6/22 at 7:10am for check in. Please bring your insurance card, ID, and wear a facemask. If you need to reschedule, please call 24hrs in advance.    CARDIOLOGY [facility]  [address]  [place] [address]  [phone]  In 1 month        MEDICATIONS ON DISCHARGE     Medication List      START taking these medications      Instructions   acetaminophen 325 MG Tabs  Commonly known as: Tylenol   Take 2 Tablets by mouth every 6 hours as needed for Mild Pain or Moderate Pain.  Dose: 650 mg     aspirin 81 MG EC tablet  Start taking on: June 19, 2021   Take 1 tablet by mouth every day.  Dose: 81 mg     atorvastatin 80 MG tablet  Commonly known as: LIPITOR   Take 1 tablet by mouth every evening.  Dose: 80 mg     cloNIDine 0.1 MG Tabs  Commonly known as: CATAPRES   Take 1 tablet by mouth every 6 hours as needed (BP > 180/110).  Dose: 0.1 mg     Cyanocobalamin 2000 MCG Tabs  Start taking on: June 19, 2021   Take 1 tablet by mouth every day.  Dose: 2,000 mcg     folic acid 1 MG Tabs  Start taking on: June 19, 2021  Commonly known as: FOLVITE   Take 1 tablet by mouth every day.  Dose: 1 mg     gabapentin 100 MG Caps  Commonly known as: NEURONTIN   Take 2 Capsules by mouth 2 times a day.  Dose: 200 mg     levothyroxine 25 MCG Tabs  Start taking on: June 19, 2021  Commonly known as: SYNTHROID   Take 1 tablet by mouth every morning on an empty stomach.  Dose: 25 mcg     omeprazole 20 MG delayed-release capsule  Start taking on: June 19, 2021  Commonly known as: PRILOSEC   Take 1 capsule by mouth every day.  Dose:  20 mg            Allergies  Allergies   Allergen Reactions   • Other Food Anaphylaxis     All fresh fruit causes throat swelling per brother.        DIET  Orders Placed This Encounter   Procedures   • Diet Order Diet: Level 7 - Easy to Chew; Liquid level: Level 0 - Thin; Tray Modifications (optional): No Sharps (Paperware), SLP - 1:1 Supervision by Nursing     Standing Status:   Standing     Number of Occurrences:   1     Order Specific Question:   Diet:     Answer:   Level 7 - Easy to Chew [22]     Order Specific Question:   Liquid level     Answer:   Level 0 - Thin     Order Specific Question:   Tray Modifications (optional)     Answer:   No Sharps (Paperware)     Order Specific Question:   Tray Modifications (optional)     Answer:   SLP - 1:1 Supervision by Nursing       ACTIVITY  As tolerated.  Weight bearing as tolerated    CONSULTATIONS  Dr. Milton with Cardiology consulted.  Treatment options were discussed and plan of care agreed upon. and Dr. Cortes with Neurology Service consulted.  Treatment options were discussed and plan of care agreed upon.    PROCEDURES  none

## 2021-06-18 NOTE — PROGRESS NOTES
Pt given DC instructions, medication education & information on follow up appointments and education about Zio patch and troubleshooting. Pt verbalized understanding of all information given. All lines/devices discontinued without complications. Patient discharged via wheelchair transport to facility. All belongings sent with patient including phones.     Report called and given to BEBETO Mensah

## 2021-06-18 NOTE — THERAPY
Occupational Therapy  Daily Treatment     Patient Name: Paul Hopper  Age:  55 y.o., Sex:  male  Medical Record #: [id number]  Today's Date: 6/18/2021     Precautions  Precautions: Fall Risk, Swallow Precautions ( See Comments)  Comments: orthostatic hypotension    Assessment    Pt is progressing with OT goals and is very motivated to participate in OT tx. Pt completed standing grooming/hyigene with Diego, LB dressing with Diego, UB dressing with Diego, eating with spv, and toilet txf with spv. Pt required verbal cues to maintain FWW during ADL txfs. Pt has improved motor control in RUE and was able to functionally use a comb and spoon in RUE. Will continue to follow.     Plan    Continue current treatment plan.    DC Equipment Recommendations: Unable to determine at this time  Discharge Recommendations: Recommend post-acute placement for additional occupational therapy services prior to discharge home    Subjective    Pt was alert, cooperative, and very pleasant.      Objective       06/18/21 1449   Cognition    Cognition / Consciousness WDL   Level of Consciousness Alert   Comments Pt was alert, cooperative, and very pleasant    Passive ROM Upper Body   Passive ROM Upper Body WDL   Active ROM Upper Body   Active ROM Upper Body  WDL   Dominant Hand Right   Comments improved motor control    Balance   Sitting Balance (Static) Good   Sitting Balance (Dynamic) Fair +   Standing Balance (Static) Fair   Standing Balance (Dynamic) Fair -   Weight Shift Sitting Good   Weight Shift Standing Fair   Comments w/ fww. No LOB   Bed Mobility    Supine to Sit Supervised   Sit to Supine Supervised   Scooting Supervised   Activities of Daily Living   Eating Supervision   Grooming Minimal Assist;Standing  (combed hair )   Upper Body Dressing Minimal Assist  (gown - required verbal cues )   Lower Body Dressing Minimal Assist  (socks)   Toileting Supervision   Comments Pt able to use RUE for ADL tasks   How much help from another person  does the patient currently need...   6 Clicks Daily Activity Score 18   Modified [place] (mRS)   Modified [place] Score 4   Functional Mobility   Sit to Stand Supervised   Bed, Chair, Wheelchair Transfer Supervised   Toilet Transfers Supervised   Transfer Method Stand Step   Mobility In room and bathroom   Distance (Feet) 25   # of Times Distance was Traveled 1   Comments w/ fww, no LOB   Activity Tolerance   Sitting in Chair 5 mins  (toilet )   Sitting Edge of Bed 15 mins   Standing 5 mins-8 mins   Comments No signs/symptoms of fatigue    Patient / Family Goals   Patient / Family Goal #1 To go home   Short Term Goals   Short Term Goal # 1 Pt will complete toilet transfer using BSC if needed with Diego by discharge.   Goal Outcome # 1 Goal met, new goal added   Short Term Goal # 1 B  Pt will complete toilet t/f with supervision   Goal Outcome  # 1 B Progressing as expected   Short Term Goal # 2 Pt will complete standing grooming/hygiene with setup/spv by discharge.   Goal Outcome # 2 Progressing as expected   Short Term Goal # 3 Pt will feed self with setup/spv by discharge.   Goal Outcome # 3 Goal met   Short Term Goal # 4 Pt will complete LB dressing with Diego using AE if needed by discharge.   Goal Outcome # 4 Progressing as expected

## 2021-06-24 LAB — TEST NAME 95000: NORMAL

## 2021-06-30 ENCOUNTER — TELEPHONE (OUTPATIENT)
Dept: CARDIOLOGY | Facility: MEDICAL CENTER | Age: 56
End: 2021-06-30

## 2021-06-30 NOTE — TELEPHONE ENCOUNTER
LM for PT to call back and sched.   SLC    ----- Message from Vandana Tillman sent at 6/28/2021  2:47 PM PDT -----  ZioAt placed by Jose Eduardo. Please schedule cardio appointment.

## 2021-07-02 ENCOUNTER — TELEPHONE (OUTPATIENT)
Dept: CARDIOLOGY | Facility: MEDICAL CENTER | Age: 56
End: 2021-07-02

## 2021-07-02 NOTE — TELEPHONE ENCOUNTER
PT has [facility] insurance not accepted by [facility]     ----- Message from Vandana Tillman sent at 6/28/2021  2:47 PM PDT -----  ZioAt placed by Jose Eduardo. Please schedule cardio appointment.

## 2021-08-06 ENCOUNTER — OFFICE VISIT (OUTPATIENT)
Dept: CARDIOLOGY | Facility: MEDICAL CENTER | Age: 56
End: 2021-08-06
Payer: COMMERCIAL

## 2021-08-06 VITALS
SYSTOLIC BLOOD PRESSURE: 128 MMHG | DIASTOLIC BLOOD PRESSURE: 90 MMHG | HEIGHT: 68 IN | HEART RATE: 53 BPM | OXYGEN SATURATION: 98 % | BODY MASS INDEX: 29.4 KG/M2 | WEIGHT: 194 LBS

## 2021-08-06 DIAGNOSIS — R55 SYNCOPE AND COLLAPSE: ICD-10-CM

## 2021-08-06 DIAGNOSIS — I10 ESSENTIAL HYPERTENSION: ICD-10-CM

## 2021-08-06 DIAGNOSIS — R93.1 ABNORMAL ECHOCARDIOGRAM: ICD-10-CM

## 2021-08-06 DIAGNOSIS — E53.8 FOLATE DEFICIENCY: ICD-10-CM

## 2021-08-06 DIAGNOSIS — Z91.89 OTHER SPECIFIED PERSONAL RISK FACTORS, NOT ELSEWHERE CLASSIFIED: ICD-10-CM

## 2021-08-06 DIAGNOSIS — R07.9 CHEST PAIN, UNSPECIFIED TYPE: ICD-10-CM

## 2021-08-06 DIAGNOSIS — I63.9 CEREBROVASCULAR ACCIDENT (CVA), UNSPECIFIED MECHANISM (HCC): ICD-10-CM

## 2021-08-06 DIAGNOSIS — M48.02 CERVICAL STENOSIS OF SPINAL CANAL: ICD-10-CM

## 2021-08-06 PROBLEM — I25.9 CHEST PAIN DUE TO MYOCARDIAL ISCHEMIA: Status: ACTIVE | Noted: 2021-06-11

## 2021-08-06 LAB — EKG IMPRESSION: NORMAL

## 2021-08-06 PROCEDURE — 93000 ELECTROCARDIOGRAM COMPLETE: CPT | Performed by: INTERNAL MEDICINE

## 2021-08-06 PROCEDURE — 99215 OFFICE O/P EST HI 40 MIN: CPT | Mod: 25 | Performed by: INTERNAL MEDICINE

## 2021-08-06 ASSESSMENT — FIBROSIS 4 INDEX: FIB4 SCORE: 0.75

## 2021-08-06 ASSESSMENT — ENCOUNTER SYMPTOMS
STRIDOR: 0
WHEEZING: 0
SHORTNESS OF BREATH: 0
SORE THROAT: 0
RESPIRATORY NEGATIVE: 1
DIZZINESS: 0
LOSS OF CONSCIOUSNESS: 0
CHILLS: 0
GASTROINTESTINAL NEGATIVE: 1
EYES NEGATIVE: 1
NEUROLOGICAL NEGATIVE: 1
PND: 0
COUGH: 0
CLAUDICATION: 0
PALPITATIONS: 0
CONSTITUTIONAL NEGATIVE: 1
BRUISES/BLEEDS EASILY: 0
HEMOPTYSIS: 0
SPUTUM PRODUCTION: 0
ORTHOPNEA: 0
FEVER: 0
MUSCULOSKELETAL NEGATIVE: 1
WEAKNESS: 0

## 2021-08-06 NOTE — PROGRESS NOTES
Chief Complaint   Patient presents with   • HTN (Controlled)     f/v dx: Acute stroke   • Chest Pain   • Syncope       Subjective:   Paul Hopper is a 56 y.o. male who presents today as a hospital follow-up for his CVA.  When he was in the hospital he had a stimulation in 1 weeks.  Because that he was referred for a code STEMI.  We saw him when he was having a stroke at that time.  We did an echocardiogram showed a possible inferior wall motion abnormality.  He was deemed to he will be a medical candidate and was not taken to Cath Lab and had no further work-up.  Since he has been discharged she has chest pain describes a throbbing-like sensation in his chest which does not worsen with exertion is not relieved by rest and is constant throughout the day.    Past Medical History:   Diagnosis Date   • Hypertension      No past surgical history on file.  No family history on file.  Social History     Socioeconomic History   • Marital status: Unknown     Spouse name: Not on file   • Number of children: Not on file   • Years of education: Not on file   • Highest education level: Not on file   Occupational History   • Not on file   Tobacco Use   • Smoking status: Never Smoker   • Smokeless tobacco: Never Used   Vaping Use   • Vaping Use: Never assessed   Substance and Sexual Activity   • Alcohol use: Yes     Alcohol/week: 1.2 oz     Types: 2 Standard drinks or equivalent per week   • Drug use: Never   • Sexual activity: Not on file   Other Topics Concern   • Not on file   Social History Narrative   • Not on file     Social Determinants of Health     Financial Resource Strain:    • Difficulty of Paying Living Expenses:    Food Insecurity:    • Worried About Running Out of Food in the Last Year:    • Ran Out of Food in the Last Year:    Transportation Needs:    • Lack of Transportation (Medical):    • Lack of Transportation (Non-Medical):    Physical Activity:    • Days of Exercise per Week:    • Minutes of Exercise per  Session:    Stress:    • Feeling of Stress :    Social Connections:    • Frequency of Communication with Friends and Family:    • Frequency of Social Gatherings with Friends and Family:    • Attends Hindu Services:    • Active Member of Clubs or Organizations:    • Attends Club or Organization Meetings:    • Marital Status:    Intimate Partner Violence:    • Fear of Current or Ex-Partner:    • Emotionally Abused:    • Physically Abused:    • Sexually Abused:      Allergies   Allergen Reactions   • Other Food Anaphylaxis     All fresh fruit causes throat swelling per brother.      Outpatient Encounter Medications as of 8/6/2021   Medication Sig Dispense Refill   • acetaminophen (TYLENOL) 325 MG Tab Take 2 Tablets by mouth every 6 hours as needed for Mild Pain or Moderate Pain. 30 tablet 0   • aspirin EC 81 MG EC tablet Take 1 tablet by mouth every day. 30 tablet 0   • atorvastatin (LIPITOR) 80 MG tablet Take 1 tablet by mouth every evening. 30 tablet 0   • cloNIDine (CATAPRES) 0.1 MG Tab Take 1 tablet by mouth every 6 hours as needed (BP > 180/110). 60 tablet 0   • cyanocobalamin 2000 MCG Tab Take 1 tablet by mouth every day. 30 tablet 3   • folic acid (FOLVITE) 1 MG Tab Take 1 tablet by mouth every day. 30 tablet 0   • gabapentin (NEURONTIN) 100 MG Cap Take 2 Capsules by mouth 2 times a day. 90 capsule 0   • levothyroxine (SYNTHROID) 25 MCG Tab Take 1 tablet by mouth every morning on an empty stomach. 30 tablet 0   • omeprazole (PRILOSEC) 20 MG delayed-release capsule Take 1 capsule by mouth every day. 30 capsule 0     No facility-administered encounter medications on file as of 8/6/2021.     Review of Systems   Constitutional: Negative.  Negative for chills, fever and malaise/fatigue.   HENT: Negative.  Negative for sore throat.    Eyes: Negative.    Respiratory: Negative.  Negative for cough, hemoptysis, sputum production, shortness of breath, wheezing and stridor.    Cardiovascular: Positive for chest pain.  "Negative for palpitations, orthopnea, claudication, leg swelling and PND.   Gastrointestinal: Negative.    Genitourinary: Negative.    Musculoskeletal: Negative.    Skin: Negative.    Neurological: Negative.  Negative for dizziness, loss of consciousness and weakness.   Endo/Heme/Allergies: Negative.  Does not bruise/bleed easily.   All other systems reviewed and are negative.       Objective:   /90 (BP Location: Right arm, Patient Position: Sitting, BP Cuff Size: Adult)   Pulse (!) 53   Ht 1.727 m (5' 8\")   Wt 88 kg (194 lb)   SpO2 98%   BMI 29.50 kg/m²     Physical Exam   Constitutional: He appears well-developed. No distress.   HENT:   Head: Normocephalic and atraumatic.   Right Ear: External ear normal.   Left Ear: External ear normal.   Nose: Nose normal.   Mouth/Throat: No oropharyngeal exudate.   Eyes: Pupils are equal, round, and reactive to light. Conjunctivae are normal. Right eye exhibits no discharge. Left eye exhibits no discharge. No scleral icterus.   Neck: No JVD present.   Cardiovascular: Normal rate and regular rhythm. Exam reveals no gallop and no friction rub.   No murmur heard.  Pulmonary/Chest: Effort normal. No stridor. No respiratory distress. He has no wheezes. He has no rales. He exhibits no tenderness.   Abdominal: Soft. He exhibits no distension. There is no guarding.   Musculoskeletal:         General: No tenderness or deformity. Normal range of motion.      Cervical back: Neck supple.   Neurological: He is alert. He has normal reflexes. He displays normal reflexes. No cranial nerve deficit. He exhibits normal muscle tone. Coordination normal.   Skin: Skin is warm and dry. No rash noted. He is not diaphoretic. No erythema. No pallor.   Psychiatric: His behavior is normal. Judgment and thought content normal.   Nursing note and vitals reviewed.    Echocardiogram: Dated 6/11/2021 personally viewed under myself showing an EF of 55% with a possible wall motion abnormality in the " inferior wall.    Cardiogram: Dated 6/12/2021 personally viewed inter myself showing a negative bubble study.    Lab Results   Component Value Date/Time    CHOLSTRLTOT 165 06/12/2021 01:09 AM     (H) 06/12/2021 01:09 AM    HDL 29 (A) 06/12/2021 01:09 AM    TRIGLYCERIDE 91 06/12/2021 01:09 AM       Lab Results   Component Value Date/Time    SODIUM 135 06/18/2021 01:51 AM    POTASSIUM 4.3 06/18/2021 01:51 AM    CHLORIDE 105 06/18/2021 01:51 AM    CO2 18 (L) 06/18/2021 01:51 AM    GLUCOSE 91 06/18/2021 01:51 AM    BUN 22 06/18/2021 01:51 AM    CREATININE 1.44 (H) 06/18/2021 01:51 AM     Lab Results   Component Value Date/Time    ALKPHOSPHAT 69 06/13/2021 02:23 AM    ASTSGOT 16 06/13/2021 02:23 AM    ALTSGPT 20 06/13/2021 02:23 AM    TBILIRUBIN 1.0 06/13/2021 02:23 AM        Assessment:     1. Essential hypertension  EKG    NM-CARDIAC STRESS TEST   2. Syncope and collapse  NM-CARDIAC STRESS TEST   3. Folate deficiency  NM-CARDIAC STRESS TEST   4. Cerebrovascular accident (CVA), unspecified mechanism (HCC)  NM-CARDIAC STRESS TEST   5. Abnormal echocardiogram  NM-CARDIAC STRESS TEST   6. Cervical stenosis of spinal canal  NM-CARDIAC STRESS TEST   7. Chest pain, unspecified type  NM-CARDIAC STRESS TEST   8. Other specified personal risk factors, not elsewhere classified  NM-CARDIAC STRESS TEST       Medical Decision Making:  Today's Assessment / Status / Plan:   56-year-old male with a ST segment elevation MI which was probably secondary to Takotsubo's cardiomyopathy in the setting of an acute CVA and chronic kidney disease.  Given his EKG changes that was seen during his admission for his CVA I would like to get a nuclear stress test to further stratify him.  We will stay on the atorvastatin and aspirin.  I will see him back in 3 months.

## 2021-08-13 ENCOUNTER — TELEPHONE (OUTPATIENT)
Dept: CARDIOLOGY | Facility: MEDICAL CENTER | Age: 56
End: 2021-08-13

## 2021-08-13 PROCEDURE — 93248 EXT ECG>7D<15D REV&INTERPJ: CPT | Performed by: INTERNAL MEDICINE

## 2021-08-19 ENCOUNTER — HOSPITAL ENCOUNTER (OUTPATIENT)
Dept: RADIOLOGY | Facility: MEDICAL CENTER | Age: 56
End: 2021-08-19
Attending: INTERNAL MEDICINE
Payer: COMMERCIAL

## 2021-08-19 DIAGNOSIS — R07.9 CHEST PAIN, UNSPECIFIED TYPE: ICD-10-CM

## 2021-08-19 DIAGNOSIS — I10 ESSENTIAL HYPERTENSION: ICD-10-CM

## 2021-08-19 DIAGNOSIS — I63.9 CEREBROVASCULAR ACCIDENT (CVA), UNSPECIFIED MECHANISM (HCC): ICD-10-CM

## 2021-08-19 DIAGNOSIS — R93.1 ABNORMAL ECHOCARDIOGRAM: ICD-10-CM

## 2021-08-19 DIAGNOSIS — E53.8 FOLATE DEFICIENCY: ICD-10-CM

## 2021-08-19 DIAGNOSIS — M48.02 CERVICAL STENOSIS OF SPINAL CANAL: ICD-10-CM

## 2021-08-19 DIAGNOSIS — R55 SYNCOPE AND COLLAPSE: ICD-10-CM

## 2021-08-20 ENCOUNTER — HOSPITAL ENCOUNTER (OUTPATIENT)
Dept: RADIOLOGY | Facility: MEDICAL CENTER | Age: 56
End: 2021-08-20
Attending: INTERNAL MEDICINE
Payer: COMMERCIAL

## 2021-08-20 PROCEDURE — 93018 CV STRESS TEST I&R ONLY: CPT | Performed by: INTERNAL MEDICINE

## 2021-08-20 PROCEDURE — A9502 TC99M TETROFOSMIN: HCPCS

## 2021-08-20 PROCEDURE — 78452 HT MUSCLE IMAGE SPECT MULT: CPT | Mod: 26 | Performed by: INTERNAL MEDICINE

## 2021-08-20 PROCEDURE — 700111 HCHG RX REV CODE 636 W/ 250 OVERRIDE (IP)

## 2021-08-20 RX ORDER — REGADENOSON 0.08 MG/ML
0.4 INJECTION, SOLUTION INTRAVENOUS ONCE
Status: COMPLETED | OUTPATIENT
Start: 2021-08-20 | End: 2021-08-20

## 2021-08-20 RX ORDER — REGADENOSON 0.08 MG/ML
INJECTION, SOLUTION INTRAVENOUS
Status: COMPLETED
Start: 2021-08-20 | End: 2021-08-20

## 2021-08-20 RX ORDER — AMINOPHYLLINE 25 MG/ML
100 INJECTION, SOLUTION INTRAVENOUS
Status: DISCONTINUED | OUTPATIENT
Start: 2021-08-20 | End: 2021-08-21 | Stop reason: HOSPADM

## 2021-08-20 RX ADMIN — REGADENOSON 0.4 MG: 0.08 INJECTION, SOLUTION INTRAVENOUS at 10:00

## 2021-10-04 ENCOUNTER — SPEECH THERAPY (OUTPATIENT)
Dept: SPEECH THERAPY | Facility: OTHER | Age: 56
End: 2021-10-04
Payer: COMMERCIAL

## 2021-10-04 DIAGNOSIS — I69.322 DYSARTHRIA AS LATE EFFECT OF CEREBROVASCULAR ACCIDENT (CVA): ICD-10-CM

## 2021-10-04 DIAGNOSIS — I69.998 WEAKNESS AS LATE EFFECT OF CEREBROVASCULAR ACCIDENT (CVA): ICD-10-CM

## 2021-10-04 DIAGNOSIS — R53.1 WEAKNESS AS LATE EFFECT OF CEREBROVASCULAR ACCIDENT (CVA): ICD-10-CM

## 2021-10-04 PROCEDURE — 92522 EVALUATE SPEECH PRODUCTION: CPT | Mod: GN | Performed by: SPEECH-LANGUAGE PATHOLOGIST

## 2021-10-07 NOTE — OP THERAPY EVALUATION
Outpatient Speech Therapy  INITIAL EVALUATION    [facility]  [address] [place] [id number]  Phone:  [phone]  Fax:  [phone]    Date of Evaluation: 10/04/2021    Patient: Paul Hopper  YOB: 1965  MRN: [id number]     Referring Provider: Dr. Sheikh   Referring Diagnosis  Dysarthria     Time Calculation    Start time: 0100  Stop time: 0300 Time Calculation (min): 120 minutes           Chief Complaint: Poor Speech (Slow Speech)    Visit Diagnoses     ICD-10-CM   1. Dysarthria as late effect of cerebrovascular accident (CVA)  I69.322   2. Weakness as late effect of cerebrovascular accident (CVA)  I69.998    R53.1     Subjective Evaluation  Past Medical History:   Diagnosis Date   • Hypertension      Speech Therapy Objective  Assessments  Speech Therapy Plan    I. Background Information:  Paul Hopper, a 56-year [id number], was evaluated on October 4, 2021, at the [facility]. He was referred to by Dr. Sheikh due to dysarthric speech and memory concerns consistent with the stroke he suffered approximately 3 months ago. Mr. Hopper arrived at the speech clinic. Prior to the evaluation, a phone call was made to Mr. Hopper and he discussed concerns regarding his memory and headaches. In terms of his memory, he emphasized how he has short term memory difficulties occasionally. Client mentioned minimal difficulties with word finding tasks. He also reported it takes him extra time to complete longer tasks, but he still accomplishes them by the end of the day. The client stated that he wakes up with headaches intermittently, approximately 3 or 4 times a week. He stated that the headache improves later in the day; however, he has concerns that this could be related to the stroke that he suffered. At the time of the evaluation, the client reported that his primary concern was his slow and effortful  speech.      Mr. Hopper had a stroke approximately 3 months ago. Mr. Hopper was seen in the emergency room one day after his stroke at [facility] and was discharged to a skilled nursing facility on the same day. The results of the CT showed that the client had an acute brainstem infarct in the left parasagittal aspen. At the skilled nursing facility, Mr. Hopper reported that he received speech-language pathology services for slurred speech, memory, attention, word finding, and swallowing. Additionally, the client received occupational and physical therapy. The client was discharged from the skilled nursing facility on August 31st, 2021 and has not received services since. The client reports severe left side hearing loss, but does not use an amplification device. The client is currently taking the following medications: Acetaminophen, Aspirin, Atrovastin, CloNIDine, Cyanocobalamin, Gabapentin, Levothyroxine, and Omeprazole.       Mr. Hopper lives alone following his stroke. The client’s daily activities consist of attending work and spending time with his girlfriend.      Mr. Hopper appears to be motivated and wanting to improve his speech during his time at the [facility]. He mentioned that his goals are to be able to return to eight hour days at work, “get better,” and improve his rate of speech.     II. Evaluation and Assessment  On the [facility]’s patient intake questionnaire, Mr. Hopper reported minimal concerns of intelligibility being understood about 90% of the time. However, his primary care physician, Dr. Sheikh described his speech as slurred and effortful within their referral.       Speech Sound Production  1.) Oral Motor Mechanism Evaluation  The patient presented with concerns of intelligibility, slurred speech, and slow rate of speech. An oral motor mechanism evaluation was performed to ensure that there are no  structural or anatomical deviations of the speech mechanism interfering with the production of speech.     The patient presented with slight tongue deviation to the right side and slight right sided weakness seen via his smile, protrusion of lips, and strength of tongue consistent with complications from his stroke. He displayed effortful, clavicular breathing while completing the diadochokinetic phrases.     The client demonstrated difficulty lowering the mandible and with tongue excursion. He demonstrated difficulty with upward/downward movement of the tongue. These movements were characterized by his right-sided weakness due to his stroke. The client’s s/z ratio was assessed which is an indirect index of laryngeal airflow. The client’s s/z ratio was 0.92. The client’s Maximum Phonation Time of the sustained /ah/ sound was 8.07 seconds which is below the average rate for elderly males at 13.9 seconds (Rufino, 2012). The client’s difficulty with the sustained /ah/ is consistent with his limited breath support via clavicular breathing and shortness of breath.     2.) Cranial Nerve Exam  Due to the client’s report of speech difficulties and history of stroke, a cranial nerve exam was performed. A cranial nerve exam was administered to analyze the functioning of the client’s central nervous system and peripheral nervous system as it relates to the speech mechanism.    The results of the cranial nerve examination indicate that the client may have a central nervous system lesion affecting his speech and motor abilities. Right-sided weakness of the masseter and temporalis muscle indicate possible V Vagus nerve lesion. Mr. 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 Right-sided deviation of the tongue and minimal elevation of the soft palate indicate possible right peripheral lesion and left central  lesion of the XII Hypoglossal Nerve.     The client has minor difficulty smiling widely demonstrating right lip weakness; however, his response is still within normal limits indicating normal VII Facial Nerve functioning.    3.) French[place] Test of Dysarthria, 2nd Edition  The client addressed his primary concerns are weak and slow speech.  For these reasons, the [place] Test of Dysarthria, 2nd edition (FDA-2) was implemented to review a variety of simple speech performance tasks as it relates to the normal speech function. The FDA-2 is a norm-referenced exam that provides data regarding motor speech disorders and their contribution to neurological diagnosis for individuals ages 12;0 - 97;0. This exam rates clients on several simple performance tasks related to speech function. There are eight sub-sections of this exam including reflexes, respiration, lips, palate, laryngeal, tongue, intelligibility and influencing factors. Each measure is rated on a five-point scale from normal to lack of function. The rating form indicates strengths and weaknesses and allows comparison of performance across all items. Normative data are reported for adults without dysarthria as well as patients with specific forms of dysarthria associated with confirmed medical diagnoses.    Within the reflex sub-section, the client reported that he did have some difficulties with coughing, swallowing, or drooling. These issues would happen intermittently but he emphasized how they’ve improved substantially due to treatment that he had received. The client didn’t demonstrate any overt signs or symptoms of aspiration while eating the cookie and drinking water, but it took him some time to clear his oral cavity. The respiration sub-section indicated that the client appeared to have clavicular respiration characterized by insufficient breath support making it difficult for him to inhale and exhale deeply. He presented with shortness of breath and it  was challenging for him to count to 20 under one breath. He repeated the exercise again after he took a sufficient amount of air. During the lips sub-section, the client presented with no difficulty approximating and manipulating the oral motor mechanism indicated by his ability to create a firm seal with his lips demonstrated by quiet /p/ sounds. Right facial weakness was slightly observed when he spread his lips to smile.     Additionally, Mr. Hopper had difficulties with repeating the sentence back to the clinician. He went back to correct one word he misarticulated. The client presented with mild difficulty alternating the shape of his oral mechanism when producing the /ka-la/ sound and within speech. He reported having occasional occurrences when food or drink would come out of his nose after he laughs. The palatal sub-section indicated that there was slight right-side deviation and minimal elevation of the soft palate when asked to produce the /ah/ sound five times. The last part of the subtest testing for normal resonance fell within normal parameters. The laryngeal subtest revealed that Mr. Hopper had major difficulties with sustaining the /ah/ sound as long as he could due to poor breath support. Some breakages or stops were heard as well. The client struggled heavily on laryngeal pitch, volume, and intonation during speech. He sounded monotone and had difficulties increasing volume on each number. Within the tongue sub-section, the client demonstrated slight ride-side deviation and weakness. He presented with moderate difficulty elevating and depressing his tongue. The client used his mandible frequently during tongue elevation compared to tongue depression.     Overall, the other portions of the tongue subtest fell within normal parameters. On the word sentence subtest, a total of seven out of ten sentences were interpreted correctly. On the sentence intelligibility subtest, a total of eight out of  ten sentences were interpreted correctly. This demonstrates that the client is exhibiting difficulty with the motor speech system consistent with his diagnosis of dysarthria.     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      4.) Grandfather Passage   The client was reported to have concerns regarding his rate of speech. The grandfather passage was administered to analyze his speech rate, intonation, articulation, and pitch.      The client was intelligible, but demonstrated misarticulating in sentences. He also presented with decreased intonation patterns and sounded monotone. His voice lacked stress patterns in his speech. This information is consistent with a diagnosis of dysarthria.       His speech rate was 117.3 words per minute (wpm), which is below the average range of how quickly and efficiently someone his age performs (Melody, et. al, 2005). This contributes to fluency issues.     Cognitive Skills  Mr. Hopper complained of minor difficulties in short-term memory which is a function of cognition. Difficulty with cognition can affect a patient's ability to communicate and effectively meet their needs.    1.) Repeatable Battery for the Assessment of Neuropsychological Status Update (RBANS)  Mr. Hopper mentioned intermittent difficulties with short-term memory. The norm-referenced exam, the Repeatable Battery for the Assessment of Neuropsychological Status Update (RBANS), was administered to identify and characterize abnormal cognitive decline in the older adult with normative data for individuals aged 12;0 - 89;11 years. The RBANS is composed of 5 subtests: immediate memory, visuospatial/constructional, language, attention, and delayed memory. The results of this exam are presented in percentile ranks, with the 50th percentile being the average.     Below is a table summarizing the results obtained when Mr. Hopper was administered the RBANS:     Index Index Score*  Description   Immediate Memory 87 Low   Visuospatial/ Constructional 72 Low   Language 90 Low Average    Attention 106 Above Average   Delayed Memory 94 Low Average   Total Score 85 Low        Mr. Hopper had difficulty with the immediate memory subtest. He scored within the 19th percentile rank which is below average. For instance, Mr. Hopper was unable to recall all parts of the story memory portion of this subtest. Additionally, he presented with the largest deficits in the visuospatial/ constructional subtest where he scored in the 3rd percentile rank. This was demonstrated by his difficulty determining line orientation as well as copying figures. Mr. Hopper scored in the 25th percentile for language and the 34th percentile for delayed memory. These two subtests were on the low end of average. During the language subtest, he had minimal difficulty recalling as many fruits and vegetables as possible within one minute and recalling words and story details presented in the beginning of the test. Mr. Hopper scored in the above average range for attention making this area a relative strength. He was able to recall strings of numbers effectively and code symbols quickly and efficiently.     This testing indicates that Mr. Hopper has moderate deficits in memory and visuospatial/construction. He has minor difficulties in language and delayed memory. Therefore, the client presents with mild to moderate cognitive communicative deficits.     III. Observations:  The client was alert to his surroundings and very compliant during testing. He presented himself with a slight bent-over posture with subtle right-sided weakness. There were some effortful, clavicular breathing, shortness of breath, poor pitch inflection, and word finding difficulties observed. He utilized his neck and clavicular muscles to maintain his breath support, so he would be short of breath for sustaining the /ah/ phonation and saying other short  phrases like “ka la.” The client presented with poor speech intonations and stress patterns. He sounded monotone throughout the assessment. He followed instructions well, but there were a few times when he would ask us to repeat instructions. The client’s personality was very social, friendly, and talkative. He was very engaging with the clinicians by initiating short conversations during assessments. There were word finding difficulties that interrupted his conversational speech. The client demonstrated one word finding difficulty that resulted in him changing the topic of conversation. After the client remembered the word/words, he would return to a past story to finish retelling it. He mentioned he has hearing loss on the left ear and he wore his glasses during the intelligibility subtest of the FDA-2. These two factors could impact the client’s speech abilities.     IV. Summary of Findings  The administration of the oral-motor exam and cranial nerve exam indicated slight tongue deviation to the right-side and soft palate right-side elevation consistent with complications from his stroke, and a possible V Vagus Nerve and XII Hypoglossal Nerve lesions due to deviations.  Mr. 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 The score received on the FDA-2 indicates that the client has an extrapyramidal tract lesion affecting his speech and motor capacities. He presented with below average WPM contributing to fluency issues. He also presents with mild to moderate cognitive communication deficit demonstrated through his below average percentile rank 2 out of 5 subtests on the RBANS.    V. Recommendations  It is the recommendation of the team that Paul Hopper’s name be added to the waiting list at the [facility] for this fall semester. Placement on the active therapy roster during any  clinical period is at the discretion of the Clinical Director, and is based on clinician and supervisor availability.    Referring provider co-signature:  I have reviewed this plan of care and my co-signature certifies the need for services.    Certification Period: 10/04/2021     Physician Signature: ________________________________ Date: ______________

## 2022-01-13 ENCOUNTER — OFFICE VISIT (OUTPATIENT)
Dept: CARDIOLOGY | Facility: MEDICAL CENTER | Age: 57
End: 2022-01-13

## 2022-01-13 VITALS
SYSTOLIC BLOOD PRESSURE: 170 MMHG | HEART RATE: 83 BPM | WEIGHT: 199 LBS | OXYGEN SATURATION: 96 % | HEIGHT: 68 IN | RESPIRATION RATE: 16 BRPM | BODY MASS INDEX: 30.16 KG/M2 | DIASTOLIC BLOOD PRESSURE: 110 MMHG

## 2022-01-13 DIAGNOSIS — I63.9 CEREBROVASCULAR ACCIDENT (CVA), UNSPECIFIED MECHANISM (HCC): ICD-10-CM

## 2022-01-13 DIAGNOSIS — I10 PRIMARY HYPERTENSION: ICD-10-CM

## 2022-01-13 DIAGNOSIS — R93.1 ABNORMAL ECHOCARDIOGRAM: ICD-10-CM

## 2022-01-13 DIAGNOSIS — N17.9 AKI (ACUTE KIDNEY INJURY) (HCC): ICD-10-CM

## 2022-01-13 DIAGNOSIS — R55 SYNCOPE AND COLLAPSE: ICD-10-CM

## 2022-01-13 PROCEDURE — 99214 OFFICE O/P EST MOD 30 MIN: CPT | Performed by: INTERNAL MEDICINE

## 2022-01-13 RX ORDER — LISINOPRIL 20 MG/1
20 TABLET ORAL DAILY
Qty: 30 TABLET | Refills: 11 | Status: SHIPPED | OUTPATIENT
Start: 2022-01-13 | End: 2023-03-09

## 2022-01-13 RX ORDER — AMLODIPINE BESYLATE 5 MG/1
5 TABLET ORAL DAILY
COMMUNITY
End: 2023-03-09

## 2022-01-13 ASSESSMENT — FIBROSIS 4 INDEX: FIB4 SCORE: 0.75

## 2022-01-13 ASSESSMENT — ENCOUNTER SYMPTOMS
PALPITATIONS: 0
CLAUDICATION: 0
COUGH: 0
NEUROLOGICAL NEGATIVE: 1
EYES NEGATIVE: 1
PND: 0
ORTHOPNEA: 0
CHILLS: 0
DIZZINESS: 0
BRUISES/BLEEDS EASILY: 0
HEMOPTYSIS: 0
STRIDOR: 0
SHORTNESS OF BREATH: 0
WEAKNESS: 0
LOSS OF CONSCIOUSNESS: 0
GASTROINTESTINAL NEGATIVE: 1
RESPIRATORY NEGATIVE: 1
SORE THROAT: 0
WHEEZING: 0
MUSCULOSKELETAL NEGATIVE: 1
SPUTUM PRODUCTION: 0
FEVER: 0
CONSTITUTIONAL NEGATIVE: 1

## 2022-01-13 NOTE — PROGRESS NOTES
Chief Complaint   Patient presents with   • HTN (Controlled)       Subjective:   Paul Hopper is a 56 y.o. male who presents today as a hospital follow-up for his CVA.  When he was in the hospital he had a stimulation in 1 weeks.  Because that he was referred for a code STEMI.  We saw him when he was having a stroke at that time.  We did an echocardiogram showed a possible inferior wall motion abnormality.  He was deemed to he will be a medical candidate and was not taken to Cath Lab and had no further work-up.    Since he was last seen he was sent for nuclear stress test which was normal.  Blood pressure continues to be elevated.  He has morning headaches.  He is fatigued and lacking energy.    Past Medical History:   Diagnosis Date   • Hypertension      No past surgical history on file.  No family history on file.  Social History     Socioeconomic History   • Marital status: Single     Spouse name: Not on file   • Number of children: Not on file   • Years of education: Not on file   • Highest education level: Not on file   Occupational History   • Not on file   Tobacco Use   • Smoking status: Never Smoker   • Smokeless tobacco: Never Used   Vaping Use   • Vaping Use: Not on file   Substance and Sexual Activity   • Alcohol use: Yes     Alcohol/week: 1.2 oz     Types: 2 Standard drinks or equivalent per week   • Drug use: Never   • Sexual activity: Not on file   Other Topics Concern   • Not on file   Social History Narrative   • Not on file     Social Determinants of Health     Financial Resource Strain:    • Difficulty of Paying Living Expenses: Not on file   Food Insecurity:    • Worried About Running Out of Food in the Last Year: Not on file   • Ran Out of Food in the Last Year: Not on file   Transportation Needs:    • Lack of Transportation (Medical): Not on file   • Lack of Transportation (Non-Medical): Not on file   Physical Activity:    • Days of Exercise per Week: Not on file   • Minutes of Exercise per  Session: Not on file   Stress:    • Feeling of Stress : Not on file   Social Connections:    • Frequency of Communication with Friends and Family: Not on file   • Frequency of Social Gatherings with Friends and Family: Not on file   • Attends Shinto Services: Not on file   • Active Member of Clubs or Organizations: Not on file   • Attends Club or Organization Meetings: Not on file   • Marital Status: Not on file   Intimate Partner Violence:    • Fear of Current or Ex-Partner: Not on file   • Emotionally Abused: Not on file   • Physically Abused: Not on file   • Sexually Abused: Not on file   Housing Stability:    • Unable to Pay for Housing in the Last Year: Not on file   • Number of Places Lived in the Last Year: Not on file   • Unstable Housing in the Last Year: Not on file     Allergies   Allergen Reactions   • Other Food Anaphylaxis     All fresh fruit causes throat swelling per brother.      Outpatient Encounter Medications as of 1/13/2022   Medication Sig Dispense Refill   • amLODIPine (NORVASC) 5 MG Tab Take 5 mg by mouth every day.     • lisinopril (PRINIVIL) 20 MG Tab Take 1 Tablet by mouth every day. 30 Tablet 11   • acetaminophen (TYLENOL) 325 MG Tab Take 2 Tablets by mouth every 6 hours as needed for Mild Pain or Moderate Pain. 30 tablet 0   • aspirin EC 81 MG EC tablet Take 1 tablet by mouth every day. 30 tablet 0   • atorvastatin (LIPITOR) 80 MG tablet Take 1 tablet by mouth every evening. 30 tablet 0   • cloNIDine (CATAPRES) 0.1 MG Tab Take 1 tablet by mouth every 6 hours as needed (BP > 180/110). 60 tablet 0   • cyanocobalamin 2000 MCG Tab Take 1 tablet by mouth every day. 30 tablet 3   • folic acid (FOLVITE) 1 MG Tab Take 1 tablet by mouth every day. 30 tablet 0   • gabapentin (NEURONTIN) 100 MG Cap Take 2 Capsules by mouth 2 times a day. 90 capsule 0   • levothyroxine (SYNTHROID) 25 MCG Tab Take 1 tablet by mouth every morning on an empty stomach. 30 tablet 0   • omeprazole (PRILOSEC) 20 MG  "delayed-release capsule Take 1 capsule by mouth every day. 30 capsule 0     No facility-administered encounter medications on file as of 1/13/2022.     Review of Systems   Constitutional: Negative.  Negative for chills, fever and malaise/fatigue.   HENT: Negative.  Negative for sore throat.    Eyes: Negative.    Respiratory: Negative.  Negative for cough, hemoptysis, sputum production, shortness of breath, wheezing and stridor.    Cardiovascular: Positive for chest pain. Negative for palpitations, orthopnea, claudication, leg swelling and PND.   Gastrointestinal: Negative.    Genitourinary: Negative.    Musculoskeletal: Negative.    Skin: Negative.    Neurological: Negative.  Negative for dizziness, loss of consciousness and weakness.   Endo/Heme/Allergies: Negative.  Does not bruise/bleed easily.   All other systems reviewed and are negative.       Objective:   BP (!) 170/110 (BP Location: Left arm, Patient Position: Sitting, BP Cuff Size: Adult)   Pulse 83   Resp 16   Ht 1.727 m (5' 8\")   Wt 90.3 kg (199 lb)   SpO2 96%   BMI 30.26 kg/m²     Physical Exam  Vitals and nursing note reviewed.   Constitutional:       General: He is not in acute distress.     Appearance: He is well-developed. He is not diaphoretic.   HENT:      Head: Normocephalic and atraumatic.      Right Ear: External ear normal.      Left Ear: External ear normal.      Nose: Nose normal.      Mouth/Throat:      Pharynx: No oropharyngeal exudate.   Eyes:      General: No scleral icterus.        Right eye: No discharge.         Left eye: No discharge.      Conjunctiva/sclera: Conjunctivae normal.      Pupils: Pupils are equal, round, and reactive to light.   Neck:      Vascular: No JVD.   Cardiovascular:      Rate and Rhythm: Normal rate and regular rhythm.      Heart sounds: No murmur heard.  No friction rub. No gallop.    Pulmonary:      Effort: Pulmonary effort is normal. No respiratory distress.      Breath sounds: No stridor. No wheezing or " rales.   Chest:      Chest wall: No tenderness.   Abdominal:      General: There is no distension.      Palpations: Abdomen is soft.      Tenderness: There is no guarding.   Musculoskeletal:         General: No tenderness or deformity. Normal range of motion.      Cervical back: Neck supple.   Skin:     General: Skin is warm and dry.      Coloration: Skin is not pale.      Findings: No erythema or rash.   Neurological:      Mental Status: He is alert.      Cranial Nerves: No cranial nerve deficit.      Motor: No abnormal muscle tone.      Coordination: Coordination normal.      Deep Tendon Reflexes: Reflexes are normal and symmetric. Reflexes normal.   Psychiatric:         Behavior: Behavior normal.         Thought Content: Thought content normal.         Judgment: Judgment normal.       Echocardiogram: Dated 6/11/2021 personally viewed under myself showing an EF of 55% with a possible wall motion abnormality in the inferior wall.    EchCardiogram: Dated 6/12/2021 personally viewed inter myself showing a negative bubble study.      Nuclear medicine stress test: Dated 8/20/2021:personally viewed inter myself showed no ischemia.      Lab Results   Component Value Date/Time    CHOLSTRLTOT 165 06/12/2021 01:09 AM     (H) 06/12/2021 01:09 AM    HDL 29 (A) 06/12/2021 01:09 AM    TRIGLYCERIDE 91 06/12/2021 01:09 AM       Lab Results   Component Value Date/Time    SODIUM 135 06/18/2021 01:51 AM    POTASSIUM 4.3 06/18/2021 01:51 AM    CHLORIDE 105 06/18/2021 01:51 AM    CO2 18 (L) 06/18/2021 01:51 AM    GLUCOSE 91 06/18/2021 01:51 AM    BUN 22 06/18/2021 01:51 AM    CREATININE 1.44 (H) 06/18/2021 01:51 AM     Lab Results   Component Value Date/Time    ALKPHOSPHAT 69 06/13/2021 02:23 AM    ASTSGOT 16 06/13/2021 02:23 AM    ALTSGPT 20 06/13/2021 02:23 AM    TBILIRUBIN 1.0 06/13/2021 02:23 AM        Assessment:     1. Cerebrovascular accident (CVA), unspecified mechanism (HCC)     2. Abnormal echocardiogram     3.  Primary hypertension  lisinopril (PRINIVIL) 20 MG Tab    Referral to Pulmonary and Sleep Medicine   4. AURELIANO (acute kidney injury) (HCC)     5. Syncope and collapse  Referral to Pulmonary and Sleep Medicine       Medical Decision Making:  Today's Assessment / Status / Plan:   56-year-old male with a ST segment elevation MI which was probably secondary to Takotsubo's cardiomyopathy in the setting of an acute CVA and chronic kidney disease.    Now that we know that he has no ischemia I will send him for a sleep study.  I will also start lisinopril 20 for his high blood pressure.  I will see him back in 3 months.

## 2022-02-02 ENCOUNTER — TELEPHONE (OUTPATIENT)
Dept: SPEECH THERAPY | Facility: OTHER | Age: 57
End: 2022-02-02

## 2022-03-14 ENCOUNTER — OFFICE VISIT (OUTPATIENT)
Dept: SLEEP MEDICINE | Facility: MEDICAL CENTER | Age: 57
End: 2022-03-14

## 2022-03-14 VITALS
HEIGHT: 68 IN | WEIGHT: 185 LBS | OXYGEN SATURATION: 98 % | SYSTOLIC BLOOD PRESSURE: 144 MMHG | DIASTOLIC BLOOD PRESSURE: 90 MMHG | BODY MASS INDEX: 28.04 KG/M2 | HEART RATE: 80 BPM | RESPIRATION RATE: 16 BRPM

## 2022-03-14 DIAGNOSIS — G47.30 SLEEP DISORDER BREATHING: ICD-10-CM

## 2022-03-14 DIAGNOSIS — I63.9 CEREBROVASCULAR ACCIDENT (CVA), UNSPECIFIED MECHANISM (HCC): ICD-10-CM

## 2022-03-14 PROCEDURE — 99204 OFFICE O/P NEW MOD 45 MIN: CPT | Performed by: FAMILY MEDICINE

## 2022-03-14 ASSESSMENT — PATIENT HEALTH QUESTIONNAIRE - PHQ9
5. POOR APPETITE OR OVEREATING: 2 - MORE THAN HALF THE DAYS
SUM OF ALL RESPONSES TO PHQ QUESTIONS 1-9: 12
CLINICAL INTERPRETATION OF PHQ2 SCORE: 3

## 2022-03-14 ASSESSMENT — FIBROSIS 4 INDEX: FIB4 SCORE: 0.75

## 2022-03-14 NOTE — PROGRESS NOTES
"       [facility]  Consult Note     Date: 3/14/2022 / Time: 1:45 PM    Patient ID:   Name:             Paul Hopper     YOB: 1965  Age:                 56 y.o.  male   MRN:               [id number]      Thank you for requesting a sleep medicine consultation on Paul Hopper at the sleep center. he presents today with the chief complaints of trouble falling/staying asleep, snoring and non restful sleep. He had CVA in 06/2021. He is referred by Dr. Milton for evaluation and treatment of sleep disorder breathing.     HISTORY OF PRESENT ILLNESS:       At night,  Paul Hopper goes to bed around 1 am on weekdays and on the weekends. He gets out of bed at 6-7 am on weekdays and on the weekends. He averages about 5-6 hrs of sleep on a good night. Pt has bad nights on most nights per week. He falls asleep within 10-60 minutes. He wakes up about 2 times during the night due to bathroom use and on average It takes him few min to fall back asleep.  His sleep has been worse for the last 9 months or so since his stroke due to multiple reason.  He was living in a hotel however he was let go from there after his stroke has been sleeping on a chair at his friends house. he is aware of snoring but unsure about breathing pauses/gasping or choking in sleep.  He  denies any symptoms of restless legs syndrome such as an \"urge to move\"  He  legs in the evening or bedtime. He  denies any symptoms of narcolepsy such as sleep paralysis or cataplexy, or any symptoms to suggest parasomnias such as sleep walking or acting out of dreams. He  has not used any medications for the sleep problem.Overall,he doesnot finds his sleep refreshing. He does not take regular naps.He drinks about 2 caffeinated beverages per day.    Patient also complains of lower extremity swelling since his stroke.  On examination he has 2+ pitting edema.    REVIEW OF SYSTEMS:       Constitutional: Denies fevers, Denies weight changes  Eyes: " Denies changes in vision, no eye pain  Ears/Nose/Throat/Mouth: Denies nasal congestion or sore throat   Cardiovascular: Denies chest pain or palpitations   Respiratory: Denies shortness of breath , Denies cough  Gastrointestinal/Hepatic: Denies abdominal pain, nausea   Genitourinary: Denies bladder dysfunction, dysuria or frequency  Musculoskeletal/Rheum: Denies  joint pain and swelling   Skin/Breast: Denies rash  Neurological: Denies headache, confusion  Psychiatric: denies mood disorder     Comprehensive review of systems form is reviewed with the patient and is attached in the EMR.     PMH:  has a past medical history of Chickenpox and Hypertension.    He has no past medical history of Arabic measles.  MEDS:   Current Outpatient Medications:   •  amLODIPine (NORVASC) 5 MG Tab, Take 5 mg by mouth every day., Disp: , Rfl:   •  cloNIDine (CATAPRES) 0.1 MG Tab, Take 1 tablet by mouth every 6 hours as needed (BP > 180/110)., Disp: 60 tablet, Rfl: 0  •  lisinopril (PRINIVIL) 20 MG Tab, Take 1 Tablet by mouth every day. (Patient not taking: Reported on 3/14/2022), Disp: 30 Tablet, Rfl: 11  •  acetaminophen (TYLENOL) 325 MG Tab, Take 2 Tablets by mouth every 6 hours as needed for Mild Pain or Moderate Pain. (Patient not taking: Reported on 3/14/2022), Disp: 30 tablet, Rfl: 0  •  aspirin EC 81 MG EC tablet, Take 1 tablet by mouth every day. (Patient not taking: Reported on 3/14/2022), Disp: 30 tablet, Rfl: 0  •  atorvastatin (LIPITOR) 80 MG tablet, Take 1 tablet by mouth every evening. (Patient not taking: Reported on 3/14/2022), Disp: 30 tablet, Rfl: 0  •  cyanocobalamin 2000 MCG Tab, Take 1 tablet by mouth every day., Disp: 30 tablet, Rfl: 3  •  folic acid (FOLVITE) 1 MG Tab, Take 1 tablet by mouth every day. (Patient not taking: Reported on 3/14/2022), Disp: 30 tablet, Rfl: 0  •  gabapentin (NEURONTIN) 100 MG Cap, Take 2 Capsules by mouth 2 times a day. (Patient not taking: Reported on 3/14/2022), Disp: 90 capsule,  "Rfl: 0  •  levothyroxine (SYNTHROID) 25 MCG Tab, Take 1 tablet by mouth every morning on an empty stomach. (Patient not taking: Reported on 3/14/2022), Disp: 30 tablet, Rfl: 0  •  omeprazole (PRILOSEC) 20 MG delayed-release capsule, Take 1 capsule by mouth every day. (Patient not taking: Reported on 3/14/2022), Disp: 30 capsule, Rfl: 0  ALLERGIES:   Allergies   Allergen Reactions   • Other Food Anaphylaxis     All fresh fruit causes throat swelling per brother.      SURGHX: History reviewed. No pertinent surgical history.  SOCHX:  reports that he has never smoked. He has never used smokeless tobacco. He reports current alcohol use of about 1.2 oz of alcohol per week. He reports that he does not use drugs.   FH: History reviewed. No pertinent family history.    Physical Exam:  Vitals/ General Appearance:   Weight/BMI: Body mass index is 28.13 kg/m².  /90 (BP Location: Left arm, Patient Position: Sitting, BP Cuff Size: Adult)   Pulse 80   Resp 16   Ht 1.727 m (5' 8\")   Wt 83.9 kg (185 lb)   SpO2 98%   Vitals:    03/14/22 1337   BP: 144/90   BP Location: Left arm   Patient Position: Sitting   BP Cuff Size: Adult   Pulse: 80   Resp: 16   SpO2: 98%   Weight: 83.9 kg (185 lb)   Height: 1.727 m (5' 8\")           Constitutional: Alert, no distress, well-groomed.  Skin: No rashes in visible areas.  Eye: Round. Conjunctiva clear, lids normal. No icterus.   ENMT: Lips pink without lesions, good dentition, moist mucous membranes. Phonation normal.  Neck: No masses, no thyromegaly. Moves freely without pain.  CV: Pulse as reported by patient  Respiratory: Unlabored respiratory effort, no cough or audible wheeze  Psych: Alert and oriented x3, normal affect and mood.   INVESTIGATIONS:       ASSESSMENT AND PLAN     1. He  has symptoms of Obstructive Sleep Apnea (SAMUEL).The pathophysiology of SAMUEL and the increased risk of cardiovascular morbidity from untreated SAMUEL is discussed in detail with the patient. We have discussed " diagnostic options including in-laboratory, attended polysomnography and home sleep testing. We have also discussed treatment options including airway pressurization, reconstructive otolaryngologic surgery, dental appliances and weight management.       Subsequently,treatment options will be discussed based on the diagnostic study. Meanwhile, He is urged to avoid supine sleep, weight gain and alcoholic beverages since all of these can worsen SAMUEL. He is cautioned against drowsy driving. If He feels sleepy while driving, He must pull over for a break/nap, rather than persist on the road, in the interest of He own safety and that of others on the road.    Plan  -  Due to the recent hx of stroke, he  will be scheduled for an overnight PSG to assess sleep related  breathing disorder.    2. Chronic Insomnia: The importance of cognitive restructuring and behavior modification therapy is emphasized for long-term improvement rather than the use of hypnotic agents, which may offer short term relief but may lead to the development of tolerance and side effects with prolonged use. Evening exercise, wind-down before bedtime, and stimulus control (leaving the bed when unable to fall back asleep at night) are explained. Stimulus control and sleep hygiene was discussed in detail The patient is also given names of book on CBT-I and was recommended to maintain a sleep diary.    3. Regarding treatment of other past medical problems and general health maintenance,  He is urged to follow up with PCP.

## 2022-03-14 NOTE — PATIENT INSTRUCTIONS
Sleep hygiene (ref: UpToDate)  ?Sleep as long as necessary to feel rested (usually seven to eight hours for adults) and then get out of bed  ?Maintain a regular sleep schedule, particularly a regular wake-up time in the morning  ?Try not to force sleep  ?Avoid caffeinated beverages after lunch  ?Avoid alcohol near bedtime (eg, late afternoon and evening)  ?Avoid smoking or other nicotine intake, particularly during the evening  ?Adjust the bedroom environment as needed to decrease stimuli (eg, reduce ambient light, turn off the television or radio)  ?Avoid use of light-emitting screens  (laptops, tablets, smartphones, seasonax GmbHooks) 2 hours before bedtime   ?Resolve concerns or worries before bedtime  ?Exercise regularly for at least 20 minutes, preferably more than four to five hours prior to bedtime.  ?Avoid daytime naps, especially if they are longer than 20 to 30 minutes or occur late in the day    Stimulus control (Ref: UpToDate)    Patients with insomnia may associate their bed and bedroom with the fear of not sleeping or other arousing events, rather than the more pleasurable anticipation of sleep. The longer one stays in bed trying to sleep, the stronger the association becomes. This perpetuates the difficulty falling asleep.Stimulus control therapy is a strategy whose purpose is to disrupt this association by enhancing the likelihood of sleep . Patients should not go to bed until they are sleepy and should use the bed primarily for sleep (and not for reading, watching television, eating, or worrying). They should not spend more than 20 minutes in bed awake. If they are awake after 20 minutes, they should leave the bedroom and engage in a relaxing activity, such as reading or listening to soothing music. Patients should not engage in activities that stimulate them or reward them for being awake in the middle of the night, such as eating or watching television. In addition, they should not return to bed until they  are tired and feel ready to sleep. If they return to bed and still cannot sleep within 20 minutes, the process should be repeated. An alarm should be set to wake the patient at the same time every morning, including weekends. Daytime naps are not allowed.

## 2022-04-11 ENCOUNTER — APPOINTMENT (OUTPATIENT)
Dept: SLEEP MEDICINE | Facility: MEDICAL CENTER | Age: 57
End: 2022-04-11
Attending: FAMILY MEDICINE

## 2023-03-09 ENCOUNTER — HOSPITAL ENCOUNTER (INPATIENT)
Facility: MEDICAL CENTER | Age: 58
LOS: 3 days | DRG: 872 | End: 2023-03-13
Attending: EMERGENCY MEDICINE | Admitting: HOSPITALIST
Payer: COMMERCIAL

## 2023-03-09 ENCOUNTER — APPOINTMENT (OUTPATIENT)
Dept: CARDIOLOGY | Facility: MEDICAL CENTER | Age: 58
DRG: 872 | End: 2023-03-09
Attending: INTERNAL MEDICINE
Payer: COMMERCIAL

## 2023-03-09 ENCOUNTER — APPOINTMENT (OUTPATIENT)
Dept: RADIOLOGY | Facility: MEDICAL CENTER | Age: 58
DRG: 872 | End: 2023-03-09
Attending: EMERGENCY MEDICINE
Payer: COMMERCIAL

## 2023-03-09 DIAGNOSIS — R94.31 ABNORMAL EKG: ICD-10-CM

## 2023-03-09 DIAGNOSIS — E53.8 B12 DEFICIENCY: ICD-10-CM

## 2023-03-09 DIAGNOSIS — R55 SYNCOPE AND COLLAPSE: ICD-10-CM

## 2023-03-09 DIAGNOSIS — Z86.73 HISTORY OF STROKE: ICD-10-CM

## 2023-03-09 DIAGNOSIS — R93.1 ABNORMAL ECHOCARDIOGRAM: ICD-10-CM

## 2023-03-09 DIAGNOSIS — R06.6 INTRACTABLE HICCUPS: ICD-10-CM

## 2023-03-09 DIAGNOSIS — A41.9 SEPSIS ASSOCIATED HYPOTENSION (HCC): ICD-10-CM

## 2023-03-09 DIAGNOSIS — I10 PRIMARY HYPERTENSION: ICD-10-CM

## 2023-03-09 DIAGNOSIS — E03.4 HYPOTHYROIDISM DUE TO ACQUIRED ATROPHY OF THYROID: ICD-10-CM

## 2023-03-09 DIAGNOSIS — E53.8 FOLATE DEFICIENCY: ICD-10-CM

## 2023-03-09 DIAGNOSIS — I95.9 SEPSIS ASSOCIATED HYPOTENSION (HCC): ICD-10-CM

## 2023-03-09 DIAGNOSIS — L03.115 CELLULITIS OF RIGHT LOWER EXTREMITY: ICD-10-CM

## 2023-03-09 DIAGNOSIS — R07.9 ACUTE CHEST PAIN: ICD-10-CM

## 2023-03-09 LAB
ALBUMIN SERPL BCP-MCNC: 4.1 G/DL (ref 3.2–4.9)
ALBUMIN/GLOB SERPL: 1.2 G/DL
ALP SERPL-CCNC: 86 U/L (ref 30–99)
ALT SERPL-CCNC: 37 U/L (ref 2–50)
ANION GAP SERPL CALC-SCNC: 13 MMOL/L (ref 7–16)
AST SERPL-CCNC: 54 U/L (ref 12–45)
BASOPHILS # BLD AUTO: 0.2 % (ref 0–1.8)
BASOPHILS # BLD: 0.02 K/UL (ref 0–0.12)
BILIRUB SERPL-MCNC: 1.7 MG/DL (ref 0.1–1.5)
BLOOD CULTURE HOLD CXBCH: NORMAL
BUN SERPL-MCNC: 15 MG/DL (ref 8–22)
CALCIUM ALBUM COR SERPL-MCNC: 9.3 MG/DL (ref 8.5–10.5)
CALCIUM SERPL-MCNC: 9.4 MG/DL (ref 8.5–10.5)
CHLORIDE SERPL-SCNC: 103 MMOL/L (ref 96–112)
CO2 SERPL-SCNC: 23 MMOL/L (ref 20–33)
CREAT SERPL-MCNC: 1.69 MG/DL (ref 0.5–1.4)
EKG IMPRESSION: NORMAL
EOSINOPHIL # BLD AUTO: 0.04 K/UL (ref 0–0.51)
EOSINOPHIL NFR BLD: 0.4 % (ref 0–6.9)
ERYTHROCYTE [DISTWIDTH] IN BLOOD BY AUTOMATED COUNT: 50 FL (ref 35.9–50)
GFR SERPLBLD CREATININE-BSD FMLA CKD-EPI: 47 ML/MIN/1.73 M 2
GLOBULIN SER CALC-MCNC: 3.4 G/DL (ref 1.9–3.5)
GLUCOSE SERPL-MCNC: 121 MG/DL (ref 65–99)
HCT VFR BLD AUTO: 43.9 % (ref 42–52)
HGB BLD-MCNC: 15.9 G/DL (ref 14–18)
IMM GRANULOCYTES # BLD AUTO: 0.08 K/UL (ref 0–0.11)
IMM GRANULOCYTES NFR BLD AUTO: 0.7 % (ref 0–0.9)
LYMPHOCYTES # BLD AUTO: 0.34 K/UL (ref 1–4.8)
LYMPHOCYTES NFR BLD: 3.1 % (ref 22–41)
MCH RBC QN AUTO: 35.8 PG (ref 27–33)
MCHC RBC AUTO-ENTMCNC: 36.2 G/DL (ref 33.7–35.3)
MCV RBC AUTO: 98.9 FL (ref 81.4–97.8)
MONOCYTES # BLD AUTO: 0.12 K/UL (ref 0–0.85)
MONOCYTES NFR BLD AUTO: 1.1 % (ref 0–13.4)
NEUTROPHILS # BLD AUTO: 10.44 K/UL (ref 1.82–7.42)
NEUTROPHILS NFR BLD: 94.5 % (ref 44–72)
NRBC # BLD AUTO: 0 K/UL
NRBC BLD-RTO: 0 /100 WBC
PLATELET # BLD AUTO: 161 K/UL (ref 164–446)
PMV BLD AUTO: 8.7 FL (ref 9–12.9)
POTASSIUM SERPL-SCNC: 3.7 MMOL/L (ref 3.6–5.5)
PROT SERPL-MCNC: 7.5 G/DL (ref 6–8.2)
RBC # BLD AUTO: 4.44 M/UL (ref 4.7–6.1)
SODIUM SERPL-SCNC: 139 MMOL/L (ref 135–145)
TROPONIN T SERPL-MCNC: 13 NG/L (ref 6–19)
TROPONIN T SERPL-MCNC: 9 NG/L (ref 6–19)
WBC # BLD AUTO: 11 K/UL (ref 4.8–10.8)

## 2023-03-09 PROCEDURE — A9270 NON-COVERED ITEM OR SERVICE: HCPCS | Performed by: HOSPITALIST

## 2023-03-09 PROCEDURE — 93005 ELECTROCARDIOGRAM TRACING: CPT | Performed by: EMERGENCY MEDICINE

## 2023-03-09 PROCEDURE — A9270 NON-COVERED ITEM OR SERVICE: HCPCS | Performed by: EMERGENCY MEDICINE

## 2023-03-09 PROCEDURE — 71045 X-RAY EXAM CHEST 1 VIEW: CPT

## 2023-03-09 PROCEDURE — 85025 COMPLETE CBC W/AUTO DIFF WBC: CPT

## 2023-03-09 PROCEDURE — G0378 HOSPITAL OBSERVATION PER HR: HCPCS

## 2023-03-09 PROCEDURE — 80053 COMPREHEN METABOLIC PANEL: CPT

## 2023-03-09 PROCEDURE — 84484 ASSAY OF TROPONIN QUANT: CPT

## 2023-03-09 PROCEDURE — 36415 COLL VENOUS BLD VENIPUNCTURE: CPT

## 2023-03-09 PROCEDURE — 93005 ELECTROCARDIOGRAM TRACING: CPT

## 2023-03-09 PROCEDURE — 700111 HCHG RX REV CODE 636 W/ 250 OVERRIDE (IP): Performed by: EMERGENCY MEDICINE

## 2023-03-09 PROCEDURE — 99285 EMERGENCY DEPT VISIT HI MDM: CPT

## 2023-03-09 PROCEDURE — 93306 TTE W/DOPPLER COMPLETE: CPT

## 2023-03-09 PROCEDURE — 700102 HCHG RX REV CODE 250 W/ 637 OVERRIDE(OP): Performed by: EMERGENCY MEDICINE

## 2023-03-09 PROCEDURE — 700102 HCHG RX REV CODE 250 W/ 637 OVERRIDE(OP): Performed by: HOSPITALIST

## 2023-03-09 PROCEDURE — 99223 1ST HOSP IP/OBS HIGH 75: CPT | Performed by: HOSPITALIST

## 2023-03-09 PROCEDURE — 99254 IP/OBS CNSLTJ NEW/EST MOD 60: CPT | Performed by: INTERNAL MEDICINE

## 2023-03-09 PROCEDURE — 96374 THER/PROPH/DIAG INJ IV PUSH: CPT | Mod: XU

## 2023-03-09 RX ORDER — ACETAMINOPHEN 325 MG/1
650 TABLET ORAL EVERY 6 HOURS PRN
Status: DISCONTINUED | OUTPATIENT
Start: 2023-03-09 | End: 2023-03-13 | Stop reason: HOSPADM

## 2023-03-09 RX ORDER — ONDANSETRON 2 MG/ML
4 INJECTION INTRAMUSCULAR; INTRAVENOUS EVERY 4 HOURS PRN
Status: DISCONTINUED | OUTPATIENT
Start: 2023-03-09 | End: 2023-03-13 | Stop reason: HOSPADM

## 2023-03-09 RX ORDER — PROMETHAZINE HYDROCHLORIDE 25 MG/1
12.5-25 TABLET ORAL EVERY 4 HOURS PRN
Status: DISCONTINUED | OUTPATIENT
Start: 2023-03-09 | End: 2023-03-13 | Stop reason: HOSPADM

## 2023-03-09 RX ORDER — ONDANSETRON 4 MG/1
4 TABLET, ORALLY DISINTEGRATING ORAL EVERY 4 HOURS PRN
Status: DISCONTINUED | OUTPATIENT
Start: 2023-03-09 | End: 2023-03-13 | Stop reason: HOSPADM

## 2023-03-09 RX ORDER — NITROGLYCERIN 0.4 MG/1
0.4 TABLET SUBLINGUAL
Status: DISCONTINUED | OUTPATIENT
Start: 2023-03-09 | End: 2023-03-13 | Stop reason: HOSPADM

## 2023-03-09 RX ORDER — MORPHINE SULFATE 4 MG/ML
4 INJECTION INTRAVENOUS ONCE
Status: COMPLETED | OUTPATIENT
Start: 2023-03-09 | End: 2023-03-09

## 2023-03-09 RX ORDER — BISACODYL 10 MG
10 SUPPOSITORY, RECTAL RECTAL
Status: DISCONTINUED | OUTPATIENT
Start: 2023-03-09 | End: 2023-03-13 | Stop reason: HOSPADM

## 2023-03-09 RX ORDER — PROCHLORPERAZINE EDISYLATE 5 MG/ML
5-10 INJECTION INTRAMUSCULAR; INTRAVENOUS EVERY 4 HOURS PRN
Status: DISCONTINUED | OUTPATIENT
Start: 2023-03-09 | End: 2023-03-13 | Stop reason: HOSPADM

## 2023-03-09 RX ORDER — ASPIRIN 81 MG/1
324 TABLET, CHEWABLE ORAL ONCE
Status: COMPLETED | OUTPATIENT
Start: 2023-03-09 | End: 2023-03-09

## 2023-03-09 RX ORDER — PROMETHAZINE HYDROCHLORIDE 25 MG/1
12.5-25 SUPPOSITORY RECTAL EVERY 4 HOURS PRN
Status: DISCONTINUED | OUTPATIENT
Start: 2023-03-09 | End: 2023-03-13 | Stop reason: HOSPADM

## 2023-03-09 RX ORDER — LISINOPRIL 10 MG/1
10 TABLET ORAL
Status: DISCONTINUED | OUTPATIENT
Start: 2023-03-09 | End: 2023-03-13

## 2023-03-09 RX ORDER — AMOXICILLIN 250 MG
2 CAPSULE ORAL 2 TIMES DAILY
Status: DISCONTINUED | OUTPATIENT
Start: 2023-03-09 | End: 2023-03-13 | Stop reason: HOSPADM

## 2023-03-09 RX ORDER — POLYETHYLENE GLYCOL 3350 17 G/17G
1 POWDER, FOR SOLUTION ORAL
Status: DISCONTINUED | OUTPATIENT
Start: 2023-03-09 | End: 2023-03-13 | Stop reason: HOSPADM

## 2023-03-09 RX ORDER — ASPIRIN 300 MG/1
300 SUPPOSITORY RECTAL ONCE
Status: COMPLETED | OUTPATIENT
Start: 2023-03-09 | End: 2023-03-09

## 2023-03-09 RX ORDER — ATORVASTATIN CALCIUM 40 MG/1
40 TABLET, FILM COATED ORAL EVERY EVENING
Status: DISCONTINUED | OUTPATIENT
Start: 2023-03-09 | End: 2023-03-10

## 2023-03-09 RX ADMIN — MORPHINE SULFATE 4 MG: 4 INJECTION, SOLUTION INTRAMUSCULAR; INTRAVENOUS at 13:10

## 2023-03-09 RX ADMIN — ACETAMINOPHEN 650 MG: 325 TABLET, FILM COATED ORAL at 20:19

## 2023-03-09 RX ADMIN — ASPIRIN 81 MG 324 MG: 81 TABLET ORAL at 13:09

## 2023-03-09 RX ADMIN — NITROGLYCERIN 0.4 MG: 0.4 TABLET, ORALLY DISINTEGRATING SUBLINGUAL at 13:11

## 2023-03-09 RX ADMIN — LISINOPRIL 10 MG: 10 TABLET ORAL at 17:55

## 2023-03-09 RX ADMIN — SENNOSIDES AND DOCUSATE SODIUM 2 TABLET: 50; 8.6 TABLET ORAL at 17:55

## 2023-03-09 RX ADMIN — METOPROLOL TARTRATE 12.5 MG: 25 TABLET, FILM COATED ORAL at 18:01

## 2023-03-09 RX ADMIN — ATORVASTATIN CALCIUM 40 MG: 40 TABLET, FILM COATED ORAL at 17:55

## 2023-03-09 ASSESSMENT — LIFESTYLE VARIABLES
EVER HAD A DRINK FIRST THING IN THE MORNING TO STEADY YOUR NERVES TO GET RID OF A HANGOVER: NO
HOW MANY TIMES IN THE PAST YEAR HAVE YOU HAD 5 OR MORE DRINKS IN A DAY: 0
ON A TYPICAL DAY WHEN YOU DRINK ALCOHOL HOW MANY DRINKS DO YOU HAVE: 0
TOTAL SCORE: 0
AVERAGE NUMBER OF DAYS PER WEEK YOU HAVE A DRINK CONTAINING ALCOHOL: 0
EVER FELT BAD OR GUILTY ABOUT YOUR DRINKING: NO
TOTAL SCORE: 0
HAVE PEOPLE ANNOYED YOU BY CRITICIZING YOUR DRINKING: NO
HAVE YOU EVER FELT YOU SHOULD CUT DOWN ON YOUR DRINKING: NO
ALCOHOL_USE: NO
CONSUMPTION TOTAL: NEGATIVE
TOTAL SCORE: 0

## 2023-03-09 ASSESSMENT — PATIENT HEALTH QUESTIONNAIRE - PHQ9
SUM OF ALL RESPONSES TO PHQ9 QUESTIONS 1 AND 2: 0
1. LITTLE INTEREST OR PLEASURE IN DOING THINGS: NOT AT ALL
2. FEELING DOWN, DEPRESSED, IRRITABLE, OR HOPELESS: NOT AT ALL

## 2023-03-09 ASSESSMENT — ENCOUNTER SYMPTOMS
HEADACHES: 1
CHILLS: 0
SHORTNESS OF BREATH: 0
FEVER: 0

## 2023-03-09 ASSESSMENT — PAIN DESCRIPTION - PAIN TYPE
TYPE: ACUTE PAIN
TYPE: ACUTE PAIN

## 2023-03-09 ASSESSMENT — FIBROSIS 4 INDEX
FIB4 SCORE: 0.77
FIB4 SCORE: 3.03

## 2023-03-09 NOTE — ASSESSMENT & PLAN NOTE
Chest pain with lateral ST depression  Troponin is negative  Serial troponins and echocardiogram ordered.   Cardiology consulted.   Aspirin ordered.   Last LDL was 118 in 2021 and he has not been on a statin. Lipid panel ordered for the morning. Empiric Lipitor 40 mg.  Cardiac stress test ordered and will be cancelled if troponins rise   3/11: Given swollen right lower extremity and cellulitis, I have ordered venous ultrasound to rule out DVT.  If it is positive then chest pain could be due to a PE.  Continue heparin drip for now.

## 2023-03-09 NOTE — H&P
Hospital Medicine History & Physical Note    Date of Service  3/9/2023    Primary Care Physician  Sam Sheikh M.D.    Consultants  cardiology    Specialist Names: Dr. Madden    Code Status  Full Code    Chief Complaint  Chief Complaint   Patient presents with    Chest Pain    Cough       History of Presenting Illness  Paul Hopper is a 57 y.o. male who presented 3/9/2023 with chest pain.  Mr. Hopper has a past medical history of an acute ischemic brainstem stroke  for which he was admitted here.  Subsequently he has not been taking his aspirin, beta-blocker, statin.  He presents today after waking up with right-sided and central chest pain that started 3 this morning.  He states the pain does not radiate.  He denies nausea and diaphoresis and no shortness of breath.  In the emergency room he did have some ST depression laterally though a negative troponin.  Cardiology has been consulted.  Will be placed under observation status with serial troponins and echocardiogram and a stress test has been ordered for the morning if his troponins do not elevate.  I discussed the plan of care with Dr. Ibarra.    Review of Systems  Review of Systems   Constitutional:  Negative for chills and fever.   Respiratory:  Negative for shortness of breath.    Cardiovascular:  Positive for chest pain.   Neurological:  Positive for headaches.   All other systems reviewed and are negative.    Past Medical History   has a past medical history of Chickenpox and Hypertension.  Prior stroke    Surgical History  None    Family History  Both of his parents  at this hospital but he is not sure why    Social History  He states he does not drink and does not smoke and denies drug use    Allergies  Allergies   Allergen Reactions    Other Food Anaphylaxis     All fresh fruit causes throat swelling per brother.        Medications  None       Physical Exam  Temp:  [36 °C (96.8 °F)] 36 °C (96.8 °F)  Pulse:  [100-105] 105  Resp:  [14-16]  16  BP: (123-155)/(68-84) 123/74  SpO2:  [94 %-97 %] 96 %  Blood Pressure: 123/74   Temperature: 36 °C (96.8 °F)   Pulse: (!) 105   Respiration: 16   Pulse Oximetry: 96 %       Physical Exam  Vitals and nursing note reviewed.   Constitutional:       General: He is not in acute distress.     Appearance: He is not ill-appearing or toxic-appearing.   HENT:      Head: Normocephalic and atraumatic.      Mouth/Throat:      Mouth: Mucous membranes are dry.      Pharynx: Oropharynx is clear.   Eyes:      General: No scleral icterus.     Conjunctiva/sclera: Conjunctivae normal.   Cardiovascular:      Rate and Rhythm: Normal rate and regular rhythm.      Heart sounds: No murmur heard.  Pulmonary:      Effort: Pulmonary effort is normal.      Breath sounds: Normal breath sounds.   Abdominal:      General: There is no distension.      Palpations: Abdomen is soft.      Tenderness: There is no abdominal tenderness.   Musculoskeletal:      Cervical back: Normal range of motion and neck supple.   Skin:     Comments: Long toenails   Neurological:      General: No focal deficit present.      Mental Status: He is alert and oriented to person, place, and time.   Psychiatric:         Mood and Affect: Mood normal.         Behavior: Behavior normal.         Thought Content: Thought content normal.         Judgment: Judgment normal.       Laboratory:  Recent Labs     03/09/23  1236   WBC 11.0*   RBC 4.44*   HEMOGLOBIN 15.9   HEMATOCRIT 43.9   MCV 98.9*   MCH 35.8*   MCHC 36.2*   RDW 50.0   PLATELETCT 161*   MPV 8.7*     Recent Labs     03/09/23  1236   SODIUM 139   POTASSIUM 3.7   CHLORIDE 103   CO2 23   GLUCOSE 121*   BUN 15   CREATININE 1.69*   CALCIUM 9.4     Recent Labs     03/09/23  1236   ALTSGPT 37   ASTSGOT 54*   ALKPHOSPHAT 86   TBILIRUBIN 1.7*   GLUCOSE 121*         No results for input(s): NTPROBNP in the last 72 hours.      Recent Labs     03/09/23  1236   TROPONINT 9       Imaging:  DX-CHEST-PORTABLE (1 VIEW)   Final Result       No acute cardiac or pulmonary abnormalities are identified.      EC-ECHOCARDIOGRAM COMPLETE W/ CONT    (Results Pending)   NM-CARDIAC STRESS TEST    (Results Pending)           Assessment/Plan:  Justification for Admission Status  I anticipate this patient is appropriate for observation status at this time because chest pain work up    Patient will need a Telemetry bed on MEDICAL service .  The need is secondary to chest pain.    * Chest pain- (present on admission)  Assessment & Plan  Chest pain with lateral ST depression  Troponin is negative  Serial troponins and echocardiogram ordered.   Cardiology consulted.   Aspirin ordered.   Last LDL was 118 in 2021 and he has not been on a statin. Lipid panel ordered for the morning. Empiric Lipitor 40 mg.  Cardiac stress test ordered and will be cancelled if troponins rise       AURELIANO (acute kidney injury) (HCC)- (present on admission)  Assessment & Plan  Cr in 2021 was 1.4 and is currently 1.69  Refrain from IV fluids  Monitor renal function in the morning.    History of stroke- (present on admission)  Assessment & Plan  The only sequelae are short term memory problems and daily headaches.   He has not been on a statin nor aspirin nor blood pressure medications all of which are indicated.     Primary hypertension- (present on admission)  Assessment & Plan  He has not been taking any prescription medications  His blood pressure is currently appropriate        VTE prophylaxis: SCDs/TEDs

## 2023-03-09 NOTE — ASSESSMENT & PLAN NOTE
Cr in 2021 was 1.4 and is currently 1.69  Refrain from IV fluids  Monitor renal function in the morning.

## 2023-03-09 NOTE — ED NOTES
Pt to CDU via ACLS RN. Pt has all belongings at time of transfer. No belongings left in room after transfer.

## 2023-03-09 NOTE — ED TRIAGE NOTES
Vitals:    03/09/23 1231   BP: 139/79   Pulse: (!) 103   Resp: 14   Temp: 36 °C (96.8 °F)   SpO2: 97%     Chief Complaint   Patient presents with    Chest Pain    Cough     Pt has had a cough and felt flu like symptoms for a few weeks. He began having right sided chest pain that started this morning. He describes it as a right sided pain that is nagging 9 out of 10 intermittently. Hx of ischemic stroke 2 years ago, pt reports only residual deficit is a daily headache.

## 2023-03-09 NOTE — ED PROVIDER NOTES
ER Provider Note    Scribed for Jose Ibarra M.D. by Kennedy Joy. 3/9/2023  12:58 PM    Primary Care Provider: Sam Sheikh M.D.    CHIEF COMPLAINT  Chief Complaint   Patient presents with    Chest Pain    Cough     EXTERNAL RECORDS REVIEWED  Reviewed EMS strips with ST elevation in V1 and V2, ST depression in V6 along with I and AVL.    HPI/ROS  LIMITATION TO HISTORY   Select: : None  OUTSIDE HISTORIAN(S):  None    Paul Hopper is a 57 y.o. male who presents to the ED via EMS complaining of moderate chest pain onset 3 to 4 AM this morning. The patient woke up with sudden chest pain that has been consistent since this morning. He currently rates his pain as 8/10. Denies history of heart attack or stents. He is not on blood thinners. He has associated symptoms of cough but denies shortness of breath. No alleviating or exacerbating factors reported.    PAST MEDICAL HISTORY  Past Medical History:   Diagnosis Date    Chickenpox     Hypertension    Stroke    SURGICAL HISTORY  History reviewed. No pertinent surgical history.    FAMILY HISTORY  History reviewed. No pertinent family history.    SOCIAL HISTORY   reports that he has been smoking cigarettes. He has never used smokeless tobacco. He reports current alcohol use of about 1.2 oz per week. He reports current drug use.    CURRENT MEDICATIONS  Previous Medications    ACETAMINOPHEN (TYLENOL) 325 MG TAB    Take 2 Tablets by mouth every 6 hours as needed for Mild Pain or Moderate Pain.    AMLODIPINE (NORVASC) 5 MG TAB    Take 5 mg by mouth every day.    ASPIRIN EC 81 MG EC TABLET    Take 1 tablet by mouth every day.    ATORVASTATIN (LIPITOR) 80 MG TABLET    Take 1 tablet by mouth every evening.    CLONIDINE (CATAPRES) 0.1 MG TAB    Take 1 tablet by mouth every 6 hours as needed (BP > 180/110).    CYANOCOBALAMIN 2000 MCG TAB    Take 1 tablet by mouth every day.    FOLIC ACID (FOLVITE) 1 MG TAB    Take 1 tablet by mouth every day.    GABAPENTIN (NEURONTIN)  "100 MG CAP    Take 2 Capsules by mouth 2 times a day.    LEVOTHYROXINE (SYNTHROID) 25 MCG TAB    Take 1 tablet by mouth every morning on an empty stomach.    LISINOPRIL (PRINIVIL) 20 MG TAB    Take 1 Tablet by mouth every day.    OMEPRAZOLE (PRILOSEC) 20 MG DELAYED-RELEASE CAPSULE    Take 1 capsule by mouth every day.       ALLERGIES  Other food    PHYSICAL EXAM  /79   Pulse (!) 103   Temp 36 °C (96.8 °F) (Temporal)   Resp 14   Ht 1.727 m (5' 8\")   Wt 83.9 kg (185 lb)   SpO2 97%   BMI 28.13 kg/m²   Constitutional: Alert in no apparent distress.  HENT: No signs of trauma, Bilateral external ears normal, Nose normal. Uvula midline.   Eyes: Pupils are equal and reactive, Conjunctiva normal, Non-icteric.   Neck: Normal range of motion, No tenderness, Supple, No stridor.   Lymphatic: No lymphadenopathy noted.   Cardiovascular: Tachycardic rate and regular rhythm, no murmurs.   Thorax & Lungs: Normal breath sounds, No respiratory distress, No wheezing, No chest tenderness.   Abdomen: Bowel sounds normal, Soft, No tenderness, No peritoneal signs, No masses, No pulsatile masses.   Skin: Warm, Dry, No erythema, No rash.   Back: No bony tenderness, No CVA tenderness.   Extremities: Intact distal pulses, No edema, No tenderness, No cyanosis.  Musculoskeletal: Good range of motion in all major joints. No tenderness to palpation or major deformities noted.   Neurologic: Alert , Normal motor function, Normal sensory function, No focal deficits noted.   Psychiatric: Affect normal, Judgment normal, Mood normal.     DIAGNOSTIC STUDIES    Labs:   Labs Reviewed   CBC WITH DIFFERENTIAL - Abnormal; Notable for the following components:       Result Value    WBC 11.0 (*)     RBC 4.44 (*)     MCV 98.9 (*)     MCH 35.8 (*)     MCHC 36.2 (*)     Platelet Count 161 (*)     MPV 8.7 (*)     Neutrophils-Polys 94.50 (*)     Lymphocytes 3.10 (*)     Neutrophils (Absolute) 10.44 (*)     Lymphs (Absolute) 0.34 (*)     All other " components within normal limits    Narrative:     Biotin intake of greater than 5 mg per day may interfere with  troponin levels, causing false low values.   COMP METABOLIC PANEL    Narrative:     Biotin intake of greater than 5 mg per day may interfere with  troponin levels, causing false low values.   TROPONIN    Narrative:     Biotin intake of greater than 5 mg per day may interfere with  troponin levels, causing false low values.   TROPONIN     EKG:    Report   Date Value Ref Range Status   2023       [facility] Emergency Dept.    Test Date:  2023  Pt Name:    MIRANDA REARDON                Department: ER  MRN:        [id number]                      Room:       RD 01  Gender:     Male                         Technician: [id number]  :        1965                   Requested By:ER TRIAGE PROTOCOL  Order #:    [id number]                    Reading MD:    Measurements  Intervals                                Axis  Rate:       107                          P:          35  ID:         140                          QRS:        -33  QRSD:       91                           T:          81  QT:         342  QTc:        457    Interpretive Statements  Sinus tachycardia  Left ventricular hypertrophy  Anterior Q waves, possibly due to LVH  Nonspecific T abnormalities, lateral leads  Compared to ECG 2021 14:09:25  Left ventricular hypertrophy now present  Q waves now present  T-wave abnormality now present  Sinus bradycardia no longer present             I have independently interpreted this EKG     Radiology:   The attending emergency physician has independently interpreted the diagnostic imaging associated with this visit and am waiting the final reading from the radiologist.   Preliminary interpretation is a follows: no ICH  Radiologist interpretation:  DX-CHEST-PORTABLE (1 VIEW)   Final Result      No acute cardiac or pulmonary abnormalities are identified.        COURSE & MEDICAL  DECISION MAKING     ED Observation Status? No; Patient does not meet criteria for ED Observation.     INITIAL ASSESSMENT, COURSE AND PLAN  Care Narrative: Patient with acute chest pain and new EKG changes.  Initially concerning for STEMI therefore STEMI called however cardiologist seen at bedside and agrees EKG more consistent with LVH    12:58 PM - Patient seen and examined at bedside. Called for Code STEMI. Discussed plan of care, including labs, imaging, and consult with cardiology. Patient agrees to the plan of care. The patient will be medicated with aspirin 324 mg, aspirin 300, morphine 4 mg, and nitroglycerin 0.4 mg. Ordered for DX-Chest, Troponin x2, CBC w/ Diff, CMP, and EKG to evaluate his symptoms.    #acute chest pain    No unilateral leg swelling  No hemoptysis  No personal or family hx of DVT or PE  No recent surgeries  No persistent shortness of breath    CBC negative for significant anemia/leukocytosis.  BMP negative for significant electrolyte abnormality.  EKG with new changes from prior concerning for acute ischemia    HEART SCORE: 4    Hx and physical exam not c/w esophageal rupture or spasm        Hx and CXR (interpreted by me) not suggestive of PNA, PTX.      1:14 PM - I discussed the patient's case and the above findings with Dr. Madden (Cardiology) at bedside who does not recommend activating STEMI at this time and the patient's symptoms are more consistent with LDH.    2:09 PM - I discussed the patient's case and the above findings with Dr. Grigsby (Hospitalist) who will assess the patient for hospitalization.     DISPOSITION AND DISCUSSIONS  I have discussed management of the patient with the following physicians and JIM's:  Dr. Madden (Cardiology), Dr. Grigsby (Hospitalist)    DISPOSITION:  Patient will be hospitalized by Dr. Grigsby in guarded condition.    FINAL DIAGNOSIS  1. Acute chest pain    2. Abnormal EKG       Kennedy COLUNGA (Scribe), deepali scribing for, and in the presence of,  Jose Ibarra M.D..    Electronically signed by: Kennedy Joy (Scribe), 3/9/2023    IJose M.D. personally performed the services described in this documentation, as scribed by Kennedy Joy in my presence, and it is both accurate and complete.     The note accurately reflects work and decisions made by me.  Jose Ibarra M.D.  3/9/2023  8:39 PM

## 2023-03-09 NOTE — CONSULTS
CARDIOLOGY CONSULTATION NOTE      Reason of Consult: STEMI    Consulting Physician: Jose Ibarra MD    HPI:  57 M with CVA 6/2021 (left parasaggital aspen), overweight BMI 28, HTN, hypercholesterolemia is here for left side CP since around 3am. Noticed occasional similar symptoms since his stroke, could not tell me how often. Since this am they wax and wane, non pleuritic, non radiating without associated symptoms. Admits to stable CORDOVA for the last 2 years and bilateral FRANCISCO. No syncope, presyncope, bleeding concerns, palpitations, LH or dizziness. Denies claudication.    Normal troponin.    SLN helped his pain somewhat.    Uses marijuana. No smoking, prior smoker. No illicit drug use.    Lives with roommates.    Denies family history of ASCVD.    Saw Dr. Milton in clinic 8/6/21 and 1/13/22 : reassuring stress test, advised sleep apnea testing and started on lisinopril for HTN management. No cardiology follow up ever since 1/2022.      Personally reviewed EKG s and EMS strips:  NO STEMI, LVH with repol abnormalities, similar to prior EKGs    Cardiac event monitor 8/13/21:  No VT VF or pauses.   No triggered events recorded.   2 runs of nonsustained SVT.   Consider implantable loop recorder or a 30-day bio tell monitor if atrial fibrillation is clinically suspected.     Lexiscan 8/2021:   No evidence of significant jeopardized viable myocardium or prior myocardial    infarction.   Normal left ventricular size, ejection fraction, and wall motion.   ECG INTERPRETATION   Negative stress ECG for ischemia.    TTE 6/2021:  No PFO  Normal LVEF  Possible inferior hypokinesis, not well visualized    CTA neck 6/2021:  1.  Atherosclerotic plaque in the proximal right ICA with less than 50% stenosis.  2.  Atherosclerotic plaque in the proximal left subclavian artery with less than 50% stenosis.    Past Medical History:   Diagnosis Date    Chickenpox     Hypertension        Social History     Socioeconomic History    Marital  "status: Single     Spouse name: Not on file    Number of children: Not on file    Years of education: Not on file    Highest education level: Not on file   Occupational History    Not on file   Tobacco Use    Smoking status: Some Days     Types: Cigarettes    Smokeless tobacco: Never   Vaping Use    Vaping Use: Not on file   Substance and Sexual Activity    Alcohol use: Yes     Alcohol/week: 1.2 oz     Types: 2 Standard drinks or equivalent per week     Comment: OCC    Drug use: Yes     Comment: marijuana    Sexual activity: Not on file   Other Topics Concern    Not on file   Social History Narrative    Not on file     Social Determinants of Health     Financial Resource Strain: Not on file   Food Insecurity: Not on file   Transportation Needs: Not on file   Physical Activity: Not on file   Stress: Not on file   Social Connections: Not on file   Intimate Partner Violence: Not on file   Housing Stability: Not on file       No current facility-administered medications on file prior to encounter.     No current outpatient medications on file prior to encounter.       Current Facility-Administered Medications   Medication Dose Frequency Provider Last Rate Last Admin    nitroglycerin (NITROSTAT) tablet 0.4 mg  0.4 mg Q5 MIN PRN Jose Ibarra M.D.   0.4 mg at 03/09/23 1311   Last reviewed on 3/9/2023  1:44 PM by Patel Tejada     Other food    History reviewed. No pertinent family history.    ROS: As per HPI all other systems reviewed and negative     Physical Exam   Blood pressure 139/79, pulse (!) 103, temperature 36 °C (96.8 °F), temperature source Temporal, resp. rate 14, height 1.727 m (5' 8\"), weight 83.9 kg (185 lb), SpO2 97 %.    Constitutional: in NAD, no conversational dyspnea  HENT: Normocephalic and atraumatic. No scleral icterus.   Neck: No JVD present.   Cardiovascular: Normal rate. Exam reveals no gallop and no friction rub. No murmur heard.   Pulmonary/Chest: fine bibasilar rales  Abdominal: " S/NT/ND  Musculoskeletal:  Pulses present  Extremities: 1-2+ bilateral pitting edema with poor nail hygiene.  Skin: Skin is warm    No intake or output data in the 24 hours ending 03/09/23 1346    Recent Labs     03/09/23  1236   WBC 11.0*   RBC 4.44*   HEMOGLOBIN 15.9   HEMATOCRIT 43.9   MCV 98.9*   MCH 35.8*   MCHC 36.2*   RDW 50.0   PLATELETCT 161*   MPV 8.7*     Recent Labs     03/09/23  1236   SODIUM 139   POTASSIUM 3.7   CHLORIDE 103   CO2 23   GLUCOSE 121*   BUN 15   CREATININE 1.69*   CALCIUM 9.4           Impressions:  # Chest Pain  # History of CVA 6/2021 on Aspirin  # HTN: target BP <=130/80mmHg  # Hypercholesterolemia: target LDL <70 and TG < 150  # LVH with repol EKG changes  # Carotid Artery atherosclerosis / PAD      Recommendations:  - trend troponin in 6 hours  - TTE with contrast ordered to mainly assess for underlying WMAs. Prior TTE 6/2021 did not show the inferior wall that well to confidently assess its kinetic state  - If troponin rise, treat as NSTEMI, if remains normal, obtain a nuclear stress test for further risk stratification  - check lipid panel, A1C  - HTN management, target <=130/80mmHg  - SLN prn for CP with EKGs as needed  - ASA 81mg daily, HI statin, ACE-I, BB    Discussed with the referring physician and bedside nursing.    We will continue to follow. Please contact me with any questions.     Giovanni Madden MD, MPH [facility]  Interventional Cardiologist  [facility]   of Clinical Internal Medicine - [facility]

## 2023-03-09 NOTE — ASSESSMENT & PLAN NOTE
The only sequelae are short term memory problems and daily headaches.   He has not been on a statin nor aspirin nor blood pressure medications all of which are indicated.

## 2023-03-10 ENCOUNTER — APPOINTMENT (OUTPATIENT)
Dept: RADIOLOGY | Facility: MEDICAL CENTER | Age: 58
DRG: 872 | End: 2023-03-10
Attending: NURSE PRACTITIONER
Payer: COMMERCIAL

## 2023-03-10 ENCOUNTER — APPOINTMENT (OUTPATIENT)
Dept: RADIOLOGY | Facility: MEDICAL CENTER | Age: 58
DRG: 872 | End: 2023-03-10
Attending: HOSPITALIST
Payer: COMMERCIAL

## 2023-03-10 PROBLEM — A41.9 SEPSIS ASSOCIATED HYPOTENSION (HCC): Status: ACTIVE | Noted: 2023-03-10

## 2023-03-10 PROBLEM — I95.9 SEPSIS ASSOCIATED HYPOTENSION (HCC): Status: ACTIVE | Noted: 2023-03-10

## 2023-03-10 PROBLEM — A41.9 SEPSIS (HCC): Status: ACTIVE | Noted: 2023-03-10

## 2023-03-10 LAB
ANION GAP SERPL CALC-SCNC: 13 MMOL/L (ref 7–16)
ANION GAP SERPL CALC-SCNC: 14 MMOL/L (ref 7–16)
APPEARANCE UR: ABNORMAL
BACTERIA #/AREA URNS HPF: NEGATIVE /HPF
BILIRUB UR QL STRIP.AUTO: ABNORMAL
BUN SERPL-MCNC: 24 MG/DL (ref 8–22)
BUN SERPL-MCNC: 32 MG/DL (ref 8–22)
CALCIUM SERPL-MCNC: 8.4 MG/DL (ref 8.5–10.5)
CALCIUM SERPL-MCNC: 8.8 MG/DL (ref 8.5–10.5)
CHLORIDE SERPL-SCNC: 100 MMOL/L (ref 96–112)
CHLORIDE SERPL-SCNC: 101 MMOL/L (ref 96–112)
CHOLEST SERPL-MCNC: 114 MG/DL (ref 100–199)
CO2 SERPL-SCNC: 20 MMOL/L (ref 20–33)
CO2 SERPL-SCNC: 22 MMOL/L (ref 20–33)
COLOR UR: ABNORMAL
CREAT SERPL-MCNC: 2.2 MG/DL (ref 0.5–1.4)
CREAT SERPL-MCNC: 2.67 MG/DL (ref 0.5–1.4)
EKG IMPRESSION: NORMAL
EKG IMPRESSION: NORMAL
EPI CELLS #/AREA URNS HPF: ABNORMAL /HPF
ERYTHROCYTE [DISTWIDTH] IN BLOOD BY AUTOMATED COUNT: 52.4 FL (ref 35.9–50)
GFR SERPLBLD CREATININE-BSD FMLA CKD-EPI: 27 ML/MIN/1.73 M 2
GFR SERPLBLD CREATININE-BSD FMLA CKD-EPI: 34 ML/MIN/1.73 M 2
GLUCOSE BLD STRIP.AUTO-MCNC: 83 MG/DL (ref 65–99)
GLUCOSE SERPL-MCNC: 116 MG/DL (ref 65–99)
GLUCOSE SERPL-MCNC: 87 MG/DL (ref 65–99)
GLUCOSE UR STRIP.AUTO-MCNC: NEGATIVE MG/DL
HCT VFR BLD AUTO: 37.8 % (ref 42–52)
HDLC SERPL-MCNC: 43 MG/DL
HGB BLD-MCNC: 13.3 G/DL (ref 14–18)
HYALINE CASTS #/AREA URNS LPF: ABNORMAL /LPF
KETONES UR STRIP.AUTO-MCNC: ABNORMAL MG/DL
LACTATE SERPL-SCNC: 1.8 MMOL/L (ref 0.5–2)
LACTATE SERPL-SCNC: 2.3 MMOL/L (ref 0.5–2)
LACTATE SERPL-SCNC: 2.6 MMOL/L (ref 0.5–2)
LACTATE SERPL-SCNC: 3 MMOL/L (ref 0.5–2)
LDLC SERPL CALC-MCNC: 62 MG/DL
LEUKOCYTE ESTERASE UR QL STRIP.AUTO: ABNORMAL
LV EJECT FRACT  99904: 50
LV EJECT FRACT MOD 2C 99903: 60.88
LV EJECT FRACT MOD 4C 99902: 52.16
LV EJECT FRACT MOD BP 99901: 56.85
MCH RBC QN AUTO: 35.3 PG (ref 27–33)
MCHC RBC AUTO-ENTMCNC: 35.2 G/DL (ref 33.7–35.3)
MCV RBC AUTO: 100.3 FL (ref 81.4–97.8)
MICRO URNS: ABNORMAL
NITRITE UR QL STRIP.AUTO: POSITIVE
PH UR STRIP.AUTO: 5 [PH] (ref 5–8)
PLATELET # BLD AUTO: 136 K/UL (ref 164–446)
PMV BLD AUTO: 8.9 FL (ref 9–12.9)
POTASSIUM SERPL-SCNC: 4 MMOL/L (ref 3.6–5.5)
POTASSIUM SERPL-SCNC: 4.2 MMOL/L (ref 3.6–5.5)
PROCALCITONIN SERPL-MCNC: 58.4 NG/ML
PROT UR QL STRIP: 30 MG/DL
RBC # BLD AUTO: 3.77 M/UL (ref 4.7–6.1)
RBC # URNS HPF: ABNORMAL /HPF
RBC UR QL AUTO: NEGATIVE
SODIUM SERPL-SCNC: 135 MMOL/L (ref 135–145)
SODIUM SERPL-SCNC: 135 MMOL/L (ref 135–145)
SP GR UR STRIP.AUTO: 1.03
TRIGL SERPL-MCNC: 44 MG/DL (ref 0–149)
TROPONIN T SERPL-MCNC: 19 NG/L (ref 6–19)
TROPONIN T SERPL-MCNC: 20 NG/L (ref 6–19)
UROBILINOGEN UR STRIP.AUTO-MCNC: 1 MG/DL
WBC # BLD AUTO: 19 K/UL (ref 4.8–10.8)
WBC #/AREA URNS HPF: ABNORMAL /HPF

## 2023-03-10 PROCEDURE — 770020 HCHG ROOM/CARE - TELE (206)

## 2023-03-10 PROCEDURE — 700111 HCHG RX REV CODE 636 W/ 250 OVERRIDE (IP): Performed by: NURSE PRACTITIONER

## 2023-03-10 PROCEDURE — 80048 BASIC METABOLIC PNL TOTAL CA: CPT

## 2023-03-10 PROCEDURE — 84145 PROCALCITONIN (PCT): CPT

## 2023-03-10 PROCEDURE — 84484 ASSAY OF TROPONIN QUANT: CPT

## 2023-03-10 PROCEDURE — 85027 COMPLETE CBC AUTOMATED: CPT

## 2023-03-10 PROCEDURE — 700105 HCHG RX REV CODE 258: Performed by: NURSE PRACTITIONER

## 2023-03-10 PROCEDURE — 87040 BLOOD CULTURE FOR BACTERIA: CPT | Mod: 91

## 2023-03-10 PROCEDURE — 93010 ELECTROCARDIOGRAM REPORT: CPT | Performed by: INTERNAL MEDICINE

## 2023-03-10 PROCEDURE — 71045 X-RAY EXAM CHEST 1 VIEW: CPT

## 2023-03-10 PROCEDURE — 99233 SBSQ HOSP IP/OBS HIGH 50: CPT | Performed by: NURSE PRACTITIONER

## 2023-03-10 PROCEDURE — 81001 URINALYSIS AUTO W/SCOPE: CPT

## 2023-03-10 PROCEDURE — 80061 LIPID PANEL: CPT

## 2023-03-10 PROCEDURE — 93005 ELECTROCARDIOGRAM TRACING: CPT | Performed by: NURSE PRACTITIONER

## 2023-03-10 PROCEDURE — 97602 WOUND(S) CARE NON-SELECTIVE: CPT

## 2023-03-10 PROCEDURE — 93306 TTE W/DOPPLER COMPLETE: CPT | Mod: 26 | Performed by: INTERNAL MEDICINE

## 2023-03-10 PROCEDURE — 82962 GLUCOSE BLOOD TEST: CPT

## 2023-03-10 PROCEDURE — 73620 X-RAY EXAM OF FOOT: CPT | Mod: RT

## 2023-03-10 PROCEDURE — 36415 COLL VENOUS BLD VENIPUNCTURE: CPT

## 2023-03-10 PROCEDURE — 74176 CT ABD & PELVIS W/O CONTRAST: CPT

## 2023-03-10 PROCEDURE — 87045 FECES CULTURE AEROBIC BACT: CPT

## 2023-03-10 PROCEDURE — A9270 NON-COVERED ITEM OR SERVICE: HCPCS | Performed by: HOSPITALIST

## 2023-03-10 PROCEDURE — 700101 HCHG RX REV CODE 250: Performed by: NURSE PRACTITIONER

## 2023-03-10 PROCEDURE — 87899 AGENT NOS ASSAY W/OPTIC: CPT | Mod: 91

## 2023-03-10 PROCEDURE — 87077 CULTURE AEROBIC IDENTIFY: CPT

## 2023-03-10 PROCEDURE — 700102 HCHG RX REV CODE 250 W/ 637 OVERRIDE(OP): Performed by: HOSPITALIST

## 2023-03-10 PROCEDURE — 700105 HCHG RX REV CODE 258: Performed by: INTERNAL MEDICINE

## 2023-03-10 PROCEDURE — 83605 ASSAY OF LACTIC ACID: CPT

## 2023-03-10 PROCEDURE — 87086 URINE CULTURE/COLONY COUNT: CPT

## 2023-03-10 RX ORDER — SODIUM CHLORIDE 9 MG/ML
1000 INJECTION, SOLUTION INTRAVENOUS ONCE
Status: COMPLETED | OUTPATIENT
Start: 2023-03-10 | End: 2023-03-10

## 2023-03-10 RX ORDER — MORPHINE SULFATE 4 MG/ML
2 INJECTION INTRAVENOUS
Status: DISCONTINUED | OUTPATIENT
Start: 2023-03-10 | End: 2023-03-13 | Stop reason: HOSPADM

## 2023-03-10 RX ORDER — SODIUM CHLORIDE, SODIUM LACTATE, POTASSIUM CHLORIDE, AND CALCIUM CHLORIDE .6; .31; .03; .02 G/100ML; G/100ML; G/100ML; G/100ML
30 INJECTION, SOLUTION INTRAVENOUS ONCE
Status: COMPLETED | OUTPATIENT
Start: 2023-03-10 | End: 2023-03-10

## 2023-03-10 RX ORDER — SODIUM CHLORIDE 9 MG/ML
INJECTION, SOLUTION INTRAVENOUS CONTINUOUS
Status: DISCONTINUED | OUTPATIENT
Start: 2023-03-10 | End: 2023-03-10

## 2023-03-10 RX ORDER — SODIUM CHLORIDE, SODIUM LACTATE, POTASSIUM CHLORIDE, AND CALCIUM CHLORIDE .6; .31; .03; .02 G/100ML; G/100ML; G/100ML; G/100ML
1000 INJECTION, SOLUTION INTRAVENOUS ONCE
Status: COMPLETED | OUTPATIENT
Start: 2023-03-10 | End: 2023-03-10

## 2023-03-10 RX ORDER — SODIUM CHLORIDE, SODIUM LACTATE, POTASSIUM CHLORIDE, CALCIUM CHLORIDE 600; 310; 30; 20 MG/100ML; MG/100ML; MG/100ML; MG/100ML
INJECTION, SOLUTION INTRAVENOUS CONTINUOUS
Status: DISCONTINUED | OUTPATIENT
Start: 2023-03-10 | End: 2023-03-11

## 2023-03-10 RX ADMIN — METOPROLOL TARTRATE 12.5 MG: 25 TABLET, FILM COATED ORAL at 17:41

## 2023-03-10 RX ADMIN — SENNOSIDES AND DOCUSATE SODIUM 2 TABLET: 50; 8.6 TABLET ORAL at 05:14

## 2023-03-10 RX ADMIN — SODIUM CHLORIDE, POTASSIUM CHLORIDE, SODIUM LACTATE AND CALCIUM CHLORIDE: 600; 310; 30; 20 INJECTION, SOLUTION INTRAVENOUS at 13:15

## 2023-03-10 RX ADMIN — ASPIRIN 81 MG: 81 TABLET, COATED ORAL at 05:13

## 2023-03-10 RX ADMIN — SODIUM CHLORIDE, POTASSIUM CHLORIDE, SODIUM LACTATE AND CALCIUM CHLORIDE: 600; 310; 30; 20 INJECTION, SOLUTION INTRAVENOUS at 08:34

## 2023-03-10 RX ADMIN — DOXYCYCLINE 100 MG: 100 INJECTION, POWDER, LYOPHILIZED, FOR SOLUTION INTRAVENOUS at 11:14

## 2023-03-10 RX ADMIN — SODIUM CHLORIDE 1000 ML: 9 INJECTION, SOLUTION INTRAVENOUS at 14:41

## 2023-03-10 RX ADMIN — CEFTRIAXONE SODIUM 2000 MG: 10 INJECTION, POWDER, FOR SOLUTION INTRAVENOUS at 11:07

## 2023-03-10 RX ADMIN — SODIUM CHLORIDE, POTASSIUM CHLORIDE, SODIUM LACTATE AND CALCIUM CHLORIDE 2640 ML: 600; 310; 30; 20 INJECTION, SOLUTION INTRAVENOUS at 10:30

## 2023-03-10 RX ADMIN — SODIUM CHLORIDE, POTASSIUM CHLORIDE, SODIUM LACTATE AND CALCIUM CHLORIDE 1000 ML: 600; 310; 30; 20 INJECTION, SOLUTION INTRAVENOUS at 09:59

## 2023-03-10 RX ADMIN — ACETAMINOPHEN 650 MG: 325 TABLET, FILM COATED ORAL at 04:17

## 2023-03-10 RX ADMIN — DOXYCYCLINE 100 MG: 100 INJECTION, POWDER, LYOPHILIZED, FOR SOLUTION INTRAVENOUS at 17:40

## 2023-03-10 ASSESSMENT — PAIN DESCRIPTION - PAIN TYPE: TYPE: ACUTE PAIN

## 2023-03-10 ASSESSMENT — ENCOUNTER SYMPTOMS
CHILLS: 1
FEVER: 1
COUGH: 0
FLANK PAIN: 0
DIARRHEA: 1

## 2023-03-10 NOTE — CARE PLAN
The patient is Watcher - Medium risk of patient condition declining or worsening    Shift Goals  Clinical Goals: control chest pain, monitor temp.  Patient Goals: sleep  Family Goals: NA    Progress made toward(s) clinical / shift goals:  patient verbalizes pain on pain scale.  Patient verbalizes understanding from education.     Patient is not progressing towards the following goals:

## 2023-03-10 NOTE — PROGRESS NOTES
Report received from ER RN. Assume care. Pt. AAOx4 pt is bed,  Assessment completed. VSS. Denies pain, good CMS and pulses to adiel LE, denies numbness and tingling. Skin intact. Ambulated from hallway to bed.  Pt was update for the care for the day. White board updated, All question answered. Pt has call light within reach,  bed is in the lowest position. Pt has no other needs at this time.

## 2023-03-10 NOTE — PROGRESS NOTES
Rapid Response Summary     Rapid response called at 0920 for: Hypotension , lightheadedness.  Pt became diaphoretic prior to episode had BM and was hypotensive and then lightheaded.  Pt is alert and AAO x 4  VS: Hypotensive (See Vitals Flowsheet)60's/30s then 96/50  Additional info:  MD Paged: Lo and at the bedside in CDU  Interventions:    Imaging/Tests: Chest X-ray and EKG    Labs: CBC, CMP, Lactic Acid, Troponin, Blood Glucose , and procalcitonin , blood cultures x 2   Medications: Fluids 500cc bolus started 1 L LR bolus ordered per MD    Other: N/A  Disposition: BP Improved with rapid response team interventions. Primary RN updated on plan of care. Rapid team will continue to follow the patient.

## 2023-03-10 NOTE — PROGRESS NOTES
Report given to SALTY Silvestre RN.  Patient has NS bolus and LR continuous infusion running, patient educated on plan of care and verbalized understanding.

## 2023-03-10 NOTE — WOUND TEAM
[facility]  Inpatient Services  Wound and Skin Care Brief Evaluation    Admission Date: 3/9/2023     Last order of IP CONSULT TO WOUND CARE was found on 3/10/2023 from Hospital Encounter on 3/9/2023     HPI, PMH, SH: Reviewed    Chief Complaint   Patient presents with    Chest Pain    Cough     Diagnosis: Chest pain [R07.9]    Unit where seen by Wound Team: T217/00     Wound consult placed regarding Right 4th toe. Chart and images reviewed. This RN in to assess patient. Pt currently being rapided. Bedside MD and RN ok with wound assessment. Pt has a small partial thickness wound to the right 4th toe. Wound left open to air. Non-selectively debrided with NS/wound cleanser and gauze. No pressure injuries or advanced wound care needs identified. Wound consult completed. No further follow up unless indicated and consulted.      Wound 03/09/23 Partial Thickness Wound Toe, 4th Dorsal Right (Active)   Wound Image     03/10/23 1000   Site Assessment Red;Dry    Periwound Assessment Clean;Intact;Dry    Margins Attached edges;Defined edges    Closure Open to air    Drainage Amount None    Treatments Cleansed;Site care    Wound Cleansing Approved Wound Cleanser    Periwound Protectant Not Applicable    Dressing Cleansing/Solutions Not Applicable    Dressing Options Open to Air    Dressing Status Open to Air    NEXT Weekly Photo (Inpatient Only) 03/17/23    Non-staged Wound Description Partial thickness    Shape Oval    WOUND NURSE ONLY - Time Spent with Patient (mins) 30

## 2023-03-10 NOTE — PROGRESS NOTES
"Late entry (all times are approximate):    0915: Pt received in nuclear medicine, ambulated to chair, questionnaire started.   0920: Pt incontinent of bowel, reports feeling \"lightheaded and sweaty\", observed to be diaphoretic and pale. BP hypotensive 68/39 on re-check, unable to keep eyes open, slumping to one side, rapid response initiated.  "

## 2023-03-10 NOTE — ASSESSMENT & PLAN NOTE
This is Sepsis Present on admission  SIRS criteria identified on my evaluation include: Fever, with temperature greater than 101 deg F, Tachycardia, with heart rate greater than 90 BPM and Leukocytosis, with WBC greater than 12,000  Source is unidentified at this time  Sepsis protocol initiated  Fluid resuscitation ordered per protocol  Crystalloid Fluid Administration: Fluid resuscitation ordered per standard protocol - 30 mL/kg per current or ideal body weight  IV antibiotics as appropriate for source of sepsis  Reassessment: I have reassessed the patient's hemodynamic status    3.6 L in so far  Pro-Chato critical at 58  Repeat lactic now  UA positive for nitrates but negative for bacteria ordered culture  Left film clear on my review however Pro-Chato screemingly positive  Blood cultures drawn  Stool culture pending

## 2023-03-10 NOTE — PROGRESS NOTES
Mery from Lab called with critical result of procalcitonin 58.40 at 10:52. Critical lab result read back to Mery.   JULIO Lo  notified of critical lab result at 1052.  Critical lab result read back by JULIO Lo.

## 2023-03-10 NOTE — PROGRESS NOTES
NM called and reported that pt is hypotensive,MD updated with orders,pt brought back from [facility],sepsis protocol initiated with bolus ,labs.explained to pt the situation of all procedures.

## 2023-03-10 NOTE — PROGRESS NOTES
Hospital Medicine Daily Progress Note    Date of Service  3/10/2023    Chief Complaint  Cough and chest pain    Hospital Course  Paul Hopper is a 57 y.o. male who presented 3/9/2023 with chest pain.  Mr. Hopper has a past medical history of an acute ischemic brainstem stroke 2021 for which he was admitted here.  Subsequently he has not been taking his aspirin, beta-blocker, statin.  He presents today after waking up with right-sided and central chest pain that started 3 this morning.  He states the pain does not radiate.  He denies nausea and diaphoresis and no shortness of breath.  In the emergency room he did have some ST depression laterally though a negative troponin.  Cardiology has been consulted.  Will be placed under observation status with serial troponins and echocardiogram and a stress test has been ordered for the morning if his troponins do not elevate.    Interval Problem Update  3/10  Patient had hypotensive episode in nuclear med   BP measured to be 68/39 diaphoretic and syncopal.  Rapid response initiated.   Treated with a septic protocol fluid bolus  IV antibiotics ceftriaxone and doxycycline initiated  Search for source  Blood cultures, chest film appears clear on my exam, UA ordered  Lactic pending, Pro-Chato pending  Patient is NOT hemodynamically stable at this time has been resuscitated with 3.6 L IVF at this time  Critical procalcitonin at 58.4  Continue to monitor and actively manage patient care    2:33 PM  Lactic back up to 3 from 2.6  1 liter bolus ordered  Total fluid is + 5 L  Increased IVF to 125mL/hr  Repeat lactic at 1530    CT chest abdomen pelvis ordered  Concern for abdominal infection in setting of 2 to 3-week course of hiccups  X-ray foot ordered to rule out osteo in setting of chronic toe wound to right third toe        I have discussed this patient's plan of care and discharge plan at IDT rounds today with Case Management, Nursing, Nursing leadership, and other members of  the IDT team.       greater than 60 minutes uninterrupted spent managing patient care today      Consultants/Specialty  Rapid response    Code Status  Full Code    Disposition  Patient is not medically cleared for discharge.   Anticipate discharge to to skilled nursing facility.  I have placed the appropriate orders for post-discharge needs.    Review of Systems  Review of Systems   Constitutional:  Positive for chills, fever and malaise/fatigue.   Respiratory:  Negative for cough.    Cardiovascular:  Positive for chest pain.   Gastrointestinal:  Positive for diarrhea.   Genitourinary:  Negative for dysuria and flank pain.   Neurological:         Hiccups x2 to 3 weeks      Physical Exam  Temp:  [37 °C (98.6 °F)-38.7 °C (101.6 °F)] 37.3 °C (99.1 °F)  Pulse:  [62-99] 93  Resp:  [16-20] 18  BP: ()/(50-85) 135/85  SpO2:  [87 %-98 %] 95 %    Physical Exam  Constitutional:       Appearance: Normal appearance. He is obese. He is toxic-appearing and diaphoretic.   HENT:      Head: Normocephalic and atraumatic.   Eyes:      Extraocular Movements: Extraocular movements intact.      Pupils: Pupils are equal, round, and reactive to light.   Cardiovascular:      Rate and Rhythm: Normal rate and regular rhythm.      Pulses: Normal pulses.      Heart sounds: Normal heart sounds.   Pulmonary:      Effort: Pulmonary effort is normal.      Breath sounds: Normal breath sounds. No wheezing, rhonchi or rales.   Abdominal:      General: Abdomen is flat. Bowel sounds are normal.      Tenderness: There is no guarding or rebound.   Musculoskeletal:         General: Normal range of motion.      Cervical back: Normal range of motion.   Skin:     General: Skin is warm.   Neurological:      General: No focal deficit present.      Mental Status: He is alert and oriented to person, place, and time.   Psychiatric:         Mood and Affect: Mood normal.         Behavior: Behavior normal.       Fluids    Intake/Output Summary (Last 24 hours) at  3/10/2023 1559  Last data filed at 3/10/2023 1541  Gross per 24 hour   Intake 5140 ml   Output 350 ml   Net 4790 ml       Laboratory  Recent Labs     03/09/23  1236 03/10/23  0959   WBC 11.0* 19.0*   RBC 4.44* 3.77*   HEMOGLOBIN 15.9 13.3*   HEMATOCRIT 43.9 37.8*   MCV 98.9* 100.3*   MCH 35.8* 35.3*   MCHC 36.2* 35.2   RDW 50.0 52.4*   PLATELETCT 161* 136*   MPV 8.7* 8.9*     Recent Labs     03/09/23  1236 03/10/23  0200 03/10/23  0959   SODIUM 139 135 135   POTASSIUM 3.7 4.2 4.0   CHLORIDE 103 100 101   CO2 23 22 20   GLUCOSE 121* 87 116*   BUN 15 24* 32*   CREATININE 1.69* 2.20* 2.67*   CALCIUM 9.4 8.8 8.4*             Recent Labs     03/10/23  0200   TRIGLYCERIDE 44   HDL 43   LDL 62       Imaging  DX-CHEST-PORTABLE (1 VIEW)   Final Result      No acute cardiopulmonary abnormality.      EC-ECHOCARDIOGRAM COMPLETE W/O CONT   Final Result      DX-CHEST-PORTABLE (1 VIEW)   Final Result      No acute cardiac or pulmonary abnormalities are identified.      US-CAROTID DOPPLER BILAT    (Results Pending)   CT-CHEST,ABDOMEN,PELVIS W/O    (Results Pending)   DX-FOOT-2- RIGHT    (Results Pending)        Assessment/Plan  * Sepsis (HCC)  Assessment & Plan  This is Sepsis Present on admission  SIRS criteria identified on my evaluation include: Fever, with temperature greater than 101 deg F, Tachycardia, with heart rate greater than 90 BPM and Leukocytosis, with WBC greater than 12,000  Source is unidentified at this time  Sepsis protocol initiated  Fluid resuscitation ordered per protocol  Crystalloid Fluid Administration: Fluid resuscitation ordered per standard protocol - 30 mL/kg per current or ideal body weight  IV antibiotics as appropriate for source of sepsis  Reassessment: I have reassessed the patient's hemodynamic status    3.6 L in so far  Pro-Chato critical at 58  Repeat lactic now  UA positive for nitrates but negative for bacteria ordered culture  Left film clear on my review however Pro-Chato screemingly  positive  Blood cultures drawn  Stool culture pending            History of stroke- (present on admission)  Assessment & Plan  The only sequelae are short term memory problems and daily headaches.   He has not been on a statin nor aspirin nor blood pressure medications all of which are indicated.     Chest pain- (present on admission)  Assessment & Plan  Chest pain with lateral ST depression  Troponin is negative  Serial troponins and echocardiogram ordered.   Cardiology consulted.   Aspirin ordered.   Last LDL was 118 in 2021 and he has not been on a statin. Lipid panel ordered for the morning. Empiric Lipitor 40 mg.  Cardiac stress test ordered and will be cancelled if troponins rise       AURELIANO (acute kidney injury) (HCC)- (present on admission)  Assessment & Plan  Cr in 2021 was 1.4 and is currently 1.69  Refrain from IV fluids  Monitor renal function in the morning.    Primary hypertension- (present on admission)  Assessment & Plan  He has not been taking any prescription medications  His blood pressure is currently appropriate         VTE prophylaxis: enoxaparin ppx    I have performed a physical exam and reviewed and updated ROS and Plan today (3/10/2023). In review of yesterday's note (3/9/2023), there are no changes except as documented above.

## 2023-03-10 NOTE — PROGRESS NOTES
Pt transferred to floor and placed on tele monitor. Monitor room notified. Pt oriented to unit and reports no further needs at this time.Bed in lowest and locked position and call light within reach.

## 2023-03-10 NOTE — PROGRESS NOTES
4 Eyes Skin Assessment Completed by OBEY Giordano and OBEY Cline.    Head WDL  Ears WDL  Nose WDL  Mouth WDL  Neck WDL  Breast/Chest WDL  Shoulder Blades WDL  Spine WDL  (R) Arm/Elbow/Hand Discoloration  (L) Arm/Elbow/Hand Discoloration  Abdomen WDL  Groin WDL  Scrotum/Coccyx/Buttocks WDL  (R) Leg Shiny and Edema  (L) Leg Shiny and Edema  (R) Heel/Foot/Toe WDL  (L) Heel/Foot/Toe WDL          Devices In Places Tele Box      Interventions In Place Pillows    Possible Skin Injury No    Pictures Uploaded Into Epic N/A  Wound Consult Placed N/A  RN Wound Prevention Protocol Ordered No      Patient has a unopened sore to right fourth toe. Pictures taken and wound consult completed

## 2023-03-11 ENCOUNTER — APPOINTMENT (OUTPATIENT)
Dept: RADIOLOGY | Facility: MEDICAL CENTER | Age: 58
DRG: 872 | End: 2023-03-11
Attending: HOSPITALIST
Payer: COMMERCIAL

## 2023-03-11 ENCOUNTER — APPOINTMENT (OUTPATIENT)
Dept: RADIOLOGY | Facility: MEDICAL CENTER | Age: 58
DRG: 872 | End: 2023-03-11
Attending: NURSE PRACTITIONER
Payer: COMMERCIAL

## 2023-03-11 PROBLEM — R06.6 INTRACTABLE HICCUPS: Status: ACTIVE | Noted: 2023-03-11

## 2023-03-11 PROBLEM — L03.115 CELLULITIS OF RIGHT LOWER EXTREMITY: Status: ACTIVE | Noted: 2023-03-11

## 2023-03-11 LAB
ANION GAP SERPL CALC-SCNC: 12 MMOL/L (ref 7–16)
APTT PPP: 37.2 SEC (ref 24.7–36)
BUN SERPL-MCNC: 25 MG/DL (ref 8–22)
CALCIUM SERPL-MCNC: 8.2 MG/DL (ref 8.5–10.5)
CHLORIDE SERPL-SCNC: 102 MMOL/L (ref 96–112)
CO2 SERPL-SCNC: 19 MMOL/L (ref 20–33)
CREAT SERPL-MCNC: 1.86 MG/DL (ref 0.5–1.4)
E COLI SXT1+2 STL IA: NORMAL
ERYTHROCYTE [DISTWIDTH] IN BLOOD BY AUTOMATED COUNT: 50.8 FL (ref 35.9–50)
GFR SERPLBLD CREATININE-BSD FMLA CKD-EPI: 42 ML/MIN/1.73 M 2
GLUCOSE SERPL-MCNC: 121 MG/DL (ref 65–99)
HCT VFR BLD AUTO: 33.9 % (ref 42–52)
HGB BLD-MCNC: 12.2 G/DL (ref 14–18)
INR PPP: 1.22 (ref 0.87–1.13)
MCH RBC QN AUTO: 35.7 PG (ref 27–33)
MCHC RBC AUTO-ENTMCNC: 36 G/DL (ref 33.7–35.3)
MCV RBC AUTO: 99.1 FL (ref 81.4–97.8)
PLATELET # BLD AUTO: 106 K/UL (ref 164–446)
PMV BLD AUTO: 9.1 FL (ref 9–12.9)
POTASSIUM SERPL-SCNC: 3.5 MMOL/L (ref 3.6–5.5)
PROTHROMBIN TIME: 15.2 SEC (ref 12–14.6)
RBC # BLD AUTO: 3.42 M/UL (ref 4.7–6.1)
SIGNIFICANT IND 70042: NORMAL
SITE SITE: NORMAL
SODIUM SERPL-SCNC: 133 MMOL/L (ref 135–145)
SOURCE SOURCE: NORMAL
UFH PPP CHRO-ACNC: <0.1 IU/ML
UFH PPP CHRO-ACNC: <0.1 IU/ML
WBC # BLD AUTO: 17.1 K/UL (ref 4.8–10.8)

## 2023-03-11 PROCEDURE — 700102 HCHG RX REV CODE 250 W/ 637 OVERRIDE(OP): Performed by: NURSE PRACTITIONER

## 2023-03-11 PROCEDURE — 700105 HCHG RX REV CODE 258: Performed by: HOSPITALIST

## 2023-03-11 PROCEDURE — A9270 NON-COVERED ITEM OR SERVICE: HCPCS | Performed by: HOSPITALIST

## 2023-03-11 PROCEDURE — 85610 PROTHROMBIN TIME: CPT

## 2023-03-11 PROCEDURE — 700111 HCHG RX REV CODE 636 W/ 250 OVERRIDE (IP): Performed by: NURSE PRACTITIONER

## 2023-03-11 PROCEDURE — 85027 COMPLETE CBC AUTOMATED: CPT

## 2023-03-11 PROCEDURE — 700105 HCHG RX REV CODE 258: Performed by: NURSE PRACTITIONER

## 2023-03-11 PROCEDURE — 770020 HCHG ROOM/CARE - TELE (206)

## 2023-03-11 PROCEDURE — 99233 SBSQ HOSP IP/OBS HIGH 50: CPT | Performed by: HOSPITALIST

## 2023-03-11 PROCEDURE — A9270 NON-COVERED ITEM OR SERVICE: HCPCS | Performed by: NURSE PRACTITIONER

## 2023-03-11 PROCEDURE — 36415 COLL VENOUS BLD VENIPUNCTURE: CPT

## 2023-03-11 PROCEDURE — C9113 INJ PANTOPRAZOLE SODIUM, VIA: HCPCS | Performed by: HOSPITALIST

## 2023-03-11 PROCEDURE — 302146: Performed by: NURSE PRACTITIONER

## 2023-03-11 PROCEDURE — 85730 THROMBOPLASTIN TIME PARTIAL: CPT

## 2023-03-11 PROCEDURE — 93880 EXTRACRANIAL BILAT STUDY: CPT

## 2023-03-11 PROCEDURE — 700102 HCHG RX REV CODE 250 W/ 637 OVERRIDE(OP): Performed by: HOSPITALIST

## 2023-03-11 PROCEDURE — 93970 EXTREMITY STUDY: CPT

## 2023-03-11 PROCEDURE — 700111 HCHG RX REV CODE 636 W/ 250 OVERRIDE (IP): Performed by: HOSPITALIST

## 2023-03-11 PROCEDURE — 99232 SBSQ HOSP IP/OBS MODERATE 35: CPT | Mod: FS | Performed by: INTERNAL MEDICINE

## 2023-03-11 PROCEDURE — 700101 HCHG RX REV CODE 250: Performed by: NURSE PRACTITIONER

## 2023-03-11 PROCEDURE — 80048 BASIC METABOLIC PNL TOTAL CA: CPT

## 2023-03-11 PROCEDURE — 85520 HEPARIN ASSAY: CPT | Mod: 91

## 2023-03-11 RX ORDER — HEPARIN SODIUM 1000 [USP'U]/ML
2000 INJECTION, SOLUTION INTRAVENOUS; SUBCUTANEOUS PRN
Status: DISCONTINUED | OUTPATIENT
Start: 2023-03-11 | End: 2023-03-13

## 2023-03-11 RX ORDER — PANTOPRAZOLE SODIUM 40 MG/10ML
40 INJECTION, POWDER, LYOPHILIZED, FOR SOLUTION INTRAVENOUS DAILY
Status: DISCONTINUED | OUTPATIENT
Start: 2023-03-11 | End: 2023-03-13

## 2023-03-11 RX ORDER — ATORVASTATIN CALCIUM 40 MG/1
40 TABLET, FILM COATED ORAL EVERY EVENING
Status: DISCONTINUED | OUTPATIENT
Start: 2023-03-11 | End: 2023-03-13 | Stop reason: HOSPADM

## 2023-03-11 RX ORDER — HEPARIN SODIUM 1000 [USP'U]/ML
2000 INJECTION, SOLUTION INTRAVENOUS; SUBCUTANEOUS PRN
Status: DISCONTINUED | OUTPATIENT
Start: 2023-03-11 | End: 2023-03-11

## 2023-03-11 RX ORDER — HEPARIN SODIUM 5000 [USP'U]/100ML
0-30 INJECTION, SOLUTION INTRAVENOUS CONTINUOUS
Status: DISCONTINUED | OUTPATIENT
Start: 2023-03-11 | End: 2023-03-11

## 2023-03-11 RX ORDER — HEPARIN SODIUM 1000 [USP'U]/ML
4000 INJECTION, SOLUTION INTRAVENOUS; SUBCUTANEOUS ONCE
Status: COMPLETED | OUTPATIENT
Start: 2023-03-11 | End: 2023-03-11

## 2023-03-11 RX ORDER — HEPARIN SODIUM 5000 [USP'U]/100ML
0-30 INJECTION, SOLUTION INTRAVENOUS CONTINUOUS
Status: DISCONTINUED | OUTPATIENT
Start: 2023-03-11 | End: 2023-03-13

## 2023-03-11 RX ORDER — POTASSIUM CHLORIDE 20 MEQ/1
40 TABLET, EXTENDED RELEASE ORAL ONCE
Status: COMPLETED | OUTPATIENT
Start: 2023-03-11 | End: 2023-03-11

## 2023-03-11 RX ADMIN — LISINOPRIL 10 MG: 10 TABLET ORAL at 05:04

## 2023-03-11 RX ADMIN — POTASSIUM CHLORIDE 40 MEQ: 1500 TABLET, EXTENDED RELEASE ORAL at 11:00

## 2023-03-11 RX ADMIN — METOPROLOL TARTRATE 12.5 MG: 25 TABLET, FILM COATED ORAL at 17:51

## 2023-03-11 RX ADMIN — HEPARIN SODIUM 12 UNITS/KG/HR: 5000 INJECTION, SOLUTION INTRAVENOUS at 17:15

## 2023-03-11 RX ADMIN — ASPIRIN 81 MG: 81 TABLET, COATED ORAL at 05:04

## 2023-03-11 RX ADMIN — HEPARIN SODIUM 2000 UNITS: 1000 INJECTION, SOLUTION INTRAVENOUS; SUBCUTANEOUS at 20:40

## 2023-03-11 RX ADMIN — DOXYCYCLINE 100 MG: 100 INJECTION, POWDER, LYOPHILIZED, FOR SOLUTION INTRAVENOUS at 05:21

## 2023-03-11 RX ADMIN — CHLORPROMAZINE HYDROCHLORIDE 12.5 MG: 25 INJECTION INTRAMUSCULAR at 17:07

## 2023-03-11 RX ADMIN — SODIUM CHLORIDE, POTASSIUM CHLORIDE, SODIUM LACTATE AND CALCIUM CHLORIDE: 600; 310; 30; 20 INJECTION, SOLUTION INTRAVENOUS at 08:27

## 2023-03-11 RX ADMIN — DOXYCYCLINE 100 MG: 100 INJECTION, POWDER, LYOPHILIZED, FOR SOLUTION INTRAVENOUS at 18:27

## 2023-03-11 RX ADMIN — HEPARIN SODIUM 11.36 UNITS/KG/HR: 5000 INJECTION, SOLUTION INTRAVENOUS at 12:57

## 2023-03-11 RX ADMIN — SODIUM CHLORIDE, POTASSIUM CHLORIDE, SODIUM LACTATE AND CALCIUM CHLORIDE: 600; 310; 30; 20 INJECTION, SOLUTION INTRAVENOUS at 00:30

## 2023-03-11 RX ADMIN — CEFTRIAXONE SODIUM 2000 MG: 10 INJECTION, POWDER, FOR SOLUTION INTRAVENOUS at 05:09

## 2023-03-11 RX ADMIN — PANTOPRAZOLE SODIUM 40 MG: 40 INJECTION, POWDER, FOR SOLUTION INTRAVENOUS at 20:45

## 2023-03-11 RX ADMIN — AMPICILLIN AND SULBACTAM 3 G: 1; 2 INJECTION, POWDER, FOR SOLUTION INTRAMUSCULAR; INTRAVENOUS at 17:59

## 2023-03-11 RX ADMIN — HEPARIN SODIUM 4000 UNITS: 1000 INJECTION, SOLUTION INTRAVENOUS; SUBCUTANEOUS at 12:58

## 2023-03-11 RX ADMIN — METOPROLOL TARTRATE 12.5 MG: 25 TABLET, FILM COATED ORAL at 05:05

## 2023-03-11 RX ADMIN — ATORVASTATIN CALCIUM 40 MG: 40 TABLET, FILM COATED ORAL at 17:51

## 2023-03-11 ASSESSMENT — ENCOUNTER SYMPTOMS
NERVOUS/ANXIOUS: 0
TROUBLE SWALLOWING: 0
CHILLS: 1
CONFUSION: 0
DIAPHORESIS: 0
COUGH: 0
COLOR CHANGE: 0
DIARRHEA: 1
FLANK PAIN: 0
CHILLS: 0
CHEST TIGHTNESS: 0
SHORTNESS OF BREATH: 1
NUMBNESS: 0
DIZZINESS: 0
PALPITATIONS: 0
ABDOMINAL PAIN: 0
FEVER: 0
FEVER: 1
ABDOMINAL DISTENTION: 0
AGITATION: 0
BLOOD IN STOOL: 0
MYALGIAS: 1

## 2023-03-11 ASSESSMENT — COGNITIVE AND FUNCTIONAL STATUS - GENERAL
SUGGESTED CMS G CODE MODIFIER MOBILITY: CK
TOILETING: A LOT
DRESSING REGULAR UPPER BODY CLOTHING: A LITTLE
MOVING FROM LYING ON BACK TO SITTING ON SIDE OF FLAT BED: A LITTLE
CLIMB 3 TO 5 STEPS WITH RAILING: A LOT
MOVING TO AND FROM BED TO CHAIR: A LITTLE
STANDING UP FROM CHAIR USING ARMS: A LITTLE
MOBILITY SCORE: 18
HELP NEEDED FOR BATHING: A LITTLE
WALKING IN HOSPITAL ROOM: A LITTLE
SUGGESTED CMS G CODE MODIFIER DAILY ACTIVITY: CK
DRESSING REGULAR LOWER BODY CLOTHING: A LITTLE
DAILY ACTIVITIY SCORE: 19

## 2023-03-11 ASSESSMENT — FIBROSIS 4 INDEX: FIB4 SCORE: 4.61

## 2023-03-11 ASSESSMENT — PAIN DESCRIPTION - PAIN TYPE: TYPE: ACUTE PAIN

## 2023-03-11 NOTE — CARE PLAN
he patient is Watcher - Medium risk of patient condition declining or worsening    Shift Goals  Clinical Goals: monitor vitals and lactic  Patient Goals: rest  Family Goals: no family at bedside    Progress made toward(s) clinical / shift goals:    Problem: Knowledge Deficit - Standard  Goal: Patient and family/care givers will demonstrate understanding of plan of care, disease process/condition, diagnostic tests and medications  3/11/2023 0213 by Mary Kam R.N.  Outcome: Progressing  3/11/2023 0213 by Mary Kam R.N.  Outcome: Progressing     Problem: Pain - Standard  Goal: Alleviation of pain or a reduction in pain to the patient’s comfort goal  Outcome: Progressing     Problem: Communication  Goal: The ability to communicate needs accurately and effectively will improve  3/11/2023 0213 by Mary Kam R.N.  Outcome: Progressing  3/11/2023 0213 by Mary Kam R.N.  Outcome: Progressing  3/11/2023 0212 by Mary Kam R.N.  Outcome: Progressing     Problem: Hemodynamics  Goal: Patient's hemodynamics, fluid balance and neurologic status will be stable or improve  3/11/2023 0213 by Mary Kam R.N.  Outcome: Progressing  3/11/2023 0213 by Mary Kam R.N.  Outcome: Progressing  3/11/2023 0212 by Mary Kam R.N.  Outcome: Progressing

## 2023-03-11 NOTE — PROGRESS NOTES
Notified on call hospitalist BEREKET Peñaloza that stat CT had resulted as well as pts lactic. Hospitlaist reviewed no new orders.     Let on call hospitlast Jimbo know of pts ST and redness on R leg. Outlined redness no new orders.

## 2023-03-11 NOTE — CARE PLAN
The patient is Watcher - Medium risk of patient condition declining or worsening    Shift Goals  Clinical Goals: monitor resp status  Patient Goals: rest  Family Goals: GAYATRI    Progress made toward(s) clinical / shift goals:        Problem: Knowledge Deficit - Standard  Goal: Patient and family/care givers will demonstrate understanding of plan of care, disease process/condition, diagnostic tests and medications  Outcome: Progressing     Problem: Pain - Standard  Goal: Alleviation of pain or a reduction in pain to the patient’s comfort goal  Outcome: Progressing     Problem: Communication  Goal: The ability to communicate needs accurately and effectively will improve  Outcome: Progressing     Problem: Hemodynamics  Goal: Patient's hemodynamics, fluid balance and neurologic status will be stable or improve  Outcome: Progressing     Problem: Fluid Volume  Goal: Fluid volume balance will be maintained  Outcome: Progressing     Problem: Infection - Standard  Goal: Patient will remain free from infection  Outcome: Progressing     Problem: Wound/ / Incision Healing  Goal: Patient's wound/surgical incision will decrease in size and heals properly  Outcome: Progressing     Problem: Fall Risk  Goal: Patient will remain free from falls  Outcome: Progressing       Patient is not progressing towards the following goals:

## 2023-03-11 NOTE — PROGRESS NOTES
Cardiology Follow Up Progress Note    Date of Service  3/11/2023    Attending Physician  Mary Lou Becker M.D.    Chief Complaint   ART Hopper is a 57 y.o. male admitted 3/9/2023 with past medical history of acute ischemic brainstem stroke 2021. Presented with chest pain and cough. EKG showed LVH. Trop T 20-19    ECHO showed ef 50% with inferior and inferolateral wall hypokinesis. Plan was NM stress test, while down in lab he became hypotensive and diaphoretic. RR called and noted to be in sepsis.     Interim Events  Sitting in bed, desat to low 80s on room air. Placed on oxygen  Denies any chest pain, dizziness or palpitations  Complaining of headache due to hypoxia     Review of Systems  Review of Systems   Constitutional:  Negative for chills, diaphoresis and fever.   HENT:  Negative for nosebleeds and trouble swallowing.    Respiratory:  Positive for shortness of breath. Negative for cough and chest tightness.    Cardiovascular:  Negative for chest pain, palpitations and leg swelling.   Gastrointestinal:  Negative for abdominal distention, abdominal pain and blood in stool.   Genitourinary:  Negative for hematuria.   Skin:  Negative for color change.   Neurological:  Negative for dizziness, syncope and numbness.   Psychiatric/Behavioral:  Negative for agitation and confusion. The patient is not nervous/anxious.      Vital signs in last 24 hours  Temp:  [36.8 °C (98.2 °F)-37.5 °C (99.5 °F)] 36.9 °C (98.4 °F)  Pulse:  [] 77  Resp:  [18-20] 18  BP: (103-173)/(56-98) 157/95  SpO2:  [89 %-95 %] 95 %    Physical Exam  Physical Exam  Vitals and nursing note reviewed.   Constitutional:       Appearance: Normal appearance.   HENT:      Head: Normocephalic and atraumatic.   Eyes:      Pupils: Pupils are equal, round, and reactive to light.   Cardiovascular:      Rate and Rhythm: Normal rate and regular rhythm.      Heart sounds: Normal heart sounds. No murmur heard.  Pulmonary:      Effort: Pulmonary  effort is normal.      Breath sounds: Normal breath sounds.   Abdominal:      General: Abdomen is flat.   Musculoskeletal:      Cervical back: Normal range of motion.   Skin:     General: Skin is warm and dry.   Neurological:      General: No focal deficit present.      Mental Status: He is alert and oriented to person, place, and time.   Psychiatric:         Mood and Affect: Mood normal.         Behavior: Behavior normal.         Thought Content: Thought content normal.         Judgment: Judgment normal.       Lab Review  Lab Results   Component Value Date/Time    WBC 17.1 (H) 03/11/2023 04:53 AM    RBC 3.42 (L) 03/11/2023 04:53 AM    HEMOGLOBIN 12.2 (L) 03/11/2023 04:53 AM    HEMATOCRIT 33.9 (L) 03/11/2023 04:53 AM    MCV 99.1 (H) 03/11/2023 04:53 AM    MCH 35.7 (H) 03/11/2023 04:53 AM    MCHC 36.0 (H) 03/11/2023 04:53 AM    MPV 9.1 03/11/2023 04:53 AM      Lab Results   Component Value Date/Time    SODIUM 133 (L) 03/11/2023 04:53 AM    POTASSIUM 3.5 (L) 03/11/2023 04:53 AM    CHLORIDE 102 03/11/2023 04:53 AM    CO2 19 (L) 03/11/2023 04:53 AM    GLUCOSE 121 (H) 03/11/2023 04:53 AM    BUN 25 (H) 03/11/2023 04:53 AM    CREATININE 1.86 (H) 03/11/2023 04:53 AM      Lab Results   Component Value Date/Time    ASTSGOT 54 (H) 03/09/2023 12:36 PM    ALTSGPT 37 03/09/2023 12:36 PM     Lab Results   Component Value Date/Time    CHOLSTRLTOT 114 03/10/2023 02:00 AM    LDL 62 03/10/2023 02:00 AM    HDL 43 03/10/2023 02:00 AM    TRIGLYCERIDE 44 03/10/2023 02:00 AM    TROPONINT 19 03/10/2023 09:59 AM       No results for input(s): NTPROBNP in the last 72 hours.    Cardiac Imaging and Procedures Review  Telemetry showed NSR     Echocardiogram:    3/9/2023  Left Ventricle  Normal left ventricular size, thickness, and systolic function. Mild   concentric left ventricular hypertrophy. The left ventricular ejection   fraction is visually estimated to be 50%. Inferior and inferolateral   wall hypokinesis.     Right Ventricle  The right  ventricle is normal in size and systolic function.     Right Atrium  The right atrium is normal in size. Normal inferior vena cava size and   inspiratory collapse.     Left Atrium  The left atrium is normal in size. Left atrial volume index is 14.53   mL/sq m.     Mitral Valve  No mitral stenosis. Trace mitral regurgitation. Thickened mitral valve   leaflets.     Aortic Valve  Structurally normal aortic valve without significant stenosis or   regurgitation.     Tricuspid Valve  Structurally normal tricuspid valve without significant stenosis or   regurgitation.     Pulmonic Valve  Structurally normal pulmonic valve without significant stenosis or   regurgitation.     Pericardium  No pericardial effusion.  Left pleural effusion present.     Aorta  Normal aortic root for body surface area. The ascending aorta diameter   is 2.9 cm.      Imaging    CT chest/abdomen and pelvis   3/10/2023  1.  Right inguinal adenopathy with adjacent hazy fat stranding could represent adenitis. Consider other causes of adenopathy with additional workup as clinically interpreted.  2.  Trace bilateral pleural effusions  3.  Diverticulosis  4.  Atherosclerosis and atherosclerotic coronary artery disease    Assessment/Plan  No new Assessment & Plan notes have been filed under this hospital service since the last note was generated.  Service: Cardiology    Chest pain:  - resolved   - start atorvastatin 40mg qd   - continue asa and metoprolol 12.5mg BID   - heparin gtt for 48 hrs   - outpatient ischemic evaluation     2. Coronary atherosclerosis:  - start statin therapy as noted above     3. Sepsis   - defer       I personally spent a total of 16 minutes which includes face-to-face time and non-face-to-face time spent on preparing to see the patient, reviewing hospital notes and tests, obtaining history from the patient, performing a medically appropriate exam, counseling and educating the patient, ordering  medications/tests/procedures/referrals as clinically indicated, and documenting information in the electronic medical record.      Thank you for allowing me to participate in the care of this patient.    I will continue to follow this patient    Please contact me with any questions.    KHARI Mon.

## 2023-03-11 NOTE — RESPIRATORY CARE
COPD EDUCATION by COPD CLINICAL EDUCATOR  3/11/2023 at 2:46 PM by Lynda Branham, RRT     Smoking Cessation Intervention and education declined.

## 2023-03-11 NOTE — PROGRESS NOTES
Report received from Nirmala Lopes. Assumed pt care. Pt resting in bed. Pt A&O x 4. Fall precautions in place, call light and belongings within reach, bed in lowest position. No signs of distress.

## 2023-03-12 LAB
ANION GAP SERPL CALC-SCNC: 10 MMOL/L (ref 7–16)
BACTERIA STL CULT: NORMAL
BACTERIA UR CULT: ABNORMAL
BACTERIA UR CULT: ABNORMAL
BASOPHILS # BLD AUTO: 0.1 % (ref 0–1.8)
BASOPHILS # BLD: 0.02 K/UL (ref 0–0.12)
BUN SERPL-MCNC: 19 MG/DL (ref 8–22)
C JEJUNI+C COLI AG STL QL: NORMAL
CALCIUM SERPL-MCNC: 8.3 MG/DL (ref 8.5–10.5)
CHLORIDE SERPL-SCNC: 104 MMOL/L (ref 96–112)
CO2 SERPL-SCNC: 19 MMOL/L (ref 20–33)
CREAT SERPL-MCNC: 1.32 MG/DL (ref 0.5–1.4)
E COLI SXT1+2 STL IA: NORMAL
EOSINOPHIL # BLD AUTO: 0.05 K/UL (ref 0–0.51)
EOSINOPHIL NFR BLD: 0.3 % (ref 0–6.9)
ERYTHROCYTE [DISTWIDTH] IN BLOOD BY AUTOMATED COUNT: 52.1 FL (ref 35.9–50)
GFR SERPLBLD CREATININE-BSD FMLA CKD-EPI: 63 ML/MIN/1.73 M 2
GLUCOSE SERPL-MCNC: 104 MG/DL (ref 65–99)
HCT VFR BLD AUTO: 32 % (ref 42–52)
HGB BLD-MCNC: 11.4 G/DL (ref 14–18)
IMM GRANULOCYTES # BLD AUTO: 0.14 K/UL (ref 0–0.11)
IMM GRANULOCYTES NFR BLD AUTO: 1 % (ref 0–0.9)
LYMPHOCYTES # BLD AUTO: 1.01 K/UL (ref 1–4.8)
LYMPHOCYTES NFR BLD: 7 % (ref 22–41)
MCH RBC QN AUTO: 35.6 PG (ref 27–33)
MCHC RBC AUTO-ENTMCNC: 35.6 G/DL (ref 33.7–35.3)
MCV RBC AUTO: 100 FL (ref 81.4–97.8)
MONOCYTES # BLD AUTO: 0.33 K/UL (ref 0–0.85)
MONOCYTES NFR BLD AUTO: 2.3 % (ref 0–13.4)
NEUTROPHILS # BLD AUTO: 12.82 K/UL (ref 1.82–7.42)
NEUTROPHILS NFR BLD: 89.3 % (ref 44–72)
NRBC # BLD AUTO: 0 K/UL
NRBC BLD-RTO: 0 /100 WBC
PLATELET # BLD AUTO: 106 K/UL (ref 164–446)
PMV BLD AUTO: 8.9 FL (ref 9–12.9)
POTASSIUM SERPL-SCNC: 3.8 MMOL/L (ref 3.6–5.5)
PROCALCITONIN SERPL-MCNC: 15.5 NG/ML
RBC # BLD AUTO: 3.2 M/UL (ref 4.7–6.1)
SIGNIFICANT IND 70042: ABNORMAL
SIGNIFICANT IND 70042: NORMAL
SITE SITE: ABNORMAL
SITE SITE: NORMAL
SODIUM SERPL-SCNC: 133 MMOL/L (ref 135–145)
SOURCE SOURCE: ABNORMAL
SOURCE SOURCE: NORMAL
UFH PPP CHRO-ACNC: 0.2 IU/ML
UFH PPP CHRO-ACNC: 0.33 IU/ML
UFH PPP CHRO-ACNC: 0.38 IU/ML
WBC # BLD AUTO: 14.4 K/UL (ref 4.8–10.8)

## 2023-03-12 PROCEDURE — 700101 HCHG RX REV CODE 250: Performed by: NURSE PRACTITIONER

## 2023-03-12 PROCEDURE — 700105 HCHG RX REV CODE 258: Performed by: HOSPITALIST

## 2023-03-12 PROCEDURE — 700102 HCHG RX REV CODE 250 W/ 637 OVERRIDE(OP): Performed by: HOSPITALIST

## 2023-03-12 PROCEDURE — A9270 NON-COVERED ITEM OR SERVICE: HCPCS | Performed by: HOSPITALIST

## 2023-03-12 PROCEDURE — 85520 HEPARIN ASSAY: CPT | Mod: 91

## 2023-03-12 PROCEDURE — 99233 SBSQ HOSP IP/OBS HIGH 50: CPT | Performed by: HOSPITALIST

## 2023-03-12 PROCEDURE — 700111 HCHG RX REV CODE 636 W/ 250 OVERRIDE (IP): Performed by: HOSPITALIST

## 2023-03-12 PROCEDURE — C9113 INJ PANTOPRAZOLE SODIUM, VIA: HCPCS | Performed by: HOSPITALIST

## 2023-03-12 PROCEDURE — 80048 BASIC METABOLIC PNL TOTAL CA: CPT

## 2023-03-12 PROCEDURE — 85025 COMPLETE CBC W/AUTO DIFF WBC: CPT

## 2023-03-12 PROCEDURE — 700102 HCHG RX REV CODE 250 W/ 637 OVERRIDE(OP): Performed by: NURSE PRACTITIONER

## 2023-03-12 PROCEDURE — 700111 HCHG RX REV CODE 636 W/ 250 OVERRIDE (IP)

## 2023-03-12 PROCEDURE — A9270 NON-COVERED ITEM OR SERVICE: HCPCS | Performed by: NURSE PRACTITIONER

## 2023-03-12 PROCEDURE — 84145 PROCALCITONIN (PCT): CPT

## 2023-03-12 PROCEDURE — 36415 COLL VENOUS BLD VENIPUNCTURE: CPT

## 2023-03-12 PROCEDURE — 700105 HCHG RX REV CODE 258: Performed by: NURSE PRACTITIONER

## 2023-03-12 PROCEDURE — 770020 HCHG ROOM/CARE - TELE (206)

## 2023-03-12 RX ORDER — DOXYCYCLINE 100 MG/1
100 TABLET ORAL EVERY 12 HOURS
Status: DISCONTINUED | OUTPATIENT
Start: 2023-03-12 | End: 2023-03-13 | Stop reason: HOSPADM

## 2023-03-12 RX ORDER — HYDRALAZINE HYDROCHLORIDE 20 MG/ML
20 INJECTION INTRAMUSCULAR; INTRAVENOUS EVERY 6 HOURS PRN
Status: DISCONTINUED | OUTPATIENT
Start: 2023-03-12 | End: 2023-03-13 | Stop reason: HOSPADM

## 2023-03-12 RX ADMIN — ASPIRIN 81 MG: 81 TABLET, COATED ORAL at 06:17

## 2023-03-12 RX ADMIN — HEPARIN SODIUM 2000 UNITS: 1000 INJECTION, SOLUTION INTRAVENOUS; SUBCUTANEOUS at 04:45

## 2023-03-12 RX ADMIN — AMPICILLIN AND SULBACTAM 3 G: 1; 2 INJECTION, POWDER, FOR SOLUTION INTRAMUSCULAR; INTRAVENOUS at 23:17

## 2023-03-12 RX ADMIN — AMPICILLIN AND SULBACTAM 3 G: 1; 2 INJECTION, POWDER, FOR SOLUTION INTRAMUSCULAR; INTRAVENOUS at 17:08

## 2023-03-12 RX ADMIN — AMPICILLIN AND SULBACTAM 3 G: 1; 2 INJECTION, POWDER, FOR SOLUTION INTRAMUSCULAR; INTRAVENOUS at 06:16

## 2023-03-12 RX ADMIN — HYDRALAZINE HYDROCHLORIDE 20 MG: 20 INJECTION INTRAMUSCULAR; INTRAVENOUS at 21:00

## 2023-03-12 RX ADMIN — AMPICILLIN AND SULBACTAM 3 G: 1; 2 INJECTION, POWDER, FOR SOLUTION INTRAMUSCULAR; INTRAVENOUS at 11:41

## 2023-03-12 RX ADMIN — AMPICILLIN AND SULBACTAM 3 G: 1; 2 INJECTION, POWDER, FOR SOLUTION INTRAMUSCULAR; INTRAVENOUS at 00:41

## 2023-03-12 RX ADMIN — HEPARIN SODIUM 18 UNITS/KG/HR: 5000 INJECTION, SOLUTION INTRAVENOUS at 11:17

## 2023-03-12 RX ADMIN — ATORVASTATIN CALCIUM 40 MG: 40 TABLET, FILM COATED ORAL at 17:08

## 2023-03-12 RX ADMIN — DOXYCYCLINE 100 MG: 100 TABLET, FILM COATED ORAL at 17:09

## 2023-03-12 RX ADMIN — PANTOPRAZOLE SODIUM 40 MG: 40 INJECTION, POWDER, FOR SOLUTION INTRAVENOUS at 17:09

## 2023-03-12 RX ADMIN — METOPROLOL TARTRATE 12.5 MG: 25 TABLET, FILM COATED ORAL at 06:17

## 2023-03-12 RX ADMIN — DOXYCYCLINE 100 MG: 100 INJECTION, POWDER, LYOPHILIZED, FOR SOLUTION INTRAVENOUS at 06:00

## 2023-03-12 RX ADMIN — LISINOPRIL 10 MG: 10 TABLET ORAL at 06:17

## 2023-03-12 ASSESSMENT — ENCOUNTER SYMPTOMS
MYALGIAS: 1
DIARRHEA: 1
CHILLS: 1
FLANK PAIN: 0
FEVER: 1
COUGH: 0

## 2023-03-12 ASSESSMENT — PAIN DESCRIPTION - PAIN TYPE
TYPE: ACUTE PAIN
TYPE: ACUTE PAIN

## 2023-03-12 NOTE — ASSESSMENT & PLAN NOTE
Significant cellulitis noted of the right lower extremity with edema erythema warmth pain.  Likely nidus was abrasion to the right third toe dorsum.  Margin of erythema has been marked.  Interesting CT chest abdomen pelvis showed right inguinal lymphadenopathy.  This is reactive from the significant cellulitis right lower extremity.  negative venous ultrasound   3/11: Ordered Unasyn IV in addition to doxycycline IV.

## 2023-03-12 NOTE — PROGRESS NOTES
Report received from Socorro Lopes. Assumed pt care. Pt resting in bed. Pt A&O x 4. Fall precautions in place, call light and belongings within reach, bed in lowest position. No signs of distress.

## 2023-03-12 NOTE — PROGRESS NOTES
Hospital Medicine Daily Progress Note    Date of Service  3/11/2023    Chief Complaint  Cough and chest pain, right leg cellulitis.    Hospital Course  Paul Hopper is a 57 y.o. male who presented 3/9/2023 with chest pain.  Mr. Hopper has a past medical history of an acute ischemic brainstem stroke 2021 for which he was admitted here.  Subsequently he has not been taking his aspirin, beta-blocker, statin.  He presents today after waking up with right-sided and central chest pain that started 3 this morning.  He states the pain does not radiate.  He denies nausea and diaphoresis and no shortness of breath.  In the emergency room he did have some ST depression laterally though a negative troponin.  Cardiology has been consulted.  Will be placed under observation status with serial troponins and echocardiogram and a stress test has been ordered for the morning if his troponins do not elevate.    Interval Problem Update  3/10  Patient had hypotensive episode in nuclear med   BP measured to be 68/39 diaphoretic and syncopal.  Rapid response initiated.   Treated with a septic protocol fluid bolus  IV antibiotics ceftriaxone and doxycycline initiated  Search for source  Blood cultures, chest film appears clear on my exam, UA ordered  Lactic pending, Pro-Chato pending  Patient is NOT hemodynamically stable at this time has been resuscitated with 3.6 L IVF at this time  Critical procalcitonin at 58.4  Continue to monitor and actively manage patient care    2:33 PM  Lactic back up to 3 from 2.6  1 liter bolus ordered  Total fluid is + 5 L  Increased IVF to 125mL/hr  Repeat lactic at 1530    CT chest abdomen pelvis ordered  Concern for abdominal infection in setting of 2 to 3-week course of hiccups  X-ray foot ordered to rule out osteo in setting of chronic toe wound to right third toe    3/11:  finding of right inguinal lymphadenopathy on CT abdomen chest pelvis correlates with significant warmth erythema cellulitis from  right third toe abrasion.  Margin of erythema ends at the proximal tib-fib area.  Continue doxycycline and Unasyn IV.  Blood cultures pending.  Patient clinically is improving with start of antibiotics.  White blood cell count decreased from 19 to 17.  No longer having fever or chills.  Ordered a venous ultrasound to ensure there is no underlying DVT.  Discussed with friends family members at patient bedside.  Currently on heparin drip per cardiology for possible cardiac ischemia.  In the of acute sepsis from cellulitis no current intervention.  Treatment of persistent hiccups for 1 week with Thorazine IV x1.        I have discussed this patient's plan of care and discharge plan at IDT rounds today with Case Management, Nursing, Nursing leadership, and other members of the IDT team.       greater than 60 minutes uninterrupted spent managing patient care today      Consultants/Specialty  Rapid response    Code Status  Full Code    Disposition  Patient is not medically cleared for discharge.   Anticipate discharge to to skilled nursing facility.  I have placed the appropriate orders for post-discharge needs.    Review of Systems  Review of Systems   Constitutional:  Positive for chills, fever and malaise/fatigue.   Respiratory:  Negative for cough.    Cardiovascular:  Positive for chest pain.   Gastrointestinal:  Positive for diarrhea.   Genitourinary:  Negative for dysuria and flank pain.   Musculoskeletal:  Positive for myalgias (Right leg pain swelling.).   Neurological:         Hiccups x2 to 3 weeks      Physical Exam  Temp:  [36.8 °C (98.2 °F)-37.5 °C (99.5 °F)] 37.3 °C (99.1 °F)  Pulse:  [] 71  Resp:  [16-20] 18  BP: (139-174)/() 158/99  SpO2:  [89 %-98 %] 91 %    Physical Exam  Constitutional:       Appearance: Normal appearance. He is obese. He is toxic-appearing and diaphoretic.   HENT:      Head: Normocephalic and atraumatic.   Eyes:      Extraocular Movements: Extraocular movements intact.       Pupils: Pupils are equal, round, and reactive to light.   Cardiovascular:      Rate and Rhythm: Normal rate and regular rhythm.      Pulses: Normal pulses.      Heart sounds: Normal heart sounds.   Pulmonary:      Effort: Pulmonary effort is normal.      Breath sounds: Normal breath sounds. No wheezing, rhonchi or rales.   Abdominal:      General: Abdomen is flat. Bowel sounds are normal.      Tenderness: There is no guarding or rebound.   Musculoskeletal:         General: Normal range of motion.      Cervical back: Normal range of motion.   Skin:     General: Skin is warm.   Neurological:      General: No focal deficit present.      Mental Status: He is alert and oriented to person, place, and time.   Psychiatric:         Mood and Affect: Mood normal.         Behavior: Behavior normal.       Fluids    Intake/Output Summary (Last 24 hours) at 3/11/2023 2017  Last data filed at 3/10/2023 2040  Gross per 24 hour   Intake 100 ml   Output --   Net 100 ml       Laboratory  Recent Labs     03/09/23  1236 03/10/23  0959 03/11/23  0453   WBC 11.0* 19.0* 17.1*   RBC 4.44* 3.77* 3.42*   HEMOGLOBIN 15.9 13.3* 12.2*   HEMATOCRIT 43.9 37.8* 33.9*   MCV 98.9* 100.3* 99.1*   MCH 35.8* 35.3* 35.7*   MCHC 36.2* 35.2 36.0*   RDW 50.0 52.4* 50.8*   PLATELETCT 161* 136* 106*   MPV 8.7* 8.9* 9.1     Recent Labs     03/10/23  0200 03/10/23  0959 03/11/23  0453   SODIUM 135 135 133*   POTASSIUM 4.2 4.0 3.5*   CHLORIDE 100 101 102   CO2 22 20 19*   GLUCOSE 87 116* 121*   BUN 24* 32* 25*   CREATININE 2.20* 2.67* 1.86*   CALCIUM 8.8 8.4* 8.2*     Recent Labs     03/11/23  1202   APTT 37.2*   INR 1.22*         Recent Labs     03/10/23  0200   TRIGLYCERIDE 44   HDL 43   LDL 62       Imaging  US-EXTREMITY VENOUS LOWER BILAT   Final Result      US-CAROTID DOPPLER BILAT   Final Result      CT-CHEST,ABDOMEN,PELVIS W/O   Final Result         1.  Right inguinal adenopathy with adjacent hazy fat stranding could represent adenitis. Consider other  causes of adenopathy with additional workup as clinically interpreted.   2.  Trace bilateral pleural effusions   3.  Diverticulosis   4.  Atherosclerosis and atherosclerotic coronary artery disease      DX-FOOT-2- RIGHT   Final Result      Great toe soft tissue swelling. No radiographic evidence of osteomyelitis.      DX-CHEST-PORTABLE (1 VIEW)   Final Result      No acute cardiopulmonary abnormality.      EC-ECHOCARDIOGRAM COMPLETE W/O CONT   Final Result      DX-CHEST-PORTABLE (1 VIEW)   Final Result      No acute cardiac or pulmonary abnormalities are identified.           Assessment/Plan  * Cellulitis of right lower extremity  Assessment & Plan  Significant cellulitis noted of the right lower extremity with edema erythema warmth pain.  Likely nidus was abrasion to the right third toe dorsum.  Margin of erythema has been marked.  Interesting CT chest abdomen pelvis showed right inguinal lymphadenopathy.  This is reactive from the significant cellulitis right lower extremity.  Ordered venous ultrasound to rule out underlying DVT.  Ordered Unasyn IV in addition to doxycycline IV.    Intractable hiccups  Assessment & Plan  CT abdomen pelvis negative.  3/11 ordered Thorazine IV x1.    Sepsis (HCC)- (present on admission)  Assessment & Plan  This is Sepsis Present on admission  SIRS criteria identified on my evaluation include: Fever, with temperature greater than 101 deg F, Tachycardia, with heart rate greater than 90 BPM and Leukocytosis, with WBC greater than 12,000  Source is unidentified at this time  Sepsis protocol initiated  Fluid resuscitation ordered per protocol  Crystalloid Fluid Administration: Fluid resuscitation ordered per standard protocol - 30 mL/kg per current or ideal body weight  IV antibiotics as appropriate for source of sepsis  Reassessment: I have reassessed the patient's hemodynamic status    3.6 L in so far  Pro-Chato critical at 58  Repeat lactic now  UA positive for nitrates but negative for  bacteria ordered culture  Left film clear on my review however Pro-Chato screemingly positive  Blood cultures drawn  Stool culture pending            History of stroke- (present on admission)  Assessment & Plan  The only sequelae are short term memory problems and daily headaches.   He has not been on a statin nor aspirin nor blood pressure medications all of which are indicated.     Chest pain- (present on admission)  Assessment & Plan  Chest pain with lateral ST depression  Troponin is negative  Serial troponins and echocardiogram ordered.   Cardiology consulted.   Aspirin ordered.   Last LDL was 118 in 2021 and he has not been on a statin. Lipid panel ordered for the morning. Empiric Lipitor 40 mg.  Cardiac stress test ordered and will be cancelled if troponins rise   3/11: Given swollen right lower extremity and cellulitis, I have ordered venous ultrasound to rule out DVT.  If it is positive then chest pain could be due to a PE.  Continue heparin drip for now.    AURELIANO (acute kidney injury) (HCC)- (present on admission)  Assessment & Plan  Cr in 2021 was 1.4 and is currently 1.69  Refrain from IV fluids  Monitor renal function in the morning.    Primary hypertension- (present on admission)  Assessment & Plan  He has not been taking any prescription medications           VTE prophylaxis: enoxaparin ppx    I have performed a physical exam and reviewed and updated ROS and Plan today (3/11/2023). In review of yesterday's note (3/10/2023), there are no changes except as documented above.

## 2023-03-12 NOTE — CARE PLAN
The patient is Stable - Low risk of patient condition declining or worsening    Shift Goals  Clinical Goals: Improve O2 needs  Patient Goals: Rest  Family Goals: GAYATRI    Progress made toward(s) clinical / shift goals:        Problem: Knowledge Deficit - Standard  Goal: Patient and family/care givers will demonstrate understanding of plan of care, disease process/condition, diagnostic tests and medications  Outcome: Progressing     Problem: Pain - Standard  Goal: Alleviation of pain or a reduction in pain to the patient’s comfort goal  Outcome: Progressing     Problem: Communication  Goal: The ability to communicate needs accurately and effectively will improve  Outcome: Progressing     Problem: Hemodynamics  Goal: Patient's hemodynamics, fluid balance and neurologic status will be stable or improve  Outcome: Progressing     Problem: Fluid Volume  Goal: Fluid volume balance will be maintained  Outcome: Progressing     Problem: Infection - Standard  Goal: Patient will remain free from infection  Outcome: Progressing     Problem: Wound/ / Incision Healing  Goal: Patient's wound/surgical incision will decrease in size and heals properly  Outcome: Progressing     Problem: Fall Risk  Goal: Patient will remain free from falls  Outcome: Progressing       Patient is not progressing towards the following goals:

## 2023-03-12 NOTE — CARE PLAN
The patient is Watcher - Medium risk of patient condition declining or worsening    Shift Goals  Clinical Goals: monitor BP and oxygen  Patient Goals: rest  Family Goals: GAYATRI    Progress made toward(s) clinical / shift goals:    Problem: Knowledge Deficit - Standard  Goal: Patient and family/care givers will demonstrate understanding of plan of care, disease process/condition, diagnostic tests and medications  Outcome: Progressing     Problem: Communication  Goal: The ability to communicate needs accurately and effectively will improve  Outcome: Progressing     Problem: Wound/ / Incision Healing  Goal: Patient's wound/surgical incision will decrease in size and heals properly  Outcome: Progressing

## 2023-03-13 ENCOUNTER — PHARMACY VISIT (OUTPATIENT)
Dept: PHARMACY | Facility: MEDICAL CENTER | Age: 58
End: 2023-03-13
Payer: MEDICARE

## 2023-03-13 VITALS
HEART RATE: 69 BPM | TEMPERATURE: 98.2 F | RESPIRATION RATE: 16 BRPM | HEIGHT: 68 IN | SYSTOLIC BLOOD PRESSURE: 166 MMHG | BODY MASS INDEX: 30.24 KG/M2 | DIASTOLIC BLOOD PRESSURE: 95 MMHG | WEIGHT: 199.52 LBS | OXYGEN SATURATION: 94 %

## 2023-03-13 PROBLEM — N17.9 AKI (ACUTE KIDNEY INJURY) (HCC): Status: RESOLVED | Noted: 2021-06-13 | Resolved: 2023-03-13

## 2023-03-13 PROBLEM — R06.6 INTRACTABLE HICCUPS: Status: RESOLVED | Noted: 2023-03-11 | Resolved: 2023-03-13

## 2023-03-13 LAB
ANION GAP SERPL CALC-SCNC: 10 MMOL/L (ref 7–16)
BASOPHILS # BLD AUTO: 0.4 % (ref 0–1.8)
BASOPHILS # BLD: 0.04 K/UL (ref 0–0.12)
BUN SERPL-MCNC: 18 MG/DL (ref 8–22)
CALCIUM SERPL-MCNC: 8.1 MG/DL (ref 8.5–10.5)
CHLORIDE SERPL-SCNC: 106 MMOL/L (ref 96–112)
CO2 SERPL-SCNC: 20 MMOL/L (ref 20–33)
CREAT SERPL-MCNC: 1.37 MG/DL (ref 0.5–1.4)
EOSINOPHIL # BLD AUTO: 0.15 K/UL (ref 0–0.51)
EOSINOPHIL NFR BLD: 1.7 % (ref 0–6.9)
ERYTHROCYTE [DISTWIDTH] IN BLOOD BY AUTOMATED COUNT: 51.8 FL (ref 35.9–50)
GFR SERPLBLD CREATININE-BSD FMLA CKD-EPI: 61 ML/MIN/1.73 M 2
GLUCOSE SERPL-MCNC: 89 MG/DL (ref 65–99)
HCT VFR BLD AUTO: 34.1 % (ref 42–52)
HGB BLD-MCNC: 12.1 G/DL (ref 14–18)
IMM GRANULOCYTES # BLD AUTO: 0.09 K/UL (ref 0–0.11)
IMM GRANULOCYTES NFR BLD AUTO: 1 % (ref 0–0.9)
LYMPHOCYTES # BLD AUTO: 1.13 K/UL (ref 1–4.8)
LYMPHOCYTES NFR BLD: 12.5 % (ref 22–41)
MCH RBC QN AUTO: 35.2 PG (ref 27–33)
MCHC RBC AUTO-ENTMCNC: 35.5 G/DL (ref 33.7–35.3)
MCV RBC AUTO: 99.1 FL (ref 81.4–97.8)
MONOCYTES # BLD AUTO: 0.35 K/UL (ref 0–0.85)
MONOCYTES NFR BLD AUTO: 3.9 % (ref 0–13.4)
NEUTROPHILS # BLD AUTO: 7.27 K/UL (ref 1.82–7.42)
NEUTROPHILS NFR BLD: 80.5 % (ref 44–72)
NRBC # BLD AUTO: 0 K/UL
NRBC BLD-RTO: 0 /100 WBC
PLATELET # BLD AUTO: 119 K/UL (ref 164–446)
PMV BLD AUTO: 9.3 FL (ref 9–12.9)
POTASSIUM SERPL-SCNC: 3.6 MMOL/L (ref 3.6–5.5)
RBC # BLD AUTO: 3.44 M/UL (ref 4.7–6.1)
SODIUM SERPL-SCNC: 136 MMOL/L (ref 135–145)
WBC # BLD AUTO: 9 K/UL (ref 4.8–10.8)

## 2023-03-13 PROCEDURE — RXMED WILLOW AMBULATORY MEDICATION CHARGE: Performed by: HOSPITALIST

## 2023-03-13 PROCEDURE — A9270 NON-COVERED ITEM OR SERVICE: HCPCS | Performed by: HOSPITALIST

## 2023-03-13 PROCEDURE — 99239 HOSP IP/OBS DSCHRG MGMT >30: CPT | Performed by: HOSPITALIST

## 2023-03-13 PROCEDURE — 700111 HCHG RX REV CODE 636 W/ 250 OVERRIDE (IP): Performed by: HOSPITALIST

## 2023-03-13 PROCEDURE — 80048 BASIC METABOLIC PNL TOTAL CA: CPT

## 2023-03-13 PROCEDURE — 700102 HCHG RX REV CODE 250 W/ 637 OVERRIDE(OP): Performed by: HOSPITALIST

## 2023-03-13 PROCEDURE — 700105 HCHG RX REV CODE 258: Performed by: HOSPITALIST

## 2023-03-13 PROCEDURE — 85025 COMPLETE CBC W/AUTO DIFF WBC: CPT

## 2023-03-13 RX ORDER — LOSARTAN POTASSIUM 25 MG/1
25 TABLET ORAL
Status: DISCONTINUED | OUTPATIENT
Start: 2023-03-13 | End: 2023-03-13

## 2023-03-13 RX ORDER — OMEPRAZOLE 20 MG/1
20 CAPSULE, DELAYED RELEASE ORAL DAILY
Qty: 30 CAPSULE | Refills: 3 | Status: ON HOLD | OUTPATIENT
Start: 2023-03-14 | End: 2024-02-24

## 2023-03-13 RX ORDER — ASPIRIN 81 MG/1
81 TABLET ORAL DAILY
Qty: 100 TABLET | Refills: 3 | Status: ON HOLD | OUTPATIENT
Start: 2023-03-14 | End: 2024-02-24

## 2023-03-13 RX ORDER — LISINOPRIL 40 MG/1
40 TABLET ORAL DAILY
Qty: 30 TABLET | Refills: 3 | Status: ON HOLD | OUTPATIENT
Start: 2023-03-14 | End: 2024-02-24

## 2023-03-13 RX ORDER — OMEPRAZOLE 20 MG/1
20 CAPSULE, DELAYED RELEASE ORAL DAILY
Status: DISCONTINUED | OUTPATIENT
Start: 2023-03-13 | End: 2023-03-13 | Stop reason: HOSPADM

## 2023-03-13 RX ORDER — DOXYCYCLINE 100 MG/1
100 TABLET ORAL EVERY 12 HOURS
Qty: 12 TABLET | Refills: 0 | Status: ACTIVE | OUTPATIENT
Start: 2023-03-13 | End: 2023-03-19

## 2023-03-13 RX ORDER — LISINOPRIL 20 MG/1
20 TABLET ORAL
Status: DISCONTINUED | OUTPATIENT
Start: 2023-03-14 | End: 2023-03-13 | Stop reason: HOSPADM

## 2023-03-13 RX ORDER — AMOXICILLIN AND CLAVULANATE POTASSIUM 875; 125 MG/1; MG/1
1 TABLET, FILM COATED ORAL 2 TIMES DAILY
Qty: 12 TABLET | Refills: 0 | Status: ACTIVE | OUTPATIENT
Start: 2023-03-13 | End: 2023-03-19

## 2023-03-13 RX ORDER — ATORVASTATIN CALCIUM 40 MG/1
40 TABLET, FILM COATED ORAL EVERY EVENING
Qty: 30 TABLET | Refills: 3 | Status: ON HOLD | OUTPATIENT
Start: 2023-03-13 | End: 2024-02-24

## 2023-03-13 RX ADMIN — DOXYCYCLINE 100 MG: 100 TABLET, FILM COATED ORAL at 06:11

## 2023-03-13 RX ADMIN — AMPICILLIN AND SULBACTAM 3 G: 1; 2 INJECTION, POWDER, FOR SOLUTION INTRAMUSCULAR; INTRAVENOUS at 06:14

## 2023-03-13 RX ADMIN — LISINOPRIL 10 MG: 10 TABLET ORAL at 06:11

## 2023-03-13 RX ADMIN — METOPROLOL TARTRATE 12.5 MG: 25 TABLET, FILM COATED ORAL at 06:11

## 2023-03-13 RX ADMIN — AMPICILLIN AND SULBACTAM 3 G: 1; 2 INJECTION, POWDER, FOR SOLUTION INTRAMUSCULAR; INTRAVENOUS at 11:32

## 2023-03-13 RX ADMIN — ASPIRIN 81 MG: 81 TABLET, COATED ORAL at 06:11

## 2023-03-13 RX ADMIN — HEPARIN SODIUM 18 UNITS/KG/HR: 5000 INJECTION, SOLUTION INTRAVENOUS at 02:54

## 2023-03-13 RX ADMIN — OMEPRAZOLE 20 MG: 20 CAPSULE, DELAYED RELEASE ORAL at 11:31

## 2023-03-13 ASSESSMENT — FIBROSIS 4 INDEX: FIB4 SCORE: 4.61

## 2023-03-13 NOTE — DISCHARGE PLANNING
Care Transition Team Assessment  Lsw met w/ pt at bedside to complete d/c planning assessment. Pt was on phone at SW arrival and disconnected call. Pt has high flow mask on but pulled down  to his chin during interaction. Lsw did advise pt keep o2 on as may need to remain clear minded.    Pt states lives w/ roommates x2. Uses bus for transportation. Has no DME at home. Works w/ brother and emergency contact, local brother, Matthias.    Pt can either have friend Bryon p/u or take bus at d/c IF pt is steady enough to stand at bus stop.    Pt uses pharmacy [facility] w/ home delivery.     Pt has not seen his listed PCP Kenny Sheikh MD for 1.5 years.    Pt uses [employer], on beer per month.  Pt has no dx or meds for anx/dep/mental health.    Pt declining any resources at this time.    Pt reports he is fully independent w/ I/ADLs.     Pt supports are friends, and brother.     Information Source  Orientation Level: Oriented X4  Information Given By: Patient  Informant's Name: Paul  Who is responsible for making decisions for patient? : Patient    Readmission Evaluation  Is this a readmission?: No    Elopement Risk  Legal Hold: No  Ambulatory or Self Mobile in Wheelchair: Yes  Disoriented: No  Psychiatric Symptoms: None  History of Wandering: No  Elopement this Admit: No  Vocalizing Wanting to Leave: No  Displays Behaviors, Body Language Wanting to Leave: No-Not at Risk for Elopement  Elopement Risk: Not at Risk for Elopement    Interdisciplinary Discharge Planning  Primary Care Physician: 1.5 years ago went to Dr Sam Manzanares MD  Lives with - Patient's Self Care Capacity:  (has 2 roommates at home, unrelated)  Patient or legal guardian wants to designate a caregiver: No  Support Systems: Family Member(s), Friends / Neighbors (bro matthias listed as emergency contact)  Housing / Facility: 1 Story Apartment / Condo  Do You Take your Prescribed Medications Regularly: Yes  Mobility Issues: No  Prior Services: None  Durable  Medical Equipment: Not Applicable    Discharge Preparedness  Difficulity with IADLs:  (takes bus for transport or friend Bryon can p/u when ready to d/c)         Finances  Prescription Coverage: Yes ([facility] home delivery)    Vision / Hearing Impairment  Vision Impairment : Yes  Right Eye Vision: Wears Glasses  Left Eye Vision: Wears Glasses  Hearing Impairment : No              Domestic Abuse  Have you ever been the victim of abuse or violence?: No  Physical Abuse or Sexual Abuse: No  Verbal Abuse or Emotional Abuse: No  Possible Abuse/Neglect Reported to:: Not Applicable    Psychological Assessment  History of Substance Abuse:  (has beer one time per month)  History of Psychiatric Problems: No         Anticipated Discharge Information  Discharge Disposition: Discharged to home/self care (01)

## 2023-03-13 NOTE — ASSESSMENT & PLAN NOTE
This is Sepsis Present on admission  SIRS criteria identified on my evaluation include: Tachycardia, with heart rate greater than 90 BPM and Leukocytosis, with WBC greater than 12,000  Source is RLE cellulitis  Sepsis protocol initiated  Fluid resuscitation ordered per protocol  Crystalloid Fluid Administration: Fluid resuscitation ordered per standard protocol - 30 mL/kg per current or ideal body weight  IV antibiotics as appropriate for source of sepsis  Reassessment: I have reassessed the patient's hemodynamic status

## 2023-03-13 NOTE — PROGRESS NOTES
Hospital Medicine Daily Progress Note    Date of Service  3/12/2023    Chief Complaint  Cough and chest pain, right leg cellulitis.    Hospital Course  Paul Hopper is a 57 y.o. male who presented 3/9/2023 with chest pain.  Mr. Hopper has a past medical history of an acute ischemic brainstem stroke 2021 for which he was admitted here.  Subsequently he has not been taking his aspirin, beta-blocker, statin.  He presents today after waking up with right-sided and central chest pain that started 3 this morning.  He states the pain does not radiate.  He denies nausea and diaphoresis and no shortness of breath.  In the emergency room he did have some ST depression laterally though a negative troponin.  Cardiology has been consulted.  Will be placed under observation status with serial troponins and echocardiogram and a stress test has been ordered for the morning if his troponins do not elevate.    Interval Problem Update  3/10  Patient had hypotensive episode in nuclear med   BP measured to be 68/39 diaphoretic and syncopal.  Rapid response initiated.   Treated with a septic protocol fluid bolus  IV antibiotics ceftriaxone and doxycycline initiated  Search for source  Blood cultures, chest film appears clear on my exam, UA ordered  Lactic pending, Pro-Chato pending  Patient is NOT hemodynamically stable at this time has been resuscitated with 3.6 L IVF at this time  Critical procalcitonin at 58.4  Continue to monitor and actively manage patient care    2:33 PM  Lactic back up to 3 from 2.6  1 liter bolus ordered  Total fluid is + 5 L  Increased IVF to 125mL/hr  Repeat lactic at 1530    CT chest abdomen pelvis ordered  Concern for abdominal infection in setting of 2 to 3-week course of hiccups  X-ray foot ordered to rule out osteo in setting of chronic toe wound to right third toe    3/11:  finding of right inguinal lymphadenopathy on CT abdomen chest pelvis correlates with significant warmth erythema cellulitis from  right third toe abrasion.  Margin of erythema ends at the proximal tib-fib area.  Continue doxycycline and Unasyn IV.  Blood cultures pending.  Patient clinically is improving with start of antibiotics.  White blood cell count decreased from 19 to 17.  No longer having fever or chills.  Ordered a venous ultrasound to ensure there is no underlying DVT.  Discussed with friends family members at patient bedside.  Currently on heparin drip per cardiology for possible cardiac ischemia.  In the of acute sepsis from cellulitis no current intervention.  Treatment of persistent hiccups for 1 week with Thorazine IV x1.  3/12:  improving cellulitis noted today, less erythema, warmth to touch on exam.  No chest pain.  BP stable.  Venous u/s negative for dvt. Cr improved with IVFs.  Procal 54 to 15.        I have discussed this patient's plan of care and discharge plan at IDT rounds today with Case Management, Nursing, Nursing leadership, and other members of the IDT team.       greater than 60 minutes uninterrupted spent managing patient care today      Consultants/Specialty  Rapid response    Code Status  Full Code    Disposition  Patient is not medically cleared for discharge. PT/OT pending.  Anticipate discharge to to skilled nursing facility.  I have placed the appropriate orders for post-discharge needs.    Review of Systems  Review of Systems   Constitutional:  Positive for chills, fever and malaise/fatigue.   Respiratory:  Negative for cough.    Cardiovascular:  Positive for chest pain.   Gastrointestinal:  Positive for diarrhea.   Genitourinary:  Negative for dysuria and flank pain.   Musculoskeletal:  Positive for myalgias (Right leg pain swelling.).   Neurological:         Hiccups x2 to 3 weeks      Physical Exam  Temp:  [36.7 °C (98.1 °F)-37.4 °C (99.3 °F)] 36.7 °C (98.1 °F)  Pulse:  [59-71] 60  Resp:  [18] 18  BP: (132-174)/() 171/95  SpO2:  [91 %-99 %] 99 %    Physical Exam  Constitutional:       Appearance:  Normal appearance. He is obese. He is toxic-appearing and diaphoretic.   HENT:      Head: Normocephalic and atraumatic.   Eyes:      Extraocular Movements: Extraocular movements intact.      Pupils: Pupils are equal, round, and reactive to light.   Cardiovascular:      Rate and Rhythm: Normal rate and regular rhythm.      Pulses: Normal pulses.      Heart sounds: Normal heart sounds.   Pulmonary:      Effort: Pulmonary effort is normal.      Breath sounds: Normal breath sounds. No wheezing, rhonchi or rales.   Abdominal:      General: Abdomen is flat. Bowel sounds are normal.      Tenderness: There is no guarding or rebound.   Musculoskeletal:         General: Normal range of motion.      Cervical back: Normal range of motion.   Skin:     General: Skin is warm.   Neurological:      General: No focal deficit present.      Mental Status: He is alert and oriented to person, place, and time.   Psychiatric:         Mood and Affect: Mood normal.         Behavior: Behavior normal.       Fluids  No intake or output data in the 24 hours ending 03/12/23 1824      Laboratory  Recent Labs     03/10/23  0959 03/11/23  0453 03/12/23  0333   WBC 19.0* 17.1* 14.4*   RBC 3.77* 3.42* 3.20*   HEMOGLOBIN 13.3* 12.2* 11.4*   HEMATOCRIT 37.8* 33.9* 32.0*   .3* 99.1* 100.0*   MCH 35.3* 35.7* 35.6*   MCHC 35.2 36.0* 35.6*   RDW 52.4* 50.8* 52.1*   PLATELETCT 136* 106* 106*   MPV 8.9* 9.1 8.9*     Recent Labs     03/10/23  0959 03/11/23  0453 03/12/23  0333   SODIUM 135 133* 133*   POTASSIUM 4.0 3.5* 3.8   CHLORIDE 101 102 104   CO2 20 19* 19*   GLUCOSE 116* 121* 104*   BUN 32* 25* 19   CREATININE 2.67* 1.86* 1.32   CALCIUM 8.4* 8.2* 8.3*     Recent Labs     03/11/23  1202   APTT 37.2*   INR 1.22*         Recent Labs     03/10/23  0200   TRIGLYCERIDE 44   HDL 43   LDL 62       Imaging  US-EXTREMITY VENOUS LOWER BILAT   Final Result      US-CAROTID DOPPLER BILAT   Final Result      CT-CHEST,ABDOMEN,PELVIS W/O   Final Result         1.   Right inguinal adenopathy with adjacent hazy fat stranding could represent adenitis. Consider other causes of adenopathy with additional workup as clinically interpreted.   2.  Trace bilateral pleural effusions   3.  Diverticulosis   4.  Atherosclerosis and atherosclerotic coronary artery disease      DX-FOOT-2- RIGHT   Final Result      Great toe soft tissue swelling. No radiographic evidence of osteomyelitis.      DX-CHEST-PORTABLE (1 VIEW)   Final Result      No acute cardiopulmonary abnormality.      EC-ECHOCARDIOGRAM COMPLETE W/O CONT   Final Result      DX-CHEST-PORTABLE (1 VIEW)   Final Result      No acute cardiac or pulmonary abnormalities are identified.           Assessment/Plan  * Cellulitis of right lower extremity  Assessment & Plan  Significant cellulitis noted of the right lower extremity with edema erythema warmth pain.  Likely nidus was abrasion to the right third toe dorsum.  Margin of erythema has been marked.  Interesting CT chest abdomen pelvis showed right inguinal lymphadenopathy.  This is reactive from the significant cellulitis right lower extremity.  negative venous ultrasound   3/11: Ordered Unasyn IV in addition to doxycycline IV.    Intractable hiccups- (present on admission)  Assessment & Plan  CT abdomen pelvis negative.  3/11 ordered Thorazine IV x1.    Sepsis associated hypotension (HCC)- (present on admission)  Assessment & Plan  This is Sepsis Present on admission  SIRS criteria identified on my evaluation include: Tachycardia, with heart rate greater than 90 BPM and Leukocytosis, with WBC greater than 12,000  Source is RLE cellulitis  Sepsis protocol initiated  Fluid resuscitation ordered per protocol  Crystalloid Fluid Administration: Fluid resuscitation ordered per standard protocol - 30 mL/kg per current or ideal body weight  IV antibiotics as appropriate for source of sepsis  Reassessment: I have reassessed the patient's hemodynamic status          History of stroke-  (present on admission)  Assessment & Plan  The only sequelae are short term memory problems and daily headaches.   He has not been on a statin nor aspirin nor blood pressure medications all of which are indicated.     Chest pain- (present on admission)  Assessment & Plan  Chest pain with lateral ST depression  Troponin is negative  Serial troponins and echocardiogram ordered.   Cardiology consulted.   Aspirin ordered.   Last LDL was 118 in 2021 and he has not been on a statin. Lipid panel ordered for the morning. Empiric Lipitor 40 mg.  Cardiac stress test ordered and will be cancelled if troponins rise   3/11: Given swollen right lower extremity and cellulitis, I have ordered venous ultrasound to rule out DVT.  If it is positive then chest pain could be due to a PE.  Continue heparin drip for now.    AURELIANO (acute kidney injury) (HCC)- (present on admission)  Assessment & Plan  Cr in 2021 was 1.4 and is currently 1.69  Refrain from IV fluids  Monitor renal function in the morning.    Primary hypertension- (present on admission)  Assessment & Plan  He has not been taking any prescription medications           VTE prophylaxis: enoxaparin ppx    I have performed a physical exam and reviewed and updated ROS and Plan today (3/12/2023). In review of yesterday's note (3/11/2023), there are no changes except as documented above.

## 2023-03-13 NOTE — DISCHARGE INSTRUCTIONS
Cellulitis, Adult    Cellulitis is a skin infection. The infected area is often warm, red, swollen, and sore. It occurs most often in the arms and lower legs. It is very important to get treated for this condition.  What are the causes?  This condition is caused by bacteria. The bacteria enter through a break in the skin, such as a cut, burn, insect bite, open sore, or crack.  What increases the risk?  This condition is more likely to occur in people who:  Have a weak body defense system (immune system).  Have open cuts, burns, bites, or scrapes on the skin.  Are older than 60 years of age.  Have a blood sugar problem (diabetes).  Have a long-lasting (chronic) liver disease (cirrhosis) or kidney disease.  Are very overweight (obese).  Have a skin problem, such as:  Itchy rash (eczema).  Slow movement of blood in the veins (venous stasis).  Fluid buildup below the skin (edema).  Have been treated with high-energy rays (radiation).  Use IV drugs.  What are the signs or symptoms?  Symptoms of this condition include:  Skin that is:  Red.  Streaking.  Spotting.  Swollen.  Sore or painful when you touch it.  Warm.  A fever.  Chills.  Blisters.  How is this diagnosed?  This condition is diagnosed based on:  Medical history.  Physical exam.  Blood tests.  Imaging tests.  How is this treated?  Treatment for this condition may include:  Medicines to treat infections or allergies.  Home care, such as:  Rest.  Placing cold or warm cloths (compresses) on the skin.  Hospital care, if the condition is very bad.  Follow these instructions at home:  Medicines  Take over-the-counter and prescription medicines only as told by your doctor.  If you were prescribed an antibiotic medicine, take it as told by your doctor. Do not stop taking it even if you start to feel better.  General instructions    Drink enough fluid to keep your pee (urine) pale yellow.  Do not touch or rub the infected area.  Raise (elevate) the infected area above  the level of your heart while you are sitting or lying down.  Place cold or warm cloths on the area as told by your doctor.  Keep all follow-up visits as told by your doctor. This is important.  Contact a doctor if:  You have a fever.  You do not start to get better after 1-2 days of treatment.  Your bone or joint under the infected area starts to hurt after the skin has healed.  Your infection comes back. This can happen in the same area or another area.  You have a swollen bump in the area.  You have new symptoms.  You feel ill and have muscle aches and pains.  Get help right away if:  Your symptoms get worse.  You feel very sleepy.  You throw up (vomit) or have watery poop (diarrhea) for a long time.  You see red streaks coming from the area.  Your red area gets larger.  Your red area turns dark in color.  These symptoms may represent a serious problem that is an emergency. Do not wait to see if the symptoms will go away. Get medical help right away. Call your local emergency services (911 in the [place] Do not drive yourself to the hospital.  Summary  Cellulitis is a skin infection. The area is often warm, red, swollen, and sore.  This condition is treated with medicines, rest, and cold and warm cloths.  Take all medicines only as told by your doctor.  Tell your doctor if symptoms do not start to get better after 1-2 days of treatment.  This information is not intended to replace advice given to you by your health care provider. Make sure you discuss any questions you have with your health care provider.  Document Released: 06/05/2009 Document Revised: 05/09/2019 Document Reviewed: 05/09/2019  [employer] Patient Education © 2020 [employer]

## 2023-03-13 NOTE — FACE TO FACE
Face to Face Note  -  Durable Medical Equipment    Mary Lou Becker M.D. - NPI: [id number]  I certify that this patient is under my care and that they have had a durable medical equipment(DME)face to face encounter by myself that meets the physician DME face-to-face encounter requirements with this patient on:    Date of encounter:   Patient:                    MRN:                       YOB: 20[birth year]  Paul Hopper  [id number]  1965     The encounter with the patient was in whole, or in part, for the following medical condition, which is the primary reason for durable medical equipment:  Other - cellulitis    I certify that, based on my findings, the following durable medical equipment is medically necessary:  Walkers.        ------------------------------------------------------------------------------------------------------------------    Face to Face Supporting Documentation - Home Health    The encounter with this patient was in whole or in part the primary reason for home health admission.    Date of encounter:   Patient:                    MRN:                       YOB: 2021  Paul Hopper  [id number]  1965     Home health to see patient for:  Skilled Nursing care for assessment, interventions & education, Home health aide, Physical Therapy evaluation and treatment, and Occupational therapy evaluation and treatment    Skilled need for:  New Onset Medical Diagnosis RLE cellulitis/sepsis    Skilled nursing interventions to include:  Comment: home safety    Homebound evidenced status by:  Need the aid of supportive devices such as crutches, canes, wheelchairs or walkers. Leaving home must require a considerable and taxing effort. There must exist a normal inability to leave the home.    Community Physician to provide follow up care: Sam Sheikh M.D.     Optional Interventions    Wound information & treatment:    Home Infusion Therapy orders:     Line/Drain/Airway:    I certify the face to face encounter for this home care referral meets the CMS requirements and the encounter/clinical assessment with the patient was, in whole, or in part, for the medical condition(s) listed above, which is the primary reason for home health care. Based on my clinical findings: the service(s) are medically necessary, support the need for home health care, and the homebound criteria are met.  I certify that this patient has had a face to face encounter by myself.  Mary Lou Becker M.D. - NPI: [id number]    *Debility, frailty and advanced age in the absence of an acute deterioration or exacerbation of a condition do not qualify a patient for home health.

## 2023-03-13 NOTE — CARE PLAN
The patient is Stable - Low risk of patient condition declining or worsening    Shift Goals  Clinical Goals: Improve O2 needs  Patient Goals: Comfort, Rest  Family Goals: GAYATRI    Progress made toward(s) clinical / shift goals:    Problem: Knowledge Deficit - Standard  Goal: Patient and family/care givers will demonstrate understanding of plan of care, disease process/condition, diagnostic tests and medications  Outcome: Progressing  Plan of care rec\viewed with pt who verbalizes understanding and agreement.     Problem: Pain - Standard  Goal: Alleviation of pain or a reduction in pain to the patient’s comfort goal  Outcome: Progressing  Pain controlled throughout shift with non-pharmacological interventions.     Problem: Hemodynamics  Goal: Patient's hemodynamics, fluid balance and neurologic status will be stable or improve  Outcome: Progressing  Pt VS remains stable throughout shift, no acute neuro changes.     Problem: Fall Risk  Goal: Patient will remain free from falls  Outcome: Progressing  Pt remains free from falls throughout shift, calls appropriately for assistance.        Patient is not progressing towards the following goals:

## 2023-03-13 NOTE — DISCHARGE SUMMARY
Discharge Summary    CHIEF COMPLAINT ON ADMISSION  Chief Complaint   Patient presents with    Chest Pain    Cough       Reason for Admission  Chest pain/ cellulitis RLE    Admission Date  3/9/2023    CODE STATUS  Full Code    HPI & HOSPITAL COURSE   Paul Hopper is a 57 y.o. male who presented 3/9/2023 with chest pain.  Mr. Hopper has a past medical history of an acute ischemic brainstem stroke 2021 for which he was admitted here.  Subsequently he has not been taking his aspirin, beta-blocker, statin.  He presents today after waking up with right-sided and central chest pain that started 3 this morning.  He states the pain does not radiate.  He denies nausea and diaphoresis and no shortness of breath.  In the emergency room he did have some ST depression laterally though a negative troponin.  Cardiology has been consulted.  Will be placed under observation status with serial troponins and echocardiogram and a stress test has been ordered for the morning if his troponins do not elevate.     Interval Problem Update  3/10  Patient had hypotensive episode in nuclear med   BP measured to be 68/39 diaphoretic and syncopal.  Rapid response initiated.   Treated with a septic protocol fluid bolus  IV antibiotics ceftriaxone and doxycycline initiated  Search for source  Blood cultures, chest film appears clear on my exam, UA ordered  Lactic pending, Pro-Chato pending  Patient is NOT hemodynamically stable at this time has been resuscitated with 3.6 L IVF at this time  Critical procalcitonin at 58.4  Continue to monitor and actively manage patient care     2:33 PM  Lactic back up to 3 from 2.6  1 liter bolus ordered  Total fluid is + 5 L  Increased IVF to 125mL/hr  Repeat lactic at 1530     CT chest abdomen pelvis ordered  Concern for abdominal infection in setting of 2 to 3-week course of hiccups  X-ray foot ordered to rule out osteo in setting of chronic toe wound to right third toe     3/11:  finding of right inguinal  lymphadenopathy on CT abdomen chest pelvis correlates with significant warmth erythema cellulitis from right third toe abrasion.  Margin of erythema ends at the proximal tib-fib area.  Continue doxycycline and Unasyn IV.  Blood cultures pending.  Patient clinically is improving with start of antibiotics.  White blood cell count decreased from 19 to 17.  No longer having fever or chills.  Ordered a venous ultrasound to ensure there is no underlying DVT.  Discussed with friends family members at patient bedside.  Currently on heparin drip per cardiology for possible cardiac ischemia.  In the of acute sepsis from cellulitis no current intervention.  Treatment of persistent hiccups for 1 week with Thorazine IV x1.  3/12:  improving cellulitis noted today, less erythema, warmth to touch on exam.  No chest pain.  BP stable.  Venous u/s negative for dvt. Cr improved with IVFs.  Procal 54 to 15.    Patient's wbc has normalized to 9, significant improvement of RLE cellulitis on exam, ambulating with FWW, eating and on RA.  No further chest pain, EKGs reviewed no ischemic changes, troponin T highest was 20.    Patient received 4 days of IV antibiotics doxycycline and unasyn and will complete 10 days total antibiotics with po doxycycline and augmentin.     Cardiology recommending cardiac catheterization set up as outpatient given his echo findings of hypokinesis inferior wall, EF 50%.    Work note off for additional 7 days, return 3/20/23.         Therefore, he is discharged in good and stable condition to home with organized home healthcare and close outpatient follow-up.    The patient met 2-midnight criteria for an inpatient stay at the time of discharge.    Discharge Date  3/13/23    FOLLOW UP ITEMS POST DISCHARGE  Follow up with PCP recheck blood pressure and cellulitis.  Follow up with cardiology in 2 weeks.    DISCHARGE DIAGNOSES  Principal Problem:    Cellulitis of right lower extremity POA: Yes  Active Problems:     Primary hypertension POA: Yes    Chest pain POA: Yes    History of stroke POA: Yes    Sepsis associated hypotension (HCC) POA: Yes  Resolved Problems:    AURELIANO (acute kidney injury) (HCC) POA: Yes    Intractable hiccups POA: Yes      FOLLOW UP  No future appointments.  No follow-up provider specified.    MEDICATIONS ON DISCHARGE     Medication List        START taking these medications        Instructions   amoxicillin-clavulanate 875-125 MG Tabs  Commonly known as: AUGMENTIN   Take 1 Tablet by mouth 2 times a day for 6 days.  Dose: 1 Tablet     aspirin 81 MG EC tablet  Start taking on: March 14, 2023   Take 1 Tablet by mouth every day.  Dose: 81 mg     atorvastatin 40 MG Tabs  Commonly known as: LIPITOR   Take 1 Tablet by mouth every evening.  Dose: 40 mg     doxycycline monohydrate 100 MG tablet  Commonly known as: ADOXA   Take 1 Tablet by mouth every 12 hours for 6 days.  Dose: 100 mg     lisinopril 40 MG tablet  Start taking on: March 14, 2023  Commonly known as: PRINIVIL   Take 1 Tablet by mouth every day.  Dose: 40 mg     metoprolol tartrate 25 MG Tabs  Commonly known as: LOPRESSOR   Take 0.5 Tablets by mouth 2 times a day.  Dose: 12.5 mg     omeprazole 20 MG delayed-release capsule  Start taking on: March 14, 2023  Commonly known as: PRILOSEC   Take 1 Capsule by mouth every day.  Dose: 20 mg              Allergies  Allergies   Allergen Reactions    Other Food Anaphylaxis     All fresh fruit causes throat swelling per brother.        DIET  Orders Placed This Encounter   Procedures    Diet Order Diet: Cardiac     Standing Status:   Standing     Number of Occurrences:   1     Order Specific Question:   Diet:     Answer:   Cardiac [6]       ACTIVITY  As tolerated.  Weight bearing as tolerated    CONSULTATIONS  cardiology    PROCEDURES    3/10/2023 7:59 PM     HISTORY/REASON FOR EXAM:  Concern for abdominal source of sepsis     TECHNIQUE/EXAM DESCRIPTION:     CT scan of the chest, abdomen and pelvis without contrast  was performed.     Noncontrast helical scanning of the chest, abdomen and pelvis was obtained from the lung apices through the pubic symphysis.     Low dose optimization technique was utilized for this CT exam including automated exposure control and adjustment of the mA and/or kV according to patient size.     COMPARISON:  None.     FINDINGS:     CT Chest:     Lungs: 3.2 mm right upper lobe pulmonary nodule seen on image 32.     Mediastinum/Ana Maria: No significant adenopathy.     Pleura: Trace bilateral pleural effusions are seen.     Cardiac: Heart normal in size without pericardial effusion.     Vascular: Atherosclerotic changes are seen including atherosclerotic coronary artery calcifications.     Soft tissues: Unremarkable.     Bones: No acute or destructive process     CT Abdomen and Pelvis:     Liver: Normal.     Spleen: Unremarkable.     Pancreas: Unremarkable.     Gallbladder: No calcified stones.     Biliary: Nondilated.     Adrenal glands: Normal.     Kidneys: Unremarkable without hydronephrosis.     Bowel: No obstruction or acute inflammation. Scattered colonic diverticula are seen. The appendix appears within normal limits.     Lymph nodes: Enlarged right inguinal lymph nodes are seen measuring up to 1.6 cm in short axis with adjacent hazy fat stranding.     Vasculature: Unremarkable.     Peritoneum: Unremarkable without ascites.     Musculoskeletal: No acute or destructive process.     Pelvis: No adenopathy or free fluid.     IMPRESSION:        1.  Right inguinal adenopathy with adjacent hazy fat stranding could represent adenitis. Consider other causes of adenopathy with additional workup as clinically interpreted.  2.  Trace bilateral pleural effusions  3.  Diverticulosis  4.  Atherosclerosis and atherosclerotic coronary artery disease           Exam Ended: 03/10/23  8:12 PM Last Resulted: 03/10/23  8:21 PM         Transthoracic  Echo Report        Echocardiography Laboratory     CONCLUSIONS  Compared  to the prior study on 2021, no significant changes.  The left ventricular ejection fraction is visually estimated to be 50%.  Inferior and inferolateral wall hypokinesis.   No significant valvular disease.      MIRANDA REARDON  Exam Date:         2023                      20:18  Exam Location:     Inpatient  Priority:          Routine     Ordering Physician:        CLINT FINNEY  Referring Physician:  Sonographer:               Amanda Villarreal                              [facility]     Age:    57     Gender:    M  MRN:    [id number]  :    1965  BSA:    1.98   Ht (in):    68     Wt (lb):    185  Exam Type:     Complete     Indications:     Chest Pain  ICD Codes:       786.5     CPT Codes:       76293     BP:   137    /   68     HR:   96  Technical Quality:     MEASUREMENTS  (Male / Female) Normal Values  2D ECHO  LV Diastolic Diameter PLAX        3.5 cm                4.2 - 5.9 / 3.9 - 5.3   cm  LV Systolic Diameter PLAX         2.4 cm                2.1 - 4.0 cm  IVS Diastolic Thickness           1.3 cm                  LVPW Diastolic Thickness          1.4 cm                  LVOT Diameter                     2 cm                    Estimated LV Ejection Fraction    50 %                    LV Ejection Fraction MOD BP       56.8 %                >= 55  %  LV Ejection Fraction MOD 4C       52.2 %                  LV Ejection Fraction MOD 2C       60.9 %                  LV Ejection Fraction 4C AL        56.8 %                  LV Ejection Fraction 2C AL        63.9 %                  LA Volume Index                   14.8 cm3/m2           16 - 28 cm3/m2     DOPPLER  AV Peak Velocity                  1.4 m/s                 AV Peak Gradient                  8 mmHg                  AV Mean Gradient                  4 mmHg                  LVOT Peak Velocity                1.1 m/s                 AV Area Cont Eq vti               2.6 cm2                 LV E' Lateral Velocity            9.7 cm/s                 LV E' Septal Velocity             7.2 cm/s                   * Indicates values subject to auto-interpretation  LV EF:  50    %     FINDINGS  Left Ventricle  Normal left ventricular size, thickness, and systolic function. Mild   concentric left ventricular hypertrophy. The left ventricular ejection   fraction is visually estimated to be 50%. Inferior and inferolateral   wall hypokinesis.     Right Ventricle  The right ventricle is normal in size and systolic function.     Right Atrium  The right atrium is normal in size. Normal inferior vena cava size and   inspiratory collapse.     Left Atrium  The left atrium is normal in size. Left atrial volume index is 14.53   mL/sq m.     Mitral Valve  No mitral stenosis. Trace mitral regurgitation. Thickened mitral valve   leaflets.     Aortic Valve  Structurally normal aortic valve without significant stenosis or   regurgitation.     Tricuspid Valve  Structurally normal tricuspid valve without significant stenosis or   regurgitation.     Pulmonic Valve  Structurally normal pulmonic valve without significant stenosis or   regurgitation.     Pericardium  No pericardial effusion.  Left pleural effusion present.     Aorta  Normal aortic root for body surface area. The ascending aorta diameter   is 2.9 cm.                                Michael Ospina MD  (Electronically Signed)  Final Date:     10 March 2023                   14:29    LABORATORY  Lab Results   Component Value Date    SODIUM 136 03/13/2023    POTASSIUM 3.6 03/13/2023    CHLORIDE 106 03/13/2023    CO2 20 03/13/2023    GLUCOSE 89 03/13/2023    BUN 18 03/13/2023    CREATININE 1.37 03/13/2023        Lab Results   Component Value Date    WBC 9.0 03/13/2023    HEMOGLOBIN 12.1 (L) 03/13/2023    HEMATOCRIT 34.1 (L) 03/13/2023    PLATELETCT 119 (L) 03/13/2023        Total time of the discharge process exceeds 45 minutes.

## 2023-03-13 NOTE — PROGRESS NOTES
Notified by CNA that pt's /119. This RN assessed pt who is asymptomatic, rechecked BP and got 188/105. BEREKET Ortega, on call hospitalist, notified at 2043, new orders for PRN Hydralazine. PRN Hydralazine administered.

## 2023-03-13 NOTE — DOCUMENTATION QUERY
[facility]                                                                       Query Response Note      PATIENT:               MIRANDA REARDON  ACCT #:                  [id number]  MRN:                     [id number]  :                      1965  ADMIT DATE:       3/9/2023 12:27 PM  DISCH DATE:          RESPONDING  PROVIDER #:        [id number]           QUERY TEXT:    Please clarify in documentation the relationship, if any, between AURELIANO and sepsis.      The patient's Clinical Indicators include:  Progress Notes: Sepsis associated hypotension POA.  Source is RLE cellulitis.  AURELIANO POA.    3/10 Creatinine 2.67  3/13 Creatinine 1.37  Risk Factors: sepsis  Treatment: trend chemistry/ monitor renal function, IVF    Thank you,  Weston Kamara RN, BSN  Clinical   Connect via [employer]  Options provided:   -- Acute kidney injury is due to or associated with sepsis   -- Unrelated to each other   -- Other explanation, please specify   -- Unable to determine      Query created by: Weston Kamara on 3/13/2023 2:11 PM    RESPONSE TEXT:    Acute kidney injury is due to or associated with sepsis       QUERY TEXT:    Lactic acid 3.0 is noted in the 3/10 Lab Results.  Can a diagnosis be provided to support this finding?    The patient's clinical indicators include:  3/10 Lactic acid: 2.6, 3.0, 2.3, 1.8  Risk Factors : sepsis, cellulitis  Treatment: trend lactic acid, IVF    Thank you,  Weston Kamara RN, BSN  Clinical   Connect via [employer]  Options provided:   -- Lactic acidosis   -- Other explanation, please specify   -- Finding of no clinical significance   -- Unable to determine      Query created by: Weston Kamara on 3/13/2023 2:14 PM    RESPONSE TEXT:    Lactic acidosis          Electronically signed by:  ERICH HART MD 3/13/2023 4:40 PM

## 2023-03-14 NOTE — PROGRESS NOTES
DC instructions reviewed w/ pt, verbalized understanding. Meds to bed delivered. DC home in stable condition.

## 2023-03-15 LAB
BACTERIA BLD CULT: NORMAL
BACTERIA BLD CULT: NORMAL
SIGNIFICANT IND 70042: NORMAL
SIGNIFICANT IND 70042: NORMAL
SITE SITE: NORMAL
SITE SITE: NORMAL
SOURCE SOURCE: NORMAL
SOURCE SOURCE: NORMAL

## 2023-03-27 ENCOUNTER — APPOINTMENT (OUTPATIENT)
Dept: RADIOLOGY | Facility: MEDICAL CENTER | Age: 58
End: 2023-03-27
Attending: EMERGENCY MEDICINE
Payer: COMMERCIAL

## 2023-03-27 ENCOUNTER — HOSPITAL ENCOUNTER (OUTPATIENT)
Facility: MEDICAL CENTER | Age: 58
End: 2023-03-29
Attending: EMERGENCY MEDICINE | Admitting: INTERNAL MEDICINE
Payer: COMMERCIAL

## 2023-03-27 DIAGNOSIS — L03.115 CELLULITIS OF RIGHT LOWER EXTREMITY: ICD-10-CM

## 2023-03-27 DIAGNOSIS — I10 HYPERTENSION, UNSPECIFIED TYPE: ICD-10-CM

## 2023-03-27 PROBLEM — L03.90 CELLULITIS: Status: ACTIVE | Noted: 2023-03-27

## 2023-03-27 LAB
ALBUMIN SERPL BCP-MCNC: 3.5 G/DL (ref 3.2–4.9)
ALBUMIN/GLOB SERPL: 0.9 G/DL
ALP SERPL-CCNC: 63 U/L (ref 30–99)
ALT SERPL-CCNC: 14 U/L (ref 2–50)
ANION GAP SERPL CALC-SCNC: 9 MMOL/L (ref 7–16)
APPEARANCE UR: CLEAR
AST SERPL-CCNC: 20 U/L (ref 12–45)
BASOPHILS # BLD AUTO: 0.6 % (ref 0–1.8)
BASOPHILS # BLD: 0.04 K/UL (ref 0–0.12)
BILIRUB SERPL-MCNC: 1.2 MG/DL (ref 0.1–1.5)
BILIRUB UR QL STRIP.AUTO: NEGATIVE
BUN SERPL-MCNC: 8 MG/DL (ref 8–22)
CALCIUM ALBUM COR SERPL-MCNC: 9 MG/DL (ref 8.5–10.5)
CALCIUM SERPL-MCNC: 8.6 MG/DL (ref 8.5–10.5)
CHLORIDE SERPL-SCNC: 103 MMOL/L (ref 96–112)
CO2 SERPL-SCNC: 26 MMOL/L (ref 20–33)
COLOR UR: YELLOW
CREAT SERPL-MCNC: 1.32 MG/DL (ref 0.5–1.4)
EOSINOPHIL # BLD AUTO: 0.17 K/UL (ref 0–0.51)
EOSINOPHIL NFR BLD: 2.7 % (ref 0–6.9)
ERYTHROCYTE [DISTWIDTH] IN BLOOD BY AUTOMATED COUNT: 47.7 FL (ref 35.9–50)
GFR SERPLBLD CREATININE-BSD FMLA CKD-EPI: 63 ML/MIN/1.73 M 2
GLOBULIN SER CALC-MCNC: 4 G/DL (ref 1.9–3.5)
GLUCOSE SERPL-MCNC: 87 MG/DL (ref 65–99)
GLUCOSE UR STRIP.AUTO-MCNC: NEGATIVE MG/DL
HCT VFR BLD AUTO: 32.4 % (ref 42–52)
HGB BLD-MCNC: 11.8 G/DL (ref 14–18)
IMM GRANULOCYTES # BLD AUTO: 0.02 K/UL (ref 0–0.11)
IMM GRANULOCYTES NFR BLD AUTO: 0.3 % (ref 0–0.9)
KETONES UR STRIP.AUTO-MCNC: NEGATIVE MG/DL
LACTATE SERPL-SCNC: 1 MMOL/L (ref 0.5–2)
LEUKOCYTE ESTERASE UR QL STRIP.AUTO: NEGATIVE
LYMPHOCYTES # BLD AUTO: 2.08 K/UL (ref 1–4.8)
LYMPHOCYTES NFR BLD: 33 % (ref 22–41)
MCH RBC QN AUTO: 35.6 PG (ref 27–33)
MCHC RBC AUTO-ENTMCNC: 36.4 G/DL (ref 33.7–35.3)
MCV RBC AUTO: 97.9 FL (ref 81.4–97.8)
MICRO URNS: NORMAL
MONOCYTES # BLD AUTO: 0.5 K/UL (ref 0–0.85)
MONOCYTES NFR BLD AUTO: 7.9 % (ref 0–13.4)
NEUTROPHILS # BLD AUTO: 3.5 K/UL (ref 1.82–7.42)
NEUTROPHILS NFR BLD: 55.5 % (ref 44–72)
NITRITE UR QL STRIP.AUTO: NEGATIVE
NRBC # BLD AUTO: 0 K/UL
NRBC BLD-RTO: 0 /100 WBC
NT-PROBNP SERPL IA-MCNC: 263 PG/ML (ref 0–125)
PH UR STRIP.AUTO: 7.5 [PH] (ref 5–8)
PLATELET # BLD AUTO: 209 K/UL (ref 164–446)
PMV BLD AUTO: 8.3 FL (ref 9–12.9)
POTASSIUM SERPL-SCNC: 3.6 MMOL/L (ref 3.6–5.5)
PROT SERPL-MCNC: 7.5 G/DL (ref 6–8.2)
PROT UR QL STRIP: NEGATIVE MG/DL
RBC # BLD AUTO: 3.31 M/UL (ref 4.7–6.1)
RBC UR QL AUTO: NEGATIVE
SODIUM SERPL-SCNC: 138 MMOL/L (ref 135–145)
SP GR UR STRIP.AUTO: 1.01
UROBILINOGEN UR STRIP.AUTO-MCNC: 0.2 MG/DL
WBC # BLD AUTO: 6.3 K/UL (ref 4.8–10.8)

## 2023-03-27 PROCEDURE — 96376 TX/PRO/DX INJ SAME DRUG ADON: CPT

## 2023-03-27 PROCEDURE — A9270 NON-COVERED ITEM OR SERVICE: HCPCS | Performed by: INTERNAL MEDICINE

## 2023-03-27 PROCEDURE — 87040 BLOOD CULTURE FOR BACTERIA: CPT | Mod: 91

## 2023-03-27 PROCEDURE — 700101 HCHG RX REV CODE 250: Performed by: EMERGENCY MEDICINE

## 2023-03-27 PROCEDURE — 700111 HCHG RX REV CODE 636 W/ 250 OVERRIDE (IP): Performed by: INTERNAL MEDICINE

## 2023-03-27 PROCEDURE — 700111 HCHG RX REV CODE 636 W/ 250 OVERRIDE (IP): Performed by: EMERGENCY MEDICINE

## 2023-03-27 PROCEDURE — 99222 1ST HOSP IP/OBS MODERATE 55: CPT | Performed by: INTERNAL MEDICINE

## 2023-03-27 PROCEDURE — 96372 THER/PROPH/DIAG INJ SC/IM: CPT | Mod: XU

## 2023-03-27 PROCEDURE — 80053 COMPREHEN METABOLIC PANEL: CPT

## 2023-03-27 PROCEDURE — 96365 THER/PROPH/DIAG IV INF INIT: CPT

## 2023-03-27 PROCEDURE — 99285 EMERGENCY DEPT VISIT HI MDM: CPT

## 2023-03-27 PROCEDURE — 96375 TX/PRO/DX INJ NEW DRUG ADDON: CPT

## 2023-03-27 PROCEDURE — 81003 URINALYSIS AUTO W/O SCOPE: CPT

## 2023-03-27 PROCEDURE — 700105 HCHG RX REV CODE 258: Performed by: EMERGENCY MEDICINE

## 2023-03-27 PROCEDURE — 83605 ASSAY OF LACTIC ACID: CPT

## 2023-03-27 PROCEDURE — 36415 COLL VENOUS BLD VENIPUNCTURE: CPT

## 2023-03-27 PROCEDURE — 700102 HCHG RX REV CODE 250 W/ 637 OVERRIDE(OP): Performed by: INTERNAL MEDICINE

## 2023-03-27 PROCEDURE — 83880 ASSAY OF NATRIURETIC PEPTIDE: CPT

## 2023-03-27 PROCEDURE — 85025 COMPLETE CBC W/AUTO DIFF WBC: CPT

## 2023-03-27 PROCEDURE — 770006 HCHG ROOM/CARE - MED/SURG/GYN SEMI*

## 2023-03-27 PROCEDURE — 93970 EXTREMITY STUDY: CPT

## 2023-03-27 RX ORDER — PROMETHAZINE HYDROCHLORIDE 25 MG/1
12.5-25 TABLET ORAL EVERY 4 HOURS PRN
Status: DISCONTINUED | OUTPATIENT
Start: 2023-03-27 | End: 2023-03-29 | Stop reason: HOSPADM

## 2023-03-27 RX ORDER — LABETALOL HYDROCHLORIDE 5 MG/ML
10 INJECTION, SOLUTION INTRAVENOUS ONCE
Status: COMPLETED | OUTPATIENT
Start: 2023-03-27 | End: 2023-03-27

## 2023-03-27 RX ORDER — LISINOPRIL 20 MG/1
40 TABLET ORAL DAILY
Status: DISCONTINUED | OUTPATIENT
Start: 2023-03-27 | End: 2023-03-29 | Stop reason: HOSPADM

## 2023-03-27 RX ORDER — ACETAMINOPHEN 325 MG/1
650 TABLET ORAL EVERY 6 HOURS PRN
Status: DISCONTINUED | OUTPATIENT
Start: 2023-03-27 | End: 2023-03-29 | Stop reason: HOSPADM

## 2023-03-27 RX ORDER — MORPHINE SULFATE 4 MG/ML
4 INJECTION INTRAVENOUS
Status: DISCONTINUED | OUTPATIENT
Start: 2023-03-27 | End: 2023-03-29 | Stop reason: HOSPADM

## 2023-03-27 RX ORDER — ENOXAPARIN SODIUM 100 MG/ML
40 INJECTION SUBCUTANEOUS DAILY
Status: DISCONTINUED | OUTPATIENT
Start: 2023-03-27 | End: 2023-03-29 | Stop reason: HOSPADM

## 2023-03-27 RX ORDER — BISACODYL 10 MG
10 SUPPOSITORY, RECTAL RECTAL
Status: DISCONTINUED | OUTPATIENT
Start: 2023-03-27 | End: 2023-03-29 | Stop reason: HOSPADM

## 2023-03-27 RX ORDER — LISINOPRIL 40 MG/1
40 TABLET ORAL DAILY
Status: CANCELLED | OUTPATIENT
Start: 2023-03-28

## 2023-03-27 RX ORDER — ATORVASTATIN CALCIUM 40 MG/1
40 TABLET, FILM COATED ORAL EVERY EVENING
Status: CANCELLED | OUTPATIENT
Start: 2023-03-27

## 2023-03-27 RX ORDER — ATORVASTATIN CALCIUM 40 MG/1
40 TABLET, FILM COATED ORAL EVERY EVENING
Status: DISCONTINUED | OUTPATIENT
Start: 2023-03-28 | End: 2023-03-29 | Stop reason: HOSPADM

## 2023-03-27 RX ORDER — AMOXICILLIN AND CLAVULANATE POTASSIUM 875; 125 MG/1; MG/1
1 TABLET, FILM COATED ORAL 2 TIMES DAILY
Status: ON HOLD | COMMUNITY
Start: 2023-03-13 | End: 2023-03-29

## 2023-03-27 RX ORDER — DOXYCYCLINE 100 MG/1
100 TABLET ORAL 2 TIMES DAILY
Status: ON HOLD | COMMUNITY
Start: 2023-03-13 | End: 2023-03-29

## 2023-03-27 RX ORDER — PROCHLORPERAZINE EDISYLATE 5 MG/ML
5-10 INJECTION INTRAMUSCULAR; INTRAVENOUS EVERY 4 HOURS PRN
Status: DISCONTINUED | OUTPATIENT
Start: 2023-03-27 | End: 2023-03-29 | Stop reason: HOSPADM

## 2023-03-27 RX ORDER — OMEPRAZOLE 20 MG/1
20 CAPSULE, DELAYED RELEASE ORAL DAILY
Status: CANCELLED | OUTPATIENT
Start: 2023-03-28

## 2023-03-27 RX ORDER — OMEPRAZOLE 20 MG/1
20 CAPSULE, DELAYED RELEASE ORAL DAILY
Status: DISCONTINUED | OUTPATIENT
Start: 2023-03-28 | End: 2023-03-29 | Stop reason: HOSPADM

## 2023-03-27 RX ORDER — LABETALOL HYDROCHLORIDE 5 MG/ML
10 INJECTION, SOLUTION INTRAVENOUS EVERY 4 HOURS PRN
Status: DISCONTINUED | OUTPATIENT
Start: 2023-03-27 | End: 2023-03-29 | Stop reason: HOSPADM

## 2023-03-27 RX ORDER — OXYCODONE HYDROCHLORIDE 10 MG/1
10 TABLET ORAL
Status: DISCONTINUED | OUTPATIENT
Start: 2023-03-27 | End: 2023-03-29 | Stop reason: HOSPADM

## 2023-03-27 RX ORDER — AMOXICILLIN 250 MG
2 CAPSULE ORAL 2 TIMES DAILY
Status: DISCONTINUED | OUTPATIENT
Start: 2023-03-27 | End: 2023-03-29 | Stop reason: HOSPADM

## 2023-03-27 RX ORDER — POLYETHYLENE GLYCOL 3350 17 G/17G
1 POWDER, FOR SOLUTION ORAL
Status: DISCONTINUED | OUTPATIENT
Start: 2023-03-27 | End: 2023-03-29 | Stop reason: HOSPADM

## 2023-03-27 RX ORDER — PROMETHAZINE HYDROCHLORIDE 25 MG/1
12.5-25 SUPPOSITORY RECTAL EVERY 4 HOURS PRN
Status: DISCONTINUED | OUTPATIENT
Start: 2023-03-27 | End: 2023-03-29 | Stop reason: HOSPADM

## 2023-03-27 RX ORDER — HYDRALAZINE HYDROCHLORIDE 20 MG/ML
20 INJECTION INTRAMUSCULAR; INTRAVENOUS EVERY 6 HOURS PRN
Status: DISCONTINUED | OUTPATIENT
Start: 2023-03-27 | End: 2023-03-29 | Stop reason: HOSPADM

## 2023-03-27 RX ORDER — ONDANSETRON 4 MG/1
4 TABLET, ORALLY DISINTEGRATING ORAL EVERY 4 HOURS PRN
Status: DISCONTINUED | OUTPATIENT
Start: 2023-03-27 | End: 2023-03-29 | Stop reason: HOSPADM

## 2023-03-27 RX ORDER — HYDRALAZINE HYDROCHLORIDE 20 MG/ML
10 INJECTION INTRAMUSCULAR; INTRAVENOUS ONCE
Status: COMPLETED | OUTPATIENT
Start: 2023-03-27 | End: 2023-03-27

## 2023-03-27 RX ORDER — OXYCODONE HYDROCHLORIDE 5 MG/1
5 TABLET ORAL
Status: DISCONTINUED | OUTPATIENT
Start: 2023-03-27 | End: 2023-03-29 | Stop reason: HOSPADM

## 2023-03-27 RX ORDER — ONDANSETRON 2 MG/ML
4 INJECTION INTRAMUSCULAR; INTRAVENOUS EVERY 4 HOURS PRN
Status: DISCONTINUED | OUTPATIENT
Start: 2023-03-27 | End: 2023-03-29 | Stop reason: HOSPADM

## 2023-03-27 RX ADMIN — ENOXAPARIN SODIUM 40 MG: 100 INJECTION SUBCUTANEOUS at 23:42

## 2023-03-27 RX ADMIN — LISINOPRIL 40 MG: 20 TABLET ORAL at 23:42

## 2023-03-27 RX ADMIN — DOCUSATE SODIUM 50 MG AND SENNOSIDES 8.6 MG 2 TABLET: 8.6; 5 TABLET, FILM COATED ORAL at 23:42

## 2023-03-27 RX ADMIN — LABETALOL HYDROCHLORIDE 10 MG: 5 INJECTION INTRAVENOUS at 23:04

## 2023-03-27 RX ADMIN — LABETALOL HYDROCHLORIDE 10 MG: 5 INJECTION INTRAVENOUS at 21:01

## 2023-03-27 RX ADMIN — AMPICILLIN AND SULBACTAM 3 G: 1; 2 INJECTION, POWDER, FOR SOLUTION INTRAMUSCULAR; INTRAVENOUS at 21:09

## 2023-03-27 RX ADMIN — METOPROLOL TARTRATE 12.5 MG: 25 TABLET, FILM COATED ORAL at 23:42

## 2023-03-27 RX ADMIN — HYDRALAZINE HYDROCHLORIDE 10 MG: 20 INJECTION INTRAMUSCULAR; INTRAVENOUS at 22:23

## 2023-03-27 ASSESSMENT — FIBROSIS 4 INDEX: FIB4 SCORE: 4.1

## 2023-03-27 NOTE — ED TRIAGE NOTES
"Chief Complaint   Patient presents with    Leg Swelling     RLE. Pt supposed to be on blood thinners but states he ran out of his meds. Right leg intermittently tender, warm to the touch, and red.      Pt denies SOB/CP.       BP (!) 192/120 Comment: 192/120 Rt side; 194/127 Lf side  Pulse 88   Temp 36.5 °C (97.7 °F) (Oral)   Resp 18   Ht 1.753 m (5' 9\")   Wt 89.4 kg (197 lb 1.5 oz)   SpO2 98%   BMI 29.11 kg/m²     "

## 2023-03-28 PROBLEM — D53.9 MACROCYTIC ANEMIA: Status: ACTIVE | Noted: 2023-03-28

## 2023-03-28 PROBLEM — I51.89 LEFT VENTRICULAR HYPOKINESIS: Status: ACTIVE | Noted: 2023-03-28

## 2023-03-28 PROBLEM — Z91.199 NONCOMPLIANCE: Status: ACTIVE | Noted: 2023-03-28

## 2023-03-28 LAB
ALBUMIN SERPL BCP-MCNC: 3.7 G/DL (ref 3.2–4.9)
ALBUMIN/GLOB SERPL: 1 G/DL
ALP SERPL-CCNC: 69 U/L (ref 30–99)
ALT SERPL-CCNC: 13 U/L (ref 2–50)
ANION GAP SERPL CALC-SCNC: 11 MMOL/L (ref 7–16)
AST SERPL-CCNC: 18 U/L (ref 12–45)
BASOPHILS # BLD AUTO: 0.5 % (ref 0–1.8)
BASOPHILS # BLD: 0.03 K/UL (ref 0–0.12)
BILIRUB SERPL-MCNC: 1.3 MG/DL (ref 0.1–1.5)
BUN SERPL-MCNC: 8 MG/DL (ref 8–22)
CALCIUM ALBUM COR SERPL-MCNC: 9 MG/DL (ref 8.5–10.5)
CALCIUM SERPL-MCNC: 8.8 MG/DL (ref 8.5–10.5)
CHLORIDE SERPL-SCNC: 104 MMOL/L (ref 96–112)
CO2 SERPL-SCNC: 25 MMOL/L (ref 20–33)
CREAT SERPL-MCNC: 1.31 MG/DL (ref 0.5–1.4)
EOSINOPHIL # BLD AUTO: 0.14 K/UL (ref 0–0.51)
EOSINOPHIL NFR BLD: 2.2 % (ref 0–6.9)
ERYTHROCYTE [DISTWIDTH] IN BLOOD BY AUTOMATED COUNT: 49.5 FL (ref 35.9–50)
GFR SERPLBLD CREATININE-BSD FMLA CKD-EPI: 64 ML/MIN/1.73 M 2
GLOBULIN SER CALC-MCNC: 3.8 G/DL (ref 1.9–3.5)
GLUCOSE SERPL-MCNC: 91 MG/DL (ref 65–99)
HCT VFR BLD AUTO: 35.2 % (ref 42–52)
HGB BLD-MCNC: 12.3 G/DL (ref 14–18)
IMM GRANULOCYTES # BLD AUTO: 0.02 K/UL (ref 0–0.11)
IMM GRANULOCYTES NFR BLD AUTO: 0.3 % (ref 0–0.9)
LYMPHOCYTES # BLD AUTO: 2.07 K/UL (ref 1–4.8)
LYMPHOCYTES NFR BLD: 32 % (ref 22–41)
MCH RBC QN AUTO: 35.3 PG (ref 27–33)
MCHC RBC AUTO-ENTMCNC: 34.9 G/DL (ref 33.7–35.3)
MCV RBC AUTO: 101.1 FL (ref 81.4–97.8)
MONOCYTES # BLD AUTO: 0.55 K/UL (ref 0–0.85)
MONOCYTES NFR BLD AUTO: 8.5 % (ref 0–13.4)
NEUTROPHILS # BLD AUTO: 3.66 K/UL (ref 1.82–7.42)
NEUTROPHILS NFR BLD: 56.5 % (ref 44–72)
NRBC # BLD AUTO: 0 K/UL
NRBC BLD-RTO: 0 /100 WBC
PLATELET # BLD AUTO: 226 K/UL (ref 164–446)
PMV BLD AUTO: 8.4 FL (ref 9–12.9)
POTASSIUM SERPL-SCNC: 3.8 MMOL/L (ref 3.6–5.5)
PROT SERPL-MCNC: 7.5 G/DL (ref 6–8.2)
RBC # BLD AUTO: 3.48 M/UL (ref 4.7–6.1)
SODIUM SERPL-SCNC: 140 MMOL/L (ref 135–145)
WBC # BLD AUTO: 6.5 K/UL (ref 4.8–10.8)

## 2023-03-28 PROCEDURE — 80053 COMPREHEN METABOLIC PANEL: CPT

## 2023-03-28 PROCEDURE — 700105 HCHG RX REV CODE 258: Performed by: INTERNAL MEDICINE

## 2023-03-28 PROCEDURE — 99232 SBSQ HOSP IP/OBS MODERATE 35: CPT | Performed by: INTERNAL MEDICINE

## 2023-03-28 PROCEDURE — 700111 HCHG RX REV CODE 636 W/ 250 OVERRIDE (IP): Performed by: INTERNAL MEDICINE

## 2023-03-28 PROCEDURE — 85025 COMPLETE CBC W/AUTO DIFF WBC: CPT

## 2023-03-28 PROCEDURE — 700102 HCHG RX REV CODE 250 W/ 637 OVERRIDE(OP): Performed by: INTERNAL MEDICINE

## 2023-03-28 PROCEDURE — G0378 HOSPITAL OBSERVATION PER HR: HCPCS

## 2023-03-28 PROCEDURE — A9270 NON-COVERED ITEM OR SERVICE: HCPCS | Performed by: INTERNAL MEDICINE

## 2023-03-28 RX ADMIN — OMEPRAZOLE 20 MG: 20 CAPSULE, DELAYED RELEASE ORAL at 06:03

## 2023-03-28 RX ADMIN — AMPICILLIN AND SULBACTAM 3 G: 1; 2 INJECTION, POWDER, FOR SOLUTION INTRAMUSCULAR; INTRAVENOUS at 23:06

## 2023-03-28 RX ADMIN — METOPROLOL TARTRATE 12.5 MG: 25 TABLET, FILM COATED ORAL at 17:28

## 2023-03-28 RX ADMIN — ASPIRIN 81 MG: 81 TABLET, COATED ORAL at 06:03

## 2023-03-28 RX ADMIN — DOCUSATE SODIUM 50 MG AND SENNOSIDES 8.6 MG 2 TABLET: 8.6; 5 TABLET, FILM COATED ORAL at 06:04

## 2023-03-28 RX ADMIN — AMPICILLIN AND SULBACTAM 3 G: 1; 2 INJECTION, POWDER, FOR SOLUTION INTRAMUSCULAR; INTRAVENOUS at 06:05

## 2023-03-28 RX ADMIN — AMPICILLIN AND SULBACTAM 3 G: 1; 2 INJECTION, POWDER, FOR SOLUTION INTRAMUSCULAR; INTRAVENOUS at 02:23

## 2023-03-28 RX ADMIN — OXYCODONE 5 MG: 5 TABLET ORAL at 17:37

## 2023-03-28 RX ADMIN — METOPROLOL TARTRATE 12.5 MG: 25 TABLET, FILM COATED ORAL at 06:03

## 2023-03-28 RX ADMIN — AMPICILLIN AND SULBACTAM 3 G: 1; 2 INJECTION, POWDER, FOR SOLUTION INTRAMUSCULAR; INTRAVENOUS at 17:33

## 2023-03-28 RX ADMIN — AMPICILLIN AND SULBACTAM 3 G: 1; 2 INJECTION, POWDER, FOR SOLUTION INTRAMUSCULAR; INTRAVENOUS at 11:42

## 2023-03-28 RX ADMIN — ENOXAPARIN SODIUM 40 MG: 100 INJECTION SUBCUTANEOUS at 17:29

## 2023-03-28 RX ADMIN — ATORVASTATIN CALCIUM 40 MG: 40 TABLET, FILM COATED ORAL at 17:28

## 2023-03-28 ASSESSMENT — ENCOUNTER SYMPTOMS
WEIGHT LOSS: 0
SORE THROAT: 0
SPEECH CHANGE: 0
NAUSEA: 0
FOCAL WEAKNESS: 0
HEARTBURN: 0
PALPITATIONS: 0
HEMOPTYSIS: 0
ORTHOPNEA: 0
COUGH: 0
FEVER: 0
ABDOMINAL PAIN: 0
FLANK PAIN: 0
HEADACHES: 0
BACK PAIN: 0
VOMITING: 0
SPUTUM PRODUCTION: 0
SHORTNESS OF BREATH: 0
FALLS: 0
BLURRED VISION: 0
NERVOUS/ANXIOUS: 0
POLYDIPSIA: 0
PHOTOPHOBIA: 0
HALLUCINATIONS: 0
TREMORS: 0
SEIZURES: 0
DOUBLE VISION: 0
CHILLS: 0
DIARRHEA: 0
BRUISES/BLEEDS EASILY: 0
NECK PAIN: 0

## 2023-03-28 ASSESSMENT — PATIENT HEALTH QUESTIONNAIRE - PHQ9
9. THOUGHTS THAT YOU WOULD BE BETTER OFF DEAD, OR OF HURTING YOURSELF: NOT AT ALL
SUM OF ALL RESPONSES TO PHQ QUESTIONS 1-9: 7
4. FEELING TIRED OR HAVING LITTLE ENERGY: NEARLY EVERY DAY
3. TROUBLE FALLING OR STAYING ASLEEP OR SLEEPING TOO MUCH: NOT AT ALL
7. TROUBLE CONCENTRATING ON THINGS, SUCH AS READING THE NEWSPAPER OR WATCHING TELEVISION: NOT AT ALL
SUM OF ALL RESPONSES TO PHQ9 QUESTIONS 1 AND 2: 2
8. MOVING OR SPEAKING SO SLOWLY THAT OTHER PEOPLE COULD HAVE NOTICED. OR THE OPPOSITE, BEING SO FIGETY OR RESTLESS THAT YOU HAVE BEEN MOVING AROUND A LOT MORE THAN USUAL: NOT AT ALL
2. FEELING DOWN, DEPRESSED, IRRITABLE, OR HOPELESS: SEVERAL DAYS
5. POOR APPETITE OR OVEREATING: MORE THAN HALF THE DAYS
6. FEELING BAD ABOUT YOURSELF - OR THAT YOU ARE A FAILURE OR HAVE LET YOURSELF OR YOUR FAMILY DOWN: NOT AL ALL
1. LITTLE INTEREST OR PLEASURE IN DOING THINGS: SEVERAL DAYS

## 2023-03-28 ASSESSMENT — COGNITIVE AND FUNCTIONAL STATUS - GENERAL
MOVING TO AND FROM BED TO CHAIR: A LITTLE
STANDING UP FROM CHAIR USING ARMS: A LITTLE
MOVING FROM LYING ON BACK TO SITTING ON SIDE OF FLAT BED: A LITTLE
SUGGESTED CMS G CODE MODIFIER MOBILITY: CJ
DAILY ACTIVITIY SCORE: 24
MOBILITY SCORE: 21
SUGGESTED CMS G CODE MODIFIER DAILY ACTIVITY: CH

## 2023-03-28 ASSESSMENT — LIFESTYLE VARIABLES
TOTAL SCORE: 0
DOES PATIENT WANT TO TALK TO SOMEONE ABOUT QUITTING: NO
ALCOHOL_USE: NO
SUBSTANCE_ABUSE: 0
TOTAL SCORE: 0
TOTAL SCORE: 0
EVER FELT BAD OR GUILTY ABOUT YOUR DRINKING: NO
HOW MANY TIMES IN THE PAST YEAR HAVE YOU HAD 5 OR MORE DRINKS IN A DAY: 0
ON A TYPICAL DAY WHEN YOU DRINK ALCOHOL HOW MANY DRINKS DO YOU HAVE: 1
HAVE YOU EVER FELT YOU SHOULD CUT DOWN ON YOUR DRINKING: NO
HAVE PEOPLE ANNOYED YOU BY CRITICIZING YOUR DRINKING: NO
EVER HAD A DRINK FIRST THING IN THE MORNING TO STEADY YOUR NERVES TO GET RID OF A HANGOVER: NO
ALCOHOL_USE: NO
AVERAGE NUMBER OF DAYS PER WEEK YOU HAVE A DRINK CONTAINING ALCOHOL: 1
CONSUMPTION TOTAL: NEGATIVE
DOES PATIENT WANT TO STOP DRINKING: YES

## 2023-03-28 ASSESSMENT — FIBROSIS 4 INDEX
FIB4 SCORE: 1.41
FIB4 SCORE: 1.41

## 2023-03-28 ASSESSMENT — PAIN DESCRIPTION - PAIN TYPE
TYPE: ACUTE PAIN

## 2023-03-28 NOTE — PROGRESS NOTES
4 Eyes Skin Assessment Completed by OBEY Garland and OBEY Ariza.    Head WDL  Ears WDL  Nose WDL  Mouth WDL  Neck WDL  Breast/Chest WDL  Shoulder Blades WDL  Spine WDL  (R) Arm/Elbow/Hand WDL  (L) Arm/Elbow/Hand WDL  Abdomen WDL  Groin WDL  Scrotum/Coccyx/Buttocks WDL  (R) Leg Swelling  (L) Leg Redness, Blanching, Swelling, and Edema  (R) Heel/Foot/Toe WDL  (L) Heel/Foot/Toe WDL        Interventions In Place N/A    Possible Skin Injury No    Pictures Uploaded Into Epic Yes  Wound Consult Placed Yes  RN Wound Prevention Protocol Ordered Yes

## 2023-03-28 NOTE — ED NOTES
IV started. Pt hypertensive, pt states he has not taken any medications since his last hospital stay a week ago.

## 2023-03-28 NOTE — H&P
Hospital Medicine History & Physical Note    Date of Service  3/27/2023    Primary Care Physician  Sam Sheikh M.D.        Code Status  Full code    Chief Complaint  Chief Complaint   Patient presents with    Leg Swelling     RLE. Pt supposed to be on blood thinners but states he ran out of his meds. Right leg intermittently tender, warm to the touch, and red.        History of Presenting Illness  Paul Hopper is a 57 y.o. male with past medical history of stroke, CAD, MI, recent admission here from 3/9 to 3/13 with right leg cellulitis, who presented 3/27/2023 with complaints of worsening of right leg redness, swelling and pain, 2 days after he completed course of oral antibiotics which was prescribed on discharge.  Pain is worsened with movement, dull, 5 out of 10.  He denies fever or chills.  His blood pressure is difficult to control in ER.  Suspected noncompliance.  He is poor historian.    I discussed the plan of care with patient.    Review of Systems  Review of Systems   Constitutional:  Negative for chills, fever and weight loss.   HENT:  Negative for ear pain, hearing loss and tinnitus.    Eyes:  Negative for blurred vision, double vision and photophobia.   Respiratory:  Negative for cough, hemoptysis and sputum production.    Cardiovascular:  Negative for chest pain, palpitations and orthopnea.   Gastrointestinal:  Negative for heartburn, nausea and vomiting.   Genitourinary:  Negative for dysuria, flank pain, frequency and hematuria.   Musculoskeletal:  Positive for joint pain. Negative for back pain and neck pain.        Redness, swelling and pain in the right leg   Skin:  Negative for itching and rash.   Neurological:  Negative for tremors, speech change, focal weakness and headaches.   Endo/Heme/Allergies:  Negative for environmental allergies and polydipsia. Does not bruise/bleed easily.   Psychiatric/Behavioral:  Negative for hallucinations and substance abuse. The patient is not  nervous/anxious.      Past Medical History   has a past medical history of Chickenpox and Hypertension.    Surgical History   has no past surgical history on file.     Family History     Family history reviewed with patient. There is no family history that is pertinent to the chief complaint.     Social History   reports that he has been smoking cigarettes. He has never used smokeless tobacco. He reports current alcohol use of about 1.2 oz per week. He reports current drug use.    Allergies  Allergies   Allergen Reactions    Other Food Anaphylaxis     All fresh fruit causes throat swelling per brother.        Medications  Prior to Admission Medications   Prescriptions Last Dose Informant Patient Reported? Taking?   aspirin 81 MG EC tablet   No No   Sig: Take 1 Tablet by mouth every day.   atorvastatin (LIPITOR) 40 MG Tab   No No   Sig: Take 1 Tablet by mouth every evening.   lisinopril (PRINIVIL) 40 MG tablet   No No   Sig: Take 1 Tablet by mouth every day.   metoprolol tartrate (LOPRESSOR) 25 MG Tab   No No   Sig: Take 0.5 Tablets by mouth 2 times a day.   omeprazole (PRILOSEC) 20 MG delayed-release capsule   No No   Sig: Take 1 Capsule by mouth every day.      Facility-Administered Medications: None       Physical Exam  Temp:  [36.5 °C (97.7 °F)] 36.5 °C (97.7 °F)  Pulse:  [68-88] 78  Resp:  [18-31] 21  BP: (190-229)/() 190/110  SpO2:  [95 %-99 %] 97 %  Blood Pressure: (!) 190/110   Temperature: 36.5 °C (97.7 °F)   Pulse: 78   Respiration: (!) 21   Pulse Oximetry: 97 %       Physical Exam  Vitals and nursing note reviewed.   Constitutional:       General: He is not in acute distress.     Appearance: Normal appearance.   HENT:      Head: Normocephalic and atraumatic.      Nose: Nose normal.      Mouth/Throat:      Mouth: Mucous membranes are moist.   Eyes:      Extraocular Movements: Extraocular movements intact.      Pupils: Pupils are equal, round, and reactive to light.   Cardiovascular:      Rate and  Rhythm: Normal rate and regular rhythm.   Pulmonary:      Effort: Pulmonary effort is normal.      Breath sounds: Normal breath sounds.   Abdominal:      General: Abdomen is flat. There is no distension.      Tenderness: There is no abdominal tenderness.   Musculoskeletal:         General: No swelling or deformity. Normal range of motion.      Cervical back: Normal range of motion and neck supple.      Comments: Right leg swelling, redness, tenderness to palpation   Skin:     General: Skin is warm and dry.   Neurological:      General: No focal deficit present.      Mental Status: He is alert and oriented to person, place, and time.   Psychiatric:         Mood and Affect: Mood normal.         Behavior: Behavior normal.       Laboratory:  Recent Labs     03/27/23 2057   WBC 6.3   RBC 3.31*   HEMOGLOBIN 11.8*   HEMATOCRIT 32.4*   MCV 97.9*   MCH 35.6*   MCHC 36.4*   RDW 47.7   PLATELETCT 209   MPV 8.3*     Recent Labs     03/27/23 2057   SODIUM 138   POTASSIUM 3.6   CHLORIDE 103   CO2 26   GLUCOSE 87   BUN 8   CREATININE 1.32   CALCIUM 8.6     Recent Labs     03/27/23 2057   ALTSGPT 14   ASTSGOT 20   ALKPHOSPHAT 63   TBILIRUBIN 1.2   GLUCOSE 87         Recent Labs     03/27/23 2057   NTPROBNP 263*         No results for input(s): TROPONINT in the last 72 hours.    Imaging:  US-EXTREMITY VENOUS LOWER BILAT   Final Result              Assessment/Plan:  Justification for Admission Status  I anticipate this patient will require at least two midnights for appropriate medical management, necessitating inpatient admission because right leg cellulitis    Patient will need a Telemetry bed on EMERGENCY service .  The need is secondary to right leg cellulitis.    * Recurrent or persistent right lower extremity cellulitis- (present on admission)  Assessment & Plan  Recently was admitted with the same.  Symptoms returned after completion of oral antibiotics  Plan to readmit and to retreat with IV antibiotics  Repeat ultrasound  DVT study is negative for DVT  Monitor clinically for improvement or worsening, consider CT of the leg to rule out abscess    Primary hypertension- (present on admission)  Assessment & Plan  Uncontrolled  Will restart home medications metoprolol, lisinopril  Compliance counseling        VTE prophylaxis: enoxaparin ppx

## 2023-03-28 NOTE — ED NOTES
Pt disconnected from monitor to use the bathroom. Pt returned to room, reconnected to monitor. NAD.

## 2023-03-28 NOTE — CARE PLAN
The patient is Stable - Low risk of patient condition declining or worsening    Shift Goals  Clinical Goals:  (antibiotics, safety)  Patient Goals:  (comfort, rest)    Progress made toward(s) clinical / shift goals:    Problem: Knowledge Deficit - Standard  Goal: Patient and family/care givers will demonstrate understanding of plan of care, disease process/condition, diagnostic tests and medications  Outcome: Progressing     Problem: Pain - Standard  Goal: Alleviation of pain or a reduction in pain to the patient’s comfort goal  Outcome: Progressing       Patient is not progressing towards the following goals:

## 2023-03-28 NOTE — PROGRESS NOTES
[facility] Medicine Daily Progress Note    Date of Service  3/28/2023    Chief Complaint  Paul Hopper is a 57 y.o. male admitted 3/27/2023 with cellulitis    Hospital Course  No notes on file    Interval Problem Update  Patient was seen and examined at bedside.  No acute events overnight. Patient is resting comfortably in bed and in no acute distress.     IV antibiotics    I have discussed this patient's plan of care and discharge plan at IDT rounds today with Case Management, Nursing, Nursing leadership, and other members of the IDT team.    Code Status  Full Code    Disposition  Patient is not medically cleared for discharge.   Anticipate discharge to to home with close outpatient follow-up.  I have placed the appropriate orders for post-discharge needs.    Review of Systems  Review of Systems   Constitutional:  Negative for chills and fever.   HENT:  Negative for congestion and sore throat.    Eyes:  Negative for blurred vision and double vision.   Respiratory:  Negative for cough and shortness of breath.    Cardiovascular:  Negative for chest pain and palpitations.   Gastrointestinal:  Negative for abdominal pain, diarrhea, nausea and vomiting.   Genitourinary:  Negative for dysuria and frequency.   Musculoskeletal:  Negative for back pain and falls.   Skin:  Positive for rash.   Neurological:  Negative for seizures and headaches.   Psychiatric/Behavioral:  Negative for hallucinations and substance abuse.       Physical Exam  Temp:  [36.5 °C (97.7 °F)-37.6 °C (99.6 °F)] 37.6 °C (99.6 °F)  Pulse:  [68-88] 76  Resp:  [18-31] 18  BP: (116-229)/() 141/73  SpO2:  [92 %-99 %] 95 %    Physical Exam  Vitals and nursing note reviewed.   Constitutional:       General: He is not in acute distress.     Appearance: He is not toxic-appearing.   HENT:      Head: Normocephalic.      Right Ear: External ear normal.      Left Ear: External ear normal.      Nose: No congestion.      Mouth/Throat:      Mouth: Mucous  membranes are dry.      Pharynx: No oropharyngeal exudate.   Eyes:      General: No scleral icterus.     Pupils: Pupils are equal, round, and reactive to light.   Cardiovascular:      Rate and Rhythm: Normal rate.      Heart sounds: No murmur heard.  Pulmonary:      Breath sounds: No wheezing.   Abdominal:      Palpations: Abdomen is soft.      Tenderness: There is no abdominal tenderness. There is no guarding or rebound.   Musculoskeletal:         General: Swelling present. No deformity.      Comments: Erythematous and swollen right lower extremity   Skin:     Coloration: Skin is not jaundiced.      Findings: No bruising.   Neurological:      General: No focal deficit present.      Mental Status: He is alert and oriented to person, place, and time.      Motor: No weakness.   Psychiatric:         Mood and Affect: Mood normal.         Behavior: Behavior normal.         Fluids    Intake/Output Summary (Last 24 hours) at 3/28/2023 1352  Last data filed at 3/28/2023 1300  Gross per 24 hour   Intake 720 ml   Output --   Net 720 ml       Laboratory  Recent Labs     03/27/23 2057 03/28/23  0153   WBC 6.3 6.5   RBC 3.31* 3.48*   HEMOGLOBIN 11.8* 12.3*   HEMATOCRIT 32.4* 35.2*   MCV 97.9* 101.1*   MCH 35.6* 35.3*   MCHC 36.4* 34.9   RDW 47.7 49.5   PLATELETCT 209 226   MPV 8.3* 8.4*     Recent Labs     03/27/23 2057 03/28/23  0153   SODIUM 138 140   POTASSIUM 3.6 3.8   CHLORIDE 103 104   CO2 26 25   GLUCOSE 87 91   BUN 8 8   CREATININE 1.32 1.31   CALCIUM 8.6 8.8                   Imaging  US-EXTREMITY VENOUS LOWER BILAT   Final Result           Assessment/Plan  * Recurrent or persistent right lower extremity cellulitis- (present on admission)  Assessment & Plan  Recently was admitted with the same.  Symptoms returned after completion of oral antibiotics  Plan to readmit and to retreat with IV antibiotics  Repeat ultrasound DVT study is negative for DVT  Improving  No leukocytosis  Blood culture negative x1 day  Continue  IV unasyn    Left ventricular hypokinesis  Assessment & Plan  Per recent echo  Needs outpatient cath    Primary hypertension- (present on admission)  Assessment & Plan  Uncontrolled  Will restart home medications metoprolol, lisinopril  Compliance counseling    Noncompliance  Assessment & Plan  Medical noncompliance  Not taking prescribed medicaitons    Macrocytic anemia  Assessment & Plan  Hb 12.3  .1  Check B12, folate levels         VTE prophylaxis: SCDs/TEDs and enoxaparin ppx    I have performed a physical exam and reviewed and updated ROS and Plan today (3/28/2023). In review of yesterday's note (3/27/2023), there are no changes except as documented above.

## 2023-03-28 NOTE — DISCHARGE PLANNING
"RNCM met with pt at bedside and obtained the information used in this assessment. Pt verified accuracy of facesheet. Pt lives in an apartment with friends. Prior to current hospitalization, pt was completely independent in ADLS/IADLS. He states he was issued a walker about 3 weeks ago and rarely uses it as it makes him feel like an \"old man\". Pt drives and is able to attend necessary MD appointments. Pt has a good support system. Pt denies any hx of substance use and denies any dx of mh. Pt anticipates returning home. It is still determined if long term antibiotics will be needed. CM to continue to follow.  Care Transition Team Assessment    Information Source  Orientation Level: Oriented X4  Information Given By: Patient         Elopement Risk  Legal Hold: No  Ambulatory or Self Mobile in Wheelchair: No-Not an Elopement Risk  History of Wandering: No  Elopement this Admit: No  Elopement Risk: Not at Risk for Elopement    Interdisciplinary Discharge Planning  Does Admitting Nurse Feel This Could be a Complex Discharge?: No  Primary Care Physician: Dr Sam Sheikh  Lives with - Patient's Self Care Capacity: Friends  Patient or legal guardian wants to designate a caregiver: No  Support Systems: Friends / Neighbors  Housing / Facility: 1 Story Apartment / Condo  Able to Return to Previous ADL's: Future Time w/Therapy  Mobility Issues: No  Prior Services: Home-Independent  Patient Prefers to be Discharged to:: Home  Assistance Needed: Unknown at this Time  Durable Medical Equipment: Walker    Discharge Preparedness  What is your plan after discharge?: Home health care  What are your discharge supports?: Other (comment) (friends)  Prior Functional Level: Ambulatory, Independent with Activities of Daily Living, Independent with Medication Management  Difficulity with ADLs: None  Difficulity with IADLs: None    Functional Assesment  Prior Functional Level: Ambulatory, Independent with Activities of Daily Living, " Independent with Medication Management         Vision / Hearing Impairment  Right Eye Vision: Impaired, Wears Glasses  Left Eye Vision: Impaired, Wears Glasses  Hearing Impairment : Yes (deaf Left; [place] Right)  Hearing Impairment: Both Ears              Domestic Abuse  Have you ever been the victim of abuse or violence?: No  Physical Abuse or Sexual Abuse: No  Verbal Abuse or Emotional Abuse: No  Possible Abuse/Neglect Reported to:: Not Applicable         Discharge Risks or Barriers  Discharge risks or barriers?: No    Anticipated Discharge Information  Discharge Disposition: Still a Patient (30)

## 2023-03-28 NOTE — ED NOTES
Med rec updated and complete. Allergies reviewed.  Interviewed pt at bedside.  Pt reports that   he has not taken any medications since 03/20/23. Pt reports that he thinks he ran out.   However, pt is not sure because his roommate takes care of all his medications.   According to reconcile outside medications pt was given prescriptions for antibiotics.   Pt is unsure if was taking them or if he finished them.        Home pharmacy [facility] [phone].  Prescriptions last filled at [facility]

## 2023-03-28 NOTE — ASSESSMENT & PLAN NOTE
Recently was admitted with the same.  Symptoms returned after completion of oral antibiotics  Plan to readmit and to retreat with IV antibiotics  Repeat ultrasound DVT study is negative for DVT  Improving  No leukocytosis  Blood culture negative x1 day  Continue IV unasyn

## 2023-03-28 NOTE — ED NOTES
Pt up to restroom. Pt able to pivot and stand under his own strength standby assistance. Pt ambulated well to bathroom with standby assistance.    Pt returned to room without incident.

## 2023-03-28 NOTE — ED PROVIDER NOTES
"ER Provider Note    Scribed for Hilaria Powell M.D. by Kennedy Joy. 3/27/2023  6:13 PM    Primary Care Provider: Sam Sheikh M.D.    CHIEF COMPLAINT  Chief Complaint   Patient presents with    Leg Swelling     RLE. Pt supposed to be on blood thinners but states he ran out of his meds. Right leg intermittently tender, warm to the touch, and red.      EXTERNAL RECORDS REVIEWED  Review of records show the patient was admitted at [facility] on 3/9/23 and discharged on the 13th. He was admitted for chest pain and right lower extremity cellulitis. Cardiology recommended a cardiac cath as an outpatient since he had inferior wall hypokinesis with an EF of 50%. He was sent home Augmentin and doxycycline for cellulitis.    HPI/ROS  LIMITATION TO HISTORY   Select: : None  OUTSIDE HISTORIAN(S):  None    Paul Hopper is a 57 y.o. male who presents to the ED complaining of moderate to severe right leg swelling onset two weeks ago. The patient says his left leg is \"3 times as big as it should be.\" He was admitted two weeks ago for cellulitis and discharged on antibiotics with no alleviation. He says he finished the course of antibiotics a week ago.  He reports leg redness and swelling got worse after finishing the antibiotics.  Denies history of blood clots. He had a heart attack when he was admitted. He has associated symptoms of chills, but denies fever, chest pain, or rash. Denies history of diabetes.  Patient is a very poor historian    PAST MEDICAL HISTORY  Past Medical History:   Diagnosis Date    Chickenpox     Hypertension    Heart attack    SURGICAL HISTORY  No past surgical history noted.    FAMILY HISTORY  No family history noted.    SOCIAL HISTORY   reports that he has been smoking cigarettes. He has never used smokeless tobacco. He reports current alcohol use of about 1.2 oz per week. He reports current drug use.    CURRENT MEDICATIONS  Previous Medications    ASPIRIN 81 MG EC TABLET    Take 1 Tablet " "by mouth every day.    ATORVASTATIN (LIPITOR) 40 MG TAB    Take 1 Tablet by mouth every evening.    LISINOPRIL (PRINIVIL) 40 MG TABLET    Take 1 Tablet by mouth every day.    METOPROLOL TARTRATE (LOPRESSOR) 25 MG TAB    Take 0.5 Tablets by mouth 2 times a day.    OMEPRAZOLE (PRILOSEC) 20 MG DELAYED-RELEASE CAPSULE    Take 1 Capsule by mouth every day.       ALLERGIES  Other food    PHYSICAL EXAM  BP (!) 192/120 Comment: 192/120 Rt side; 194/127 Lf side  Pulse 88   Temp 36.5 °C (97.7 °F) (Oral)   Resp 18   Ht 1.753 m (5' 9\")   Wt 89.4 kg (197 lb 1.5 oz)   SpO2 98%   BMI 29.11 kg/m²   Constitutional: Well developed, well nourished; No acute distress; Non-toxic appearance.   HENT: Normocephalic, atraumatic; Bilateral external ears normal; oropharyngeal examination deferred due to COVID-19 outbreak and lack of oropharyngeal complaint.  Eyes: PERRL, EOMI, Conjunctiva normal. No discharge.   Neck:  Supple, nontender midline; No stridor; No nuchal rigidity.   Lymphatic: No cervical lymphadenopathy noted.   Cardiovascular: Regular rate and rhythm without murmurs, rubs, or gallop.   Thorax & Lungs: No respiratory distress, Decreased breath sounds throughout right more so than the left without wheezing, rales or rhonchi. Nontender chest wall. No crepitus or subcutaneous air  Abdomen: Soft, nontender, bowel sounds normal. No obvious masses; No pulsatile masses; no rebound, guarding, or peritoneal signs.   Skin: Good color; warm and dry without rash or petechia.  Back: Nontender, No CVA tenderness.   Extremities: Distal radial, dorsalis pedis, posterior tibial pulses are equal bilaterally; Nontender calves or saphenous, No cyanosis, No clubbing,   Left lower extremity: Mild erythema to the medial aspect of the leg, right lower extremity: Significant erythema with warmth to the entire right lower extremity, Erythema is circumferential, Slight lymphangitic spread along the knee and inner thigh, Good pulses bilaterally, No " blistering or desquamation of the skin, No ulcers or sores on the feet. .   Musculoskeletal: Good range of motion in all major joints. No tenderness to palpation or major deformities noted.   Neurologic: Alert & oriented x 4, clear speech.    DIAGNOSTIC STUDIES    Labs:   Results for orders placed or performed during the hospital encounter of 03/27/23   CBC WITH DIFFERENTIAL   Result Value Ref Range    WBC 6.3 4.8 - 10.8 K/uL    RBC 3.31 (L) 4.70 - 6.10 M/uL    Hemoglobin 11.8 (L) 14.0 - 18.0 g/dL    Hematocrit 32.4 (L) 42.0 - 52.0 %    MCV 97.9 (H) 81.4 - 97.8 fL    MCH 35.6 (H) 27.0 - 33.0 pg    MCHC 36.4 (H) 33.7 - 35.3 g/dL    RDW 47.7 35.9 - 50.0 fL    Platelet Count 209 164 - 446 K/uL    MPV 8.3 (L) 9.0 - 12.9 fL    Neutrophils-Polys 55.50 44.00 - 72.00 %    Lymphocytes 33.00 22.00 - 41.00 %    Monocytes 7.90 0.00 - 13.40 %    Eosinophils 2.70 0.00 - 6.90 %    Basophils 0.60 0.00 - 1.80 %    Immature Granulocytes 0.30 0.00 - 0.90 %    Nucleated RBC 0.00 /100 WBC    Neutrophils (Absolute) 3.50 1.82 - 7.42 K/uL    Lymphs (Absolute) 2.08 1.00 - 4.80 K/uL    Monos (Absolute) 0.50 0.00 - 0.85 K/uL    Eos (Absolute) 0.17 0.00 - 0.51 K/uL    Baso (Absolute) 0.04 0.00 - 0.12 K/uL    Immature Granulocytes (abs) 0.02 0.00 - 0.11 K/uL    NRBC (Absolute) 0.00 K/uL   COMP METABOLIC PANEL   Result Value Ref Range    Sodium 138 135 - 145 mmol/L    Potassium 3.6 3.6 - 5.5 mmol/L    Chloride 103 96 - 112 mmol/L    Co2 26 20 - 33 mmol/L    Anion Gap 9.0 7.0 - 16.0    Glucose 87 65 - 99 mg/dL    Bun 8 8 - 22 mg/dL    Creatinine 1.32 0.50 - 1.40 mg/dL    Calcium 8.6 8.5 - 10.5 mg/dL    AST(SGOT) 20 12 - 45 U/L    ALT(SGPT) 14 2 - 50 U/L    Alkaline Phosphatase 63 30 - 99 U/L    Total Bilirubin 1.2 0.1 - 1.5 mg/dL    Albumin 3.5 3.2 - 4.9 g/dL    Total Protein 7.5 6.0 - 8.2 g/dL    Globulin 4.0 (H) 1.9 - 3.5 g/dL    A-G Ratio 0.9 g/dL   proBrain Natriuretic Peptide, NT   Result Value Ref Range    NT-proBNP 263 (H) 0 - 125 pg/mL    URINALYSIS (UA)    Specimen: Urine   Result Value Ref Range    Color Yellow     Character Clear     Specific Gravity 1.007 <1.035    Ph 7.5 5.0 - 8.0    Glucose Negative Negative mg/dL    Ketones Negative Negative mg/dL    Protein Negative Negative mg/dL    Bilirubin Negative Negative    Urobilinogen, Urine 0.2 Negative    Nitrite Negative Negative    Leukocyte Esterase Negative Negative    Occult Blood Negative Negative    Micro Urine Req see below    Lactic Acid   Result Value Ref Range    Lactic Acid 1.0 0.5 - 2.0 mmol/L   CORRECTED CALCIUM   Result Value Ref Range    Correct Calcium 9.0 8.5 - 10.5 mg/dL   ESTIMATED GFR   Result Value Ref Range    GFR (CKD-EPI) 63 >60 mL/min/1.73 m 2      Radiology:   The attending emergency physician has independently interpreted the diagnostic imaging associated with this visit and am waiting the final reading from the radiologist.     Preliminary interpretation is a follows: ER MD has reviewed the patient's ultrasound.  No obvious clot seen.    Radiologist interpretation:   US-EXTREMITY VENOUS LOWER BILAT   Final Result          Vascular Laboratory   CONCLUSIONS   1) Normal bilateral lower extremity superficial and deep venous    examination.    2) Prominent inguinal lymph nodes with typically benign fatty hilar    morphology     COURSE & MEDICAL DECISION MAKING     ED Observation Status? No; Patient does not meet criteria for ED Observation.     INITIAL ASSESSMENT, COURSE AND PLAN  Care Narrative: Patient presents to the ER with complaint of worsening right lower extremity redness, warmth and swelling.  The patient was admitted here on March 9 for chest pain.  He also was found to have a cellulitis.  He was discharged home on March 13 on Augmentin and doxycycline.  Patient was also given prescriptions for blood pressure medications as his blood pressure was high.  Patient says he ran out of his blood pressure medicines.  He said as soon as he ran out of the antibiotics that  "his cellulitis got worse.  Patient has a circumferentially red and warm right lower extremity from the knee down.  There is a slight meningitic streak going up the medial aspect of the lower thigh.  Ultrasound is negative for DVT.  Patient denies fevers.  He does, however, report chills over the last several days.  White count is normal.  He is afebrile here in the ER.  He does not appear to be septic or toxic.  He is not in any significant distress.  No subcu emphysema of the leg on examination.  Lactic acid level is normal.  At this time I do not think he has necrotizing fasciitis.  He was given IV Unasyn for his right lower extremity cellulitis.  I think he will need to be hospitalized.  Additionally, his blood pressures been quite high.  Sounds like he has been noncompliant with blood pressure medication, even before this hospitalization several weeks ago.  He admits to \"forgetting\" to get his medications filled.  He knows he supposed to be on antihypertensives, but he just does not take them.  He has been given IV antihypertensives here in the ER.  I gave him a dose of labetalol.  It transiently helped his blood pressure but then the blood pressure shot back up again.  At this point I wrote for some hydralazine.  I suspect the patient has a compliance issue.  I spoke with the patient about the importance of staying on top of his blood pressure with medications and the importance of getting follow-up.  He has not yet followed up with his primary care physician status post discharge.  At this time patient will be hospitalized for right lower extremity cellulitis and uncontrolled hypertension.      6:13 PM - Patient seen and examined at bedside. Discussed plan of care, including labs and imaging. Patient agrees to the plan of care. Ordered for labs and imaging to evaluate his symptoms.     2200: Patient states that his blood pressure is high because he ran out of the medications that they prescribed him when they " "discharged him home couple weeks ago.  He says he been out of his medicines for a week.  He said he was supposed to be on blood pressure medicines even before he was admitted to the hospital a few weeks ago, but he \"ran out of those medications\" as well.  He says he just \"forgot to fill them.\"    2200: Paged hospitalist    2040: Discussed with Dr. Cornejo, hospitalist on-call, and he will kindly evaluate the patient hospitalization.    HTN/IDDM FOLLOW UP:  The patient has known hypertension and is being followed by their primary care doctor    ADDITIONAL PROBLEM LIST  Problem #1: Right leg redness and swelling  Problem #2: Hypertension    DISPOSITION AND DISCUSSIONS  I have discussed management of the patient with the following physicians and JIM's: Hospitalist    Discussion of management with other QHP or appropriate source(s): None     Escalation of care considered, and ultimately not performed: diagnostic imaging.  No crepitus or subcu air.  Patient has not had a fever.  White count is not elevated.  Lactic acid level is normal.  At this time I do not think he needs a CT scan of the leg as my suspicion for necrotizing fasciitis is low.    Barriers to care at this time, including but not limited to:  History of noncompliance .     Decision tools and prescription drugs considered including, but not limited to: Pain Medications patient does not complain of significant pain.  For this reason no pain medication administered. .    DISPOSITION:  Patient will be hospitalized by Dr. Cornejo in guarded condition.    FINAL DIAGNOSIS  1. Cellulitis of right lower extremity    2. Hypertension, unspecified type Acute        Kennedy COLUNGA (Amarjit), am scribing for, and in the presence of, Hilaria Powell M.D..    Electronically signed by: Kennedy Joy (Amarjit), 3/27/2023    Hilaria COLUNGA M.D. personally performed the services described in this documentation, as scribed by Kennedy Joy in my presence, and it is both accurate " and complete.     The note accurately reflects work and decisions made by me.  Hilaria Powell M.D.  3/27/2023  10:11 PM

## 2023-03-29 ENCOUNTER — PHARMACY VISIT (OUTPATIENT)
Dept: PHARMACY | Facility: MEDICAL CENTER | Age: 58
End: 2023-03-29
Payer: MEDICARE

## 2023-03-29 VITALS
TEMPERATURE: 97.2 F | RESPIRATION RATE: 17 BRPM | SYSTOLIC BLOOD PRESSURE: 167 MMHG | BODY MASS INDEX: 27.47 KG/M2 | WEIGHT: 196.21 LBS | HEART RATE: 64 BPM | HEIGHT: 71 IN | OXYGEN SATURATION: 98 % | DIASTOLIC BLOOD PRESSURE: 90 MMHG

## 2023-03-29 LAB
ALBUMIN SERPL BCP-MCNC: 3.2 G/DL (ref 3.2–4.9)
BASOPHILS # BLD AUTO: 0.6 % (ref 0–1.8)
BASOPHILS # BLD: 0.03 K/UL (ref 0–0.12)
BUN SERPL-MCNC: 10 MG/DL (ref 8–22)
CALCIUM ALBUM COR SERPL-MCNC: 8.5 MG/DL (ref 8.5–10.5)
CALCIUM SERPL-MCNC: 7.9 MG/DL (ref 8.5–10.5)
CHLORIDE SERPL-SCNC: 107 MMOL/L (ref 96–112)
CO2 SERPL-SCNC: 23 MMOL/L (ref 20–33)
CREAT SERPL-MCNC: 1.38 MG/DL (ref 0.5–1.4)
EOSINOPHIL # BLD AUTO: 0.24 K/UL (ref 0–0.51)
EOSINOPHIL NFR BLD: 4.7 % (ref 0–6.9)
ERYTHROCYTE [DISTWIDTH] IN BLOOD BY AUTOMATED COUNT: 47.9 FL (ref 35.9–50)
FOLATE SERPL-MCNC: 3.7 NG/ML
GFR SERPLBLD CREATININE-BSD FMLA CKD-EPI: 60 ML/MIN/1.73 M 2
GLUCOSE SERPL-MCNC: 125 MG/DL (ref 65–99)
HCT VFR BLD AUTO: 30.6 % (ref 42–52)
HGB BLD-MCNC: 11.1 G/DL (ref 14–18)
IMM GRANULOCYTES # BLD AUTO: 0.01 K/UL (ref 0–0.11)
IMM GRANULOCYTES NFR BLD AUTO: 0.2 % (ref 0–0.9)
LYMPHOCYTES # BLD AUTO: 2.24 K/UL (ref 1–4.8)
LYMPHOCYTES NFR BLD: 43.8 % (ref 22–41)
MAGNESIUM SERPL-MCNC: 2.1 MG/DL (ref 1.5–2.5)
MCH RBC QN AUTO: 35.6 PG (ref 27–33)
MCHC RBC AUTO-ENTMCNC: 36.3 G/DL (ref 33.7–35.3)
MCV RBC AUTO: 98.1 FL (ref 81.4–97.8)
MONOCYTES # BLD AUTO: 0.43 K/UL (ref 0–0.85)
MONOCYTES NFR BLD AUTO: 8.4 % (ref 0–13.4)
NEUTROPHILS # BLD AUTO: 2.16 K/UL (ref 1.82–7.42)
NEUTROPHILS NFR BLD: 42.3 % (ref 44–72)
NRBC # BLD AUTO: 0 K/UL
NRBC BLD-RTO: 0 /100 WBC
PHOSPHATE SERPL-MCNC: 2.5 MG/DL (ref 2.5–4.5)
PLATELET # BLD AUTO: 195 K/UL (ref 164–446)
PMV BLD AUTO: 8.5 FL (ref 9–12.9)
POTASSIUM SERPL-SCNC: 3.6 MMOL/L (ref 3.6–5.5)
RBC # BLD AUTO: 3.12 M/UL (ref 4.7–6.1)
SODIUM SERPL-SCNC: 138 MMOL/L (ref 135–145)
VIT B12 SERPL-MCNC: 172 PG/ML (ref 211–911)
WBC # BLD AUTO: 5.1 K/UL (ref 4.8–10.8)

## 2023-03-29 PROCEDURE — 85025 COMPLETE CBC W/AUTO DIFF WBC: CPT

## 2023-03-29 PROCEDURE — 700105 HCHG RX REV CODE 258: Performed by: INTERNAL MEDICINE

## 2023-03-29 PROCEDURE — RXMED WILLOW AMBULATORY MEDICATION CHARGE: Performed by: INTERNAL MEDICINE

## 2023-03-29 PROCEDURE — 97602 WOUND(S) CARE NON-SELECTIVE: CPT

## 2023-03-29 PROCEDURE — A9270 NON-COVERED ITEM OR SERVICE: HCPCS | Performed by: INTERNAL MEDICINE

## 2023-03-29 PROCEDURE — 99239 HOSP IP/OBS DSCHRG MGMT >30: CPT | Performed by: INTERNAL MEDICINE

## 2023-03-29 PROCEDURE — 700102 HCHG RX REV CODE 250 W/ 637 OVERRIDE(OP): Performed by: INTERNAL MEDICINE

## 2023-03-29 PROCEDURE — 700111 HCHG RX REV CODE 636 W/ 250 OVERRIDE (IP): Performed by: INTERNAL MEDICINE

## 2023-03-29 PROCEDURE — 82607 VITAMIN B-12: CPT

## 2023-03-29 PROCEDURE — 82746 ASSAY OF FOLIC ACID SERUM: CPT

## 2023-03-29 PROCEDURE — 83735 ASSAY OF MAGNESIUM: CPT

## 2023-03-29 PROCEDURE — G0378 HOSPITAL OBSERVATION PER HR: HCPCS

## 2023-03-29 PROCEDURE — 80069 RENAL FUNCTION PANEL: CPT

## 2023-03-29 RX ORDER — CEFUROXIME AXETIL 500 MG/1
500 TABLET ORAL 2 TIMES DAILY
Qty: 14 TABLET | Refills: 0 | Status: ACTIVE | OUTPATIENT
Start: 2023-03-29 | End: 2023-04-05

## 2023-03-29 RX ADMIN — OMEPRAZOLE 20 MG: 20 CAPSULE, DELAYED RELEASE ORAL at 04:29

## 2023-03-29 RX ADMIN — LISINOPRIL 40 MG: 20 TABLET ORAL at 04:29

## 2023-03-29 RX ADMIN — AMPICILLIN AND SULBACTAM 3 G: 1; 2 INJECTION, POWDER, FOR SOLUTION INTRAMUSCULAR; INTRAVENOUS at 04:29

## 2023-03-29 RX ADMIN — METOPROLOL TARTRATE 12.5 MG: 25 TABLET, FILM COATED ORAL at 04:29

## 2023-03-29 RX ADMIN — DOCUSATE SODIUM 50 MG AND SENNOSIDES 8.6 MG 2 TABLET: 8.6; 5 TABLET, FILM COATED ORAL at 04:29

## 2023-03-29 RX ADMIN — ASPIRIN 81 MG: 81 TABLET, COATED ORAL at 04:29

## 2023-03-29 ASSESSMENT — PAIN DESCRIPTION - PAIN TYPE
TYPE: ACUTE PAIN
TYPE: ACUTE PAIN

## 2023-03-29 NOTE — DISCHARGE SUMMARY
Discharge Summary    CHIEF COMPLAINT ON ADMISSION  Chief Complaint   Patient presents with    Leg Swelling     RLE. Pt supposed to be on blood thinners but states he ran out of his meds. Right leg intermittently tender, warm to the touch, and red.        Reason for Admission  Sent by /Leg Swollen     Admission Date  3/27/2023    CODE STATUS  Prior    HPI & HOSPITAL COURSE  Paul Hopper is a 57 y.o. male with past medical history of stroke, CAD, MI, recent admission here from 3/9 to 3/13 with right leg cellulitis, who presented 3/27/2023 with complaints of worsening of right leg redness, swelling and pain. Patient was recently discharged on oral antibiotics for this cellulitis but failed to fill the prescription and therefore did not complete antibiotic course. Blood cultures negative x2 days. No leukocytosis, afebrile. Patient improved clinically on IV antibiotics. Patient agreeable to discharge. Ordered oral antibiotics thru meds to bed to ensure medication compliance. Patient was determined satisfactory for discharge with appropriate follow up.      Therefore, he is discharged in fair and stable condition to home with close outpatient follow-up.    The patient met 2-midnight criteria for an inpatient stay at the time of discharge.    Discharge Date  03/29/2023    FOLLOW UP ITEMS POST DISCHARGE  Please follow up with PCP in 3-5 days for post hospitalization follow up and medication reconciliation.     DISCHARGE DIAGNOSES  Principal Problem:    Recurrent or persistent right lower extremity cellulitis POA: Yes      Overview:         Active Problems:    Primary hypertension POA: Yes    Left ventricular hypokinesis POA: Unknown    Macrocytic anemia POA: Unknown    Noncompliance POA: Unknown  Resolved Problems:    * No resolved hospital problems. *      FOLLOW UP  No future appointments.  Sam Sheikh M.D.  [address] [facility] [id number]  [id number]  [place] [address]  [phone]    Schedule an appointment as soon as possible  for a visit in 1 week(s)        MEDICATIONS ON DISCHARGE     Medication List        START taking these medications        Instructions   cefUROXime 500 MG Tabs  Commonly known as: CEFTIN   Take 1 Tablet by mouth 2 times a day for 7 days.  Dose: 500 mg            CONTINUE taking these medications        Instructions   Aspirin Low Dose 81 MG EC tablet  Generic drug: aspirin   Take 1 Tablet by mouth every day.  Dose: 81 mg     atorvastatin 40 MG Tabs  Commonly known as: LIPITOR   Take 1 Tablet by mouth every evening.  Dose: 40 mg     lisinopril 40 MG tablet  Commonly known as: PRINIVIL   Take 1 Tablet by mouth every day.  Dose: 40 mg     metoprolol tartrate 25 MG Tabs  Commonly known as: LOPRESSOR   Take 0.5 Tablets by mouth 2 times a day.  Dose: 12.5 mg     omeprazole 20 MG delayed-release capsule  Commonly known as: PRILOSEC   Take 1 Capsule by mouth every day.  Dose: 20 mg            STOP taking these medications      amoxicillin-clavulanate 875-125 MG Tabs  Commonly known as: AUGMENTIN     doxycycline monohydrate 100 MG tablet  Commonly known as: ADOXA              Allergies  Allergies   Allergen Reactions    Other Food Anaphylaxis     All fresh fruit causes throat swelling per brother.        DIET  No orders of the defined types were placed in this encounter.      ACTIVITY  As tolerated.  Weight bearing as tolerated    CONSULTATIONS  N/A    PROCEDURES  N/A    LABORATORY  Lab Results   Component Value Date    SODIUM 138 03/29/2023    POTASSIUM 3.6 03/29/2023    CHLORIDE 107 03/29/2023    CO2 23 03/29/2023    GLUCOSE 125 (H) 03/29/2023    BUN 10 03/29/2023    CREATININE 1.38 03/29/2023        Lab Results   Component Value Date    WBC 5.1 03/29/2023    HEMOGLOBIN 11.1 (L) 03/29/2023    HEMATOCRIT 30.6 (L) 03/29/2023    PLATELETCT 195 03/29/2023        Total time of the discharge process exceeds 37 minutes.

## 2023-03-29 NOTE — PROGRESS NOTES
Assumed care of patient 0700. Received Report from [facility] nurse. Patient A&O x 4, on RA, Reporting a pain level of 0. Call light within reach, belongings within reach, Fall precautions in place, and bed alarm is on and bed in lowest position. Patient does not have any other needs at this time.

## 2023-03-29 NOTE — WOUND TEAM
[facility]  Inpatient Services  Wound and Skin Care Brief Evaluation    Admission Date: 3/27/2023     Last order of IP CONSULT TO WOUND CARE was found on 3/28/2023 from Hospital Encounter on 3/27/2023     HPI, PMH, SH: Reviewed    Chief Complaint   Patient presents with    Leg Swelling     RLE. Pt supposed to be on blood thinners but states he ran out of his meds. Right leg intermittently tender, warm to the touch, and red.      Diagnosis: Cellulitis [L03.90]    Unit where seen by Wound Team: S521/01     Wound consult placed regarding R 4th toe. Chart and images reviewed. This RN in to assess patient. Pt pleasant and agreeable. Dry scab to R 4th toe that was easily removed via non-selective debridement revealing pink intact tissue. No wound present thus left LATISHA. Skin moisturizer applied to BLE for comfort. No pressure injuries or advanced wound care needs identified. Wound consult completed. Wound team signing off, re-consult if patient has further advanced wound care needs.        RSKIN:   CURRENTLY IN PLACE (X), APPLIED THIS VISIT (A), ORDERED (O):   Q shift Jaxon:  X  Q shift pressure point assessments:  X    Surface/Positioning   Standard/Trauma Bed        X  Low Airloss          Bariatric ARYAN     ICU ARYAN                              Waffle cushion        Waffle Overlay          Reposition q 2 hours      TAPs Turning system     Z James Pillow     Offloading/Redistribution NA  Sacral Offloading Dressing (Silicone dressing)     Heel Offloading Dressing (Silicone dressing)         Heel float boots (Prevalon boot)             Float Heels off Bed with Pillows           Respiratory NA - on RA  Silicone O2 tubing         Gray Foam Ear protectors     Cannula fixation Device (Tender )          High flow offloading Clip    Elastic head band offloading device      Anchorfast                                                         Trach with Optifoam split foam             Containment/Moisture Prevention  NA    Rectal tube or BMS    Purwick/Condom Cath        Coelho Catheter    Barrier wipes           Barrier paste       Antifungal tx      Interdry        Mobilization Not assessed this visit      Up to chair        Ambulate      PT/OT      Nutrition Not assessed this visit      Dietician        Diabetes Education      PO     TF     TPN     NPO   # days

## 2023-03-29 NOTE — DISCHARGE PLANNING
Meds-to-Beds: Discharge prescription orders listed below delivered to patient's bedside. RN Carole notified. Patient counseled. Patient elected to have co-payment billed to patient account.        Current Outpatient Medications   Medication Sig Dispense Refill    cefUROXime (CEFTIN) 500 MG Tab Take 1 Tablet by mouth 2 times a day for 7 days. 14 Tablet 0      Heaven Ritter, PharmD

## 2023-03-29 NOTE — HOSPITAL COURSE
Paul Hopper is a 57 y.o. male with past medical history of stroke, CAD, MI, recent admission here from 3/9 to 3/13 with right leg cellulitis, who presented 3/27/2023 with complaints of worsening of right leg redness, swelling and pain. Patient was recently discharged on oral antibiotics for this cellulitis but failed to fill the prescription and therefore did not complete antibiotic course. Blood cultures negative x2 days. No leukocytosis, afebrile. Patient improved clinically on IV antibiotics. Patient agreeable to discharge. Ordered oral antibiotics thru meds to bed to ensure medication compliance. Patient was determined satisfactory for discharge with appropriate follow up.

## 2023-03-29 NOTE — CARE PLAN
The patient is Stable - Low risk of patient condition declining or worsening    Shift Goals  Clinical Goals: abx  Patient Goals: rest    Progress made toward(s) clinical / shift goals:      Problem: Knowledge Deficit - Standard  Goal: Patient and family/care givers will demonstrate understanding of plan of care, disease process/condition, diagnostic tests and medications  Outcome: Progressing     Problem: Pain - Standard  Goal: Alleviation of pain or a reduction in pain to the patient’s comfort goal  Outcome: Progressing

## 2023-03-29 NOTE — PROGRESS NOTES
D/c instructions given, educated on worsening s/s. Pt understands and questions answered. D/c home with friend

## 2024-02-21 ENCOUNTER — APPOINTMENT (OUTPATIENT)
Dept: RADIOLOGY | Facility: MEDICAL CENTER | Age: 59
End: 2024-02-21
Attending: EMERGENCY MEDICINE
Payer: COMMERCIAL

## 2024-02-21 ENCOUNTER — APPOINTMENT (OUTPATIENT)
Dept: RADIOLOGY | Facility: MEDICAL CENTER | Age: 59
End: 2024-02-21
Attending: INTERNAL MEDICINE
Payer: COMMERCIAL

## 2024-02-21 ENCOUNTER — HOSPITAL ENCOUNTER (OUTPATIENT)
Facility: MEDICAL CENTER | Age: 59
End: 2024-02-26
Attending: EMERGENCY MEDICINE | Admitting: INTERNAL MEDICINE
Payer: COMMERCIAL

## 2024-02-21 DIAGNOSIS — I10 UNCONTROLLED HYPERTENSION: ICD-10-CM

## 2024-02-21 DIAGNOSIS — I16.1 HYPERTENSIVE EMERGENCY: ICD-10-CM

## 2024-02-21 DIAGNOSIS — N17.9 AKI (ACUTE KIDNEY INJURY) (HCC): ICD-10-CM

## 2024-02-21 DIAGNOSIS — R93.1 ABNORMAL ECHOCARDIOGRAM: ICD-10-CM

## 2024-02-21 DIAGNOSIS — R07.9 LEFT-SIDED CHEST PAIN: ICD-10-CM

## 2024-02-21 DIAGNOSIS — R55 SYNCOPE, UNSPECIFIED SYNCOPE TYPE: ICD-10-CM

## 2024-02-21 DIAGNOSIS — R06.6 INTRACTABLE HICCUPS: ICD-10-CM

## 2024-02-21 PROBLEM — K21.9 GERD (GASTROESOPHAGEAL REFLUX DISEASE): Status: ACTIVE | Noted: 2024-02-21

## 2024-02-21 PROBLEM — E78.5 HYPERLIPIDEMIA: Status: ACTIVE | Noted: 2024-02-21

## 2024-02-21 PROBLEM — R73.9 HYPERGLYCEMIA: Status: ACTIVE | Noted: 2024-02-21

## 2024-02-21 PROBLEM — I16.0 HYPERTENSIVE URGENCY: Status: ACTIVE | Noted: 2024-02-21

## 2024-02-21 PROBLEM — G93.41 ACUTE METABOLIC ENCEPHALOPATHY: Status: ACTIVE | Noted: 2024-02-21

## 2024-02-21 LAB
ALBUMIN SERPL BCP-MCNC: 3.9 G/DL (ref 3.2–4.9)
ALBUMIN/GLOB SERPL: 1.2 G/DL
ALP SERPL-CCNC: 76 U/L (ref 30–99)
ALT SERPL-CCNC: 22 U/L (ref 2–50)
ANION GAP SERPL CALC-SCNC: 12 MMOL/L (ref 7–16)
AST SERPL-CCNC: 30 U/L (ref 12–45)
BASOPHILS # BLD AUTO: 0.5 % (ref 0–1.8)
BASOPHILS # BLD: 0.03 K/UL (ref 0–0.12)
BILIRUB SERPL-MCNC: 1.5 MG/DL (ref 0.1–1.5)
BUN SERPL-MCNC: 16 MG/DL (ref 8–22)
CALCIUM ALBUM COR SERPL-MCNC: 8.6 MG/DL (ref 8.5–10.5)
CALCIUM SERPL-MCNC: 8.5 MG/DL (ref 8.5–10.5)
CHLORIDE SERPL-SCNC: 100 MMOL/L (ref 96–112)
CO2 SERPL-SCNC: 23 MMOL/L (ref 20–33)
CREAT SERPL-MCNC: 1.95 MG/DL (ref 0.5–1.4)
EKG IMPRESSION: NORMAL
EOSINOPHIL # BLD AUTO: 0.21 K/UL (ref 0–0.51)
EOSINOPHIL NFR BLD: 3.2 % (ref 0–6.9)
ERYTHROCYTE [DISTWIDTH] IN BLOOD BY AUTOMATED COUNT: 50.8 FL (ref 35.9–50)
FLUAV RNA SPEC QL NAA+PROBE: NEGATIVE
FLUBV RNA SPEC QL NAA+PROBE: NEGATIVE
GFR SERPLBLD CREATININE-BSD FMLA CKD-EPI: 39 ML/MIN/1.73 M 2
GLOBULIN SER CALC-MCNC: 3.3 G/DL (ref 1.9–3.5)
GLUCOSE BLD STRIP.AUTO-MCNC: 115 MG/DL (ref 65–99)
GLUCOSE BLD STRIP.AUTO-MCNC: 64 MG/DL (ref 65–99)
GLUCOSE BLD STRIP.AUTO-MCNC: 70 MG/DL (ref 65–99)
GLUCOSE SERPL-MCNC: 126 MG/DL (ref 65–99)
HCT VFR BLD AUTO: 40.1 % (ref 42–52)
HGB BLD-MCNC: 15 G/DL (ref 14–18)
IMM GRANULOCYTES # BLD AUTO: 0.02 K/UL (ref 0–0.11)
IMM GRANULOCYTES NFR BLD AUTO: 0.3 % (ref 0–0.9)
LYMPHOCYTES # BLD AUTO: 0.83 K/UL (ref 1–4.8)
LYMPHOCYTES NFR BLD: 12.8 % (ref 22–41)
MCH RBC QN AUTO: 36.6 PG (ref 27–33)
MCHC RBC AUTO-ENTMCNC: 37.4 G/DL (ref 32.3–36.5)
MCV RBC AUTO: 97.8 FL (ref 81.4–97.8)
MONOCYTES # BLD AUTO: 0.48 K/UL (ref 0–0.85)
MONOCYTES NFR BLD AUTO: 7.4 % (ref 0–13.4)
NEUTROPHILS # BLD AUTO: 4.91 K/UL (ref 1.82–7.42)
NEUTROPHILS NFR BLD: 75.8 % (ref 44–72)
NRBC # BLD AUTO: 0 K/UL
NRBC BLD-RTO: 0 /100 WBC (ref 0–0.2)
PLATELET # BLD AUTO: 150 K/UL (ref 164–446)
PMV BLD AUTO: 8.8 FL (ref 9–12.9)
POTASSIUM SERPL-SCNC: 3.6 MMOL/L (ref 3.6–5.5)
PROT SERPL-MCNC: 7.2 G/DL (ref 6–8.2)
RBC # BLD AUTO: 4.1 M/UL (ref 4.7–6.1)
RSV RNA SPEC QL NAA+PROBE: NEGATIVE
SARS-COV-2 RNA RESP QL NAA+PROBE: NOTDETECTED
SODIUM SERPL-SCNC: 135 MMOL/L (ref 135–145)
SPECIMEN SOURCE: NORMAL
TROPONIN T SERPL-MCNC: 10 NG/L (ref 6–19)
TROPONIN T SERPL-MCNC: 10 NG/L (ref 6–19)
TROPONIN T SERPL-MCNC: 16 NG/L (ref 6–19)
WBC # BLD AUTO: 6.5 K/UL (ref 4.8–10.8)

## 2024-02-21 PROCEDURE — 93005 ELECTROCARDIOGRAM TRACING: CPT | Performed by: EMERGENCY MEDICINE

## 2024-02-21 PROCEDURE — 700105 HCHG RX REV CODE 258: Mod: UD | Performed by: INTERNAL MEDICINE

## 2024-02-21 PROCEDURE — G0378 HOSPITAL OBSERVATION PER HR: HCPCS

## 2024-02-21 PROCEDURE — 0241U HCHG SARS-COV-2 COVID-19 NFCT DS RESP RNA 4 TRGT MIC: CPT

## 2024-02-21 PROCEDURE — A9270 NON-COVERED ITEM OR SERVICE: HCPCS | Mod: UD | Performed by: INTERNAL MEDICINE

## 2024-02-21 PROCEDURE — 82962 GLUCOSE BLOOD TEST: CPT | Mod: 91

## 2024-02-21 PROCEDURE — 36415 COLL VENOUS BLD VENIPUNCTURE: CPT

## 2024-02-21 PROCEDURE — 85025 COMPLETE CBC W/AUTO DIFF WBC: CPT

## 2024-02-21 PROCEDURE — 70450 CT HEAD/BRAIN W/O DYE: CPT

## 2024-02-21 PROCEDURE — 700102 HCHG RX REV CODE 250 W/ 637 OVERRIDE(OP): Mod: UD | Performed by: INTERNAL MEDICINE

## 2024-02-21 PROCEDURE — 84484 ASSAY OF TROPONIN QUANT: CPT

## 2024-02-21 PROCEDURE — 700111 HCHG RX REV CODE 636 W/ 250 OVERRIDE (IP): Mod: JZ,UD | Performed by: EMERGENCY MEDICINE

## 2024-02-21 PROCEDURE — 700105 HCHG RX REV CODE 258: Mod: UD | Performed by: EMERGENCY MEDICINE

## 2024-02-21 PROCEDURE — 71045 X-RAY EXAM CHEST 1 VIEW: CPT

## 2024-02-21 PROCEDURE — 99285 EMERGENCY DEPT VISIT HI MDM: CPT

## 2024-02-21 PROCEDURE — 99223 1ST HOSP IP/OBS HIGH 75: CPT | Performed by: INTERNAL MEDICINE

## 2024-02-21 PROCEDURE — 80053 COMPREHEN METABOLIC PANEL: CPT

## 2024-02-21 PROCEDURE — 96374 THER/PROPH/DIAG INJ IV PUSH: CPT

## 2024-02-21 PROCEDURE — 96375 TX/PRO/DX INJ NEW DRUG ADDON: CPT

## 2024-02-21 PROCEDURE — 93005 ELECTROCARDIOGRAM TRACING: CPT

## 2024-02-21 RX ORDER — AMOXICILLIN 250 MG
2 CAPSULE ORAL EVERY EVENING
Status: DISCONTINUED | OUTPATIENT
Start: 2024-02-21 | End: 2024-02-26 | Stop reason: HOSPADM

## 2024-02-21 RX ORDER — ACETAMINOPHEN 325 MG/1
650 TABLET ORAL EVERY 6 HOURS PRN
Status: DISCONTINUED | OUTPATIENT
Start: 2024-02-21 | End: 2024-02-26 | Stop reason: HOSPADM

## 2024-02-21 RX ORDER — ASPIRIN 81 MG/1
81 TABLET ORAL DAILY
Status: DISCONTINUED | OUTPATIENT
Start: 2024-02-21 | End: 2024-02-26 | Stop reason: HOSPADM

## 2024-02-21 RX ORDER — PROCHLORPERAZINE EDISYLATE 5 MG/ML
5-10 INJECTION INTRAMUSCULAR; INTRAVENOUS EVERY 4 HOURS PRN
Status: DISCONTINUED | OUTPATIENT
Start: 2024-02-21 | End: 2024-02-26 | Stop reason: HOSPADM

## 2024-02-21 RX ORDER — PROMETHAZINE HYDROCHLORIDE 25 MG/1
12.5-25 SUPPOSITORY RECTAL EVERY 4 HOURS PRN
Status: DISCONTINUED | OUTPATIENT
Start: 2024-02-21 | End: 2024-02-26 | Stop reason: HOSPADM

## 2024-02-21 RX ORDER — ONDANSETRON 2 MG/ML
4 INJECTION INTRAMUSCULAR; INTRAVENOUS EVERY 4 HOURS PRN
Status: DISCONTINUED | OUTPATIENT
Start: 2024-02-21 | End: 2024-02-26 | Stop reason: HOSPADM

## 2024-02-21 RX ORDER — SODIUM CHLORIDE 9 MG/ML
INJECTION, SOLUTION INTRAVENOUS CONTINUOUS
Status: DISCONTINUED | OUTPATIENT
Start: 2024-02-21 | End: 2024-02-22

## 2024-02-21 RX ORDER — ONDANSETRON 4 MG/1
4 TABLET, ORALLY DISINTEGRATING ORAL EVERY 4 HOURS PRN
Status: DISCONTINUED | OUTPATIENT
Start: 2024-02-21 | End: 2024-02-26 | Stop reason: HOSPADM

## 2024-02-21 RX ORDER — POLYETHYLENE GLYCOL 3350 17 G/17G
1 POWDER, FOR SOLUTION ORAL
Status: DISCONTINUED | OUTPATIENT
Start: 2024-02-21 | End: 2024-02-26 | Stop reason: HOSPADM

## 2024-02-21 RX ORDER — OMEPRAZOLE 20 MG/1
20 CAPSULE, DELAYED RELEASE ORAL DAILY
Status: DISCONTINUED | OUTPATIENT
Start: 2024-02-21 | End: 2024-02-26 | Stop reason: HOSPADM

## 2024-02-21 RX ORDER — HYDRALAZINE HYDROCHLORIDE 20 MG/ML
20 INJECTION INTRAMUSCULAR; INTRAVENOUS ONCE
Status: COMPLETED | OUTPATIENT
Start: 2024-02-21 | End: 2024-02-21

## 2024-02-21 RX ORDER — SODIUM CHLORIDE, SODIUM LACTATE, POTASSIUM CHLORIDE, CALCIUM CHLORIDE 600; 310; 30; 20 MG/100ML; MG/100ML; MG/100ML; MG/100ML
1000 INJECTION, SOLUTION INTRAVENOUS ONCE
Status: COMPLETED | OUTPATIENT
Start: 2024-02-21 | End: 2024-02-21

## 2024-02-21 RX ORDER — ENALAPRILAT 1.25 MG/ML
1.25 INJECTION INTRAVENOUS ONCE
Status: COMPLETED | OUTPATIENT
Start: 2024-02-21 | End: 2024-02-21

## 2024-02-21 RX ORDER — PROMETHAZINE HYDROCHLORIDE 25 MG/1
12.5-25 TABLET ORAL EVERY 4 HOURS PRN
Status: DISCONTINUED | OUTPATIENT
Start: 2024-02-21 | End: 2024-02-26 | Stop reason: HOSPADM

## 2024-02-21 RX ORDER — ATORVASTATIN CALCIUM 40 MG/1
40 TABLET, FILM COATED ORAL EVERY EVENING
Status: DISCONTINUED | OUTPATIENT
Start: 2024-02-21 | End: 2024-02-26 | Stop reason: HOSPADM

## 2024-02-21 RX ORDER — LABETALOL HYDROCHLORIDE 5 MG/ML
10 INJECTION, SOLUTION INTRAVENOUS EVERY 4 HOURS PRN
Status: DISCONTINUED | OUTPATIENT
Start: 2024-02-21 | End: 2024-02-22

## 2024-02-21 RX ORDER — LISINOPRIL 20 MG/1
40 TABLET ORAL DAILY
Status: DISCONTINUED | OUTPATIENT
Start: 2024-02-21 | End: 2024-02-23

## 2024-02-21 RX ORDER — HYDRALAZINE HYDROCHLORIDE 20 MG/ML
10 INJECTION INTRAMUSCULAR; INTRAVENOUS ONCE
Status: DISCONTINUED | OUTPATIENT
Start: 2024-02-21 | End: 2024-02-22

## 2024-02-21 RX ORDER — DEXTROSE MONOHYDRATE 25 G/50ML
25 INJECTION, SOLUTION INTRAVENOUS
Status: DISCONTINUED | OUTPATIENT
Start: 2024-02-21 | End: 2024-02-22

## 2024-02-21 RX ADMIN — HYDRALAZINE HYDROCHLORIDE 20 MG: 20 INJECTION INTRAMUSCULAR; INTRAVENOUS at 14:37

## 2024-02-21 RX ADMIN — ATORVASTATIN CALCIUM 40 MG: 40 TABLET, FILM COATED ORAL at 17:47

## 2024-02-21 RX ADMIN — SODIUM CHLORIDE, POTASSIUM CHLORIDE, SODIUM LACTATE AND CALCIUM CHLORIDE 1000 ML: 600; 310; 30; 20 INJECTION, SOLUTION INTRAVENOUS at 15:29

## 2024-02-21 RX ADMIN — SODIUM CHLORIDE: 9 INJECTION, SOLUTION INTRAVENOUS at 18:24

## 2024-02-21 RX ADMIN — ASPIRIN 81 MG: 81 TABLET, COATED ORAL at 17:46

## 2024-02-21 RX ADMIN — METOPROLOL TARTRATE 12.5 MG: 25 TABLET, FILM COATED ORAL at 17:46

## 2024-02-21 RX ADMIN — LISINOPRIL 40 MG: 20 TABLET ORAL at 17:45

## 2024-02-21 RX ADMIN — OMEPRAZOLE 20 MG: 20 CAPSULE, DELAYED RELEASE ORAL at 17:46

## 2024-02-21 RX ADMIN — ENALAPRILAT 1.25 MG: 1.25 INJECTION INTRAVENOUS at 14:02

## 2024-02-21 RX ADMIN — ACETAMINOPHEN 650 MG: 325 TABLET, FILM COATED ORAL at 19:12

## 2024-02-21 ASSESSMENT — PATIENT HEALTH QUESTIONNAIRE - PHQ9
5. POOR APPETITE OR OVEREATING: SEVERAL DAYS
2. FEELING DOWN, DEPRESSED, IRRITABLE, OR HOPELESS: SEVERAL DAYS
7. TROUBLE CONCENTRATING ON THINGS, SUCH AS READING THE NEWSPAPER OR WATCHING TELEVISION: NOT AT ALL
4. FEELING TIRED OR HAVING LITTLE ENERGY: NEARLY EVERY DAY
1. LITTLE INTEREST OR PLEASURE IN DOING THINGS: SEVERAL DAYS
8. MOVING OR SPEAKING SO SLOWLY THAT OTHER PEOPLE COULD HAVE NOTICED. OR THE OPPOSITE, BEING SO FIGETY OR RESTLESS THAT YOU HAVE BEEN MOVING AROUND A LOT MORE THAN USUAL: NOT AT ALL
3. TROUBLE FALLING OR STAYING ASLEEP OR SLEEPING TOO MUCH: MORE THAN HALF THE DAYS
SUM OF ALL RESPONSES TO PHQ QUESTIONS 1-9: 9
SUM OF ALL RESPONSES TO PHQ9 QUESTIONS 1 AND 2: 2
9. THOUGHTS THAT YOU WOULD BE BETTER OFF DEAD, OR OF HURTING YOURSELF: NOT AT ALL
6. FEELING BAD ABOUT YOURSELF - OR THAT YOU ARE A FAILURE OR HAVE LET YOURSELF OR YOUR FAMILY DOWN: SEVERAL DAYS

## 2024-02-21 ASSESSMENT — LIFESTYLE VARIABLES
TOTAL SCORE: 0
TOTAL SCORE: 0
EVER FELT BAD OR GUILTY ABOUT YOUR DRINKING: NO
EVER HAD A DRINK FIRST THING IN THE MORNING TO STEADY YOUR NERVES TO GET RID OF A HANGOVER: NO
AVERAGE NUMBER OF DAYS PER WEEK YOU HAVE A DRINK CONTAINING ALCOHOL: 0
HAVE YOU EVER FELT YOU SHOULD CUT DOWN ON YOUR DRINKING: NO
HAVE PEOPLE ANNOYED YOU BY CRITICIZING YOUR DRINKING: NO
ALCOHOL_USE: YES
ON A TYPICAL DAY WHEN YOU DRINK ALCOHOL HOW MANY DRINKS DO YOU HAVE: 1
HOW MANY TIMES IN THE PAST YEAR HAVE YOU HAD 5 OR MORE DRINKS IN A DAY: 0
TOTAL SCORE: 0
CONSUMPTION TOTAL: NEGATIVE

## 2024-02-21 ASSESSMENT — PAIN DESCRIPTION - PAIN TYPE
TYPE: ACUTE PAIN
TYPE: ACUTE PAIN

## 2024-02-21 ASSESSMENT — FIBROSIS 4 INDEX: FIB4 SCORE: 1.43

## 2024-02-21 NOTE — ASSESSMENT & PLAN NOTE
Patient had significant hypertension with a systolic greater than 200 upon arrival, encephalopathy and AURELIANO  Patient has known hypertension, also has known noncompliance, unsure about his current compliance  Patient formally on lisinopril  Discontinued lisinopril in 2/22, started patient on amlodipine 5 mg, increase to 10 mg 2/24,  carvedilol was added, increased to 25 mg twice daily, blood pressure still not controlled, added hydralazine 25 mg 3 times daily.  TTE noted LVEF of 60%, grade 1 diastolic dysfunction, no valvular abnormalities.

## 2024-02-21 NOTE — ED NOTES
Hospitalist at bedside. Pt noted to be unresponsive, pt able to open eyes but not following commands. Pt laid flat and BP checked on bilateral arms.  Pt noted to be hypotensive 91/50.  Order received for LR bolus.  Pt became more alert after 5min.  Fluid bolus initiated on pressure back.  Pt able to gain full orientation after episode.

## 2024-02-21 NOTE — ASSESSMENT & PLAN NOTE
Patient has not received any sedating medication in the ER  He did receive Vasotec as well as hydralazine 20 mg  He does not have a bounding pulse which I would have anticipated with his hypertensive urgency, at this point in time I feel his blood pressure is likely dropped significantly causing his altered mentation/syncopal episode  I feel like his initial syncopal episode was likely secondary to his hypertension however  Head CT negative  RESOLVED

## 2024-02-21 NOTE — ED PROVIDER NOTES
ED Provider Note    ED Provider    Means of arrival: Ambulance  History obtained from: Patient  History limited by: Patient has no memory of the event    CHIEF COMPLAINT  Chief Complaint   Patient presents with    Syncope     Pt BIBA from work.  Pt was found by a fellow employee passed out at his desk.  Pt lost consciousness for approximately 10 minutes.  Pt's blood sugar 142 for EMS.  Per EMS, pt was pale and diaphoretic upon arrival. A&O x4.  Pt received approximately 300ml NS from EMS.        HPI  Paul Hopper is a 58 y.o. male who presents patient states that when he was at his workstation, he was on a computer, and then next he knows he is being woken up.  There was no witnessed seizures.  He says he has had some like this about 30 years ago but has not had anything since.    There was no tongue biting, and no urinary incontinence, no reported postictal state.    He does have a history of MI, he does a history of hypertension is not compliant with his medications.  Prior to the event there was no prodromal symptoms.    REVIEW OF SYSTEMS  See HPI for further details. All other systems are negative.     PAST MEDICAL HISTORY   has a past medical history of Chickenpox, CVA (cerebral vascular accident) (HCC), Hypertension, and MI (myocardial infarction) (HCC).    SOCIAL HISTORY  Social History     Tobacco Use    Smoking status: Some Days     Types: Cigarettes    Smokeless tobacco: Never   Vaping Use    Vaping Use: Not on file   Substance and Sexual Activity    Alcohol use: Yes     Alcohol/week: 1.2 oz     Types: 2 Standard drinks or equivalent per week     Comment: OCC    Drug use: Not Currently     Comment: marijuana    Sexual activity: Not on file       SURGICAL HISTORY  patient denies any surgical history    CURRENT MEDICATIONS  Home Medications       Reviewed by Patel Mora (Pharmacy Tech) on 02/21/24 at 2037  Med List Status: Unable to Obtain     Medication Last Dose Status   aspirin 81 MG EC tablet   "Active   atorvastatin (LIPITOR) 40 MG Tab  Active   lisinopril (PRINIVIL) 40 MG tablet  Active   metoprolol tartrate (LOPRESSOR) 25 MG Tab  Active   omeprazole (PRILOSEC) 20 MG delayed-release capsule  Active                    ALLERGIES  Allergies   Allergen Reactions    Other Food Anaphylaxis     All fresh fruit causes throat swelling per brother.        PHYSICAL EXAM  VITAL SIGNS: BP (!) 181/89   Pulse 86   Temp 36.6 °C (97.9 °F) (Temporal)   Resp 19   Ht 1.753 m (5' 9\")   Wt 88.5 kg (195 lb)   SpO2 98%   BMI 28.80 kg/m²   Constitutional: Alert in no apparent distress.  HENT: No signs of trauma, Mucous membranes are moist   Eyes:  Conjunctiva normal, Non-icteric.   Neck: Normal range of motion, No tenderness, Supple,  Lymphatic: No lymphadenopathy noted.   Cardiovascular: Regular rate and rhythm, no murmurs.   Thorax & Lungs: Normal breath sounds, No respiratory distress, No wheezing, No chest tenderness.   Abdomen: Bowel sounds normal, Soft, No tenderness, No masses, No pulsatile masses. No peritoneal signs.  Skin: Warm, Dry,Normal color  Back: No bony tenderness, No CVA tenderness.   Extremities:No edema, No tenderness, No cyanosis,    Musculoskeletal: Good range of motion in all major joints. No tenderness to palpation or major deformities noted.   Neurologic: Alert ,Oriented x4, Normal motor function, Normal sensory function, No focal deficits noted.   Psychiatric: Affect normal, Judgment normal, Mood normal.       MEDICAL DECISION MAKING  This is a 58 y.o. male who presents patient presents with a sudden drop/syncopal episode is concerning for cardiac arrhythmia, electrolyte disorder cardiac event.  His blood pressure is elevated that he has been noncompliant with his medication IV medications will be given to try to improve his blood pressure at this time.    DIAGNOSTIC STUDIES / PROCEDURES    EKG      LABS  Results for orders placed or performed during the hospital encounter of 02/21/24   CBC with " Differential   Result Value Ref Range    WBC 6.5 4.8 - 10.8 K/uL    RBC 4.10 (L) 4.70 - 6.10 M/uL    Hemoglobin 15.0 14.0 - 18.0 g/dL    Hematocrit 40.1 (L) 42.0 - 52.0 %    MCV 97.8 81.4 - 97.8 fL    MCH 36.6 (H) 27.0 - 33.0 pg    MCHC 37.4 (H) 32.3 - 36.5 g/dL    RDW 50.8 (H) 35.9 - 50.0 fL    Platelet Count 150 (L) 164 - 446 K/uL    MPV 8.8 (L) 9.0 - 12.9 fL    Neutrophils-Polys 75.80 (H) 44.00 - 72.00 %    Lymphocytes 12.80 (L) 22.00 - 41.00 %    Monocytes 7.40 0.00 - 13.40 %    Eosinophils 3.20 0.00 - 6.90 %    Basophils 0.50 0.00 - 1.80 %    Immature Granulocytes 0.30 0.00 - 0.90 %    Nucleated RBC 0.00 0.00 - 0.20 /100 WBC    Neutrophils (Absolute) 4.91 1.82 - 7.42 K/uL    Lymphs (Absolute) 0.83 (L) 1.00 - 4.80 K/uL    Monos (Absolute) 0.48 0.00 - 0.85 K/uL    Eos (Absolute) 0.21 0.00 - 0.51 K/uL    Baso (Absolute) 0.03 0.00 - 0.12 K/uL    Immature Granulocytes (abs) 0.02 0.00 - 0.11 K/uL    NRBC (Absolute) 0.00 K/uL   Complete Metabolic Panel (CMP)   Result Value Ref Range    Sodium 135 135 - 145 mmol/L    Potassium 3.6 3.6 - 5.5 mmol/L    Chloride 100 96 - 112 mmol/L    Co2 23 20 - 33 mmol/L    Anion Gap 12.0 7.0 - 16.0    Glucose 126 (H) 65 - 99 mg/dL    Bun 16 8 - 22 mg/dL    Creatinine 1.95 (H) 0.50 - 1.40 mg/dL    Calcium 8.5 8.5 - 10.5 mg/dL    Correct Calcium 8.6 8.5 - 10.5 mg/dL    AST(SGOT) 30 12 - 45 U/L    ALT(SGPT) 22 2 - 50 U/L    Alkaline Phosphatase 76 30 - 99 U/L    Total Bilirubin 1.5 0.1 - 1.5 mg/dL    Albumin 3.9 3.2 - 4.9 g/dL    Total Protein 7.2 6.0 - 8.2 g/dL    Globulin 3.3 1.9 - 3.5 g/dL    A-G Ratio 1.2 g/dL   Troponins NOW   Result Value Ref Range    Troponin T 10 6 - 19 ng/L   CoV-2, FLU A/B, and RSV by PCR (2-4 Hours CEPHEID) : Collect NP swab in VTM    Specimen: Respirate   Result Value Ref Range    Influenza virus A RNA Negative Negative    Influenza virus B, PCR Negative Negative    RSV, PCR Negative Negative    SARS-CoV-2 by PCR NotDetected     SARS-CoV-2 Source NP Swab     ESTIMATED GFR   Result Value Ref Range    GFR (CKD-EPI) 39 (A) >60 mL/min/1.73 m 2   EKG   Result Value Ref Range    Report       [facility]    Test Date:  2024  Pt Name:    MIRANDA REARDON                Department: ER  MRN:        [id number]                      Room:        15  Gender:     Male                         Technician: [id number]  :        1965                   Requested By:ER TRIAGE PROTOCOL  Order #:    [id number]                    Reading MD: LOBO FREGOSO D.O.    Measurements  Intervals                                Axis  Rate:       68                           P:          12  NE:         161                          QRS:        -26  QRSD:       89                           T:          -4  QT:         408  QTc:        434    Interpretive Statements  Sinus rhythm  LVH with secondary repolarization abnormality  Baseline wander in lead(s) V6  Compared to ECG 03/10/2023 09:57:49  Left ventricular hypertrophy now present  Early repolarization now present  Electronically Signed On 2024 15:02:53 PST by LOBO FREGOSO D.O.           RADIOLOGY  CT-HEAD W/O   Final Result      1.  No acute intracranial abnormality.   2.  Mild atrophy with ventriculomegaly, unchanged.   3.  Chronic paranasal sinus disease.         DX-CHEST-PORTABLE (1 VIEW)   Final Result      Hypoinflation without other evidence for acute cardiopulmonary disease.          COURSE  Pertinent Labs & Imaging studies reviewed. (See chart for details)    ED course: This patient presented with a syncopal episode.  There was no signs of seizures, he has been awake alert and oriented here.  His laboratory shows no significant abnormality except for an elevated creatinine which is new since 10 months ago.  He is hypertensive, he does not take his hypertension medications he is medicated with IV Vasotec, this was uneventful and did not work so a dose of hydralazine was given.    This did  improve his blood pressure 180/95  I spoke with the hospitalist for admission the patient will be admitted for further evaluation and treatment.    While the patient was waiting for the hospitalist, hospitalist when the patient unresponsive, very somnolent and not able to stay awake.    His systolic blood pressure was 90, and 80s did start to awaken, IV fluids were given his blood pressure did significantly improved.    I cannot find a reason for this sudden drop in blood pressure and altered mental status, this is similar to what he had at work.  He did receive blood pressure medications but there was a delayed reaction to this I do not suspect it was from the medications he received as it was only transient episode.  With this the patient will be admitted for further evaluation and treatment.      Admitted in guarded condition    FINAL IMPRESSION  1. Syncope, unspecified syncope type    2. AURELIANO (acute kidney injury) (HCC)    3. Uncontrolled hypertension    4. Left-sided chest pain    Critical care time 35 minutes    The note accurately reflects work and decisions made by me.  Davy Box D.O.  2/21/2024  5:12 PM

## 2024-02-21 NOTE — H&P
Hospital Medicine History & Physical Note    Date of Service  2/21/2024    Primary Care Physician  Sam Sheikh M.D.    Consultants  None    Specialist Names: None    Code Status  Full Code    Chief Complaint  Chief Complaint   Patient presents with    Syncope     Pt BIBA from work.  Pt was found by a fellow employee passed out at his desk.  Pt lost consciousness for approximately 10 minutes.  Pt's blood sugar 142 for EMS.  Per EMS, pt was pale and diaphoretic upon arrival. A&O x4.  Pt received approximately 300ml NS from EMS.        History of Presenting Illness  Paul Hopper is a 58 y.o. male who presented 2/21/2024 with syncopal episode.  Apparently, patient was at his workstation, on a computer, next thing he knew he was being woken up.  Patient was in his usual mental state upon arousal, no witnessed seizure.  Because of this, patient was brought to the ER.  In the ER, he was noted to have significantly elevated blood pressure with a systolic greater than 200.  This was treated with multiple agents in the ER.  During my exam, patient was unresponsive which was new per ERP.  Patient would wake up and open his eyes but was never verbal.  I did discuss the case including labs and imaging with the ER physician.    I discussed the plan of care with bedside RN.    Review of Systems  Review of Systems   Unable to perform ROS: Acuity of condition       Past Medical History   has a past medical history of Chickenpox, CVA (cerebral vascular accident) (HCC), Hypertension, and MI (myocardial infarction) (HCC).    Surgical History   has no past surgical history on file.     Family History  family history is not on file.   Family history reviewed with patient. There is no family history that is pertinent to the chief complaint.     Social History   reports that he has been smoking cigarettes. He has never used smokeless tobacco. He reports current alcohol use of about 1.2 oz of alcohol per week. He reports that  he does not currently use drugs.    Allergies  Allergies   Allergen Reactions    Other Food Anaphylaxis     All fresh fruit causes throat swelling per brother.        Medications  Prior to Admission Medications   Prescriptions Last Dose Informant Patient Reported? Taking?   aspirin 81 MG EC tablet  Patient No No   Sig: Take 1 Tablet by mouth every day.   atorvastatin (LIPITOR) 40 MG Tab  Patient No No   Sig: Take 1 Tablet by mouth every evening.   lisinopril (PRINIVIL) 40 MG tablet  Patient No No   Sig: Take 1 Tablet by mouth every day.   metoprolol tartrate (LOPRESSOR) 25 MG Tab  Patient No No   Sig: Take 0.5 Tablets by mouth 2 times a day.   omeprazole (PRILOSEC) 20 MG delayed-release capsule  Patient No No   Sig: Take 1 Capsule by mouth every day.      Facility-Administered Medications: None       Physical Exam  Temp:  [36.6 °C (97.9 °F)] 36.6 °C (97.9 °F)  Pulse:  [66-90] 87  Resp:  [14-18] 18  BP: ()/() 145/78  SpO2:  [93 %-100 %] 100 %  Blood Pressure: (!) 145/78   Temperature: 36.6 °C (97.9 °F)   Pulse: 87   Respiration: 18   Pulse Oximetry: 100 %       Physical Exam  Vitals and nursing note reviewed.   Constitutional:       General: He is not in acute distress.     Appearance: He is well-developed. He is not toxic-appearing or diaphoretic.   HENT:      Head: Normocephalic and atraumatic.      Right Ear: External ear normal.      Left Ear: External ear normal.      Nose: Nose normal. No congestion or rhinorrhea.      Mouth/Throat:      Mouth: Mucous membranes are dry.      Pharynx: No oropharyngeal exudate.   Eyes:      General:         Right eye: No discharge.         Left eye: No discharge.   Neck:      Trachea: No tracheal deviation.   Cardiovascular:      Rate and Rhythm: Normal rate and regular rhythm.      Heart sounds: No murmur heard.     No friction rub. No gallop.   Pulmonary:      Effort: Pulmonary effort is normal. No respiratory distress.      Breath sounds: Normal breath sounds. No  "stridor. No wheezing or rales.   Abdominal:      General: Bowel sounds are normal. There is no distension.      Palpations: Abdomen is soft.   Musculoskeletal:      Cervical back: Normal range of motion and neck supple. No edema or erythema.      Right lower leg: No edema.      Left lower leg: No edema.   Lymphadenopathy:      Cervical: No cervical adenopathy.   Skin:     General: Skin is warm and dry.      Findings: No erythema or rash.   Neurological:      Comments: Somnolent, arousable but falls asleep without speaking   Psychiatric:         Speech: He is noncommunicative.         Laboratory:  Recent Labs     02/21/24  1310   WBC 6.5   RBC 4.10*   HEMOGLOBIN 15.0   HEMATOCRIT 40.1*   MCV 97.8   MCH 36.6*   MCHC 37.4*   RDW 50.8*   PLATELETCT 150*   MPV 8.8*     Recent Labs     02/21/24  1310   SODIUM 135   POTASSIUM 3.6   CHLORIDE 100   CO2 23   GLUCOSE 126*   BUN 16   CREATININE 1.95*   CALCIUM 8.5     Recent Labs     02/21/24  1310   ALTSGPT 22   ASTSGOT 30   ALKPHOSPHAT 76   TBILIRUBIN 1.5   GLUCOSE 126*         No results for input(s): \"NTPROBNP\" in the last 72 hours.      Recent Labs     02/21/24  1310   TROPONINT 10       Imaging:  DX-CHEST-PORTABLE (1 VIEW)   Final Result      Hypoinflation without other evidence for acute cardiopulmonary disease.      CT-HEAD W/O    (Results Pending)       X-Ray:  I have personally reviewed the images and compared with prior images.    Assessment/Plan:  Justification for Admission Status  I anticipate this patient is appropriate for observation status at this time because hypertensive urgency and syncope    Patient will need a Telemetry bed on CARDIOLOGY service .  The need is secondary to hypertensive urgency and syncope.    * Hypertensive urgency- (present on admission)  Assessment & Plan  Patient had significant hypertension with a systolic greater than 200 upon arrival  Patient has known hypertension, also has known noncompliance, unsure about his current " compliance  Patient was given Vasotec and hydralazine in the ER, I think this decreased his blood pressure significantly causing his altered mentation  At this time, will restart home lisinopril and metoprolol  Start as needed labetalol  If additional agent is needed, will start hydralazine but only at 10 mg  I do believe his uncontrolled hypertension because his syncopal episode, thus far troponin is negative  Will monitor him on telemetry  I do not anticipate this is a cardiac issue, I will not obtain an echocardiogram unless troponin elevates or abnormality noted on telemetry  Most recent echocardiogram was in March which showed an ejection fraction of 50% and some wall hypokinesis      Syncope and collapse- (present on admission)  Assessment & Plan  I think his initial syncopal episode was secondary to hypertension  He had a repeat episode in the ER of altered mentation, I think this is due to multiple antihypertensives along with his dehydration causing him to have a more significant response to these medications  Significant drop in his systolic pressure caused altered mentation  Monitor patient on telemetry  I do not believe this is an arrhythmia, certainly not an ACS, I am going to hold off on getting an echocardiogram for now  If there is an elevation in troponin or any arrhythmia, will obtain echo    Acute metabolic encephalopathy- (present on admission)  Assessment & Plan  Patient has not received any sedating medication in the ER  He did receive Vasotec as well as hydralazine 20 mg  He does not have a bounding pulse which I would have anticipated with his hypertensive urgency, at this point in time I feel his blood pressure is likely dropped significantly causing his altered mentation/syncopal episode  I feel like his initial syncopal episode was likely secondary to his hypertension however  Currently, trying to get a manual blood pressure, ERP is ordering a fluid bolus  If mentation does not improve we  will start additional workup  I am however going to obtain a CT of the head, he does have a history of a stroke on MRI but no aneurysm, I do not anticipate a stroke at this time    Hyperglycemia- (present on admission)  Assessment & Plan  Start insulin sliding scale  Adjust as needed    GERD (gastroesophageal reflux disease)- (present on admission)  Assessment & Plan  Continue home PPI    Hyperlipidemia- (present on admission)  Assessment & Plan  Continue home statin    AURELIANO (acute kidney injury) (HCC)- (present on admission)  Assessment & Plan  Due to dehydration  Start IV fluids  Repeat BMP in the morning        VTE prophylaxis: SCDs/TEDs

## 2024-02-21 NOTE — ASSESSMENT & PLAN NOTE
Likely AURELIANO on CKD 3A  Discontinued patient's ACE on 2/22  Bladder scan and straight cath as needed  Renal ultrasound unremarkable  Better blood pressure control  Resolving   Serial BMP

## 2024-02-21 NOTE — ED TRIAGE NOTES
"Chief Complaint   Patient presents with    Syncope     Pt BIBA from work.  Pt was found by a fellow employee passed out at his desk.  Pt lost consciousness for approximately 10 minutes.  Pt's blood sugar 142 for EMS.  Per EMS, pt was pale and diaphoretic upon arrival. A&O x4.  Pt received approximately 300ml NS from EMS.      BP (!) 202/100   Pulse 69   Temp 36.6 °C (97.9 °F) (Temporal)   Resp 14   Ht 1.753 m (5' 9\")   Wt 88.5 kg (195 lb)   SpO2 94%   BMI 28.80 kg/m²     Pt connected to monitor. Pt A&O x4 on arrival.  Pt states he has prior history of MI last year and CVA with no deficits.  Pt endorses mild chest pain at this time. Chest pain protocol ordered.   "

## 2024-02-21 NOTE — ED NOTES
Patient unable to participate in interview at this time. Called [facility] [phone], they have not filled for patient in about 2 years.   No dispense history through [facility].  Voicemail left for brother (Cristi [phone]).

## 2024-02-22 PROBLEM — I10 HTN (HYPERTENSION), BENIGN: Status: ACTIVE | Noted: 2024-02-22

## 2024-02-22 PROBLEM — R50.9 FEVER: Status: ACTIVE | Noted: 2024-02-22

## 2024-02-22 PROBLEM — N18.32 STAGE 3B CHRONIC KIDNEY DISEASE: Status: ACTIVE | Noted: 2021-06-13

## 2024-02-22 LAB
ANION GAP SERPL CALC-SCNC: 9 MMOL/L (ref 7–16)
APPEARANCE UR: CLEAR
BACTERIA #/AREA URNS HPF: NEGATIVE /HPF
BILIRUB UR QL STRIP.AUTO: NEGATIVE
BUN SERPL-MCNC: 16 MG/DL (ref 8–22)
CALCIUM SERPL-MCNC: 8 MG/DL (ref 8.5–10.5)
CHLORIDE SERPL-SCNC: 105 MMOL/L (ref 96–112)
CO2 SERPL-SCNC: 23 MMOL/L (ref 20–33)
COLOR UR: YELLOW
CREAT SERPL-MCNC: 1.73 MG/DL (ref 0.5–1.4)
EPI CELLS #/AREA URNS HPF: NEGATIVE /HPF
ERYTHROCYTE [DISTWIDTH] IN BLOOD BY AUTOMATED COUNT: 49.7 FL (ref 35.9–50)
GFR SERPLBLD CREATININE-BSD FMLA CKD-EPI: 46 ML/MIN/1.73 M 2
GLUCOSE BLD STRIP.AUTO-MCNC: 87 MG/DL (ref 65–99)
GLUCOSE BLD STRIP.AUTO-MCNC: 87 MG/DL (ref 65–99)
GLUCOSE SERPL-MCNC: 92 MG/DL (ref 65–99)
GLUCOSE UR STRIP.AUTO-MCNC: NEGATIVE MG/DL
HCT VFR BLD AUTO: 36.6 % (ref 42–52)
HGB BLD-MCNC: 13.6 G/DL (ref 14–18)
HYALINE CASTS #/AREA URNS LPF: ABNORMAL /LPF
KETONES UR STRIP.AUTO-MCNC: NEGATIVE MG/DL
LEUKOCYTE ESTERASE UR QL STRIP.AUTO: NEGATIVE
MCH RBC QN AUTO: 35.6 PG (ref 27–33)
MCHC RBC AUTO-ENTMCNC: 37.2 G/DL (ref 32.3–36.5)
MCV RBC AUTO: 95.8 FL (ref 81.4–97.8)
MICRO URNS: ABNORMAL
NITRITE UR QL STRIP.AUTO: NEGATIVE
PH UR STRIP.AUTO: 5.5 [PH] (ref 5–8)
PLATELET # BLD AUTO: 144 K/UL (ref 164–446)
PMV BLD AUTO: 8.5 FL (ref 9–12.9)
POTASSIUM SERPL-SCNC: 3.7 MMOL/L (ref 3.6–5.5)
PROT UR QL STRIP: 30 MG/DL
RBC # BLD AUTO: 3.82 M/UL (ref 4.7–6.1)
RBC # URNS HPF: ABNORMAL /HPF
RBC UR QL AUTO: NEGATIVE
SODIUM SERPL-SCNC: 137 MMOL/L (ref 135–145)
SP GR UR STRIP.AUTO: 1.02
UROBILINOGEN UR STRIP.AUTO-MCNC: 1 MG/DL
WBC # BLD AUTO: 6.1 K/UL (ref 4.8–10.8)
WBC #/AREA URNS HPF: ABNORMAL /HPF

## 2024-02-22 PROCEDURE — G0378 HOSPITAL OBSERVATION PER HR: HCPCS

## 2024-02-22 PROCEDURE — A9270 NON-COVERED ITEM OR SERVICE: HCPCS | Mod: UD | Performed by: HOSPITALIST

## 2024-02-22 PROCEDURE — 80048 BASIC METABOLIC PNL TOTAL CA: CPT

## 2024-02-22 PROCEDURE — 700111 HCHG RX REV CODE 636 W/ 250 OVERRIDE (IP): Mod: UD | Performed by: INTERNAL MEDICINE

## 2024-02-22 PROCEDURE — 96375 TX/PRO/DX INJ NEW DRUG ADDON: CPT

## 2024-02-22 PROCEDURE — 700105 HCHG RX REV CODE 258: Mod: UD | Performed by: INTERNAL MEDICINE

## 2024-02-22 PROCEDURE — 99233 SBSQ HOSP IP/OBS HIGH 50: CPT | Performed by: HOSPITALIST

## 2024-02-22 PROCEDURE — 87040 BLOOD CULTURE FOR BACTERIA: CPT

## 2024-02-22 PROCEDURE — 81001 URINALYSIS AUTO W/SCOPE: CPT

## 2024-02-22 PROCEDURE — 96376 TX/PRO/DX INJ SAME DRUG ADON: CPT

## 2024-02-22 PROCEDURE — 36415 COLL VENOUS BLD VENIPUNCTURE: CPT

## 2024-02-22 PROCEDURE — 82962 GLUCOSE BLOOD TEST: CPT

## 2024-02-22 PROCEDURE — 700111 HCHG RX REV CODE 636 W/ 250 OVERRIDE (IP): Mod: UD | Performed by: HOSPITALIST

## 2024-02-22 PROCEDURE — 85027 COMPLETE CBC AUTOMATED: CPT

## 2024-02-22 PROCEDURE — 700102 HCHG RX REV CODE 250 W/ 637 OVERRIDE(OP): Mod: UD | Performed by: HOSPITALIST

## 2024-02-22 PROCEDURE — A9270 NON-COVERED ITEM OR SERVICE: HCPCS | Mod: UD | Performed by: INTERNAL MEDICINE

## 2024-02-22 PROCEDURE — 700102 HCHG RX REV CODE 250 W/ 637 OVERRIDE(OP): Mod: UD | Performed by: INTERNAL MEDICINE

## 2024-02-22 RX ORDER — CARVEDILOL 6.25 MG/1
6.25 TABLET ORAL 2 TIMES DAILY WITH MEALS
Status: DISCONTINUED | OUTPATIENT
Start: 2024-02-22 | End: 2024-02-23

## 2024-02-22 RX ORDER — HYDRALAZINE HYDROCHLORIDE 20 MG/ML
10 INJECTION INTRAMUSCULAR; INTRAVENOUS EVERY 4 HOURS PRN
Status: DISCONTINUED | OUTPATIENT
Start: 2024-02-22 | End: 2024-02-22

## 2024-02-22 RX ORDER — HYDRALAZINE HYDROCHLORIDE 20 MG/ML
20 INJECTION INTRAMUSCULAR; INTRAVENOUS EVERY 4 HOURS PRN
Status: DISCONTINUED | OUTPATIENT
Start: 2024-02-22 | End: 2024-02-23

## 2024-02-22 RX ADMIN — CARVEDILOL 6.25 MG: 6.25 TABLET, FILM COATED ORAL at 11:49

## 2024-02-22 RX ADMIN — METOPROLOL TARTRATE 12.5 MG: 25 TABLET, FILM COATED ORAL at 05:50

## 2024-02-22 RX ADMIN — HYDRALAZINE HYDROCHLORIDE 10 MG: 20 INJECTION, SOLUTION INTRAMUSCULAR; INTRAVENOUS at 11:13

## 2024-02-22 RX ADMIN — HYDRALAZINE HYDROCHLORIDE 10 MG: 20 INJECTION, SOLUTION INTRAMUSCULAR; INTRAVENOUS at 15:00

## 2024-02-22 RX ADMIN — ACETAMINOPHEN 650 MG: 325 TABLET, FILM COATED ORAL at 20:16

## 2024-02-22 RX ADMIN — ATORVASTATIN CALCIUM 40 MG: 40 TABLET, FILM COATED ORAL at 16:44

## 2024-02-22 RX ADMIN — LABETALOL HYDROCHLORIDE 10 MG: 5 INJECTION, SOLUTION INTRAVENOUS at 04:15

## 2024-02-22 RX ADMIN — CARVEDILOL 6.25 MG: 6.25 TABLET, FILM COATED ORAL at 16:44

## 2024-02-22 RX ADMIN — LISINOPRIL 40 MG: 20 TABLET ORAL at 05:49

## 2024-02-22 RX ADMIN — OMEPRAZOLE 20 MG: 20 CAPSULE, DELAYED RELEASE ORAL at 05:51

## 2024-02-22 RX ADMIN — ASPIRIN 81 MG: 81 TABLET, COATED ORAL at 05:48

## 2024-02-22 RX ADMIN — HYDRALAZINE HYDROCHLORIDE 20 MG: 20 INJECTION, SOLUTION INTRAMUSCULAR; INTRAVENOUS at 23:41

## 2024-02-22 RX ADMIN — SODIUM CHLORIDE: 9 INJECTION, SOLUTION INTRAVENOUS at 07:45

## 2024-02-22 RX ADMIN — LABETALOL HYDROCHLORIDE 10 MG: 5 INJECTION, SOLUTION INTRAVENOUS at 09:35

## 2024-02-22 ASSESSMENT — ENCOUNTER SYMPTOMS
SENSORY CHANGE: 0
FEVER: 0
ORTHOPNEA: 0
PALPITATIONS: 0
DIZZINESS: 0
CHILLS: 0
DOUBLE VISION: 0
NAUSEA: 0
VOMITING: 0
WEIGHT LOSS: 0
MYALGIAS: 0
HALLUCINATIONS: 0
HEMOPTYSIS: 0
HEADACHES: 0
HEARTBURN: 0
BACK PAIN: 0
COUGH: 0
BLURRED VISION: 0
NECK PAIN: 0
DEPRESSION: 0
SPEECH CHANGE: 0
DIARRHEA: 0

## 2024-02-22 ASSESSMENT — PAIN DESCRIPTION - PAIN TYPE
TYPE: ACUTE PAIN

## 2024-02-22 ASSESSMENT — COGNITIVE AND FUNCTIONAL STATUS - GENERAL
SUGGESTED CMS G CODE MODIFIER DAILY ACTIVITY: CH
DAILY ACTIVITIY SCORE: 24
SUGGESTED CMS G CODE MODIFIER MOBILITY: CH
MOBILITY SCORE: 24

## 2024-02-22 ASSESSMENT — FIBROSIS 4 INDEX: FIB4 SCORE: 2.49

## 2024-02-22 NOTE — ASSESSMENT & PLAN NOTE
Of unclear etiology  Patient is not septic  Follow temperature trend  Tylenol as needed for any temp greater than 100.5  Chest x-ray with no evidence of pulmonary infiltrate or effusion, COVID, influenza, RSV negative  Urinalysis negative

## 2024-02-22 NOTE — ASSESSMENT & PLAN NOTE
Vasovagal  He had a repeat episode in the ER of altered mentation,--> she due to multiple antihypertensives along with his dehydration causing him to have a more significant response to these medications  Significant drop in his systolic pressure caused altered mentation  TTE pending

## 2024-02-22 NOTE — PROGRESS NOTES
FSBG Rechecked @1903 result 70. Pt A&Ox4 able to protect airway. RN Notified and pt given additional 8 oz juice PO

## 2024-02-22 NOTE — CARE PLAN
The patient is Stable - Low risk of patient condition declining or worsening    Shift Goals  Clinical Goals: Monitor labs and BP  Patient Goals: Comfort, rest  Family Goals: Not at the bedside    Problem: Knowledge Deficit - Standard  Goal: Patient and family/care givers will demonstrate understanding of plan of care, disease process/condition, diagnostic tests and medications  Description: Target End Date:  2/22/24    1.  Patient oriented to unit, equipment, visitation policy and means for communicating concern  2.  Complete/review Learning Assessment  3.  Assess knowledge level of disease process/condition, treatment plan, diagnostic tests and medications  4.  Explain disease process/condition, treatment plan, diagnostic tests and medications  Outcome: Progressing     Problem: Pain - Standard  Goal: Alleviation of pain or a reduction in pain to the patient’s comfort goal  Description: Target End Date:  2/22/24    1.  Document pain using the appropriate pain scale per order or unit policy  2.  Educate and implement non-pharmacologic comfort measures (i.e. relaxation, distraction, massage, cold/heat therapy, etc.)  3.  Pain management medications as ordered  4.  Reassess pain after pain med administration per policy  5.  If opiods administered assess patient's response to pain medication is appropriate per POSS sedation scale  6.  Follow pain management plan developed in collaboration with patient and interdisciplinary team (including palliative care or pain specialists if applicable)  Outcome: Progressing     Problem: Fluid Volume  Goal: Fluid volume balance will be maintained  Description: Target End Date:  2/22/24    1.  Monitor intake and output as ordered  2.  Promote oral intake as appropriate  3.  Report inadequate intake or output to physician  4.  Administer IV therapy as ordered  5.  Weights per provider order  6.  Assess for signs and symptoms of bleeding  7.  Monitor for signs of fluid overload  (respiratory changes, edema, weight gain, increased abdominal girth)  8.  Monitor of signs for inadequate fluid volume (poor skin turgor, dry mucous membranes)  9.  Instruct patient on adherence to fluid restrictions  Outcome: Progressing

## 2024-02-22 NOTE — PROGRESS NOTES
Monitor Summary (per monitor room interpretation):  Rhythm: SR/SB  Rate: 53-81  Ectopy: None    .15/.08/.36

## 2024-02-22 NOTE — PROGRESS NOTES
[facility] Medicine Daily Progress Note    Date of Service  2/22/2024    Chief Complaint  Paul Hopper is a 58 y.o. male admitted 2/21/2024 with syncope    Hospital Course  Paul Hopper is a 58 y.o. male who presented 2/21/2024 with syncopal episode.  Apparently, patient was at his workstation, on a computer, next thing he knew he was being woken up.  Patient was in his usual mental state upon arousal, no witnessed seizure.  Because of this, patient was brought to the ER.  In the ER, he was noted to have significantly elevated blood pressure with a systolic greater than 200.  This was treated with multiple agents in the ER.  During my exam, patient was unresponsive which was new per ERP.  Patient would wake up and open his eyes but was never verbal.  I did discuss the case including labs and imaging with the ER physician     Interval Problem Update  Patient states he feels better since yesterday but patient blood pressure still consistently elevated and uncontrolled.      Patient had a fever last night of unclear etiology     Plan continue to titrate his blood pressure medications for better BP control I have ordered a blood culture and urine culture    Creatinine is elevated at 1.73 but chart review shows patient most likely has chronic kidney disease    I have discussed this patient's plan of care and discharge plan at IDT rounds today with Case Management, Nursing, Nursing leadership, and other members of the IDT team.    Consultants/Specialty      Code Status  Full Code    Disposition  The patient is not medically cleared for discharge to home or a post-acute facility.  Anticipate discharge to: home with close outpatient follow-up    I have placed the appropriate orders for post-discharge needs.    Review of Systems  Review of Systems   Constitutional:  Negative for chills, fever, malaise/fatigue and weight loss.   HENT:  Negative for ear discharge, ear pain and hearing loss.    Eyes:  Negative for  blurred vision and double vision.   Respiratory:  Negative for cough and hemoptysis.    Cardiovascular:  Negative for chest pain, palpitations and orthopnea.   Gastrointestinal:  Negative for diarrhea, heartburn, nausea and vomiting.   Genitourinary:  Negative for hematuria and urgency.   Musculoskeletal:  Negative for back pain, myalgias and neck pain.   Neurological:  Negative for dizziness, sensory change, speech change and headaches.   Psychiatric/Behavioral:  Negative for depression and hallucinations.         Physical Exam  Temp:  [36.6 °C (97.9 °F)-39.1 °C (102.3 °F)] 37.4 °C (99.3 °F)  Pulse:  [] 75  Resp:  [14-20] 18  BP: ()/() 160/83  SpO2:  [93 %-100 %] 97 %    Physical Exam  Constitutional:       General: He is not in acute distress.     Appearance: He is not ill-appearing, toxic-appearing or diaphoretic.   HENT:      Mouth/Throat:      Mouth: Mucous membranes are moist.   Eyes:      Extraocular Movements: Extraocular movements intact.      Pupils: Pupils are equal, round, and reactive to light.   Cardiovascular:      Rate and Rhythm: Normal rate and regular rhythm.      Heart sounds: No murmur heard.     No gallop.   Pulmonary:      Effort: No respiratory distress.      Breath sounds: No stridor. No wheezing or rales.   Abdominal:      General: There is no distension.      Palpations: There is no mass.      Tenderness: There is no abdominal tenderness.      Hernia: No hernia is present.   Musculoskeletal:         General: No swelling, tenderness or deformity. Normal range of motion.      Cervical back: Normal range of motion.      Left lower leg: No edema.   Skin:     Capillary Refill: Capillary refill takes 2 to 3 seconds.      Coloration: Skin is not jaundiced.      Findings: No bruising.   Neurological:      General: No focal deficit present.      Cranial Nerves: No cranial nerve deficit.      Motor: No weakness.   Psychiatric:         Mood and Affect: Mood normal.         Behavior:  Behavior normal.         Fluids  No intake or output data in the 24 hours ending 02/22/24 1217    Laboratory  Recent Labs     02/21/24  1310 02/22/24  0031   WBC 6.5 6.1   RBC 4.10* 3.82*   HEMOGLOBIN 15.0 13.6*   HEMATOCRIT 40.1* 36.6*   MCV 97.8 95.8   MCH 36.6* 35.6*   MCHC 37.4* 37.2*   RDW 50.8* 49.7   PLATELETCT 150* 144*   MPV 8.8* 8.5*     Recent Labs     02/21/24  1310 02/22/24  0031   SODIUM 135 137   POTASSIUM 3.6 3.7   CHLORIDE 100 105   CO2 23 23   GLUCOSE 126* 92   BUN 16 16   CREATININE 1.95* 1.73*   CALCIUM 8.5 8.0*                   Imaging  CT-HEAD W/O   Final Result      1.  No acute intracranial abnormality.   2.  Mild atrophy with ventriculomegaly, unchanged.   3.  Chronic paranasal sinus disease.         DX-CHEST-PORTABLE (1 VIEW)   Final Result      Hypoinflation without other evidence for acute cardiopulmonary disease.           Assessment/Plan  * Hypertensive urgency- (present on admission)  Assessment & Plan  Patient had significant hypertension with a systolic greater than 200 upon arrival  Patient has known hypertension, also has known noncompliance, unsure about his current compliance  Patient was given Vasotec and hydralazine in the ER, I think this decreased his blood pressure significantly causing his altered mentation      Continue lisinopril    Change Lopressor to Coreg    Added as needed IV hydralazine      Fever- (present on admission)  Assessment & Plan  Of unclear etiology    Patient is not septic  Follow temperature trend      Tylenol as needed for any temp greater than 100.5    Check blood culture and UA    Hyperglycemia- (present on admission)  Assessment & Plan  insulin sliding scale  Adjust as needed    Acute metabolic encephalopathy- (present on admission)  Assessment & Plan  Patient has not received any sedating medication in the ER  He did receive Vasotec as well as hydralazine 20 mg  He does not have a bounding pulse which I would have anticipated with his hypertensive  urgency, at this point in time I feel his blood pressure is likely dropped significantly causing his altered mentation/syncopal episode  I feel like his initial syncopal episode was likely secondary to his hypertension however  Currently, trying to get a manual blood pressure, ERP is ordering a fluid bolus  If mentation does not improve we will start additional workup  I am however going to obtain a CT of the head, he does have a history of a stroke on MRI but no aneurysm, I do not anticipate a stroke at this time    GERD (gastroesophageal reflux disease)- (present on admission)  Assessment & Plan  Continue home PPI    Hyperlipidemia- (present on admission)  Assessment & Plan  Continue home statin    Syncope and collapse- (present on admission)  Assessment & Plan  Vasovagal      He had a repeat episode in the ER of altered mentation,--> she due to multiple antihypertensives along with his dehydration causing him to have a more significant response to these medications  Significant drop in his systolic pressure caused altered mentation  Monitor patient on telemetry      Check echocardiogram      Stage 3b chronic kidney disease (HCC)- (present on admission)  Assessment & Plan  Due to dehydration    S/p ivf    Repeat BMP in the morning         VTE prophylaxis:   SCDs/TEDs      I have performed a physical exam and reviewed and updated ROS and Plan today (2/22/2024). In review of yesterday's note (2/21/2024), there are no changes except as documented above.      Greater than 51 minutes spent prepping to see patient (e.g. review of tests) obtaining and/or reviewing separately obtained history. Performing a medically appropriate examination and/ evaluation.  Counseling and educating the patient/family/caregiver.  Ordering medications, tests, or procedures.  Referring and communicating with other health care professionals.  Documenting clinical information in EPIC.  Independently interpreting results and communicating  results to patient/family/caregiver.  Care coordination.

## 2024-02-22 NOTE — PROGRESS NOTES
0935: Patient /102, IV labetalol given    1000: Pt /93, MD notified IV hydralazine ordered    1115: Pt blood pressure 193/99, 10mg IV hydralazine given, blood pressures cycling    1145: Pt /83, PO coreg given with meal

## 2024-02-22 NOTE — HOSPITAL COURSE
This is a 58-year-old male with past medical history of hypertension, dyslipidemia and GERD who was admitted on 2/21/2024 after a syncopal episode.    Patient noted to have hypertensive emergency with encephalopathy and AURELIANO.  Patient given multiple antihypertensives, resulting in episode of syncope.  TTE noted LVEF of 60%, grade 1 diastolic dysfunction, no valvular abnormalities.    Blood pressure medications adjusted with better blood pressure control.  Stressed the importance of medication compliance, as patient can die from hemorrhagic CVA, aneurysm.  Patient on 3 agents for blood pressure control.    On day of discharge patient had episode of dizziness, blood pressure 120-130s/ 70-80s.  Neurological exam performed, within normal limits, no concern for CVA.  Patient reexamined later in the day, no further episodes of dizziness.  Patient medically cleared discharge home.

## 2024-02-22 NOTE — ED NOTES
Pt's BP improved.  Orthostatic BPs obtained. See flowsheet for values.  Pt able to stand to use urinal. Per pt, he had minimal dizziness initially but that resolved.  Pt able to use urinal without complication.  Hospitalist updated on pt status. Per hospitalist pt okay for CDU bed assignment.

## 2024-02-22 NOTE — ED NOTES
Pt transported up to CDU by transport RN on zoll.  Pt on room air.  Pt sent up with belongings and chart.

## 2024-02-22 NOTE — PROGRESS NOTES
Report received from night shift RN. Patient A&O4, in bed resting comfortably. VSS, denies pain, bed in lowest position and locked, call light in reach, pt updated on POC, no further questions

## 2024-02-22 NOTE — PROGRESS NOTES
4 Eyes Skin Assessment Completed by OBEY Diana and OBEY Baires.    Head WDL  Ears WDL  Nose WDL  Mouth WDL  Neck WDL  Breast/Chest WDL  Shoulder Blades WDL  Spine WDL  (R) Arm/Elbow/Hand WDL  (L) Arm/Elbow/Hand WDL  Abdomen WDL  Groin WDL  Scrotum/Coccyx/Buttocks WDL  (R) Leg WDL  (L) Leg WDL  (R) Heel/Foot/Toe WDL Cut on toe   (L) Heel/Foot/Toe WDL Cut on toe          Devices In Places Pulse Ox      Interventions In Place N/A    Possible Skin Injury No    Pictures Uploaded Into Epic Yes  Wound Consult Placed N/A  RN Wound Prevention Protocol Ordered No       L foot    R foot

## 2024-02-23 ENCOUNTER — APPOINTMENT (OUTPATIENT)
Dept: RADIOLOGY | Facility: MEDICAL CENTER | Age: 59
End: 2024-02-23
Attending: GENERAL PRACTICE
Payer: COMMERCIAL

## 2024-02-23 ENCOUNTER — APPOINTMENT (OUTPATIENT)
Dept: CARDIOLOGY | Facility: MEDICAL CENTER | Age: 59
End: 2024-02-23
Attending: HOSPITALIST
Payer: COMMERCIAL

## 2024-02-23 PROBLEM — I16.1 HYPERTENSIVE EMERGENCY: Status: ACTIVE | Noted: 2024-02-21

## 2024-02-23 LAB
ANION GAP SERPL CALC-SCNC: 13 MMOL/L (ref 7–16)
BUN SERPL-MCNC: 16 MG/DL (ref 8–22)
CALCIUM SERPL-MCNC: 8.7 MG/DL (ref 8.5–10.5)
CHLORIDE SERPL-SCNC: 105 MMOL/L (ref 96–112)
CO2 SERPL-SCNC: 20 MMOL/L (ref 20–33)
CREAT SERPL-MCNC: 1.7 MG/DL (ref 0.5–1.4)
ERYTHROCYTE [DISTWIDTH] IN BLOOD BY AUTOMATED COUNT: 50.8 FL (ref 35.9–50)
GFR SERPLBLD CREATININE-BSD FMLA CKD-EPI: 47 ML/MIN/1.73 M 2
GLUCOSE SERPL-MCNC: 86 MG/DL (ref 65–99)
HCT VFR BLD AUTO: 39.9 % (ref 42–52)
HGB BLD-MCNC: 14.6 G/DL (ref 14–18)
LV EJECT FRACT  99904: 60
LV EJECT FRACT MOD 2C 99903: 49.27
LV EJECT FRACT MOD 4C 99902: 57.02
LV EJECT FRACT MOD BP 99901: 54.78
MCH RBC QN AUTO: 35.3 PG (ref 27–33)
MCHC RBC AUTO-ENTMCNC: 36.6 G/DL (ref 32.3–36.5)
MCV RBC AUTO: 96.4 FL (ref 81.4–97.8)
PLATELET # BLD AUTO: 167 K/UL (ref 164–446)
PMV BLD AUTO: 8.8 FL (ref 9–12.9)
POTASSIUM SERPL-SCNC: 3.6 MMOL/L (ref 3.6–5.5)
RBC # BLD AUTO: 4.14 M/UL (ref 4.7–6.1)
SODIUM SERPL-SCNC: 138 MMOL/L (ref 135–145)
WBC # BLD AUTO: 5.8 K/UL (ref 4.8–10.8)

## 2024-02-23 PROCEDURE — 700102 HCHG RX REV CODE 250 W/ 637 OVERRIDE(OP): Mod: UD | Performed by: GENERAL PRACTICE

## 2024-02-23 PROCEDURE — 76775 US EXAM ABDO BACK WALL LIM: CPT

## 2024-02-23 PROCEDURE — 93306 TTE W/DOPPLER COMPLETE: CPT

## 2024-02-23 PROCEDURE — A9270 NON-COVERED ITEM OR SERVICE: HCPCS | Mod: UD | Performed by: GENERAL PRACTICE

## 2024-02-23 PROCEDURE — 93306 TTE W/DOPPLER COMPLETE: CPT | Mod: 26 | Performed by: INTERNAL MEDICINE

## 2024-02-23 PROCEDURE — 85027 COMPLETE CBC AUTOMATED: CPT

## 2024-02-23 PROCEDURE — 96376 TX/PRO/DX INJ SAME DRUG ADON: CPT | Mod: XE

## 2024-02-23 PROCEDURE — 36415 COLL VENOUS BLD VENIPUNCTURE: CPT

## 2024-02-23 PROCEDURE — G0378 HOSPITAL OBSERVATION PER HR: HCPCS

## 2024-02-23 PROCEDURE — 700111 HCHG RX REV CODE 636 W/ 250 OVERRIDE (IP): Mod: UD | Performed by: GENERAL PRACTICE

## 2024-02-23 PROCEDURE — 80048 BASIC METABOLIC PNL TOTAL CA: CPT

## 2024-02-23 PROCEDURE — 99233 SBSQ HOSP IP/OBS HIGH 50: CPT | Performed by: GENERAL PRACTICE

## 2024-02-23 PROCEDURE — 700102 HCHG RX REV CODE 250 W/ 637 OVERRIDE(OP): Mod: UD | Performed by: INTERNAL MEDICINE

## 2024-02-23 PROCEDURE — A9270 NON-COVERED ITEM OR SERVICE: HCPCS | Mod: UD | Performed by: INTERNAL MEDICINE

## 2024-02-23 RX ORDER — CARVEDILOL 12.5 MG/1
12.5 TABLET ORAL 2 TIMES DAILY WITH MEALS
Status: DISCONTINUED | OUTPATIENT
Start: 2024-02-23 | End: 2024-02-25

## 2024-02-23 RX ORDER — AMLODIPINE BESYLATE 5 MG/1
5 TABLET ORAL
Status: DISCONTINUED | OUTPATIENT
Start: 2024-02-24 | End: 2024-02-24

## 2024-02-23 RX ORDER — HYDRALAZINE HYDROCHLORIDE 20 MG/ML
10 INJECTION INTRAMUSCULAR; INTRAVENOUS EVERY 4 HOURS PRN
Status: DISCONTINUED | OUTPATIENT
Start: 2024-02-23 | End: 2024-02-24

## 2024-02-23 RX ADMIN — CARVEDILOL 12.5 MG: 12.5 TABLET, FILM COATED ORAL at 16:14

## 2024-02-23 RX ADMIN — OMEPRAZOLE 20 MG: 20 CAPSULE, DELAYED RELEASE ORAL at 04:58

## 2024-02-23 RX ADMIN — HYDRALAZINE HYDROCHLORIDE 10 MG: 20 INJECTION, SOLUTION INTRAMUSCULAR; INTRAVENOUS at 16:20

## 2024-02-23 RX ADMIN — ATORVASTATIN CALCIUM 40 MG: 40 TABLET, FILM COATED ORAL at 16:14

## 2024-02-23 RX ADMIN — ASPIRIN 81 MG: 81 TABLET, COATED ORAL at 04:58

## 2024-02-23 RX ADMIN — CARVEDILOL 12.5 MG: 12.5 TABLET, FILM COATED ORAL at 09:22

## 2024-02-23 RX ADMIN — LISINOPRIL 40 MG: 20 TABLET ORAL at 04:57

## 2024-02-23 ASSESSMENT — PAIN DESCRIPTION - PAIN TYPE: TYPE: ACUTE PAIN

## 2024-02-23 NOTE — CARE PLAN
The patient is Watcher - Medium risk of patient condition declining or worsening    Shift Goals  Clinical Goals: pt's blood pressure will be controlled during this shift- systolic less than 150  Patient Goals: rest  Family Goals: GAYATRI    Progress made toward(s) clinical / shift goals:  patient's blood pressure up 174/91 at the start of the shift. After reassessing patient's blood pressure was 148/82 so no PRN was given. Blood pressure was checked later on the shift and blood pressure was to 171/80. PRN medication given and patient's blood pressure down to 149/78.     Patient is not progressing towards the following goals:

## 2024-02-23 NOTE — PROGRESS NOTES
Hospital Medicine Daily Progress Note    Date of Service  2/23/2024    Chief Complaint  Paul Hopper is a 58 y.o. male admitted 2/21/2024 with AMS    Hospital Course  This is a 58-year-old male with past medical history of hypertension, dyslipidemia and GERD who was admitted on 2/21/2024 after a syncopal episode.    Patient noted to have hypertensive emergency with encephalopathy and AURELIANO.  Patient given multiple antihypertensives, resulting in episode of syncope.  TTE pending.    Blood pressure medications adjusted with better blood pressure control.  Stressed the importance of medication compliance, as patient can die from hemorrhagic CVA, aneurysm.    Interval Problem Update  Discontinued patient's lisinopril, switch to amlodipine, increase carvedilol from 6.25 to 12.5 mg twice daily, continue to monitor blood pressure.    Patient continues with AURELIANO on CKD?  Discontinued lisinopril, serial BMP.  Patient with good urine output.    Anticipate discharge home in the next 24 to 48 hours pending blood pressure and renal function.    I have discussed this patient's plan of care and discharge plan at IDT rounds today with Case Management, Nursing, Nursing leadership, and other members of the IDT team.    Consultants/Specialty  None    Code Status  Full Code    Disposition  The patient is not medically cleared for discharge to home or a post-acute facility.  Anticipate discharge to: home with close outpatient follow-up    I have placed the appropriate orders for post-discharge needs.    Review of Systems  Review of Systems   All other systems reviewed and are negative.       Physical Exam  Temp:  [36.8 °C (98.2 °F)-37.2 °C (98.9 °F)] 36.8 °C (98.3 °F)  Pulse:  [63-78] 78  Resp:  [16-18] 16  BP: (148-175)/() 152/88  SpO2:  [95 %-98 %] 96 %    Physical Exam  Vitals and nursing note reviewed.   Constitutional:       General: He is not in acute distress.     Appearance: Normal appearance.   HENT:      Head:  Normocephalic and atraumatic.   Eyes:      Extraocular Movements: Extraocular movements intact.      Conjunctiva/sclera: Conjunctivae normal.      Pupils: Pupils are equal, round, and reactive to light.   Cardiovascular:      Rate and Rhythm: Normal rate and regular rhythm.      Pulses: Normal pulses.      Heart sounds: No murmur heard.     No friction rub. No gallop.   Pulmonary:      Effort: Pulmonary effort is normal. No respiratory distress.      Breath sounds: Normal breath sounds. No wheezing, rhonchi or rales.   Abdominal:      General: Bowel sounds are normal. There is no distension.      Palpations: Abdomen is soft.      Tenderness: There is no abdominal tenderness.   Musculoskeletal:         General: No swelling or tenderness. Normal range of motion.      Cervical back: Normal range of motion and neck supple. No muscular tenderness.      Right lower leg: No edema.      Left lower leg: No edema.   Skin:     General: Skin is warm and dry.      Capillary Refill: Capillary refill takes less than 2 seconds.      Findings: No bruising, erythema or rash.   Neurological:      General: No focal deficit present.      Mental Status: He is alert and oriented to person, place, and time.         Fluids    Intake/Output Summary (Last 24 hours) at 2/23/2024 1233  Last data filed at 2/23/2024 1000  Gross per 24 hour   Intake 480 ml   Output 500 ml   Net -20 ml       Laboratory  Recent Labs     02/21/24  1310 02/22/24  0031 02/23/24  0041   WBC 6.5 6.1 5.8   RBC 4.10* 3.82* 4.14*   HEMOGLOBIN 15.0 13.6* 14.6   HEMATOCRIT 40.1* 36.6* 39.9*   MCV 97.8 95.8 96.4   MCH 36.6* 35.6* 35.3*   MCHC 37.4* 37.2* 36.6*   RDW 50.8* 49.7 50.8*   PLATELETCT 150* 144* 167   MPV 8.8* 8.5* 8.8*     Recent Labs     02/21/24  1310 02/22/24  0031 02/23/24  0041   SODIUM 135 137 138   POTASSIUM 3.6 3.7 3.6   CHLORIDE 100 105 105   CO2 23 23 20   GLUCOSE 126* 92 86   BUN 16 16 16   CREATININE 1.95* 1.73* 1.70*   CALCIUM 8.5 8.0* 8.7                    Imaging  US-RENAL   Final Result      1.  Unremarkable renal ultrasound.      CT-HEAD W/O   Final Result      1.  No acute intracranial abnormality.   2.  Mild atrophy with ventriculomegaly, unchanged.   3.  Chronic paranasal sinus disease.         DX-CHEST-PORTABLE (1 VIEW)   Final Result      Hypoinflation without other evidence for acute cardiopulmonary disease.      EC-ECHOCARDIOGRAM COMPLETE W/O CONT    (Results Pending)        Assessment/Plan  * Hypertensive emergency- (present on admission)  Assessment & Plan  Patient had significant hypertension with a systolic greater than 200 upon arrival, encephalopathy and AURELIANO  Patient has known hypertension, also has known noncompliance, unsure about his current compliance  Patient formally on lisinopril  Discontinued lisinopril in 2/22, started patient on amlodipine 5 mg,  carvedilol was added, and increased to 12.5 mg twice daily  Added as needed IV hydralazine      Acute metabolic encephalopathy- (present on admission)  Assessment & Plan  Patient has not received any sedating medication in the ER  He did receive Vasotec as well as hydralazine 20 mg  He does not have a bounding pulse which I would have anticipated with his hypertensive urgency, at this point in time I feel his blood pressure is likely dropped significantly causing his altered mentation/syncopal episode  I feel like his initial syncopal episode was likely secondary to his hypertension however  Head CT negative  RESOLVED    AURELIANO (acute kidney injury) (HCC)- (present on admission)  Assessment & Plan  Discontinued patient's ACE on 2/22  Bladder scan and straight cath as needed  Renal ultrasound unremarkable  Better blood pressure control  Serial BMP    Syncope and collapse- (present on admission)  Assessment & Plan  Vasovagal  He had a repeat episode in the ER of altered mentation,--> she due to multiple antihypertensives along with his dehydration causing him to have a more significant response to  these medications  Significant drop in his systolic pressure caused altered mentation  TTE pending    Fever- (present on admission)  Assessment & Plan  Of unclear etiology  Patient is not septic  Follow temperature trend  Tylenol as needed for any temp greater than 100.5  Chest x-ray with no evidence of pulmonary infiltrate or effusion, COVID, influenza, RSV negative  Urinalysis negative    GERD (gastroesophageal reflux disease)- (present on admission)  Assessment & Plan  Continue home PPI    Hyperlipidemia- (present on admission)  Assessment & Plan  Continue home statin         VTE prophylaxis: SCDs    I have performed a physical exam and reviewed and updated ROS and Plan today (2/23/2024). In review of yesterday's note (2/22/2024), there are no changes except as documented above.    Greater than 51 minutes spent prepping to see patient (e.g. reviewing EMR) obtaining and/or reviewing separately obtained history. Performing a medically appropriate examination and evaluation. Independently interpreting results and communicating results to patient/family/caregiver. Counseling and educating the patient/family/caregiver. Ordering medications, tests, and/or procedures. Referring and communicating with other health care professionals.  Documenting clinical information in EPIC. Care coordination.

## 2024-02-23 NOTE — CARE PLAN
The patient is Stable - Low risk of patient condition declining or worsening    Shift Goals  Clinical Goals: manage blood pressure  Patient Goals: rest  Family Goals: GAYATRI    Progress made toward(s) clinical / shift goals:  Pt aox4. Able to make needs known. Scheduled meds given. No complaints of pain or discomfort. Vitals are stable. Needs met. Call light and belongings within reach. Will continue to monitor.       Problem: Knowledge Deficit - Standard  Goal: Patient and family/care givers will demonstrate understanding of plan of care, disease process/condition, diagnostic tests and medications  Outcome: Progressing     Problem: Pain - Standard  Goal: Alleviation of pain or a reduction in pain to the patient’s comfort goal  Outcome: Progressing     Problem: Fluid Volume  Goal: Fluid volume balance will be maintained  Outcome: Progressing     Problem: Hemodynamics  Goal: Patient's hemodynamics, fluid balance and neurologic status will be stable or improve  Outcome: Progressing     Patient is not progressing towards the following goals:

## 2024-02-23 NOTE — CARE PLAN
The patient is Watcher - Medium risk of patient condition declining or worsening    Shift Goals  Clinical Goals: Monitor BP and labs  Patient Goals: comfort, rest  Family Goals: GAYATRI    Progress made toward(s) clinical / shift goals:  Pt aox4. No complaints of pain at this time. Oriented to new room. Resting in bed. Needs met. Call light and belongings within reach.       Problem: Knowledge Deficit - Standard  Goal: Patient and family/care givers will demonstrate understanding of plan of care, disease process/condition, diagnostic tests and medications  Outcome: Progressing     Problem: Pain - Standard  Goal: Alleviation of pain or a reduction in pain to the patient’s comfort goal  Outcome: Progressing     Problem: Fluid Volume  Goal: Fluid volume balance will be maintained  Outcome: Progressing       Patient is not progressing towards the following goals:

## 2024-02-23 NOTE — PROGRESS NOTES
4 Eyes Skin Assessment Completed by OBEY Lomeli and OBEY Espinoza.    Head WDL  Ears WDL  Nose WDL  Mouth WDL  Neck WDL  Breast/Chest WDL  Shoulder Blades WDL  Spine WDL  (R) Arm/Elbow/Hand WDL  (L) Arm/Elbow/Hand WDL  Abdomen WDL  Groin WDL  Scrotum/Coccyx/Buttocks WDL  (R) Leg discoloration  (L) Leg discoloration  (R) Heel/Foot/Toe skin tear on 2nd and 3rd toe   (L) Heel/Foot/Toe skin tear on 2nd toe           Devices In Places Blood Pressure Cuff and Pulse Ox      Interventions In Place N/A    Possible Skin Injury Yes    Pictures Uploaded Into Epic Yes  Wound Consult Placed N/A  RN Wound Prevention Protocol Ordered No

## 2024-02-23 NOTE — PROGRESS NOTES
Received report from day shift nurse. Patient is alert and oriented x4. Patient complains oh headache PRN medication given.   Bed I locked and in the lowest position. Personal belongings are within reach. Patient's blood pressure is 174/91, after reassessing pt's blood pressure is 148/82, no PRN given.     Patient's blood pressure during morning vitals was 159/79, scheduled lisinopril given, rechecked in an hour and blood pressure was 152/74.

## 2024-02-24 PROBLEM — E87.6 HYPOKALEMIA: Status: ACTIVE | Noted: 2024-02-24

## 2024-02-24 PROBLEM — N18.9 ACUTE KIDNEY INJURY SUPERIMPOSED ON CHRONIC KIDNEY DISEASE (HCC): Status: ACTIVE | Noted: 2021-06-13

## 2024-02-24 LAB
ANION GAP SERPL CALC-SCNC: 11 MMOL/L (ref 7–16)
BUN SERPL-MCNC: 18 MG/DL (ref 8–22)
CALCIUM SERPL-MCNC: 8.5 MG/DL (ref 8.5–10.5)
CHLORIDE SERPL-SCNC: 105 MMOL/L (ref 96–112)
CO2 SERPL-SCNC: 21 MMOL/L (ref 20–33)
CREAT SERPL-MCNC: 1.62 MG/DL (ref 0.5–1.4)
GFR SERPLBLD CREATININE-BSD FMLA CKD-EPI: 49 ML/MIN/1.73 M 2
GLUCOSE SERPL-MCNC: 96 MG/DL (ref 65–99)
POTASSIUM SERPL-SCNC: 3.5 MMOL/L (ref 3.6–5.5)
SODIUM SERPL-SCNC: 137 MMOL/L (ref 135–145)

## 2024-02-24 PROCEDURE — RXMED WILLOW AMBULATORY MEDICATION CHARGE: Performed by: GENERAL PRACTICE

## 2024-02-24 PROCEDURE — A9270 NON-COVERED ITEM OR SERVICE: HCPCS | Mod: UD | Performed by: INTERNAL MEDICINE

## 2024-02-24 PROCEDURE — G0378 HOSPITAL OBSERVATION PER HR: HCPCS

## 2024-02-24 PROCEDURE — 99232 SBSQ HOSP IP/OBS MODERATE 35: CPT | Performed by: GENERAL PRACTICE

## 2024-02-24 PROCEDURE — 36415 COLL VENOUS BLD VENIPUNCTURE: CPT

## 2024-02-24 PROCEDURE — 80048 BASIC METABOLIC PNL TOTAL CA: CPT

## 2024-02-24 PROCEDURE — 700102 HCHG RX REV CODE 250 W/ 637 OVERRIDE(OP): Mod: JZ,UD | Performed by: GENERAL PRACTICE

## 2024-02-24 PROCEDURE — 700102 HCHG RX REV CODE 250 W/ 637 OVERRIDE(OP): Mod: UD | Performed by: INTERNAL MEDICINE

## 2024-02-24 PROCEDURE — A9270 NON-COVERED ITEM OR SERVICE: HCPCS | Mod: JZ,UD | Performed by: GENERAL PRACTICE

## 2024-02-24 RX ORDER — ASPIRIN 81 MG/1
81 TABLET ORAL DAILY
Qty: 30 TABLET | Refills: 0 | Status: SHIPPED | OUTPATIENT
Start: 2024-02-24 | End: 2024-03-27

## 2024-02-24 RX ORDER — OMEPRAZOLE 20 MG/1
20 CAPSULE, DELAYED RELEASE ORAL DAILY
Qty: 30 CAPSULE | Refills: 0 | Status: SHIPPED | OUTPATIENT
Start: 2024-02-24 | End: 2024-03-11 | Stop reason: SDUPTHER

## 2024-02-24 RX ORDER — AMLODIPINE BESYLATE 10 MG/1
10 TABLET ORAL
Status: DISCONTINUED | OUTPATIENT
Start: 2024-02-25 | End: 2024-02-26 | Stop reason: HOSPADM

## 2024-02-24 RX ORDER — ATORVASTATIN CALCIUM 40 MG/1
40 TABLET, FILM COATED ORAL EVERY EVENING
Qty: 30 TABLET | Refills: 0 | Status: SHIPPED | OUTPATIENT
Start: 2024-02-24 | End: 2024-03-11 | Stop reason: SDUPTHER

## 2024-02-24 RX ORDER — AMLODIPINE BESYLATE 5 MG/1
5 TABLET ORAL ONCE
Status: COMPLETED | OUTPATIENT
Start: 2024-02-24 | End: 2024-02-24

## 2024-02-24 RX ORDER — POTASSIUM CHLORIDE 20 MEQ/1
40 TABLET, EXTENDED RELEASE ORAL ONCE
Status: COMPLETED | OUTPATIENT
Start: 2024-02-24 | End: 2024-02-24

## 2024-02-24 RX ADMIN — CARVEDILOL 12.5 MG: 12.5 TABLET, FILM COATED ORAL at 08:42

## 2024-02-24 RX ADMIN — ATORVASTATIN CALCIUM 40 MG: 40 TABLET, FILM COATED ORAL at 17:03

## 2024-02-24 RX ADMIN — CARVEDILOL 12.5 MG: 12.5 TABLET, FILM COATED ORAL at 17:04

## 2024-02-24 RX ADMIN — DOCUSATE SODIUM 50 MG AND SENNOSIDES 8.6 MG 2 TABLET: 8.6; 5 TABLET, FILM COATED ORAL at 17:04

## 2024-02-24 RX ADMIN — POTASSIUM CHLORIDE 40 MEQ: 1500 TABLET, EXTENDED RELEASE ORAL at 08:40

## 2024-02-24 RX ADMIN — OMEPRAZOLE 20 MG: 20 CAPSULE, DELAYED RELEASE ORAL at 05:13

## 2024-02-24 RX ADMIN — ASPIRIN 81 MG: 81 TABLET, COATED ORAL at 05:13

## 2024-02-24 RX ADMIN — AMLODIPINE BESYLATE 5 MG: 5 TABLET ORAL at 12:15

## 2024-02-24 RX ADMIN — AMLODIPINE BESYLATE 5 MG: 5 TABLET ORAL at 05:13

## 2024-02-24 NOTE — CARE PLAN
Problem: Knowledge Deficit - Standard  Goal: Patient and family/care givers will demonstrate understanding of plan of care, disease process/condition, diagnostic tests and medications  Outcome: Progressing     Problem: Pain - Standard  Goal: Alleviation of pain or a reduction in pain to the patient’s comfort goal  Outcome: Progressing   The patient is Stable - Low risk of patient condition declining or worsening    Shift Goals  Clinical Goals: monitor BP  Patient Goals: rest  Family Goals: GAYATRI    Progress made toward(s) clinical / shift goals:  up to chair for meals. Monitor BP Q4    Patient is not progressing towards the following goals:

## 2024-02-24 NOTE — CARE PLAN
The patient is Stable - Low risk of patient condition declining or worsening    Shift Goals  Clinical Goals: bp managment  Patient Goals: rest  Family Goals: GAYATRI    Progress made toward(s) clinical / shift goals:      Patients pain will be managed during shift     Patient is not progressing towards the following goals:

## 2024-02-24 NOTE — PROGRESS NOTES
Hospital Medicine Daily Progress Note    Date of Service  2/24/2024    Chief Complaint  Paul Hopper is a 58 y.o. male admitted 2/21/2024 with AMS    Hospital Course  This is a 58-year-old male with past medical history of hypertension, dyslipidemia and GERD who was admitted on 2/21/2024 after a syncopal episode.    Patient noted to have hypertensive emergency with encephalopathy and AURELIANO.  Patient given multiple antihypertensives, resulting in episode of syncope.  TTE noted LVEF of 60%, grade 1 diastolic dysfunction, no valvular abnormalities.    Blood pressure medications adjusted with better blood pressure control.  Stressed the importance of medication compliance, as patient can die from hemorrhagic CVA, aneurysm.    Interval Problem Update  Discontinued patient's lisinopril, switch to amlodipine, increase to 10 mg a day, continue with carvedilol 12.5 mg twice daily, continue to monitor blood pressure.    Patient continues with AURELIANO on CKD 3A, discontinued lisinopril, serial BMP.  Patient with good urine output.    Anticipate discharge home in the next 24 to 48 hours pending blood pressure control.    I have discussed this patient's plan of care and discharge plan at IDT rounds today with Case Management, Nursing, Nursing leadership, and other members of the IDT team.    Consultants/Specialty  None    Code Status  Full Code    Disposition  The patient is not medically cleared for discharge to home or a post-acute facility.  Anticipate discharge to: home with close outpatient follow-up    I have placed the appropriate orders for post-discharge needs.    Review of Systems  Review of Systems   All other systems reviewed and are negative.       Physical Exam  Temp:  [36.2 °C (97.1 °F)-37 °C (98.6 °F)] 37 °C (98.6 °F)  Pulse:  [62-69] 62  Resp:  [16-18] 16  BP: (147-171)/(70-93) 162/82  SpO2:  [94 %-98 %] 98 %    Physical Exam  Vitals and nursing note reviewed.   Constitutional:       General: He is not in acute  distress.     Appearance: Normal appearance.   HENT:      Head: Normocephalic and atraumatic.   Eyes:      Extraocular Movements: Extraocular movements intact.      Conjunctiva/sclera: Conjunctivae normal.      Pupils: Pupils are equal, round, and reactive to light.   Cardiovascular:      Rate and Rhythm: Normal rate and regular rhythm.      Pulses: Normal pulses.      Heart sounds: No murmur heard.     No friction rub. No gallop.   Pulmonary:      Effort: Pulmonary effort is normal. No respiratory distress.      Breath sounds: Normal breath sounds. No wheezing, rhonchi or rales.   Abdominal:      General: Bowel sounds are normal. There is no distension.      Palpations: Abdomen is soft.      Tenderness: There is no abdominal tenderness.   Musculoskeletal:         General: No swelling or tenderness. Normal range of motion.      Cervical back: Normal range of motion and neck supple. No muscular tenderness.      Right lower leg: No edema.      Left lower leg: No edema.   Skin:     General: Skin is warm and dry.      Capillary Refill: Capillary refill takes less than 2 seconds.      Findings: No bruising, erythema or rash.   Neurological:      General: No focal deficit present.      Mental Status: He is alert and oriented to person, place, and time.         Fluids    Intake/Output Summary (Last 24 hours) at 2/24/2024 1037  Last data filed at 2/24/2024 0823  Gross per 24 hour   Intake 840 ml   Output --   Net 840 ml       Laboratory  Recent Labs     02/21/24  1310 02/22/24  0031 02/23/24  0041   WBC 6.5 6.1 5.8   RBC 4.10* 3.82* 4.14*   HEMOGLOBIN 15.0 13.6* 14.6   HEMATOCRIT 40.1* 36.6* 39.9*   MCV 97.8 95.8 96.4   MCH 36.6* 35.6* 35.3*   MCHC 37.4* 37.2* 36.6*   RDW 50.8* 49.7 50.8*   PLATELETCT 150* 144* 167   MPV 8.8* 8.5* 8.8*     Recent Labs     02/22/24  0031 02/23/24  0041 02/24/24  0646   SODIUM 137 138 137   POTASSIUM 3.7 3.6 3.5*   CHLORIDE 105 105 105   CO2 23 20 21   GLUCOSE 92 86 96   BUN 16 16 18    CREATININE 1.73* 1.70* 1.62*   CALCIUM 8.0* 8.7 8.5                   Imaging  EC-ECHOCARDIOGRAM COMPLETE W/O CONT   Final Result      US-RENAL   Final Result      1.  Unremarkable renal ultrasound.      CT-HEAD W/O   Final Result      1.  No acute intracranial abnormality.   2.  Mild atrophy with ventriculomegaly, unchanged.   3.  Chronic paranasal sinus disease.         DX-CHEST-PORTABLE (1 VIEW)   Final Result      Hypoinflation without other evidence for acute cardiopulmonary disease.           Assessment/Plan  * Hypertensive emergency- (present on admission)  Assessment & Plan  Patient had significant hypertension with a systolic greater than 200 upon arrival, encephalopathy and AURELIANO  Patient has known hypertension, also has known noncompliance, unsure about his current compliance  Patient formally on lisinopril  Discontinued lisinopril in 2/22, started patient on amlodipine 5 mg, increase to 10 mg 2/24,  carvedilol was added, increased to 12.5 mg twice daily  TTE noted LVEF of 60%, grade 1 diastolic dysfunction, no valvular abnormalities.      Acute metabolic encephalopathy- (present on admission)  Assessment & Plan  Patient has not received any sedating medication in the ER  He did receive Vasotec as well as hydralazine 20 mg  He does not have a bounding pulse which I would have anticipated with his hypertensive urgency, at this point in time I feel his blood pressure is likely dropped significantly causing his altered mentation/syncopal episode  I feel like his initial syncopal episode was likely secondary to his hypertension however  Head CT negative  RESOLVED    Acute kidney injury superimposed on chronic kidney disease (HCC)- (present on admission)  Assessment & Plan  Likely AURELIANO on CKD 3A  Discontinued patient's ACE on 2/22  Bladder scan and straight cath as needed  Renal ultrasound unremarkable  Better blood pressure control  Slowly improving  Serial BMP    Syncope and collapse- (present on  admission)  Assessment & Plan  Vasovagal  He had a repeat episode in the ER of altered mentation,--> she due to multiple antihypertensives along with his dehydration causing him to have a more significant response to these medications  Significant drop in his systolic pressure caused altered mentation  TTE pending    Hypokalemia  Assessment & Plan  Oral supplementation  Serial BMP, magnesium, phosphorus levels ordered for tomorrow morning to continue to monitor electrolytes     Fever- (present on admission)  Assessment & Plan  Of unclear etiology  Patient is not septic  Follow temperature trend  Tylenol as needed for any temp greater than 100.5  Chest x-ray with no evidence of pulmonary infiltrate or effusion, COVID, influenza, RSV negative  Urinalysis negative    GERD (gastroesophageal reflux disease)- (present on admission)  Assessment & Plan  Continue home PPI    Hyperlipidemia- (present on admission)  Assessment & Plan  Continue home statin         VTE prophylaxis: SCDs    I have performed a physical exam and reviewed and updated ROS and Plan today (2/24/2024). In review of yesterday's note (2/23/2024), there are no changes except as documented above.

## 2024-02-25 LAB
ANION GAP SERPL CALC-SCNC: 10 MMOL/L (ref 7–16)
BUN SERPL-MCNC: 18 MG/DL (ref 8–22)
CALCIUM SERPL-MCNC: 8.6 MG/DL (ref 8.5–10.5)
CHLORIDE SERPL-SCNC: 104 MMOL/L (ref 96–112)
CO2 SERPL-SCNC: 22 MMOL/L (ref 20–33)
CREAT SERPL-MCNC: 1.45 MG/DL (ref 0.5–1.4)
GFR SERPLBLD CREATININE-BSD FMLA CKD-EPI: 56 ML/MIN/1.73 M 2
GLUCOSE SERPL-MCNC: 97 MG/DL (ref 65–99)
MAGNESIUM SERPL-MCNC: 2.2 MG/DL (ref 1.5–2.5)
PHOSPHATE SERPL-MCNC: 3.1 MG/DL (ref 2.5–4.5)
POTASSIUM SERPL-SCNC: 3.8 MMOL/L (ref 3.6–5.5)
SODIUM SERPL-SCNC: 136 MMOL/L (ref 135–145)

## 2024-02-25 PROCEDURE — A9270 NON-COVERED ITEM OR SERVICE: HCPCS | Mod: UD | Performed by: INTERNAL MEDICINE

## 2024-02-25 PROCEDURE — 80048 BASIC METABOLIC PNL TOTAL CA: CPT

## 2024-02-25 PROCEDURE — 84100 ASSAY OF PHOSPHORUS: CPT

## 2024-02-25 PROCEDURE — 700102 HCHG RX REV CODE 250 W/ 637 OVERRIDE(OP): Mod: UD | Performed by: GENERAL PRACTICE

## 2024-02-25 PROCEDURE — 83735 ASSAY OF MAGNESIUM: CPT

## 2024-02-25 PROCEDURE — 700102 HCHG RX REV CODE 250 W/ 637 OVERRIDE(OP): Mod: UD | Performed by: INTERNAL MEDICINE

## 2024-02-25 PROCEDURE — A9270 NON-COVERED ITEM OR SERVICE: HCPCS | Mod: UD | Performed by: GENERAL PRACTICE

## 2024-02-25 PROCEDURE — G0378 HOSPITAL OBSERVATION PER HR: HCPCS

## 2024-02-25 PROCEDURE — 99232 SBSQ HOSP IP/OBS MODERATE 35: CPT | Performed by: GENERAL PRACTICE

## 2024-02-25 RX ORDER — HYDRALAZINE HYDROCHLORIDE 50 MG/1
25 TABLET, FILM COATED ORAL EVERY 8 HOURS
Status: DISCONTINUED | OUTPATIENT
Start: 2024-02-25 | End: 2024-02-26 | Stop reason: HOSPADM

## 2024-02-25 RX ORDER — CARVEDILOL 25 MG/1
25 TABLET ORAL 2 TIMES DAILY WITH MEALS
Status: DISCONTINUED | OUTPATIENT
Start: 2024-02-25 | End: 2024-02-26 | Stop reason: HOSPADM

## 2024-02-25 RX ADMIN — AMLODIPINE BESYLATE 10 MG: 10 TABLET ORAL at 05:03

## 2024-02-25 RX ADMIN — DOCUSATE SODIUM 50 MG AND SENNOSIDES 8.6 MG 2 TABLET: 8.6; 5 TABLET, FILM COATED ORAL at 16:41

## 2024-02-25 RX ADMIN — HYDRALAZINE HYDROCHLORIDE 25 MG: 50 TABLET ORAL at 21:27

## 2024-02-25 RX ADMIN — HYDRALAZINE HYDROCHLORIDE 25 MG: 50 TABLET ORAL at 14:44

## 2024-02-25 RX ADMIN — ASPIRIN 81 MG: 81 TABLET, COATED ORAL at 05:03

## 2024-02-25 RX ADMIN — OMEPRAZOLE 20 MG: 20 CAPSULE, DELAYED RELEASE ORAL at 05:03

## 2024-02-25 RX ADMIN — ATORVASTATIN CALCIUM 40 MG: 40 TABLET, FILM COATED ORAL at 16:41

## 2024-02-25 RX ADMIN — CARVEDILOL 25 MG: 25 TABLET, FILM COATED ORAL at 10:49

## 2024-02-25 ASSESSMENT — PAIN DESCRIPTION - PAIN TYPE: TYPE: ACUTE PAIN

## 2024-02-25 NOTE — PROGRESS NOTES
Hospital Medicine Daily Progress Note    Date of Service  2/25/2024    Chief Complaint  Paul Hopper is a 58 y.o. male admitted 2/21/2024 with AMS    Hospital Course  This is a 58-year-old male with past medical history of hypertension, dyslipidemia and GERD who was admitted on 2/21/2024 after a syncopal episode.    Patient noted to have hypertensive emergency with encephalopathy and AURELIANO.  Patient given multiple antihypertensives, resulting in episode of syncope.  TTE noted LVEF of 60%, grade 1 diastolic dysfunction, no valvular abnormalities.    Blood pressure medications adjusted with better blood pressure control.  Stressed the importance of medication compliance, as patient can die from hemorrhagic CVA, aneurysm.    Interval Problem Update  Blood pressure not controlled, continue with amlodipine 10 mg, increase carvedilol to 25 mg twice daily.  Blood pressure still not controlled, added hydralazine 25 mg 3 times daily.Monitor blood pressure closely.    Patient appears to be back at baseline CKD 3A.  Continues with good urine output, serial BMP.    Anticipate discharge home in the next 24 to 48 hours pending blood pressure control.    I have discussed this patient's plan of care and discharge plan at IDT rounds today with Case Management, Nursing, Nursing leadership, and other members of the IDT team.    Consultants/Specialty  None    Code Status  Full Code    Disposition  The patient is not medically cleared for discharge to home or a post-acute facility.  Anticipate discharge to: home with close outpatient follow-up    I have placed the appropriate orders for post-discharge needs.    Review of Systems  Review of Systems   All other systems reviewed and are negative.       Physical Exam  Temp:  [36 °C (96.8 °F)-36.5 °C (97.7 °F)] 36.5 °C (97.7 °F)  Pulse:  [50-66] 55  Resp:  [17-18] 18  BP: (152-169)/() 159/88  SpO2:  [95 %-100 %] 99 %    Physical Exam  Vitals and nursing note reviewed.    Constitutional:       General: He is not in acute distress.     Appearance: Normal appearance.   HENT:      Head: Normocephalic and atraumatic.   Eyes:      Extraocular Movements: Extraocular movements intact.      Conjunctiva/sclera: Conjunctivae normal.      Pupils: Pupils are equal, round, and reactive to light.   Cardiovascular:      Rate and Rhythm: Normal rate and regular rhythm.      Pulses: Normal pulses.      Heart sounds: No murmur heard.     No friction rub. No gallop.   Pulmonary:      Effort: Pulmonary effort is normal. No respiratory distress.      Breath sounds: Normal breath sounds. No wheezing, rhonchi or rales.   Abdominal:      General: Bowel sounds are normal. There is no distension.      Palpations: Abdomen is soft.      Tenderness: There is no abdominal tenderness.   Musculoskeletal:         General: No swelling or tenderness. Normal range of motion.      Cervical back: Normal range of motion and neck supple. No muscular tenderness.      Right lower leg: No edema.      Left lower leg: No edema.   Skin:     General: Skin is warm and dry.      Capillary Refill: Capillary refill takes less than 2 seconds.      Findings: No bruising, erythema or rash.   Neurological:      General: No focal deficit present.      Mental Status: He is alert and oriented to person, place, and time.         Fluids    Intake/Output Summary (Last 24 hours) at 2/25/2024 1331  Last data filed at 2/25/2024 1100  Gross per 24 hour   Intake 360 ml   Output --   Net 360 ml       Laboratory  Recent Labs     02/23/24  0041   WBC 5.8   RBC 4.14*   HEMOGLOBIN 14.6   HEMATOCRIT 39.9*   MCV 96.4   MCH 35.3*   MCHC 36.6*   RDW 50.8*   PLATELETCT 167   MPV 8.8*     Recent Labs     02/23/24  0041 02/24/24  0646 02/25/24  0825   SODIUM 138 137 136   POTASSIUM 3.6 3.5* 3.8   CHLORIDE 105 105 104   CO2 20 21 22   GLUCOSE 86 96 97   BUN 16 18 18   CREATININE 1.70* 1.62* 1.45*   CALCIUM 8.7 8.5 8.6                    Imaging  EC-ECHOCARDIOGRAM COMPLETE W/O CONT   Final Result      US-RENAL   Final Result      1.  Unremarkable renal ultrasound.      CT-HEAD W/O   Final Result      1.  No acute intracranial abnormality.   2.  Mild atrophy with ventriculomegaly, unchanged.   3.  Chronic paranasal sinus disease.         DX-CHEST-PORTABLE (1 VIEW)   Final Result      Hypoinflation without other evidence for acute cardiopulmonary disease.           Assessment/Plan  * Hypertensive emergency- (present on admission)  Assessment & Plan  Patient had significant hypertension with a systolic greater than 200 upon arrival, encephalopathy and AURELIANO  Patient has known hypertension, also has known noncompliance, unsure about his current compliance  Patient formally on lisinopril  Discontinued lisinopril in 2/22, started patient on amlodipine 5 mg, increase to 10 mg 2/24,  carvedilol was added, increased to 25 mg twice daily, blood pressure still not controlled, added hydralazine 25 mg 3 times daily.  TTE noted LVEF of 60%, grade 1 diastolic dysfunction, no valvular abnormalities.      Acute metabolic encephalopathy- (present on admission)  Assessment & Plan  Patient has not received any sedating medication in the ER  He did receive Vasotec as well as hydralazine 20 mg  He does not have a bounding pulse which I would have anticipated with his hypertensive urgency, at this point in time I feel his blood pressure is likely dropped significantly causing his altered mentation/syncopal episode  I feel like his initial syncopal episode was likely secondary to his hypertension however  Head CT negative  RESOLVED    Acute kidney injury superimposed on chronic kidney disease (HCC)- (present on admission)  Assessment & Plan  Likely AURELIANO on CKD 3A  Discontinued patient's ACE on 2/22  Bladder scan and straight cath as needed  Renal ultrasound unremarkable  Better blood pressure control  Resolving   Serial BMP    Syncope and collapse- (present on  admission)  Assessment & Plan  Vasovagal  He had a repeat episode in the ER of altered mentation,--> she due to multiple antihypertensives along with his dehydration causing him to have a more significant response to these medications  Significant drop in his systolic pressure caused altered mentation  TTE pending    Hypokalemia  Assessment & Plan  Labs reviewed, resolved  Serial BMP, magnesium, phosphorus levels ordered for tomorrow morning to continue to monitor electrolytes     Fever- (present on admission)  Assessment & Plan  Of unclear etiology  Patient is not septic  Follow temperature trend  Tylenol as needed for any temp greater than 100.5  Chest x-ray with no evidence of pulmonary infiltrate or effusion, COVID, influenza, RSV negative  Urinalysis negative    GERD (gastroesophageal reflux disease)- (present on admission)  Assessment & Plan  Continue home PPI    Hyperlipidemia- (present on admission)  Assessment & Plan  Continue home statin         VTE prophylaxis: SCDs    I have performed a physical exam and reviewed and updated ROS and Plan today (2/25/2024). In review of yesterday's note (2/24/2024), there are no changes except as documented above.

## 2024-02-25 NOTE — CARE PLAN
The patient is Stable - Low risk of patient condition declining or worsening    Shift Goals  Clinical Goals: bp managment  Patient Goals: rest  Family Goals: GAYATRI    Progress made toward(s) clinical / shift goals:      Patients pain will remain managed during shift     Patient is not progressing towards the following goals:

## 2024-02-25 NOTE — CARE PLAN
Problem: Knowledge Deficit - Standard  Goal: Patient and family/care givers will demonstrate understanding of plan of care, disease process/condition, diagnostic tests and medications  Outcome: Progressing     Problem: Hemodynamics  Goal: Patient's hemodynamics, fluid balance and neurologic status will be stable or improve  Outcome: Progressing   The patient is Stable - Low risk of patient condition declining or worsening    Shift Goals  Clinical Goals: monitor BP  Patient Goals: rest  Family Goals: GAYATRI    Progress made toward(s) clinical / shift goals:  BP meds adjusted and monitoring.     Patient is not progressing towards the following goals:

## 2024-02-26 ENCOUNTER — APPOINTMENT (OUTPATIENT)
Dept: RADIOLOGY | Facility: MEDICAL CENTER | Age: 59
End: 2024-02-26
Attending: STUDENT IN AN ORGANIZED HEALTH CARE EDUCATION/TRAINING PROGRAM
Payer: COMMERCIAL

## 2024-02-26 ENCOUNTER — PHARMACY VISIT (OUTPATIENT)
Dept: PHARMACY | Facility: MEDICAL CENTER | Age: 59
End: 2024-02-26
Payer: COMMERCIAL

## 2024-02-26 ENCOUNTER — HOSPITAL ENCOUNTER (OUTPATIENT)
Facility: MEDICAL CENTER | Age: 59
End: 2024-02-27
Attending: STUDENT IN AN ORGANIZED HEALTH CARE EDUCATION/TRAINING PROGRAM | Admitting: STUDENT IN AN ORGANIZED HEALTH CARE EDUCATION/TRAINING PROGRAM
Payer: COMMERCIAL

## 2024-02-26 VITALS
HEIGHT: 69 IN | BODY MASS INDEX: 28.9 KG/M2 | SYSTOLIC BLOOD PRESSURE: 126 MMHG | TEMPERATURE: 97.7 F | DIASTOLIC BLOOD PRESSURE: 85 MMHG | HEART RATE: 86 BPM | RESPIRATION RATE: 18 BRPM | WEIGHT: 195.11 LBS | OXYGEN SATURATION: 98 %

## 2024-02-26 DIAGNOSIS — R07.9 CHEST PAIN, UNSPECIFIED TYPE: ICD-10-CM

## 2024-02-26 DIAGNOSIS — I47.20 VT (VENTRICULAR TACHYCARDIA) (HCC): ICD-10-CM

## 2024-02-26 DIAGNOSIS — R94.31 EKG ABNORMALITIES: ICD-10-CM

## 2024-02-26 DIAGNOSIS — N17.9 AKI (ACUTE KIDNEY INJURY) (HCC): ICD-10-CM

## 2024-02-26 PROBLEM — E11.9 DM (DIABETES MELLITUS) (HCC): Status: ACTIVE | Noted: 2024-02-26

## 2024-02-26 PROBLEM — E11.9 DM (DIABETES MELLITUS) (HCC): Status: RESOLVED | Noted: 2024-02-26 | Resolved: 2024-02-26

## 2024-02-26 PROBLEM — I16.0 HYPERTENSIVE URGENCY: Status: ACTIVE | Noted: 2024-02-26

## 2024-02-26 LAB
ALBUMIN SERPL BCP-MCNC: 4.3 G/DL (ref 3.2–4.9)
ALBUMIN/GLOB SERPL: 1.3 G/DL
ALP SERPL-CCNC: 72 U/L (ref 30–99)
ALT SERPL-CCNC: 19 U/L (ref 2–50)
AMPHET UR QL SCN: NEGATIVE
ANION GAP SERPL CALC-SCNC: 12 MMOL/L (ref 7–16)
ANION GAP SERPL CALC-SCNC: 14 MMOL/L (ref 7–16)
AST SERPL-CCNC: 24 U/L (ref 12–45)
BARBITURATES UR QL SCN: NEGATIVE
BASOPHILS # BLD AUTO: 0.4 % (ref 0–1.8)
BASOPHILS # BLD: 0.03 K/UL (ref 0–0.12)
BENZODIAZ UR QL SCN: NEGATIVE
BILIRUB SERPL-MCNC: 0.9 MG/DL (ref 0.1–1.5)
BUN SERPL-MCNC: 19 MG/DL (ref 8–22)
BUN SERPL-MCNC: 26 MG/DL (ref 8–22)
BZE UR QL SCN: NEGATIVE
CALCIUM ALBUM COR SERPL-MCNC: 8.7 MG/DL (ref 8.5–10.5)
CALCIUM SERPL-MCNC: 8.6 MG/DL (ref 8.5–10.5)
CALCIUM SERPL-MCNC: 8.9 MG/DL (ref 8.5–10.5)
CANNABINOIDS UR QL SCN: NEGATIVE
CHLORIDE SERPL-SCNC: 103 MMOL/L (ref 96–112)
CHLORIDE SERPL-SCNC: 104 MMOL/L (ref 96–112)
CHOLEST SERPL-MCNC: 104 MG/DL (ref 100–199)
CO2 SERPL-SCNC: 20 MMOL/L (ref 20–33)
CO2 SERPL-SCNC: 21 MMOL/L (ref 20–33)
CREAT SERPL-MCNC: 1.68 MG/DL (ref 0.5–1.4)
CREAT SERPL-MCNC: 2.12 MG/DL (ref 0.5–1.4)
D DIMER PPP IA.FEU-MCNC: 3.61 UG/ML (FEU) (ref 0–0.5)
EKG IMPRESSION: NORMAL
EOSINOPHIL # BLD AUTO: 0.18 K/UL (ref 0–0.51)
EOSINOPHIL NFR BLD: 2.2 % (ref 0–6.9)
ERYTHROCYTE [DISTWIDTH] IN BLOOD BY AUTOMATED COUNT: 49.7 FL (ref 35.9–50)
EST. AVERAGE GLUCOSE BLD GHB EST-MCNC: 97 MG/DL
ETHANOL BLD-MCNC: <10.1 MG/DL
FENTANYL UR QL: NEGATIVE
GFR SERPLBLD CREATININE-BSD FMLA CKD-EPI: 36 ML/MIN/1.73 M 2
GFR SERPLBLD CREATININE-BSD FMLA CKD-EPI: 47 ML/MIN/1.73 M 2
GLOBULIN SER CALC-MCNC: 3.4 G/DL (ref 1.9–3.5)
GLUCOSE SERPL-MCNC: 107 MG/DL (ref 65–99)
GLUCOSE SERPL-MCNC: 88 MG/DL (ref 65–99)
HBA1C MFR BLD: 5 % (ref 4–5.6)
HCT VFR BLD AUTO: 41.1 % (ref 42–52)
HDLC SERPL-MCNC: 24 MG/DL
HGB BLD-MCNC: 15.9 G/DL (ref 14–18)
IMM GRANULOCYTES # BLD AUTO: 0.03 K/UL (ref 0–0.11)
IMM GRANULOCYTES NFR BLD AUTO: 0.4 % (ref 0–0.9)
LDLC SERPL CALC-MCNC: 60 MG/DL
LYMPHOCYTES # BLD AUTO: 1.59 K/UL (ref 1–4.8)
LYMPHOCYTES NFR BLD: 19.1 % (ref 22–41)
MAGNESIUM SERPL-MCNC: 2.1 MG/DL (ref 1.5–2.5)
MAGNESIUM SERPL-MCNC: 2.2 MG/DL (ref 1.5–2.5)
MCH RBC QN AUTO: 36 PG (ref 27–33)
MCHC RBC AUTO-ENTMCNC: 36.6 G/DL (ref 32.3–36.5)
MCV RBC AUTO: 96 FL (ref 81.4–97.8)
METHADONE UR QL SCN: NEGATIVE
MONOCYTES # BLD AUTO: 0.4 K/UL (ref 0–0.85)
MONOCYTES NFR BLD AUTO: 4.8 % (ref 0–13.4)
NEUTROPHILS # BLD AUTO: 6.1 K/UL (ref 1.82–7.42)
NEUTROPHILS NFR BLD: 73.1 % (ref 44–72)
NRBC # BLD AUTO: 0 K/UL
NRBC BLD-RTO: 0 /100 WBC (ref 0–0.2)
NT-PROBNP SERPL IA-MCNC: 83 PG/ML (ref 0–125)
OPIATES UR QL SCN: NEGATIVE
OXYCODONE UR QL SCN: NEGATIVE
PCP UR QL SCN: NEGATIVE
PHOSPHATE SERPL-MCNC: 3.3 MG/DL (ref 2.5–4.5)
PHOSPHATE SERPL-MCNC: 3.5 MG/DL (ref 2.5–4.5)
PLATELET # BLD AUTO: 172 K/UL (ref 164–446)
PMV BLD AUTO: 8.3 FL (ref 9–12.9)
POTASSIUM SERPL-SCNC: 3.7 MMOL/L (ref 3.6–5.5)
POTASSIUM SERPL-SCNC: 3.8 MMOL/L (ref 3.6–5.5)
PROPOXYPH UR QL SCN: NEGATIVE
PROT SERPL-MCNC: 7.7 G/DL (ref 6–8.2)
RBC # BLD AUTO: 4.28 M/UL (ref 4.7–6.1)
SODIUM SERPL-SCNC: 137 MMOL/L (ref 135–145)
SODIUM SERPL-SCNC: 137 MMOL/L (ref 135–145)
TRIGL SERPL-MCNC: 102 MG/DL (ref 0–149)
TROPONIN T SERPL-MCNC: 18 NG/L (ref 6–19)
TSH SERPL DL<=0.005 MIU/L-ACNC: 1.69 UIU/ML (ref 0.38–5.33)
WBC # BLD AUTO: 8.3 K/UL (ref 4.8–10.8)

## 2024-02-26 PROCEDURE — 80053 COMPREHEN METABOLIC PANEL: CPT

## 2024-02-26 PROCEDURE — 96365 THER/PROPH/DIAG IV INF INIT: CPT

## 2024-02-26 PROCEDURE — A9270 NON-COVERED ITEM OR SERVICE: HCPCS | Mod: UD | Performed by: GENERAL PRACTICE

## 2024-02-26 PROCEDURE — 700102 HCHG RX REV CODE 250 W/ 637 OVERRIDE(OP): Mod: UD | Performed by: STUDENT IN AN ORGANIZED HEALTH CARE EDUCATION/TRAINING PROGRAM

## 2024-02-26 PROCEDURE — 700105 HCHG RX REV CODE 258: Mod: UD | Performed by: STUDENT IN AN ORGANIZED HEALTH CARE EDUCATION/TRAINING PROGRAM

## 2024-02-26 PROCEDURE — 700102 HCHG RX REV CODE 250 W/ 637 OVERRIDE(OP): Mod: UD | Performed by: INTERNAL MEDICINE

## 2024-02-26 PROCEDURE — 71045 X-RAY EXAM CHEST 1 VIEW: CPT

## 2024-02-26 PROCEDURE — 93005 ELECTROCARDIOGRAM TRACING: CPT

## 2024-02-26 PROCEDURE — 99223 1ST HOSP IP/OBS HIGH 75: CPT | Performed by: STUDENT IN AN ORGANIZED HEALTH CARE EDUCATION/TRAINING PROGRAM

## 2024-02-26 PROCEDURE — 96372 THER/PROPH/DIAG INJ SC/IM: CPT

## 2024-02-26 PROCEDURE — 36415 COLL VENOUS BLD VENIPUNCTURE: CPT

## 2024-02-26 PROCEDURE — 84443 ASSAY THYROID STIM HORMONE: CPT

## 2024-02-26 PROCEDURE — 84100 ASSAY OF PHOSPHORUS: CPT | Mod: 91

## 2024-02-26 PROCEDURE — 700102 HCHG RX REV CODE 250 W/ 637 OVERRIDE(OP): Mod: UD | Performed by: GENERAL PRACTICE

## 2024-02-26 PROCEDURE — 700111 HCHG RX REV CODE 636 W/ 250 OVERRIDE (IP): Mod: JZ,UD | Performed by: STUDENT IN AN ORGANIZED HEALTH CARE EDUCATION/TRAINING PROGRAM

## 2024-02-26 PROCEDURE — 93005 ELECTROCARDIOGRAM TRACING: CPT | Performed by: STUDENT IN AN ORGANIZED HEALTH CARE EDUCATION/TRAINING PROGRAM

## 2024-02-26 PROCEDURE — 82077 ASSAY SPEC XCP UR&BREATH IA: CPT

## 2024-02-26 PROCEDURE — A9270 NON-COVERED ITEM OR SERVICE: HCPCS | Mod: UD | Performed by: STUDENT IN AN ORGANIZED HEALTH CARE EDUCATION/TRAINING PROGRAM

## 2024-02-26 PROCEDURE — 83735 ASSAY OF MAGNESIUM: CPT | Mod: 91

## 2024-02-26 PROCEDURE — 80307 DRUG TEST PRSMV CHEM ANLYZR: CPT

## 2024-02-26 PROCEDURE — 83880 ASSAY OF NATRIURETIC PEPTIDE: CPT

## 2024-02-26 PROCEDURE — 84100 ASSAY OF PHOSPHORUS: CPT

## 2024-02-26 PROCEDURE — A9270 NON-COVERED ITEM OR SERVICE: HCPCS | Mod: UD | Performed by: INTERNAL MEDICINE

## 2024-02-26 PROCEDURE — 80061 LIPID PANEL: CPT

## 2024-02-26 PROCEDURE — G0378 HOSPITAL OBSERVATION PER HR: HCPCS

## 2024-02-26 PROCEDURE — 85379 FIBRIN DEGRADATION QUANT: CPT

## 2024-02-26 PROCEDURE — RXMED WILLOW AMBULATORY MEDICATION CHARGE: Performed by: GENERAL PRACTICE

## 2024-02-26 PROCEDURE — 700111 HCHG RX REV CODE 636 W/ 250 OVERRIDE (IP): Mod: UD | Performed by: STUDENT IN AN ORGANIZED HEALTH CARE EDUCATION/TRAINING PROGRAM

## 2024-02-26 PROCEDURE — 99285 EMERGENCY DEPT VISIT HI MDM: CPT

## 2024-02-26 PROCEDURE — 85025 COMPLETE CBC W/AUTO DIFF WBC: CPT

## 2024-02-26 PROCEDURE — 83735 ASSAY OF MAGNESIUM: CPT

## 2024-02-26 PROCEDURE — 80048 BASIC METABOLIC PNL TOTAL CA: CPT

## 2024-02-26 PROCEDURE — 84484 ASSAY OF TROPONIN QUANT: CPT

## 2024-02-26 PROCEDURE — 83036 HEMOGLOBIN GLYCOSYLATED A1C: CPT

## 2024-02-26 RX ORDER — SODIUM CHLORIDE, SODIUM LACTATE, POTASSIUM CHLORIDE, CALCIUM CHLORIDE 600; 310; 30; 20 MG/100ML; MG/100ML; MG/100ML; MG/100ML
1000 INJECTION, SOLUTION INTRAVENOUS ONCE
Status: COMPLETED | OUTPATIENT
Start: 2024-02-26 | End: 2024-02-26

## 2024-02-26 RX ORDER — CARVEDILOL 12.5 MG/1
25 TABLET ORAL 2 TIMES DAILY WITH MEALS
Status: DISCONTINUED | OUTPATIENT
Start: 2024-02-26 | End: 2024-02-27 | Stop reason: HOSPADM

## 2024-02-26 RX ORDER — AMLODIPINE BESYLATE 5 MG/1
10 TABLET ORAL DAILY
Status: DISCONTINUED | OUTPATIENT
Start: 2024-02-27 | End: 2024-02-26

## 2024-02-26 RX ORDER — MAGNESIUM SULFATE HEPTAHYDRATE 40 MG/ML
2 INJECTION, SOLUTION INTRAVENOUS ONCE
Status: DISCONTINUED | OUTPATIENT
Start: 2024-02-26 | End: 2024-02-26

## 2024-02-26 RX ORDER — HEPARIN SODIUM 5000 [USP'U]/ML
5000 INJECTION, SOLUTION INTRAVENOUS; SUBCUTANEOUS EVERY 8 HOURS
Status: DISCONTINUED | OUTPATIENT
Start: 2024-02-26 | End: 2024-02-27 | Stop reason: HOSPADM

## 2024-02-26 RX ORDER — HYDRALAZINE HYDROCHLORIDE 25 MG/1
25 TABLET, FILM COATED ORAL EVERY 8 HOURS
Status: DISCONTINUED | OUTPATIENT
Start: 2024-02-26 | End: 2024-02-27 | Stop reason: HOSPADM

## 2024-02-26 RX ORDER — CARVEDILOL 25 MG/1
25 TABLET ORAL 2 TIMES DAILY WITH MEALS
Qty: 120 TABLET | Refills: 0 | Status: ON HOLD | OUTPATIENT
Start: 2024-02-26 | End: 2024-02-27

## 2024-02-26 RX ORDER — ASPIRIN 81 MG/1
324 TABLET, CHEWABLE ORAL ONCE
Status: COMPLETED | OUTPATIENT
Start: 2024-02-26 | End: 2024-02-26

## 2024-02-26 RX ORDER — DEXTROSE MONOHYDRATE 25 G/50ML
25 INJECTION, SOLUTION INTRAVENOUS
Status: DISCONTINUED | OUTPATIENT
Start: 2024-02-26 | End: 2024-02-26

## 2024-02-26 RX ORDER — HYDRALAZINE HYDROCHLORIDE 25 MG/1
25 TABLET, FILM COATED ORAL EVERY 8 HOURS
Status: DISCONTINUED | OUTPATIENT
Start: 2024-02-26 | End: 2024-02-26

## 2024-02-26 RX ORDER — ACETAMINOPHEN 325 MG/1
650 TABLET ORAL EVERY 6 HOURS PRN
Status: DISCONTINUED | OUTPATIENT
Start: 2024-02-26 | End: 2024-02-27 | Stop reason: HOSPADM

## 2024-02-26 RX ORDER — AMLODIPINE BESYLATE 10 MG/1
10 TABLET ORAL DAILY
Qty: 60 TABLET | Refills: 0 | Status: SHIPPED | OUTPATIENT
Start: 2024-02-27 | End: 2024-04-27

## 2024-02-26 RX ORDER — CARVEDILOL 6.25 MG/1
25 TABLET ORAL 2 TIMES DAILY WITH MEALS
Status: DISCONTINUED | OUTPATIENT
Start: 2024-02-26 | End: 2024-02-26

## 2024-02-26 RX ORDER — ASPIRIN 81 MG/1
81 TABLET ORAL DAILY
Status: DISCONTINUED | OUTPATIENT
Start: 2024-02-27 | End: 2024-02-27 | Stop reason: HOSPADM

## 2024-02-26 RX ORDER — SODIUM CHLORIDE 9 MG/ML
INJECTION, SOLUTION INTRAVENOUS CONTINUOUS
Status: ACTIVE | OUTPATIENT
Start: 2024-02-26 | End: 2024-02-27

## 2024-02-26 RX ORDER — AMLODIPINE BESYLATE 10 MG/1
10 TABLET ORAL DAILY
Status: DISCONTINUED | OUTPATIENT
Start: 2024-02-26 | End: 2024-02-27 | Stop reason: HOSPADM

## 2024-02-26 RX ORDER — HYDRALAZINE HYDROCHLORIDE 25 MG/1
25 TABLET, FILM COATED ORAL EVERY 8 HOURS
Qty: 180 TABLET | Refills: 0 | Status: SHIPPED | OUTPATIENT
Start: 2024-02-26 | End: 2024-04-26

## 2024-02-26 RX ORDER — ATORVASTATIN CALCIUM 40 MG/1
40 TABLET, FILM COATED ORAL EVERY EVENING
Status: DISCONTINUED | OUTPATIENT
Start: 2024-02-26 | End: 2024-02-27 | Stop reason: HOSPADM

## 2024-02-26 RX ORDER — MAGNESIUM SULFATE HEPTAHYDRATE 40 MG/ML
2 INJECTION, SOLUTION INTRAVENOUS ONCE
Status: COMPLETED | OUTPATIENT
Start: 2024-02-26 | End: 2024-02-27

## 2024-02-26 RX ORDER — OMEPRAZOLE 20 MG/1
20 CAPSULE, DELAYED RELEASE ORAL DAILY
Status: DISCONTINUED | OUTPATIENT
Start: 2024-02-27 | End: 2024-02-27 | Stop reason: HOSPADM

## 2024-02-26 RX ADMIN — SODIUM CHLORIDE, POTASSIUM CHLORIDE, SODIUM LACTATE AND CALCIUM CHLORIDE 1000 ML: 600; 310; 30; 20 INJECTION, SOLUTION INTRAVENOUS at 20:28

## 2024-02-26 RX ADMIN — AMLODIPINE BESYLATE 10 MG: 5 TABLET ORAL at 23:32

## 2024-02-26 RX ADMIN — HYDRALAZINE HYDROCHLORIDE 25 MG: 50 TABLET ORAL at 04:51

## 2024-02-26 RX ADMIN — ASPIRIN 81 MG 324 MG: 81 TABLET ORAL at 20:29

## 2024-02-26 RX ADMIN — HEPARIN SODIUM 5000 UNITS: 5000 INJECTION, SOLUTION INTRAVENOUS; SUBCUTANEOUS at 23:33

## 2024-02-26 RX ADMIN — OMEPRAZOLE 20 MG: 20 CAPSULE, DELAYED RELEASE ORAL at 04:52

## 2024-02-26 RX ADMIN — SODIUM CHLORIDE: 9 INJECTION, SOLUTION INTRAVENOUS at 21:30

## 2024-02-26 RX ADMIN — ATORVASTATIN CALCIUM 40 MG: 40 TABLET, FILM COATED ORAL at 23:32

## 2024-02-26 RX ADMIN — CARVEDILOL 25 MG: 6.25 TABLET, FILM COATED ORAL at 21:28

## 2024-02-26 RX ADMIN — ASPIRIN 81 MG: 81 TABLET, COATED ORAL at 04:51

## 2024-02-26 RX ADMIN — MAGNESIUM SULFATE HEPTAHYDRATE 2 G: 2 INJECTION, SOLUTION INTRAVENOUS at 23:30

## 2024-02-26 RX ADMIN — AMLODIPINE BESYLATE 10 MG: 10 TABLET ORAL at 04:51

## 2024-02-26 RX ADMIN — CARVEDILOL 25 MG: 25 TABLET, FILM COATED ORAL at 08:20

## 2024-02-26 RX ADMIN — HYDRALAZINE HYDROCHLORIDE 25 MG: 25 TABLET ORAL at 21:28

## 2024-02-26 ASSESSMENT — ENCOUNTER SYMPTOMS
CHILLS: 0
PALPITATIONS: 0
DOUBLE VISION: 0
VOMITING: 0
WEAKNESS: 0
HEADACHES: 0
HEMOPTYSIS: 0
TINGLING: 0
ORTHOPNEA: 0
TREMORS: 0
DIZZINESS: 0
NECK PAIN: 0
SEIZURES: 0
HALLUCINATIONS: 0
FEVER: 0
ABDOMINAL PAIN: 0
SHORTNESS OF BREATH: 0
SPEECH CHANGE: 0
NAUSEA: 0
EYE PAIN: 0
BACK PAIN: 0
SENSORY CHANGE: 0
DIARRHEA: 0
LOSS OF CONSCIOUSNESS: 1
CLAUDICATION: 0
SPUTUM PRODUCTION: 0
SINUS PAIN: 0
NERVOUS/ANXIOUS: 0
FOCAL WEAKNESS: 0
PHOTOPHOBIA: 0

## 2024-02-26 ASSESSMENT — HEART SCORE
HISTORY: SLIGHTLY SUSPICIOUS
HEART SCORE: 5
ECG: SIGNIFICANT ST-DEPRESSION
TROPONIN: LESS THAN OR EQUAL TO NORMAL LIMIT
AGE: 45-64
RISK FACTORS: >2 RISK FACTORS OR HX OF ATHEROSCLEROTIC DISEASE

## 2024-02-26 ASSESSMENT — FIBROSIS 4 INDEX: FIB4 SCORE: 2.14

## 2024-02-26 ASSESSMENT — LIFESTYLE VARIABLES: SUBSTANCE_ABUSE: 0

## 2024-02-26 NOTE — PROGRESS NOTES
Repeat blood pressure 126/85, pulse 86. Patient states he is not dizzy, and denies any discomfort. Stable being sent down to d/c [facility]. Meds 2 beds picked up and given to the patient.

## 2024-02-26 NOTE — DISCHARGE PLANNING
Case Management Discharge Planning    Admission Date: 2/21/2024  GMLOS:    ALOS: 0    6-Clicks ADL Score: 24  6-Clicks Mobility Score: 24      Anticipated Discharge Dispo: Discharge Disposition: Discharged to home/self care (01)    DME Needed: No    Action(s) Taken: Pt to make appt with his PCP. LMSW referred CHW to pt as [place] has resources pt can utilize.     Escalations Completed: None    Medically Clear: Yes    Next Steps: Pt to DC with Meds to Beds.     Barriers to Discharge: None    Is the patient up for discharge tomorrow: No

## 2024-02-26 NOTE — CARE PLAN
The patient is Stable - Low risk of patient condition declining or worsening    Shift Goals  Clinical Goals: monitor BP  Patient Goals: rest  Family Goals: liane    Progress made toward(s) clinical / shift goals: Received patient in bed alert and oriented x4. Denies pain at this time. Administered scheduled medications. Updated on POC and verbalized understanding. Hourly rounds performed. Fall precautions in place and call light within reach. All other needs met.     Patient is not progressing towards the following goals:    Problem: Knowledge Deficit - Standard  Goal: Patient and family/care givers will demonstrate understanding of plan of care, disease process/condition, diagnostic tests and medications  Description: Target End Date:  1-3 days or as soon as patient condition allows    Document in Patient Education    1.  Patient and family/caregiver oriented to unit, equipment, visitation policy and means for communicating concern  2.  Complete/review Learning Assessment  3.  Assess knowledge level of disease process/condition, treatment plan, diagnostic tests and medications  4.  Explain disease process/condition, treatment plan, diagnostic tests and medications  Outcome: Not Progressing

## 2024-02-26 NOTE — DISCHARGE SUMMARY
Discharge Summary    CHIEF COMPLAINT ON ADMISSION  Chief Complaint   Patient presents with    Syncope     Pt BIBA from work.  Pt was found by a fellow employee passed out at his desk.  Pt lost consciousness for approximately 10 minutes.  Pt's blood sugar 142 for EMS.  Per EMS, pt was pale and diaphoretic upon arrival. A&O x4.  Pt received approximately 300ml NS from EMS.        Reason for Admission  EMS     Admission Date  2/21/2024    CODE STATUS  Full Code    HPI & HOSPITAL COURSE  This is a 58-year-old male with past medical history of hypertension, dyslipidemia and GERD who was admitted on 2/21/2024 after a syncopal episode.    Patient noted to have hypertensive emergency with encephalopathy and AURELIANO.  Patient given multiple antihypertensives, resulting in episode of syncope.  TTE noted LVEF of 60%, grade 1 diastolic dysfunction, no valvular abnormalities.    Blood pressure medications adjusted with better blood pressure control.  Stressed the importance of medication compliance, as patient can die from hemorrhagic CVA, aneurysm.  Patient on 3 agents for blood pressure control.    On day of discharge patient had episode of dizziness, blood pressure 120-130s/ 70-80s.  Neurological exam performed, within normal limits, no concern for CVA.  Patient reexamined later in the day, no further episodes of dizziness.  Patient medically cleared discharge home.    Therefore, he is discharged in good and stable condition to home with close outpatient follow-up.    The patient met 2-midnight criteria for an inpatient stay at the time of discharge.    Discharge Date  2/26/2024    FOLLOW UP ITEMS POST DISCHARGE  PCP    DISCHARGE DIAGNOSES  Principal Problem:    Hypertensive emergency (POA: Yes)  Active Problems:    Acute kidney injury superimposed on chronic kidney disease (HCC) (POA: Yes)    Acute metabolic encephalopathy (POA: Yes)    Syncope and collapse (POA: Yes)    Hyperlipidemia (POA: Yes)    GERD (gastroesophageal reflux  disease) (POA: Yes)    Fever (POA: Yes)    Hypokalemia (POA: Unknown)  Resolved Problems:    * No resolved hospital problems. *      FOLLOW UP  Sam Sheikh M.D.  [address]  [id number]  [place] [address]  [phone]    Follow up        MEDICATIONS ON DISCHARGE     Medication List        START taking these medications        Instructions   amLODIPine 10 MG Tabs  Start taking on: February 27, 2024  Commonly known as: Norvasc   Take 1 Tablet by mouth every day for 60 days.  Dose: 10 mg     carvedilol 25 MG Tabs  Commonly known as: Coreg   Take 1 Tablet by mouth 2 times a day with meals for 60 days.  Dose: 25 mg     hydrALAZINE 25 MG Tabs  Commonly known as: Apresoline   Take 1 Tablet by mouth every 8 hours for 60 days.  Dose: 25 mg            CONTINUE taking these medications        Instructions   aspirin 81 MG EC tablet   Take 1 Tablet by mouth every day for 30 days.  Dose: 81 mg     atorvastatin 40 MG Tabs  Commonly known as: Lipitor   Take 1 Tablet by mouth every evening for 30 days.  Dose: 40 mg     omeprazole 20 MG delayed-release capsule  Commonly known as: PriLOSEC   Take 1 Capsule by mouth every day for 30 days.  Dose: 20 mg            STOP taking these medications      lisinopril 40 MG tablet  Commonly known as: Prinivil     metoprolol tartrate 25 MG Tabs  Commonly known as: Lopressor              Allergies  Allergies   Allergen Reactions    Other Food Anaphylaxis     All fresh fruit causes throat swelling per brother.        DIET  Orders Placed This Encounter   Procedures    Diet Order Diet: Consistent CHO (Diabetic); Second Modifier: (optional): 2 Gram Sodium     Standing Status:   Standing     Number of Occurrences:   1     Order Specific Question:   Diet:     Answer:   Consistent CHO (Diabetic) [4]     Order Specific Question:   Second Modifier: (optional)     Answer:   2 Gram Sodium [7]       ACTIVITY  As tolerated.  Weight bearing as  tolerated    CONSULTATIONS  None    PROCEDURES  None    LABORATORY  Lab Results   Component Value Date    SODIUM 137 02/26/2024    POTASSIUM 3.7 02/26/2024    CHLORIDE 104 02/26/2024    CO2 21 02/26/2024    GLUCOSE 88 02/26/2024    BUN 19 02/26/2024    CREATININE 1.68 (H) 02/26/2024        Lab Results   Component Value Date    WBC 5.8 02/23/2024    HEMOGLOBIN 14.6 02/23/2024    HEMATOCRIT 39.9 (L) 02/23/2024    PLATELETCT 167 02/23/2024      EC-ECHOCARDIOGRAM COMPLETE W/O CONT   Final Result      US-RENAL   Final Result      1.  Unremarkable renal ultrasound.      CT-HEAD W/O   Final Result      1.  No acute intracranial abnormality.   2.  Mild atrophy with ventriculomegaly, unchanged.   3.  Chronic paranasal sinus disease.         DX-CHEST-PORTABLE (1 VIEW)   Final Result      Hypoinflation without other evidence for acute cardiopulmonary disease.         Total time of the discharge process exceeds 45 minutes.

## 2024-02-26 NOTE — DISCHARGE PLANNING
Community Health Worker Initial First Visit Intake    Social determinates of health intake complete.   Identified barriers to : None.  Contact information provided to Paul Hopper Yes  Has PCP appointment scheduled for : 03/04/24 @ 1:30 pm  Accepted Meds-To-Beds.    Inpatient assessment completed.    CHW met with pt bedside per referral request from Patricia MANUEL and introduced Community Care Management and completed SDOH. Pt lives in an apartment with two roommates , employed and relies on friends/ RTC/ Walking for all transportation needs. Pt denies any barriers with medications , food and substance abuse. Pt is current with PCP and has a scheduled appointment after discharge. Pt has great family support. Pt will have a friend provide transportation home at the time of discharge.     Plan:  Scheduled pt with PCP and provided all information in follow up for AVS.  Discussed and provided all resources for : [place] services including case mgmt, [facility] and CHW contact information.  Verifed phone number.  Advised pt to call if in need of assistance.  Will discharge pt from [facility] as all goals met and pt has no needs.

## 2024-02-26 NOTE — PROGRESS NOTES
PIV removed with tip intact and site covered with gauze and coban. Armband removed. Discussed dc instructions including monitoring BP, medications, and follow-up appointments. Home medications returned and meds to beds delivered.

## 2024-02-27 ENCOUNTER — APPOINTMENT (OUTPATIENT)
Dept: RADIOLOGY | Facility: MEDICAL CENTER | Age: 59
End: 2024-02-27
Payer: COMMERCIAL

## 2024-02-27 ENCOUNTER — APPOINTMENT (OUTPATIENT)
Dept: RADIOLOGY | Facility: MEDICAL CENTER | Age: 59
End: 2024-02-27
Attending: STUDENT IN AN ORGANIZED HEALTH CARE EDUCATION/TRAINING PROGRAM
Payer: COMMERCIAL

## 2024-02-27 VITALS
WEIGHT: 191.36 LBS | RESPIRATION RATE: 17 BRPM | OXYGEN SATURATION: 96 % | BODY MASS INDEX: 28.34 KG/M2 | HEIGHT: 69 IN | TEMPERATURE: 97.7 F | SYSTOLIC BLOOD PRESSURE: 151 MMHG | HEART RATE: 76 BPM | DIASTOLIC BLOOD PRESSURE: 75 MMHG

## 2024-02-27 PROBLEM — R07.9 CHEST PAIN: Status: RESOLVED | Noted: 2024-02-26 | Resolved: 2024-02-27

## 2024-02-27 PROBLEM — N17.9 ACUTE KIDNEY INJURY SUPERIMPOSED ON CHRONIC KIDNEY DISEASE (HCC): Status: RESOLVED | Noted: 2021-06-13 | Resolved: 2024-02-27

## 2024-02-27 PROBLEM — N18.9 ACUTE KIDNEY INJURY SUPERIMPOSED ON CHRONIC KIDNEY DISEASE (HCC): Status: RESOLVED | Noted: 2021-06-13 | Resolved: 2024-02-27

## 2024-02-27 PROBLEM — I16.0 HYPERTENSIVE URGENCY: Status: RESOLVED | Noted: 2024-02-26 | Resolved: 2024-02-27

## 2024-02-27 PROBLEM — R55 SYNCOPE AND COLLAPSE: Status: RESOLVED | Noted: 2021-06-14 | Resolved: 2024-02-27

## 2024-02-27 LAB
ALBUMIN SERPL BCP-MCNC: 3.6 G/DL (ref 3.2–4.9)
ALBUMIN/GLOB SERPL: 1.1 G/DL
ALP SERPL-CCNC: 67 U/L (ref 30–99)
ALT SERPL-CCNC: 19 U/L (ref 2–50)
ANION GAP SERPL CALC-SCNC: 13 MMOL/L (ref 7–16)
AST SERPL-CCNC: 20 U/L (ref 12–45)
BACTERIA BLD CULT: NORMAL
BACTERIA BLD CULT: NORMAL
BILIRUB SERPL-MCNC: 0.8 MG/DL (ref 0.1–1.5)
BUN SERPL-MCNC: 24 MG/DL (ref 8–22)
CALCIUM ALBUM COR SERPL-MCNC: 8.7 MG/DL (ref 8.5–10.5)
CALCIUM SERPL-MCNC: 8.4 MG/DL (ref 8.5–10.5)
CHLORIDE SERPL-SCNC: 105 MMOL/L (ref 96–112)
CO2 SERPL-SCNC: 19 MMOL/L (ref 20–33)
CREAT SERPL-MCNC: 1.77 MG/DL (ref 0.5–1.4)
ERYTHROCYTE [DISTWIDTH] IN BLOOD BY AUTOMATED COUNT: 48.3 FL (ref 35.9–50)
GFR SERPLBLD CREATININE-BSD FMLA CKD-EPI: 44 ML/MIN/1.73 M 2
GLOBULIN SER CALC-MCNC: 3.3 G/DL (ref 1.9–3.5)
GLUCOSE SERPL-MCNC: 109 MG/DL (ref 65–99)
HCT VFR BLD AUTO: 37.1 % (ref 42–52)
HGB BLD-MCNC: 13.9 G/DL (ref 14–18)
MCH RBC QN AUTO: 35.7 PG (ref 27–33)
MCHC RBC AUTO-ENTMCNC: 37.3 G/DL (ref 32.3–36.5)
MCV RBC AUTO: 94.6 FL (ref 81.4–97.8)
PLATELET # BLD AUTO: 170 K/UL (ref 164–446)
PMV BLD AUTO: 9.4 FL (ref 9–12.9)
POTASSIUM SERPL-SCNC: 3.7 MMOL/L (ref 3.6–5.5)
PROT SERPL-MCNC: 6.9 G/DL (ref 6–8.2)
RBC # BLD AUTO: 3.92 M/UL (ref 4.7–6.1)
SIGNIFICANT IND 70042: NORMAL
SIGNIFICANT IND 70042: NORMAL
SITE SITE: NORMAL
SITE SITE: NORMAL
SODIUM SERPL-SCNC: 137 MMOL/L (ref 135–145)
SOURCE SOURCE: NORMAL
SOURCE SOURCE: NORMAL
TROPONIN T SERPL-MCNC: 16 NG/L (ref 6–19)
WBC # BLD AUTO: 7.2 K/UL (ref 4.8–10.8)

## 2024-02-27 PROCEDURE — 700102 HCHG RX REV CODE 250 W/ 637 OVERRIDE(OP): Mod: UD | Performed by: STUDENT IN AN ORGANIZED HEALTH CARE EDUCATION/TRAINING PROGRAM

## 2024-02-27 PROCEDURE — 36415 COLL VENOUS BLD VENIPUNCTURE: CPT

## 2024-02-27 PROCEDURE — G0378 HOSPITAL OBSERVATION PER HR: HCPCS

## 2024-02-27 PROCEDURE — 85027 COMPLETE CBC AUTOMATED: CPT

## 2024-02-27 PROCEDURE — 84484 ASSAY OF TROPONIN QUANT: CPT

## 2024-02-27 PROCEDURE — 71275 CT ANGIOGRAPHY CHEST: CPT

## 2024-02-27 PROCEDURE — 99239 HOSP IP/OBS DSCHRG MGMT >30: CPT | Performed by: INTERNAL MEDICINE

## 2024-02-27 PROCEDURE — 700105 HCHG RX REV CODE 258: Mod: UD | Performed by: STUDENT IN AN ORGANIZED HEALTH CARE EDUCATION/TRAINING PROGRAM

## 2024-02-27 PROCEDURE — 700117 HCHG RX CONTRAST REV CODE 255: Mod: UD

## 2024-02-27 PROCEDURE — 700111 HCHG RX REV CODE 636 W/ 250 OVERRIDE (IP): Mod: UD

## 2024-02-27 PROCEDURE — 80053 COMPREHEN METABOLIC PANEL: CPT

## 2024-02-27 PROCEDURE — 700111 HCHG RX REV CODE 636 W/ 250 OVERRIDE (IP): Mod: JZ,UD | Performed by: STUDENT IN AN ORGANIZED HEALTH CARE EDUCATION/TRAINING PROGRAM

## 2024-02-27 PROCEDURE — A9502 TC99M TETROFOSMIN: HCPCS

## 2024-02-27 PROCEDURE — 96372 THER/PROPH/DIAG INJ SC/IM: CPT

## 2024-02-27 PROCEDURE — A9270 NON-COVERED ITEM OR SERVICE: HCPCS | Mod: UD | Performed by: STUDENT IN AN ORGANIZED HEALTH CARE EDUCATION/TRAINING PROGRAM

## 2024-02-27 RX ORDER — REGADENOSON 0.08 MG/ML
INJECTION, SOLUTION INTRAVENOUS
Status: COMPLETED
Start: 2024-02-27 | End: 2024-02-27

## 2024-02-27 RX ADMIN — ASPIRIN 81 MG: 81 TABLET, COATED ORAL at 05:07

## 2024-02-27 RX ADMIN — REGADENOSON 0.4 MG: 0.08 INJECTION, SOLUTION INTRAVENOUS at 08:46

## 2024-02-27 RX ADMIN — HYDRALAZINE HYDROCHLORIDE 25 MG: 25 TABLET ORAL at 14:03

## 2024-02-27 RX ADMIN — OMEPRAZOLE 20 MG: 20 CAPSULE, DELAYED RELEASE ORAL at 05:07

## 2024-02-27 RX ADMIN — HYDRALAZINE HYDROCHLORIDE 25 MG: 25 TABLET ORAL at 05:07

## 2024-02-27 RX ADMIN — SODIUM CHLORIDE: 9 INJECTION, SOLUTION INTRAVENOUS at 02:09

## 2024-02-27 RX ADMIN — HEPARIN SODIUM 5000 UNITS: 5000 INJECTION, SOLUTION INTRAVENOUS; SUBCUTANEOUS at 05:07

## 2024-02-27 RX ADMIN — IOHEXOL 40 ML: 350 INJECTION, SOLUTION INTRAVENOUS at 02:00

## 2024-02-27 ASSESSMENT — LIFESTYLE VARIABLES
TOTAL SCORE: 0
CONSUMPTION TOTAL: NEGATIVE
AVERAGE NUMBER OF DAYS PER WEEK YOU HAVE A DRINK CONTAINING ALCOHOL: 0
EVER HAD A DRINK FIRST THING IN THE MORNING TO STEADY YOUR NERVES TO GET RID OF A HANGOVER: NO
ALCOHOL_USE: YES
HAVE PEOPLE ANNOYED YOU BY CRITICIZING YOUR DRINKING: NO
ON A TYPICAL DAY WHEN YOU DRINK ALCOHOL HOW MANY DRINKS DO YOU HAVE: 2
TOTAL SCORE: 0
HAVE YOU EVER FELT YOU SHOULD CUT DOWN ON YOUR DRINKING: NO
HOW MANY TIMES IN THE PAST YEAR HAVE YOU HAD 5 OR MORE DRINKS IN A DAY: 0
EVER FELT BAD OR GUILTY ABOUT YOUR DRINKING: NO
TOTAL SCORE: 0

## 2024-02-27 ASSESSMENT — FIBROSIS 4 INDEX: FIB4 SCORE: 1.49

## 2024-02-27 ASSESSMENT — PAIN DESCRIPTION - PAIN TYPE: TYPE: ACUTE PAIN

## 2024-02-27 NOTE — PROGRESS NOTES
Discharge instructions reviewed and signed by patient. No further questions at this time. IV DC'd by RN. All belongings gathered and given to patient. Patient left via walking with friend.

## 2024-02-27 NOTE — ASSESSMENT & PLAN NOTE
Patient presents with systolic blood pressure in the 200s.  Patient states that he has not taken his home meds  Resume home meds

## 2024-02-27 NOTE — PROGRESS NOTES
4 Eyes Skin Assessment Completed by Cynthia RN and OBEY Shelton.    Head WDL  Ears WDL  Nose WDL  Mouth WDL  Neck WDL  Breast/Chest WDL  Shoulder Blades WDL  Spine WDL  (R) Arm/Elbow/Hand WDL  (L) Arm/Elbow/Hand Scab  Abdomen WDL  Groin WDL  Scrotum/Coccyx/Buttocks WDL  (R) Leg WDL  (L) Leg WDL  (R) Heel/Foot/Toe Scab  (L) Heel/Foot/Toe Scab          Devices In Places Tele Box, Blood Pressure Cuff, and Pulse Ox      Interventions In Place Pillows    Possible Skin Injury No    Pictures Uploaded Into Epic N/A  Wound Consult Placed N/A  RN Wound Prevention Protocol Ordered No

## 2024-02-27 NOTE — H&P
Hospital Medicine History & Physical Note    Date of Service  2/26/2024    Primary Care Physician  Sam Sheikh M.D.      Code Status  Full Code    Chief Complaint  Chief Complaint   Patient presents with    Syncope     Pt reports syncopal event today while playing pool. Pt states felt lightheaded prior to event. States remember waking up and was in an ambulance. Pt was d/c'd from [facility] today around 1pm. Pt states he thinks he was dx with high blood pressure while in the hospital, has not taken any new meds since being discharged.        History of Presenting Illness  Paul Hopper is a 58 y.o. male who presented 2/26/2024 with after syncopal episode.  Patient was just discharged from [facility] and had a workup for [facility] alert episode.  An echocardiogram which showed normal ejection fraction.  He states that prior to arrival, he was playing pool with his friend, and he subsequently became hot and flushed, and syncopized.  He is unsure how long he was out for.  In the emergency department, patient was found to be hypertensive with systolic blood pressure in the 200s.  He does endorse some mild substernal chest pain as well.  EKG was obtained showing no signs of ST segment elevation.  He did have a run of tachycardia as well.  Currently in normal sinus rhythm.  Initial troponin was unremarkable.  He was given a dose of aspirin as well.  Patient will be admitted for chest pain workup and syncope workup    I discussed the plan of care with patient.    Review of Systems  Review of Systems   Constitutional:  Negative for chills and fever.   HENT:  Negative for congestion, ear discharge, nosebleeds and sinus pain.    Eyes:  Negative for double vision, photophobia and pain.   Respiratory:  Negative for hemoptysis, sputum production and shortness of breath.    Cardiovascular:  Positive for chest pain. Negative for palpitations, orthopnea, claudication and leg swelling.   Gastrointestinal:   Negative for abdominal pain, diarrhea, nausea and vomiting.   Genitourinary:  Negative for frequency, hematuria and urgency.   Musculoskeletal:  Negative for back pain and neck pain.   Neurological:  Positive for loss of consciousness. Negative for dizziness, tingling, tremors, sensory change, speech change, focal weakness, seizures, weakness and headaches.   Psychiatric/Behavioral:  Negative for hallucinations, substance abuse and suicidal ideas. The patient is not nervous/anxious.        Past Medical History   has a past medical history of Chickenpox, CVA (cerebral vascular accident) ([facility]), Hypertension, and MI (myocardial infarction) ([facility]).    Surgical History   has no past surgical history on file.     Family History  family history is not on file.   Family history reviewed with patient. There is no family history that is pertinent to the chief complaint.     Social History   reports that he has been smoking cigarettes. He has never used smokeless tobacco. He reports current alcohol use of about 1.2 oz of alcohol per week. He reports that he does not currently use drugs.    Allergies  Allergies   Allergen Reactions    Other Food Anaphylaxis     All fresh fruit causes throat swelling per brother.        Medications  Prior to Admission Medications   Prescriptions Last Dose Informant Patient Reported? Taking?   amLODIPine (NORVASC) 10 MG Tab NEW RX at NOT STARTED Patient No No   Sig: Take 1 Tablet by mouth every day for 60 days.   aspirin 81 MG EC tablet NEW RX at NOT STARTED Patient No No   Sig: Take 1 Tablet by mouth every day for 30 days.   atorvastatin (LIPITOR) 40 MG Tab NEW RX at NOT STARTED Patient No No   Sig: Take 1 Tablet by mouth every evening for 30 days.   carvedilol (COREG) 25 MG Tab NEW RX at NOT STARTED Patient No No   Sig: Take 1 Tablet by mouth 2 times a day with meals for 60 days.   hydrALAZINE (APRESOLINE) 25 MG Tab NEW RX at NOT STARTED Patient No No   Sig: Take 1 Tablet by mouth every 8 hours  for 60 days.   omeprazole (PRILOSEC) 20 MG delayed-release capsule NEW RX at NOT STARTED Patient No No   Sig: Take 1 Capsule by mouth every day for 30 days.      Facility-Administered Medications: None       Physical Exam  Temp:  [35.9 °C (96.6 °F)-36.5 °C (97.7 °F)] 35.9 °C (96.6 °F)  Pulse:  [54-86] 65  Resp:  [16-20] 16  BP: (115-199)/(67-92) 199/90  SpO2:  [94 %-100 %] 94 %  Blood Pressure: (!) 199/90   Temperature: 35.9 °C (96.6 °F)   Pulse: 65   Respiration: 16   Pulse Oximetry: 94 %       Physical Exam  Constitutional:       General: He is not in acute distress.     Appearance: Normal appearance. He is normal weight. He is not ill-appearing, toxic-appearing or diaphoretic.   HENT:      Head: Normocephalic and atraumatic.      Mouth/Throat:      Mouth: Mucous membranes are moist.   Eyes:      Pupils: Pupils are equal, round, and reactive to light.   Cardiovascular:      Rate and Rhythm: Normal rate and regular rhythm.      Pulses: Normal pulses.      Heart sounds: Normal heart sounds. No murmur heard.     No friction rub. No gallop.   Pulmonary:      Effort: Pulmonary effort is normal. No respiratory distress.      Breath sounds: Normal breath sounds. No stridor. No wheezing, rhonchi or rales.   Chest:      Chest wall: No tenderness.   Abdominal:      General: There is no distension.      Palpations: There is no mass.      Tenderness: There is no abdominal tenderness. There is no right CVA tenderness, left CVA tenderness, guarding or rebound.      Hernia: No hernia is present.   Musculoskeletal:         General: No swelling, tenderness, deformity or signs of injury.      Right lower leg: No edema.      Left lower leg: No edema.   Skin:     General: Skin is warm and dry.      Capillary Refill: Capillary refill takes less than 2 seconds.      Coloration: Skin is not jaundiced or pale.      Findings: No bruising, erythema, lesion or rash.   Neurological:      General: No focal deficit present.      Mental Status:  He is alert and oriented to person, place, and time. Mental status is at baseline.      Cranial Nerves: No cranial nerve deficit.      Sensory: No sensory deficit.      Motor: No weakness.      Coordination: Coordination normal.   Psychiatric:         Mood and Affect: Mood normal.         Laboratory:  Recent Labs     02/26/24 1930   WBC 8.3   RBC 4.28*   HEMOGLOBIN 15.9   HEMATOCRIT 41.1*   MCV 96.0   MCH 36.0*   MCHC 36.6*   RDW 49.7   PLATELETCT 172   MPV 8.3*     Recent Labs     02/25/24 0825 02/26/24 0056 02/26/24 1930   SODIUM 136 137 137   POTASSIUM 3.8 3.7 3.8   CHLORIDE 104 104 103   CO2 22 21 20   GLUCOSE 97 88 107*   BUN 18 19 26*   CREATININE 1.45* 1.68* 2.12*   CALCIUM 8.6 8.6 8.9     Recent Labs     02/25/24 0825 02/26/24 0056 02/26/24 1930   ALTSGPT  --   --  19   ASTSGOT  --   --  24   ALKPHOSPHAT  --   --  72   TBILIRUBIN  --   --  0.9   GLUCOSE 97 88 107*         Recent Labs     02/26/24 1930   NTPROBNP 83     Recent Labs     02/26/24 1930   TRIGLYCERIDE 102   HDL 24*   LDL 60     Recent Labs     02/26/24 1930   TROPONINT 18       Imaging:  DX-CHEST-PORTABLE (1 VIEW)   Final Result      No acute cardiac or pulmonary abnormalities are identified.      NM-CARDIAC STRESS TEST    (Results Pending)       X-Ray:  I have personally reviewed the images and compared with prior images.  EKG:  I have personally reviewed the images and compared with prior images.    Assessment/Plan:  Justification for Admission Status  I anticipate this patient is appropriate for observation status at this time because syncope with collapse and chest pain    Patient will need a Telemetry bed on MEDICAL service .  The need is secondary to syncope and collapse and chest pain.    * Chest pain- (present on admission)  Assessment & Plan  Continue with aspirin and statin  He had a recent echocardiogram during his previous admission with an ejection fraction of 60 to 65%  Troponin of 18, EKG showed mild ST segment  depressions.  Follow-up with stress test      Hypertensive urgency  Assessment & Plan  Patient presents with systolic blood pressure in the 200s.  Patient states that he has not taken his home meds  Resume home meds    Syncope and collapse- (present on admission)  Assessment & Plan  Patient presented after syncopal episode while he was playing pool.  This similar to his presentation during his last hospitalization.  He already had an echocardiogram done which showed a normal ejection fraction  Follow-up orthostatic vital signs  Patient may benefit from outpatient Holter monitor    Acute kidney injury superimposed on chronic kidney disease (HCC)- (present on admission)  Assessment & Plan  Likely prerenal in nature.  Continue with maintenance IV fluids  Previous renal ultrasound was unremarkable  Continue with monitoring with daily BMPs    Primary hypertension- (present on admission)  Assessment & Plan  Continue with home amlodipine, Coreg, hydralazine        VTE prophylaxis: heparin ppx

## 2024-02-27 NOTE — PROGRESS NOTES
Report received from OBEY Marie.  Assumed care of patient.  Assessment complete.  Plan of care gone over with the patient and all concerns addressed.  Patient resting in bed. No complaints of chest pain. Patient A & O  x 4.  No apparent signs of distress. Safety precautions in place.  Patient educated to call for assistance.  Hourly rounding in place

## 2024-02-27 NOTE — DISCHARGE SUMMARY
Discharge Summary    CHIEF COMPLAINT ON ADMISSION  Chief Complaint   Patient presents with    Syncope     Pt reports syncopal event today while playing pool. Pt states felt lightheaded prior to event. States remember waking up and was in an ambulance. Pt was d/c'd from [facility] today around 1pm. Pt states he thinks he was dx with high blood pressure while in the hospital, has not taken any new meds since being discharged.        Reason for Admission  Syncope     Admission Date  2/26/2024    CODE STATUS  Full Code    HPI & HOSPITAL COURSE  58 y.o. male who presented 2/26/2024 with after syncopal episode. Patient was just discharged from [facility] and had a workup for syncopal episode.  An echocardiogram which showed normal ejection fraction with no significant valvular abnormalities.  He was discharged home on 3 different blood pressure medications.  He states that prior to arrival, he was playing pool with his friend, when he suddenly felt lightheaded and then passed out.  EMS was called and he was brought into the ER.  He did report some mild substernal chest pain as well.  EKG was obtained showing no signs of ST segment elevation.  Initial EKG was negative for acute ischemia.  Patient was admitted to the clinical decision unit with continuous cardiac monitoring and serial troponins which remained negative.  He underwent a nuclear medicine cardiac stress test that was negative for ischemia or infarct.  Telemetry revealed sinus bradycardia to sinus rhythm.  His blood pressure was well-controlled on amlodipine and hydralazine.  I have discontinued carvedilol which could be contributing to his episode of syncope.  I have asked the patient to his blood pressure twice a day and to keep a log.  He has been instructed to follow-up with his PCP to adjust his blood pressure medications as needed.  I have instructed the patient to increase his fluid intake.  I suspect his symptoms were from dehydration  along with hypotension and bradycardia from multiple antihypertensives.    Therefore, he is discharged in good and stable condition to home with close outpatient follow-up.    The patient recovered much more quickly than anticipated on admission.    Discharge Date  2/27/27    FOLLOW UP ITEMS POST DISCHARGE  PCP    DISCHARGE DIAGNOSES  Principal Problem (Resolved):    Chest pain (POA: Yes)  Active Problems:    Primary hypertension (POA: Yes)  Resolved Problems:    Acute kidney injury superimposed on chronic kidney disease (HCC) (POA: Yes)    Syncope and collapse (POA: Yes)    Hypertensive urgency (POA: Unknown)    DM (diabetes mellitus) (HCC) (POA: Unknown)      FOLLOW UP  No future appointments.  No follow-up provider specified.    MEDICATIONS ON DISCHARGE     Medication List        CONTINUE taking these medications        Instructions   amLODIPine 10 MG Tabs  Commonly known as: Norvasc   Take 1 Tablet by mouth every day for 60 days.  Dose: 10 mg     Aspirin Low Dose 81 MG EC tablet  Generic drug: aspirin   Take 1 Tablet by mouth every day for 30 days.  Dose: 81 mg     atorvastatin 40 MG Tabs  Commonly known as: Lipitor   Take 1 Tablet by mouth every evening for 30 days.  Dose: 40 mg     hydrALAZINE 25 MG Tabs  Commonly known as: Apresoline   Take 1 Tablet by mouth every 8 hours for 60 days.  Dose: 25 mg     omeprazole 20 MG delayed-release capsule  Commonly known as: PriLOSEC   Take 1 Capsule by mouth every day for 30 days.  Dose: 20 mg            STOP taking these medications      carvedilol 25 MG Tabs  Commonly known as: Coreg              Allergies  Allergies   Allergen Reactions    Other Food Anaphylaxis     All fresh fruit causes throat swelling per brother.        DIET  Orders Placed This Encounter   Procedures    Diet Order Diet: Cardiac     Standing Status:   Standing     Number of Occurrences:   1     Order Specific Question:   Diet:     Answer:   Cardiac [6]       ACTIVITY  As tolerated.  Weight bearing  as tolerated    CONSULTATIONS  none    PROCEDURES  none    LABORATORY  Lab Results   Component Value Date    SODIUM 137 02/27/2024    POTASSIUM 3.7 02/27/2024    CHLORIDE 105 02/27/2024    CO2 19 (L) 02/27/2024    GLUCOSE 109 (H) 02/27/2024    BUN 24 (H) 02/27/2024    CREATININE 1.77 (H) 02/27/2024        Lab Results   Component Value Date    WBC 7.2 02/27/2024    HEMOGLOBIN 13.9 (L) 02/27/2024    HEMATOCRIT 37.1 (L) 02/27/2024    PLATELETCT 170 02/27/2024        Total time of the discharge process exceeds 34 minutes.

## 2024-02-27 NOTE — PROGRESS NOTES
Pt arrived to unit via gurney. Pt oriented to room, unit, and plan of care. Tele-monitor placed and monitor room notified. SR 60s. Patient A&Ox4 on RA with normal WOB. Patient denies chest pain, denies SOB, denies, dizziness. Updated patient on plan of care, all questions answered at this time. Call light within reach; fall precautions in place.

## 2024-02-27 NOTE — PROGRESS NOTES
Monitor Summary (per monitor room interpretation):  Rhythm: SB  Rate: 49-59  Ectopy: None    .16/.06/.40

## 2024-02-27 NOTE — ED PROVIDER NOTES
"  ER Provider Note    Scribed for Eldia Burnett M.D. by Mark Beltrán. 2/26/2024   6:56 PM    Primary Care Provider: Sam Sheikh M.D.    CHIEF COMPLAINT  Chief Complaint   Patient presents with    Syncope     Pt reports syncopal event today while playing pool. Pt states felt lightheaded prior to event. States remember waking up and was in an ambulance. Pt was d/c'd from [facility] today around 1pm. Pt states he thinks he was dx with high blood pressure while in the hospital, has not taken any new meds since being discharged.      EXTERNAL RECORDS REVIEWED  Inpatient Notes Discharged earlier today after five day stay for syncope    HPI/ROS    LIMITATION TO HISTORY   Select: : None    Paul Hopper is a 58 y.o. male who presents to the ED for evaluation of syncope onset earlier today. He was admitted and discharged today for the same, this was his only admission for syncope. During discharge he felt \"weird\" and flushed, this went away quickly. After his discharge he went to get lunch at [employer] and was speaking to some coworkers. He was then playing pool, felt unwell and had a syncopal episode. He is unsure as to how long he was unconscious, when he woke up [facility] was there. After waking up he was mildly confused, this cleared up quickly. He denies any chest pain or palpitation prior. He denies any alcohol consumption. He has \"2/10\" chest pain now, this was similar to the chest pain that he had prior to his admission. He denies any numbness or weakness currently. He does know where he is, the situation he is in, and the date. He has a history of stroke and heart attack but states he does blood thinners. He does not have a stent placed. He was supposed to take his medications today but forgot. He takes carvedilol, aspirin, hydralazine, omeprazole, and amlodipine. He denies any drug use or allergies to medication.     PAST MEDICAL HISTORY  Past Medical History:   Diagnosis Date    Chickenpox     CVA " "(cerebral vascular accident) (HCC)     Hypertension     MI (myocardial infarction) (HCC)        SURGICAL HISTORY  History reviewed. No pertinent surgical history.    FAMILY HISTORY  History reviewed. No pertinent family history.    SOCIAL HISTORY   reports that he has been smoking cigarettes. He has never used smokeless tobacco. He reports current alcohol use of about 1.2 oz of alcohol per week. He reports that he does not currently use drugs.    CURRENT MEDICATIONS  Current Discharge Medication List        CONTINUE these medications which have NOT CHANGED    Details   amLODIPine (NORVASC) 10 MG Tab Take 1 Tablet by mouth every day for 60 days.  Qty: 60 Tablet, Refills: 0    Associated Diagnoses: Hypertensive emergency      carvedilol (COREG) 25 MG Tab Take 1 Tablet by mouth 2 times a day with meals for 60 days.  Qty: 120 Tablet, Refills: 0    Associated Diagnoses: Hypertensive emergency      hydrALAZINE (APRESOLINE) 25 MG Tab Take 1 Tablet by mouth every 8 hours for 60 days.  Qty: 180 Tablet, Refills: 0    Associated Diagnoses: Hypertensive emergency      aspirin 81 MG EC tablet Take 1 Tablet by mouth every day for 30 days.  Qty: 30 Tablet, Refills: 0    Associated Diagnoses: Abnormal echocardiogram      atorvastatin (LIPITOR) 40 MG Tab Take 1 Tablet by mouth every evening for 30 days.  Qty: 30 Tablet, Refills: 0    Associated Diagnoses: Abnormal echocardiogram      omeprazole (PRILOSEC) 20 MG delayed-release capsule Take 1 Capsule by mouth every day for 30 days.  Qty: 30 Capsule, Refills: 0    Associated Diagnoses: Intractable hiccups             ALLERGIES  Allergies   Allergen Reactions    Other Food Anaphylaxis     All fresh fruit causes throat swelling per brother.         PHYSICAL EXAM  /68   Pulse 63   Temp 35.9 °C (96.6 °F) (Temporal)   Resp 16   Ht 1.753 m (5' 9\")   Wt 86.5 kg (190 lb 11.2 oz)   SpO2 97%   BMI 28.16 kg/m²    General: Alert, oriented male lying in bed in no acute distress  Head: " Normocephalic atraumatic  HENT: Poor dentition  Eyes: Extraocular motion intact  Neck: Supple, no rigidity  Cardiovascular: Regular rate and rhythm no murmurs rubs or gallops  Respiratory: Clear to auscultation bilaterally, equal chest rise and fall, no increased work of breathing  Abdomen: Soft nontender no guarding  Musculoskeletal: Warm and well perfused, no peripheral edema  Neuro: Alert, no focal deficits. Cranial nerves II through XII intact.  5 out of 5 strength with elbow flexion/tension, wrist flexion/extension,  bilaterally.  5 out of 5 strength with hip flexion/extension, knee flexion/distention, ankle flexion/plantarflexion, flexion/extension of the toes bilaterally.  Sensation intact to light touch x4.  Gait intact.  Alert and oriented x3.  2+ DTRs bilaterally.   No pronator drift.   Integumentary: No wounds or rashes     DIAGNOSTIC STUDIES    Labs:   Labs Reviewed   CBC WITH DIFFERENTIAL - Abnormal; Notable for the following components:       Result Value    RBC 4.28 (*)     Hematocrit 41.1 (*)     MCH 36.0 (*)     MCHC 36.6 (*)     MPV 8.3 (*)     Neutrophils-Polys 73.10 (*)     Lymphocytes 19.10 (*)     All other components within normal limits    Narrative:     Biotin intake of greater than 5 mg per day may interfere with  troponin levels, causing false low values.   COMP METABOLIC PANEL - Abnormal; Notable for the following components:    Glucose 107 (*)     Bun 26 (*)     Creatinine 2.12 (*)     All other components within normal limits    Narrative:     Biotin intake of greater than 5 mg per day may interfere with  troponin levels, causing false low values.   ESTIMATED GFR - Abnormal; Notable for the following components:    GFR (CKD-EPI) 36 (*)     All other components within normal limits    Narrative:     Biotin intake of greater than 5 mg per day may interfere with  troponin levels, causing false low values.   D-DIMER - Abnormal; Notable for the following components:    D-Dimer 3.61 (*)      All other components within normal limits   LIPID PROFILE - Abnormal; Notable for the following components:    HDL 24 (*)     All other components within normal limits    Narrative:     Biotin intake of greater than 5 mg per day may interfere with  troponin levels, causing false low values.   COMP METABOLIC PANEL - Abnormal; Notable for the following components:    Co2 19 (*)     Glucose 109 (*)     Bun 24 (*)     Creatinine 1.77 (*)     Calcium 8.4 (*)     All other components within normal limits   ESTIMATED GFR - Abnormal; Notable for the following components:    GFR (CKD-EPI) 44 (*)     All other components within normal limits   PROBRAIN NATRIURETIC PEPTIDE, NT    Narrative:     Biotin intake of greater than 5 mg per day may interfere with  troponin levels, causing false low values.   TROPONIN    Narrative:     Biotin intake of greater than 5 mg per day may interfere with  troponin levels, causing false low values.   MAGNESIUM    Narrative:     Biotin intake of greater than 5 mg per day may interfere with  troponin levels, causing false low values.   PHOSPHORUS    Narrative:     Biotin intake of greater than 5 mg per day may interfere with  troponin levels, causing false low values.   DIAGNOSTIC ALCOHOL    Narrative:     Biotin intake of greater than 5 mg per day may interfere with  troponin levels, causing false low values.   URINE DRUG SCREEN   HEMOGLOBIN A1C   TSH WITH REFLEX TO FT4    Narrative:     Biotin intake of greater than 5 mg per day may interfere with  troponin levels, causing false low values.   TROPONIN   URINE DRUG SCREEN   CBC WITHOUT DIFFERENTIAL   Worsening acute kidney injury, prerenal whether due to poor perfusion in the setting of intermittent arrhythmia, or dehydration, unclear, troponin is within normal limits although above patient's recent baseline,   EKG:   I have independently interpreted this EKG as detailed above.  Results for orders placed or performed during the hospital  encounter of 24   EKG (NOW)   Result Value Ref Range    Report       [facility]    Test Date:  2024  Pt Name:    MIRANDA REARDON                Department: ER  MRN:        [id number]                      Room:        54  Gender:     Male                         Technician: [id number]  :        1965                   Requested By:ER TRIAGE PROTOCOL  Order #:    [id number]                    Reading MD: De Burnett    Measurements  Intervals                                Axis  Rate:       54                           P:          69  DC:         149                          QRS:        12  QRSD:       100                          T:          -6  QT:         473  QTc:        449    Interpretive Statements  Normal sinus rhythm, rate of 54, normal axis, ST depressions in 2, 3, aVF,  prolonged reciprocal elevation in aVL, less than 1 mm, ST depressions in the  inferior leads are new compared to prior 2024  Electronically Signed On 2024 18:57:26 PST by De Burnett        Radiology:     Radiologist interpretation:   CT-CTA CHEST PULMONARY ARTERY W/ RECONS   Final Result         1. No CT evidence of pulmonary embolism.      2. No airspace opacity. No pleural effusion. No pneumothorax.      DX-CHEST-PORTABLE (1 VIEW)   Final Result      No acute cardiac or pulmonary abnormalities are identified.      NM-CARDIAC STRESS TEST    (Results Pending)      INITIAL ASSESSMENT COURSE AND PLAN  Care Narrative 58-year-old presenting with recurrent syncope, patient had a 10-minute episode of syncope, was admitted and had an echocardiogram which was reassuring, subsequently discharged, had another syncopal episode shortly after discharge, did note to have episode of dizziness prior to discharge.  Patient reports feeling hot prior to pa syncopal episode, had not taken any of his medications as it was not time yet prior to representation.  He now notes chest pain, screening EKG  obtained in triage is notable for new ST depressions in the setting of known coronary artery disease.     6:56 PM - Patient was evaluated at bedside. Ordered for DX-chest, CBC w/ diff, CMP, BNP, troponin, magnesium, phosphorous, alcohol, UDS and EKG to evaluate. The patient will be medicated with aspiring 324 mg for his symptoms. Informed him that I will likely be admitting him to the hospital for EKG changes. Patient verbalizes understanding and support with my plan of care.  Differential diagnoses include but not limited to: Concerning for cardiac syncope versus orthostasis, dehydration, concerning for cardiac chest pain in the setting of high heart score of 5.          8:50 PM I discussed the patient's case and the above findings with Dr. Mejia (Hospitalist) who will assess the patient for hospitalization.      Shortly after admission, I was walking by the patient's room and noted a Nonsustained episode of tachycardia, which is concerning for nonsustained possible polymorphic ventricular tachycardia   versus supraventricular tachycardia versus artifact.  this was discussed with admitting hospitalist, additional EKG was ordered however unable to capture further episodes of palpitations.   Patient admitted for cardiac monitoring, cardiac risk stratification.    HYDRATION: Based on the patient's presentation of Acute Kidney Injury the patient was given IV fluids. IV Hydration was used because oral hydration was not adequate alone. Upon recheck following hydration, the patient was improved.     ED Observation Status? No; Patient does not meet criteria for ED Observation.       DISPOSITION AND DISCUSSIONS    I have discussed management of the patient with the following physicians and JIM's:  Dr. Mejia (Hospitalist)     Discussion of management with other \[facility] [place] [facility]\"" or appropriate source(s): None     Barriers to care at this time, including but not limited to: None      DISPOSITION:  Patient will be hospitalized by   Roberto in guarded condition.    FINAL DIAGNOSIS  1. Chest pain, unspecified type    2. AURELIANO (acute kidney injury) (HCC)    3. EKG abnormalities    4. VT (ventricular tachycardia) (HCC)       Mark COLUNGA (Scribe), am scribing for, and in the presence of, De Burnett M.D..    Electronically signed by: Mark Beltrán (Scribe), 2/26/2024    IDe M.D. personally performed the services described in this documentation, as scribed by Mark Beltrán in my presence, and it is both accurate and complete.      The note accurately reflects work and decisions made by me.  De Burnett M.D.  2/27/2024  2:50 AM

## 2024-02-27 NOTE — ED NOTES
Bedside report received from off going RN: Yoko, assumed care of patient.  POC discussed with patient. Call light within reach, all needs addressed at this time.       Fall risk interventions in place: Move the patient closer to the nurse's station, Patient's personal possessions are with in their safe reach, Place socks on patient, Place fall risk sign on patient's door, Give patient urinal if applicable, Keep floor surfaces clean and dry, and Accompanied to restroom (all applicable per [facility] Fall risk assessment)   Continuous monitoring: Cardiac Leads, Pulse Ox, or Blood Pressure  IVF/IV medications: Infusion per MAR (List Med(s)) NS  Oxygen: Room Air  Bedside sitter: Not Applicable   Isolation: Not Applicable

## 2024-02-27 NOTE — ASSESSMENT & PLAN NOTE
Continue with aspirin and statin  He had a recent echocardiogram during his previous admission with an ejection fraction of 60 to 65%  Troponin of 18, EKG showed mild ST segment depressions.  Follow-up with stress test

## 2024-02-27 NOTE — ED NOTES
Med rec complete per patient  Allergies reviewed.   Patient denies taking any medications between discharge from [facility] on 2/26/24 and coming back on 2/26/24    Nathalie Crouch, Andres

## 2024-02-27 NOTE — ASSESSMENT & PLAN NOTE
Patient presented after syncopal episode while he was playing pool.  This similar to his presentation during his last hospitalization.  He already had an echocardiogram done which showed a normal ejection fraction  Follow-up orthostatic vital signs  Patient may benefit from outpatient Holter monitor

## 2024-02-27 NOTE — ED TRIAGE NOTES
Chief Complaint   Patient presents with    Syncope     Pt reports syncopal event today while playing pool. Pt states felt lightheaded prior to event. States remember waking up and was in an ambulance. Pt was d/c'd from [facility] today around 1pm. Pt states he thinks he was dx with high blood pressure while in the hospital, has not taken any new meds since being discharged.      Pt ambulates to triage for above.     EKG completed. Pt denies any ETOH or drug use.     Pt to jose, educated on triage process, thanked for patience.

## 2024-02-27 NOTE — ASSESSMENT & PLAN NOTE
Likely prerenal in nature.  Continue with maintenance IV fluids  Previous renal ultrasound was unremarkable  Continue with monitoring with daily BMPs

## 2024-02-27 NOTE — CARE PLAN
The patient is Stable - Low risk of patient condition declining or worsening    Shift Goals  Clinical Goals: stress test, IV fluids  Patient Goals: Rest  Family Goals: N/A    Progress made toward(s) clinical / shift goals:  Patient verbalizes understanding of plan of care. Patient reports minimal chest pain at this time.   Problem: Knowledge Deficit - Standard  Goal: Patient and family/care givers will demonstrate understanding of plan of care, disease process/condition, diagnostic tests and medications  Description: Target End Date:  1-3 days or as soon as patient condition allows    Document in Patient Education    1.  Patient and family/caregiver oriented to unit, equipment, visitation policy and means for communicating concern  2.  Complete/review Learning Assessment  3.  Assess knowledge level of disease process/condition, treatment plan, diagnostic tests and medications  4.  Explain disease process/condition, treatment plan, diagnostic tests and medications  Outcome: Progressing     Problem: Pain - Standard  Goal: Alleviation of pain or a reduction in pain to the patient’s comfort goal  Description: Target End Date:  Prior to discharge or change in level of care    Document on Vitals flowsheet    1.  Document pain using the appropriate pain scale per order or unit policy  2.  Educate and implement non-pharmacologic comfort measures (i.e. relaxation, distraction, massage, cold/heat therapy, etc.)  3.  Pain management medications as ordered  4.  Reassess pain after pain med administration per policy  5.  If opiods administered assess patient's response to pain medication is appropriate per POSS sedation scale  6.  Follow pain management plan developed in collaboration with patient and interdisciplinary team (including palliative care or pain specialists if applicable)  Outcome: Progressing       Patient is not progressing towards the following goals: N/A

## 2024-03-14 ENCOUNTER — PATIENT OUTREACH (OUTPATIENT)
Dept: HEALTH INFORMATION MANAGEMENT | Facility: OTHER | Age: 59
End: 2024-03-14
Payer: COMMERCIAL

## 2025-04-12 ENCOUNTER — HOSPITAL ENCOUNTER (OUTPATIENT)
Facility: MEDICAL CENTER | Age: 60
End: 2025-04-14
Attending: EMERGENCY MEDICINE | Admitting: HOSPITALIST
Payer: COMMERCIAL

## 2025-04-12 ENCOUNTER — APPOINTMENT (OUTPATIENT)
Dept: RADIOLOGY | Facility: MEDICAL CENTER | Age: 60
End: 2025-04-12
Payer: COMMERCIAL

## 2025-04-12 DIAGNOSIS — R00.1 BRADYCARDIA: ICD-10-CM

## 2025-04-12 DIAGNOSIS — I16.0 HYPERTENSIVE URGENCY: ICD-10-CM

## 2025-04-12 DIAGNOSIS — R55 SYNCOPE, UNSPECIFIED SYNCOPE TYPE: ICD-10-CM

## 2025-04-12 DIAGNOSIS — E86.0 DEHYDRATION: ICD-10-CM

## 2025-04-12 PROBLEM — Z91.148 NONCOMPLIANCE WITH MEDICATION TREATMENT DUE TO UNDERUSE OF MEDICATION: Status: ACTIVE | Noted: 2025-04-12

## 2025-04-12 PROBLEM — Z75.8 DOES NOT HAVE PRIMARY CARE PROVIDER: Status: ACTIVE | Noted: 2025-04-12

## 2025-04-12 LAB
ALBUMIN SERPL BCP-MCNC: 4.3 G/DL (ref 3.2–4.9)
ALBUMIN/GLOB SERPL: 1.5 G/DL
ALP SERPL-CCNC: 66 U/L (ref 30–99)
ALT SERPL-CCNC: 11 U/L (ref 2–50)
AMPHET UR QL SCN: NEGATIVE
ANION GAP SERPL CALC-SCNC: 10 MMOL/L (ref 7–16)
AST SERPL-CCNC: 21 U/L (ref 12–45)
BARBITURATES UR QL SCN: NEGATIVE
BASOPHILS # BLD AUTO: 0.3 % (ref 0–1.8)
BASOPHILS # BLD: 0.02 K/UL (ref 0–0.12)
BENZODIAZ UR QL SCN: NEGATIVE
BILIRUB SERPL-MCNC: 1.6 MG/DL (ref 0.1–1.5)
BUN SERPL-MCNC: 14 MG/DL (ref 8–22)
BZE UR QL SCN: NEGATIVE
CALCIUM ALBUM COR SERPL-MCNC: 9 MG/DL (ref 8.5–10.5)
CALCIUM SERPL-MCNC: 9.2 MG/DL (ref 8.5–10.5)
CANNABINOIDS UR QL SCN: NEGATIVE
CHLORIDE SERPL-SCNC: 104 MMOL/L (ref 96–112)
CO2 SERPL-SCNC: 24 MMOL/L (ref 20–33)
CREAT SERPL-MCNC: 1.84 MG/DL (ref 0.5–1.4)
EKG IMPRESSION: NORMAL
EOSINOPHIL # BLD AUTO: 0.09 K/UL (ref 0–0.51)
EOSINOPHIL NFR BLD: 1.2 % (ref 0–6.9)
ERYTHROCYTE [DISTWIDTH] IN BLOOD BY AUTOMATED COUNT: 52.9 FL (ref 35.9–50)
ETHANOL BLD-MCNC: <10.1 MG/DL
FENTANYL UR QL: NEGATIVE
GFR SERPLBLD CREATININE-BSD FMLA CKD-EPI: 42 ML/MIN/1.73 M 2
GLOBULIN SER CALC-MCNC: 2.9 G/DL (ref 1.9–3.5)
GLUCOSE BLD STRIP.AUTO-MCNC: 93 MG/DL (ref 65–99)
GLUCOSE SERPL-MCNC: 100 MG/DL (ref 65–99)
HCT VFR BLD AUTO: 35.4 % (ref 42–52)
HGB BLD-MCNC: 13.2 G/DL (ref 14–18)
IMM GRANULOCYTES # BLD AUTO: 0.02 K/UL (ref 0–0.11)
IMM GRANULOCYTES NFR BLD AUTO: 0.3 % (ref 0–0.9)
LYMPHOCYTES # BLD AUTO: 0.92 K/UL (ref 1–4.8)
LYMPHOCYTES NFR BLD: 11.9 % (ref 22–41)
MCH RBC QN AUTO: 38.6 PG (ref 27–33)
MCHC RBC AUTO-ENTMCNC: 37.3 G/DL (ref 32.3–36.5)
MCV RBC AUTO: 103.5 FL (ref 81.4–97.8)
METHADONE UR QL SCN: NEGATIVE
MONOCYTES # BLD AUTO: 0.38 K/UL (ref 0–0.85)
MONOCYTES NFR BLD AUTO: 4.9 % (ref 0–13.4)
NEUTROPHILS # BLD AUTO: 6.33 K/UL (ref 1.82–7.42)
NEUTROPHILS NFR BLD: 81.4 % (ref 44–72)
NRBC # BLD AUTO: 0 K/UL
NRBC BLD-RTO: 0 /100 WBC (ref 0–0.2)
OPIATES UR QL SCN: NEGATIVE
OXYCODONE UR QL SCN: NEGATIVE
PCP UR QL SCN: NEGATIVE
PLATELET # BLD AUTO: 141 K/UL (ref 164–446)
PMV BLD AUTO: 9.2 FL (ref 9–12.9)
POTASSIUM SERPL-SCNC: 3.2 MMOL/L (ref 3.6–5.5)
PROPOXYPH UR QL SCN: NEGATIVE
PROT SERPL-MCNC: 7.2 G/DL (ref 6–8.2)
RBC # BLD AUTO: 3.42 M/UL (ref 4.7–6.1)
SODIUM SERPL-SCNC: 138 MMOL/L (ref 135–145)
WBC # BLD AUTO: 7.8 K/UL (ref 4.8–10.8)

## 2025-04-12 PROCEDURE — 99222 1ST HOSP IP/OBS MODERATE 55: CPT | Performed by: STUDENT IN AN ORGANIZED HEALTH CARE EDUCATION/TRAINING PROGRAM

## 2025-04-12 PROCEDURE — A9270 NON-COVERED ITEM OR SERVICE: HCPCS | Performed by: EMERGENCY MEDICINE

## 2025-04-12 PROCEDURE — 80053 COMPREHEN METABOLIC PANEL: CPT

## 2025-04-12 PROCEDURE — 700105 HCHG RX REV CODE 258: Mod: UD | Performed by: EMERGENCY MEDICINE

## 2025-04-12 PROCEDURE — 96376 TX/PRO/DX INJ SAME DRUG ADON: CPT

## 2025-04-12 PROCEDURE — 96374 THER/PROPH/DIAG INJ IV PUSH: CPT

## 2025-04-12 PROCEDURE — 82962 GLUCOSE BLOOD TEST: CPT

## 2025-04-12 PROCEDURE — 36415 COLL VENOUS BLD VENIPUNCTURE: CPT

## 2025-04-12 PROCEDURE — 99285 EMERGENCY DEPT VISIT HI MDM: CPT

## 2025-04-12 PROCEDURE — 700102 HCHG RX REV CODE 250 W/ 637 OVERRIDE(OP): Performed by: EMERGENCY MEDICINE

## 2025-04-12 PROCEDURE — A9270 NON-COVERED ITEM OR SERVICE: HCPCS | Performed by: HOSPITALIST

## 2025-04-12 PROCEDURE — 700111 HCHG RX REV CODE 636 W/ 250 OVERRIDE (IP): Mod: JZ | Performed by: EMERGENCY MEDICINE

## 2025-04-12 PROCEDURE — 82077 ASSAY SPEC XCP UR&BREATH IA: CPT

## 2025-04-12 PROCEDURE — 770020 HCHG ROOM/CARE - TELE (206)

## 2025-04-12 PROCEDURE — 93005 ELECTROCARDIOGRAM TRACING: CPT | Mod: TC | Performed by: EMERGENCY MEDICINE

## 2025-04-12 PROCEDURE — 71045 X-RAY EXAM CHEST 1 VIEW: CPT

## 2025-04-12 PROCEDURE — 700111 HCHG RX REV CODE 636 W/ 250 OVERRIDE (IP): Mod: JZ | Performed by: HOSPITALIST

## 2025-04-12 PROCEDURE — 700102 HCHG RX REV CODE 250 W/ 637 OVERRIDE(OP): Performed by: HOSPITALIST

## 2025-04-12 PROCEDURE — 99223 1ST HOSP IP/OBS HIGH 75: CPT | Performed by: HOSPITALIST

## 2025-04-12 PROCEDURE — 85025 COMPLETE CBC W/AUTO DIFF WBC: CPT

## 2025-04-12 PROCEDURE — 80307 DRUG TEST PRSMV CHEM ANLYZR: CPT

## 2025-04-12 RX ORDER — HYDRALAZINE HYDROCHLORIDE 20 MG/ML
20 INJECTION INTRAMUSCULAR; INTRAVENOUS EVERY 6 HOURS PRN
Status: DISCONTINUED | OUTPATIENT
Start: 2025-04-12 | End: 2025-04-14 | Stop reason: HOSPADM

## 2025-04-12 RX ORDER — AMOXICILLIN 250 MG
2 CAPSULE ORAL EVERY EVENING
Status: DISCONTINUED | OUTPATIENT
Start: 2025-04-12 | End: 2025-04-14 | Stop reason: HOSPADM

## 2025-04-12 RX ORDER — POTASSIUM CHLORIDE 1500 MG/1
40 TABLET, EXTENDED RELEASE ORAL 3 TIMES DAILY
Status: COMPLETED | OUTPATIENT
Start: 2025-04-12 | End: 2025-04-12

## 2025-04-12 RX ORDER — HYDRALAZINE HYDROCHLORIDE 20 MG/ML
20 INJECTION INTRAMUSCULAR; INTRAVENOUS ONCE
Status: COMPLETED | OUTPATIENT
Start: 2025-04-12 | End: 2025-04-12

## 2025-04-12 RX ORDER — ONDANSETRON 4 MG/1
4 TABLET, ORALLY DISINTEGRATING ORAL EVERY 4 HOURS PRN
Status: DISCONTINUED | OUTPATIENT
Start: 2025-04-12 | End: 2025-04-14 | Stop reason: HOSPADM

## 2025-04-12 RX ORDER — HYDRALAZINE HYDROCHLORIDE 20 MG/ML
20 INJECTION INTRAMUSCULAR; INTRAVENOUS EVERY 6 HOURS PRN
Status: DISCONTINUED | OUTPATIENT
Start: 2025-04-12 | End: 2025-04-12

## 2025-04-12 RX ORDER — PROCHLORPERAZINE EDISYLATE 5 MG/ML
5-10 INJECTION INTRAMUSCULAR; INTRAVENOUS EVERY 4 HOURS PRN
Status: DISCONTINUED | OUTPATIENT
Start: 2025-04-12 | End: 2025-04-14 | Stop reason: HOSPADM

## 2025-04-12 RX ORDER — POLYETHYLENE GLYCOL 3350 17 G/17G
1 POWDER, FOR SOLUTION ORAL
Status: DISCONTINUED | OUTPATIENT
Start: 2025-04-12 | End: 2025-04-14 | Stop reason: HOSPADM

## 2025-04-12 RX ORDER — AMLODIPINE BESYLATE 5 MG/1
10 TABLET ORAL ONCE
Status: COMPLETED | OUTPATIENT
Start: 2025-04-12 | End: 2025-04-12

## 2025-04-12 RX ORDER — SODIUM CHLORIDE 9 MG/ML
1000 INJECTION, SOLUTION INTRAVENOUS ONCE
Status: COMPLETED | OUTPATIENT
Start: 2025-04-12 | End: 2025-04-12

## 2025-04-12 RX ORDER — PROMETHAZINE HYDROCHLORIDE 25 MG/1
12.5-25 TABLET ORAL EVERY 4 HOURS PRN
Status: DISCONTINUED | OUTPATIENT
Start: 2025-04-12 | End: 2025-04-14 | Stop reason: HOSPADM

## 2025-04-12 RX ORDER — ENOXAPARIN SODIUM 100 MG/ML
40 INJECTION SUBCUTANEOUS DAILY
Status: DISCONTINUED | OUTPATIENT
Start: 2025-04-12 | End: 2025-04-14 | Stop reason: HOSPADM

## 2025-04-12 RX ORDER — LOSARTAN POTASSIUM 25 MG/1
25 TABLET ORAL
Status: DISCONTINUED | OUTPATIENT
Start: 2025-04-12 | End: 2025-04-14 | Stop reason: HOSPADM

## 2025-04-12 RX ORDER — ACETAMINOPHEN 325 MG/1
650 TABLET ORAL EVERY 6 HOURS PRN
Status: DISCONTINUED | OUTPATIENT
Start: 2025-04-12 | End: 2025-04-14 | Stop reason: HOSPADM

## 2025-04-12 RX ORDER — ONDANSETRON 2 MG/ML
4 INJECTION INTRAMUSCULAR; INTRAVENOUS EVERY 4 HOURS PRN
Status: DISCONTINUED | OUTPATIENT
Start: 2025-04-12 | End: 2025-04-14 | Stop reason: HOSPADM

## 2025-04-12 RX ORDER — LABETALOL HYDROCHLORIDE 5 MG/ML
10 INJECTION, SOLUTION INTRAVENOUS EVERY 4 HOURS PRN
Status: DISCONTINUED | OUTPATIENT
Start: 2025-04-12 | End: 2025-04-14 | Stop reason: HOSPADM

## 2025-04-12 RX ORDER — ENALAPRILAT 1.25 MG/ML
1.25 INJECTION INTRAVENOUS EVERY 6 HOURS PRN
Status: DISCONTINUED | OUTPATIENT
Start: 2025-04-12 | End: 2025-04-14 | Stop reason: HOSPADM

## 2025-04-12 RX ORDER — PROMETHAZINE HYDROCHLORIDE 25 MG/1
12.5-25 SUPPOSITORY RECTAL EVERY 4 HOURS PRN
Status: DISCONTINUED | OUTPATIENT
Start: 2025-04-12 | End: 2025-04-14 | Stop reason: HOSPADM

## 2025-04-12 RX ADMIN — LOSARTAN POTASSIUM 25 MG: 25 TABLET, FILM COATED ORAL at 12:30

## 2025-04-12 RX ADMIN — AMLODIPINE BESYLATE 10 MG: 5 TABLET ORAL at 12:26

## 2025-04-12 RX ADMIN — HYDRALAZINE HYDROCHLORIDE 20 MG: 20 INJECTION, SOLUTION INTRAMUSCULAR; INTRAVENOUS at 09:42

## 2025-04-12 RX ADMIN — SODIUM CHLORIDE 1000 ML: 9 INJECTION, SOLUTION INTRAVENOUS at 09:42

## 2025-04-12 RX ADMIN — POTASSIUM CHLORIDE 40 MEQ: 1500 TABLET, EXTENDED RELEASE ORAL at 16:35

## 2025-04-12 RX ADMIN — ENOXAPARIN SODIUM 40 MG: 100 INJECTION SUBCUTANEOUS at 17:05

## 2025-04-12 RX ADMIN — HYDRALAZINE HYDROCHLORIDE 20 MG: 20 INJECTION, SOLUTION INTRAMUSCULAR; INTRAVENOUS at 10:31

## 2025-04-12 RX ADMIN — HYDRALAZINE HYDROCHLORIDE 20 MG: 20 INJECTION, SOLUTION INTRAMUSCULAR; INTRAVENOUS at 12:26

## 2025-04-12 RX ADMIN — POTASSIUM CHLORIDE 40 MEQ: 1500 TABLET, EXTENDED RELEASE ORAL at 12:27

## 2025-04-12 SDOH — ECONOMIC STABILITY: TRANSPORTATION INSECURITY
IN THE PAST 12 MONTHS, HAS THE LACK OF TRANSPORTATION KEPT YOU FROM MEDICAL APPOINTMENTS OR FROM GETTING MEDICATIONS?: NO

## 2025-04-12 SDOH — ECONOMIC STABILITY: TRANSPORTATION INSECURITY
IN THE PAST 12 MONTHS, HAS LACK OF RELIABLE TRANSPORTATION KEPT YOU FROM MEDICAL APPOINTMENTS, MEETINGS, WORK OR FROM GETTING THINGS NEEDED FOR DAILY LIVING?: NO

## 2025-04-12 ASSESSMENT — LIFESTYLE VARIABLES
ALCOHOL_USE: NO
HAVE PEOPLE ANNOYED YOU BY CRITICIZING YOUR DRINKING: NO
EVER HAD A DRINK FIRST THING IN THE MORNING TO STEADY YOUR NERVES TO GET RID OF A HANGOVER: NO
TOTAL SCORE: 0
HAVE YOU EVER FELT YOU SHOULD CUT DOWN ON YOUR DRINKING: NO
CONSUMPTION TOTAL: NEGATIVE
ON A TYPICAL DAY WHEN YOU DRINK ALCOHOL HOW MANY DRINKS DO YOU HAVE: 0
TOTAL SCORE: 0
TOTAL SCORE: 0
HOW MANY TIMES IN THE PAST YEAR HAVE YOU HAD 5 OR MORE DRINKS IN A DAY: 0
AVERAGE NUMBER OF DAYS PER WEEK YOU HAVE A DRINK CONTAINING ALCOHOL: 0
EVER FELT BAD OR GUILTY ABOUT YOUR DRINKING: NO

## 2025-04-12 ASSESSMENT — SOCIAL DETERMINANTS OF HEALTH (SDOH)
WITHIN THE LAST YEAR, HAVE YOU BEEN KICKED, HIT, SLAPPED, OR OTHERWISE PHYSICALLY HURT BY YOUR PARTNER OR EX-PARTNER?: NO
WITHIN THE LAST YEAR, HAVE TO BEEN RAPED OR FORCED TO HAVE ANY KIND OF SEXUAL ACTIVITY BY YOUR PARTNER OR EX-PARTNER?: NO
WITHIN THE LAST YEAR, HAVE YOU BEEN AFRAID OF YOUR PARTNER OR EX-PARTNER?: NO
WITHIN THE PAST 12 MONTHS, THE FOOD YOU BOUGHT JUST DIDN'T LAST AND YOU DIDN'T HAVE MONEY TO GET MORE: NEVER TRUE
IN THE PAST 12 MONTHS, HAS THE ELECTRIC, GAS, OIL, OR WATER COMPANY THREATENED TO SHUT OFF SERVICE IN YOUR HOME?: NO
WITHIN THE PAST 12 MONTHS, YOU WORRIED THAT YOUR FOOD WOULD RUN OUT BEFORE YOU GOT THE MONEY TO BUY MORE: NEVER TRUE
WITHIN THE LAST YEAR, HAVE YOU BEEN HUMILIATED OR EMOTIONALLY ABUSED IN OTHER WAYS BY YOUR PARTNER OR EX-PARTNER?: NO

## 2025-04-12 ASSESSMENT — ENCOUNTER SYMPTOMS
NAUSEA: 0
CONSTIPATION: 0
COUGH: 0
HEADACHES: 0
DIARRHEA: 0
CHILLS: 1
LOSS OF CONSCIOUSNESS: 1
VOMITING: 0
SHORTNESS OF BREATH: 0
ABDOMINAL PAIN: 1
WHEEZING: 0

## 2025-04-12 ASSESSMENT — COGNITIVE AND FUNCTIONAL STATUS - GENERAL
DAILY ACTIVITIY SCORE: 23
SUGGESTED CMS G CODE MODIFIER DAILY ACTIVITY: CI
WALKING IN HOSPITAL ROOM: A LITTLE
MOBILITY SCORE: 22
SUGGESTED CMS G CODE MODIFIER MOBILITY: CJ
CLIMB 3 TO 5 STEPS WITH RAILING: A LITTLE
DRESSING REGULAR UPPER BODY CLOTHING: A LITTLE

## 2025-04-12 ASSESSMENT — PATIENT HEALTH QUESTIONNAIRE - PHQ9
2. FEELING DOWN, DEPRESSED, IRRITABLE, OR HOPELESS: NOT AT ALL
SUM OF ALL RESPONSES TO PHQ9 QUESTIONS 1 AND 2: 0
SUM OF ALL RESPONSES TO PHQ9 QUESTIONS 1 AND 2: 0
2. FEELING DOWN, DEPRESSED, IRRITABLE, OR HOPELESS: NOT AT ALL
1. LITTLE INTEREST OR PLEASURE IN DOING THINGS: NOT AT ALL
1. LITTLE INTEREST OR PLEASURE IN DOING THINGS: NOT AT ALL

## 2025-04-12 ASSESSMENT — PAIN DESCRIPTION - PAIN TYPE: TYPE: ACUTE PAIN

## 2025-04-12 ASSESSMENT — FIBROSIS 4 INDEX
FIB4 SCORE: 2.56
FIB4 SCORE: 1.54

## 2025-04-12 NOTE — ASSESSMENT & PLAN NOTE
Second event; previous event was 2 years ago  Preceded by sweating  - Continuous telemetry monitoring; monitoring for arrhythmia  - Echocardiogram is pending  - Cardiology following

## 2025-04-12 NOTE — ASSESSMENT & PLAN NOTE
Ran out of blood pressure medications about a year ago and has not been able to fill them as he does not have a PCP  -Start amlodipine 5  - Continue monitor closely

## 2025-04-12 NOTE — ASSESSMENT & PLAN NOTE
SBP approximately 220 in the ED  SBP now ranging 140-150  - Start amlodipine  - IV antihypertensives for SBP greater than 180

## 2025-04-12 NOTE — ED PROVIDER NOTES
ER Provider Note    Scribed for Sebastien Mercado M.D. by Amanda Li. 4/12/2025  9:04 AM    Primary Care Provider: Sam Sheikh M.D.    CHIEF COMPLAINT   Chief Complaint   Patient presents with    Syncope     Pt's boss found him slumped over at work. Pt came to-does not recall event. Incontinence of bowel and bladder. HX MI, CVA.      EXTERNAL RECORDS REVIEWED  Outpatient Notes reviewed office visit at [facility] with Dr. Vadim Sy seen on March 11 for hypercholesterolemia    HPI/ROS  LIMITATION TO HISTORY   Select: : None  OUTSIDE HISTORIAN(S):  None    Paul Hopper is a 59 y.o. male who presents to the ED via EMS for evaluation of a syncopal episode, onset prior to arrival. He states he was finishing up work today and was filling out paperwork when he lost consciousness. He states  his boss found him slumped over and he does not recall the event. The patient reports only one episode of syncope and this was witnessed by a coworker. He reports urinary and bowel incontinence with this episode of syncope. He notes 2 years ago he had multiple episodes of syncope. He reports a history of MI and stroke within the last 3-4 years. He states the stroke did leave him with some right upper extremity weakness. He denies any issues with ambulation. He denies taking any blood pressure medication currently, noting the last time      PAST MEDICAL HISTORY  Past Medical History:   Diagnosis Date    Chickenpox     CVA (cerebral vascular accident) (HCC)     Hypertension     MI (myocardial infarction) (HCC)        SURGICAL HISTORY  No past surgical history noted.     FAMILY HISTORY  No family history noted.     SOCIAL HISTORY   reports that he has been smoking cigarettes. He has never used smokeless tobacco. He reports current alcohol use of about 1.2 oz of alcohol per week. He reports that he does not currently use drugs.    CURRENT MEDICATIONS  Previous Medications    No medications on file       ALLERGIES  Other  "food    PHYSICAL EXAM  BP (!) 199/99   Pulse (!) 50   Temp 36.2 °C (97.2 °F) (Temporal)   Resp 18   Ht 1.753 m (5' 9\")   Wt 86.6 kg (191 lb)   SpO2 92%   BMI 28.21 kg/m²   Constitutional: Well developed, Well nourished, No acute distress, Non-toxic appearance.   HENT: Normocephalic, Atraumatic, Bilateral external ears normal, Oropharynx is clear mucous membranes are dry. No oral exudates or nasal discharge.   Eyes: Pupils are equal round and reactive, EOMI, Conjunctiva normal, No discharge.   Neck: Normal range of motion, No tenderness, Supple, No stridor. No meningismus.  Lymphatic: No lymphadenopathy noted.   Cardiovascular: Bradycardic and regular rhythm without murmur rub or gallop.  Thorax & Lungs: Clear breath sounds bilaterally without wheezes, rhonchi or rales. There is no chest wall tenderness.   Abdomen: Soft non-tender non-distended. There is no rebound or guarding. No organomegaly is appreciated. Bowel sounds are normal.  Skin: Normal without rash.   Back: No CVA or spinal tenderness.   Extremities: Intact distal pulses, Fingers are dusky, No edema, No tenderness, No clubbing. Capillary refill is 3-4 seconds.   Musculoskeletal: Good range of motion in all major joints. No tenderness to palpation or major deformities noted.   Neurologic: Alert & oriented x 3, Normal motor function, Normal sensory function, No focal deficits noted. Reflexes are normal.  Psychiatric: Affect normal, Judgment normal, Mood normal. There is no suicidal ideation or patient reported hallucinations.       DIAGNOSTIC STUDIES    EKG/LABS  Labs Reviewed   CBC WITH DIFFERENTIAL - Abnormal; Notable for the following components:       Result Value    RBC 3.42 (*)     Hemoglobin 13.2 (*)     Hematocrit 35.4 (*)     .5 (*)     MCH 38.6 (*)     MCHC 37.3 (*)     RDW 52.9 (*)     Platelet Count 141 (*)     Neutrophils-Polys 81.40 (*)     Lymphocytes 11.90 (*)     Lymphs (Absolute) 0.92 (*)     All other components within " normal limits   COMP METABOLIC PANEL - Abnormal; Notable for the following components:    Potassium 3.2 (*)     Glucose 100 (*)     Creatinine 1.84 (*)     Total Bilirubin 1.6 (*)     All other components within normal limits   ESTIMATED GFR - Abnormal; Notable for the following components:    GFR (CKD-EPI) 42 (*)     All other components within normal limits   DIAGNOSTIC ALCOHOL   URINE DRUG SCREEN   POCT GLUCOSE   POCT GLUCOSE DEVICE RESULTS     Results for orders placed or performed during the hospital encounter of 25   EKG   Result Value Ref Range    Report       [facility] Emergency Dept.    Test Date:  2025  Pt Name:    MIRANDA REARDON                Department: ER  MRN:        [id number]                      Room:       [facility]  Gender:     Male                         Technician: [id number]  :        1965                   Requested By:MAILE SIBLEY  Order #:    [id number]                    Reading MD: MAILE SIBLEY MD    Measurements  Intervals                                Axis  Rate:       44                           P:          38  OH:         159                          QRS:        -24  QRSD:       97                           T:          32  QT:         485  QTc:        415    Interpretive Statements  Sinus bradycardia  LVH with secondary repolarization abnormality  Compared to ECG 2024 16:36:20  Left ventricular hypertrophy now present  Early repolarization now present  Electronically Signed On 2025 11:43:10 PDT by MAILE SIBLEY MD         I have independently interpreted this EKG    RADIOLOGY/PROCEDURES   The attending emergency physician has independently interpreted the diagnostic imaging associated with this visit and am waiting the final reading from the radiologist.   My preliminary interpretation is a follows: No evidence of cardiomegaly or pneumonia    Radiologist interpretation:  DX-CHEST-PORTABLE (1 VIEW)   Final Result         1. No acute  cardiopulmonary abnormalities identified.                         COURSE & MEDICAL DECISION MAKING     ASSESSMENT, COURSE AND PLAN  Care Narrative:     Hydration: Based on the patient's presentation of Dehydration the patient was given IV fluids. IV Hydration was used because oral hydration was not adequate alone. Upon recheck following hydration, the patient was somewhat dry.    9:04 AM - Patient seen and examined at bedside. Discussed plan of care, including lab work and diagnostic imaging. Patient agrees to the plan of care. The patient will be medicated with NS 0.9% 1 L infusion and hydralazine 20 mg injection. Ordered for labs and chest xray to evaluate his symptoms.     EKG shows sinus bradycardia at a rate of 44 with no ischemic changes or dysrhythmia.    Laboratory evaluation reveals no leukocytosis, mild anemia, low potassium at 3.2 which can be corrected upstairs as well as a creatinine elevated at 1.84 and this is likely from dehydration.  Urine drug screen is negative    10:04 AM - Paged cardiology.     10:13 AM - Patient was reevaluated at bedside. His pulse is now 67 and his systolic pressure is 187.     10:14 AM - Spoke with Dr. Prado (Cardiology).     11:54 AM - Patient will be medicated hydralazine 20 mg injection and amlodipine 10 mg PO.  Pressure has finally responded and BP is down to 144/71 following multiple vasoactive's and oral calcium channel blocker.  Pulse has improved as well with hydration and blood pressure control    11:55 AM - Paged hospitalist.     12:11 PM - I discussed the patient's case and the above findings with Dr. Horta (Hospitalist) who agreed to hospitalize the patient.     Patient has improved significantly in the emergency department.  Will admit him for echo and stress testing as well as electrolyte correction    CRITICAL CARE  The very real possibilty of a deterioration of this patient's condition required the highest level of my preparedness for sudden, emergent  intervention.  I provided critical care services, which included medication orders, frequent reevaluations of the patient's condition and response to treatment, ordering and reviewing test results, and discussing the case with various consultants.  The critical care time associated with the care of the patient was 30 minutes. Review chart for interventions. This time is exclusive of any other billable procedures.       DISPOSITION AND DISCUSSIONS  I have discussed management of the patient with the following physicians and JIM's:  Dr. Prado (Cardiology), Dr. Horta (Hospitalist)     Discussion of management with other [facility] or appropriate source(s): None     Barriers to care at this time, including but not limited to:  None .     DISPOSITION:  Patient will be hospitalized by Dr. Horta in guarded condition.      FINAL DIAGNOSIS  1. Syncope, unspecified syncope type    2. Bradycardia    3. Dehydration    4. Hypertensive urgency    5.  Critical care time, 30 minutes    Amanda COLUNGA (Amarjit), am scribing for, and in the presence of, Sebastien Mercado M.D..    Electronically signed by: Amanda Li (Jessicaibe), 4/12/2025    Sebastien COLUNGA M.D. personally performed the services described in this documentation, as scribed by Amanda Li in my presence, and it is both accurate and complete.       The note accurately reflects work and decisions made by me.  Sebastien Mercado M.D.  4/12/2025  1:20 PM

## 2025-04-12 NOTE — ED NOTES
Bedside report received from off going RN/tech: [email or web address] assumed care of patient.  POC discussed with patient. Call light within reach, all needs addressed at this time.       Fall risk interventions in place: Move the patient closer to the nurse's station, Patient's personal possessions are with in their safe reach, Place socks on patient, Place fall risk sign on patient's door, Give patient urinal if applicable, Keep floor surfaces clean and dry, and Accompanied to restroom (all applicable per [place] Fall risk assessment)   Continuous monitoring: Cardiac Leads, Pulse Ox, or Blood Pressure  IVF/IV medications: Infusion per MAR (List Med(s)) NS bolus  Oxygen: Room Air  Bedside sitter: Not Applicable   Isolation: Not Applicable

## 2025-04-12 NOTE — PROGRESS NOTES
4 Eyes Skin Assessment Completed by OBEY Garcia and OBEY Mccarty.    Head WDL  Ears WDL  Nose WDL  Mouth WDL  Neck WDL  Breast/Chest WDL  Shoulder Blades WDL  Spine WDL  (R) Arm/Elbow/Hand Redness and Blanching  (L) Arm/Elbow/Hand Redness and Blanching  Abdomen WDL  Groin Redness  Scrotum/Coccyx/Buttocks Redness and Blanching  (R) Leg Redness, Blanching, and Edema knees  (L) Leg Redness, Blanching, and Edema knees  (R) Heel/Foot/Toe WDL  (L) Heel/Foot/Toe WDL          Devices In Places Tele Box      Interventions In Place Pillows    Possible Skin Injury No    Pictures Uploaded Into Epic N/A  Wound Consult Placed N/A  RN Wound Prevention Protocol Ordered No

## 2025-04-12 NOTE — CARE PLAN
The patient is Stable - Low risk of patient condition declining or worsening         Progress made toward(s) clinical / shift goals:      Patient is not progressing towards the following goals:      Problem: Knowledge Deficit - Standard  Goal: Patient and family/care givers will demonstrate understanding of plan of care, disease process/condition, diagnostic tests and medications  Outcome: Progressing

## 2025-04-12 NOTE — HOSPITAL COURSE
Paul Hopper is a 59-year-old male with PMHx CVA with mild right sided deficits, hypertension.  Admitted 4/12 for syncope.    Per history-  Patient was sitting at computer filling out his paperwork when he passed out.  His boss found him slumped over.  2 years ago this also happened to him.  He does have a history of stroke with mild residual right-sided deficits.      In the ED: SBP greater than 220.  Patient states he ran out of his medication 1 year ago.  Creatinine 1.84.  Potassium 2.3.  UDS negative.    Echocardiogram: EF 55%; no significant valvular or wall motion abnormalities noted.  Syncope is believed to be secondary to vasovagal response.    Patient was restarted on amlodipine 5 mg and losartan 25 mg.  He had improvement in his blood pressures.  I encouraged patient to keep a blood pressure log at home to bring to his cardiology appointment in approximately 1 month.  He is also discharged home with a Zio patch.  A referral has been placed for patient to establish with a primary care physician.    All medications were ordered and delivered to patient prior to discharge.

## 2025-04-12 NOTE — ED TRIAGE NOTES
Paul Hopper  59 y.o. male  Chief Complaint   Patient presents with    Syncope     Pt's boss found him slumped over at work. Pt came to-does not recall event. Incontinence of bowel and bladder. HX MI, CVA.      BIBA from work ([employer]). Pt works noc shift. No neuro deficits at this time.     Vitals:    04/12/25 0847   BP: (!) 199/99   Pulse: (!) 50   Resp: 18   Temp: 36.2 °C (97.2 °F)   SpO2: 92%

## 2025-04-12 NOTE — H&P
Hospital Medicine History & Physical Note    Date of Service  4/12/2025    Primary Care Physician  Sam Sheikh M.D.    Consultants  Cardiologist Dr. Prado    Code Status  Full code per patient     Chief Complaint  Chief Complaint   Patient presents with    Syncope     Pt's boss found him slumped over at work. Pt came to-does not recall event. Incontinence of bowel and bladder. HX MI, CVA.         History of Presenting Illness  59 y.o. male who presented 4/12/2025 with syncope at work.  Patient was sitting at computer filling out his paperwork when he passed out.  His boss found him slumped over.  2 years ago this also happened to him.  He does have a history of stroke with mild residual right-sided deficits.  He was also found to be hypertensive in the ER.  He says he ran out of medications 1 year ago and was never assigned a PCP.  A message was left for the  to make him a new PCP appointment which he was grateful for.     I discussed the plan of care with patient and ER physician .    Review of Systems  Review of Systems   Constitutional:  Positive for chills. Fever: felt hot during episode.  Respiratory:  Negative for cough, shortness of breath and wheezing.    Cardiovascular:  Positive for chest pain.   Gastrointestinal:  Positive for abdominal pain (epigastric). Negative for constipation, diarrhea, nausea and vomiting.   Genitourinary:  Negative for dysuria.   Neurological:  Positive for loss of consciousness. Negative for headaches.     all other systems reviewed and are negative    Past Medical History   has a past medical history of Chickenpox, CVA (cerebral vascular accident) (HCC), Hypertension, and MI (myocardial infarction) (HCC).    He has no past medical history of [place] measles.    Surgical History   has no past surgical history on file.    Family History  family history is not on file.    Social History   reports that he has been smoking cigarettes. He has never used smokeless  "tobacco. He reports current alcohol use of about 1.2 oz of alcohol per week. He reports that he does not currently use drugs.    Allergies  Allergies   Allergen Reactions    Other Food Anaphylaxis     All fresh fruit causes throat swelling per brother.        Medications  No current facility-administered medications on file prior to encounter.     No current outpatient medications on file prior to encounter.       Physical Exam  Weight/BMI: Body mass index is 28.21 kg/m².  BP (!) 203/105   Pulse 61   Temp 36.2 °C (97.2 °F) (Temporal)   Resp 15   Ht 1.753 m (5' 9\")   Wt 86.6 kg (191 lb)   SpO2 97%    Vitals:    04/12/25 0847 04/12/25 0953 04/12/25 1003   BP: (!) 199/99 (!) 225/94 (!) 203/105   Pulse: (!) 50 65 61   Resp: 18 17 15   Temp: 36.2 °C (97.2 °F)     TempSrc: Temporal     SpO2: 92% 97% 97%   Weight: 86.6 kg (191 lb)     Height: 1.753 m (5' 9\")      Oxygen Therapy:  Pulse Oximetry: 97 %, O2 Delivery Device: None - Room Air  Physical Exam  Constitutional:       General: He is not in acute distress.     Appearance: He is well-developed. He is ill-appearing. He is not diaphoretic.   HENT:      Head: Normocephalic and atraumatic.      Right Ear: External ear normal.      Left Ear: External ear normal.      Mouth/Throat:      Pharynx: No oropharyngeal exudate or posterior oropharyngeal erythema.   Eyes:      General:         Right eye: No discharge.         Left eye: No discharge.      Extraocular Movements: Extraocular movements intact.   Cardiovascular:      Rate and Rhythm: Normal rate.      Heart sounds:      No gallop.   Pulmonary:      Effort: No respiratory distress.      Breath sounds: No wheezing.   Abdominal:      General: There is no distension.      Tenderness: There is no abdominal tenderness. There is no guarding.   Skin:     General: Skin is warm.   Neurological:      Mental Status: He is alert and oriented to person, place, and time. Mental status is at baseline.      Motor: No weakness. "   Psychiatric:         Mood and Affect: Mood normal.         Behavior: Behavior normal.         Laboratory:   Objective   Recent Results (from the past 24 hours)   POCT glucose device results    Collection Time: 04/12/25  9:38 AM   Result Value Ref Range    POC Glucose, Blood 93 65 - 99 mg/dL   CBC with Differential    Collection Time: 04/12/25 10:11 AM   Result Value Ref Range    WBC 7.8 4.8 - 10.8 K/uL    RBC 3.42 (L) 4.70 - 6.10 M/uL    Hemoglobin 13.2 (L) 14.0 - 18.0 g/dL    Hematocrit 35.4 (L) 42.0 - 52.0 %    .5 (H) 81.4 - 97.8 fL    MCH 38.6 (H) 27.0 - 33.0 pg    MCHC 37.3 (H) 32.3 - 36.5 g/dL    RDW 52.9 (H) 35.9 - 50.0 fL    Platelet Count 141 (L) 164 - 446 K/uL    MPV 9.2 9.0 - 12.9 fL    Neutrophils-Polys 81.40 (H) 44.00 - 72.00 %    Lymphocytes 11.90 (L) 22.00 - 41.00 %    Monocytes 4.90 0.00 - 13.40 %    Eosinophils 1.20 0.00 - 6.90 %    Basophils 0.30 0.00 - 1.80 %    Immature Granulocytes 0.30 0.00 - 0.90 %    Nucleated RBC 0.00 0.00 - 0.20 /100 WBC    Neutrophils (Absolute) 6.33 1.82 - 7.42 K/uL    Lymphs (Absolute) 0.92 (L) 1.00 - 4.80 K/uL    Monos (Absolute) 0.38 0.00 - 0.85 K/uL    Eos (Absolute) 0.09 0.00 - 0.51 K/uL    Baso (Absolute) 0.02 0.00 - 0.12 K/uL    Immature Granulocytes (abs) 0.02 0.00 - 0.11 K/uL    NRBC (Absolute) 0.00 K/uL   Comp Metabolic Panel    Collection Time: 04/12/25 10:11 AM   Result Value Ref Range    Sodium 138 135 - 145 mmol/L    Potassium 3.2 (L) 3.6 - 5.5 mmol/L    Chloride 104 96 - 112 mmol/L    Co2 24 20 - 33 mmol/L    Anion Gap 10.0 7.0 - 16.0    Glucose 100 (H) 65 - 99 mg/dL    Bun 14 8 - 22 mg/dL    Creatinine 1.84 (H) 0.50 - 1.40 mg/dL    Calcium 9.2 8.5 - 10.5 mg/dL    Correct Calcium 9.0 8.5 - 10.5 mg/dL    AST(SGOT) 21 12 - 45 U/L    ALT(SGPT) 11 2 - 50 U/L    Alkaline Phosphatase 66 30 - 99 U/L    Total Bilirubin 1.6 (H) 0.1 - 1.5 mg/dL    Albumin 4.3 3.2 - 4.9 g/dL    Total Protein 7.2 6.0 - 8.2 g/dL    Globulin 2.9 1.9 - 3.5 g/dL    A-G Ratio 1.5  g/dL   Diagnostic Alcohol    Collection Time: 25 10:11 AM   Result Value Ref Range    Diagnostic Alcohol <10.1 <10.1 mg/dL   ESTIMATED GFR    Collection Time: 25 10:11 AM   Result Value Ref Range    GFR (CKD-EPI) 42 (A) >60 mL/min/1.73 m 2   EKG    Collection Time: 25 11:43 AM   Result Value Ref Range    Report       [facility] Emergency Dept.    Test Date:  2025  Pt Name:    MIRANDA REARDON                Department: ER  MRN:        [id number]                      Room:       [facility]  Gender:     Male                         Technician: [id number]  :        1965                   Requested By:MAILE SIBLEY  Order #:    [id number]                    Reading MD: MAILE SIBLEY MD    Measurements  Intervals                                Axis  Rate:       44                           P:          38  TX:         159                          QRS:        -24  QRSD:       97                           T:          32  QT:         485  QTc:        415    Interpretive Statements  Sinus bradycardia  LVH with secondary repolarization abnormality  Compared to ECG 2024 16:36:20  Left ventricular hypertrophy now present  Early repolarization now present  Electronically Signed On 2025 11:43:10 PDT by MAILE SIBLEY MD         (click the triangle to expand results)    Imaging:  DX-CHEST-PORTABLE (1 VIEW)   Final Result         1. No acute cardiopulmonary abnormalities identified.                         EKG:   per my independant read:  QTc: 415, HR: 44, sinus bradycardia with LVH, no ST/T changes    Assessment/Plan:  I anticipate this patient will require at least two midnights for appropriate medical management, necessitating inpatient admission because of need to workup bradycardia as well as syncope and treatment of hypertensive urgency    Sinus bradycardia- (present on admission)  Assessment & Plan  Down to 40s in ER  Cardiology consulted    Noncompliance with medication treatment  due to underuse of medication- (present on admission)  Assessment & Plan  Ran out of meds and did not have a PCP  See PCP and hypertensive urgency problem    Does not have primary care provider- (present on admission)  Assessment & Plan  Left message for scheduling to give patient a new PCP    Hypertensive urgency- (present on admission)  Assessment & Plan  Ran out of blood pressure medications about a year ago and has not been able to fill them as he does not have a PCP  Started on new blood pressure medications per cardiology    B12 deficiency- (present on admission)  Assessment & Plan  History of having this in the past  Repeat level pending    Syncope- (present on admission)  Assessment & Plan  Witnessed at work  Patient also had this 2 years ago  Found to have bradycardia  Cardiology consulted    Primary hypertension- (present on admission)  Assessment & Plan  See hypertensive urgency problem    Abnormal echocardiogram- (present on admission)  Assessment & Plan  Inferior hypokinesis 2021  Cardiology consulted        VTE prophylaxis:  enoxaparin

## 2025-04-12 NOTE — CONSULTS
Reason for Consult:  Asked by Dr Sebastien Mercado M.D. to see this patient with  syncope  Patient's PCP: Sam Sheikh M.D.    CC:   Chief Complaint   Patient presents with    Syncope     Pt's boss found him slumped over at work. Pt came to-does not recall event. Incontinence of bowel and bladder. HX MI, CVA.        HPI:  Paul Hopper is a 59 year old man with history of CVA and possible stress cardiomyopathy (per Dr. Milton's note) who presented with syncope.    On arrival to the ER, the patient was found to be hypertensive and bradycardic with BP above 200/100, and heart rate in 40 to 50s.      The patient reports feeling weak currently.  The patient reports that he was finishing up work when he fainted.  He does not recall symptoms prior to the event.  He denies any chest pain or shortness of breath.  No orthopnea, PND, or leg swelling.  No palpitations.    Medications / Drug list prior to admission:  No current facility-administered medications on file prior to encounter.     Current Outpatient Medications on File Prior to Encounter   Medication Sig Dispense Refill    amLODIPine (NORVASC) 10 MG Tab Take 1 tablet by mouth once a day 30 Tablet 11    atorvastatin (LIPITOR) 40 MG Tab Take 1 tablet by mouth every night at bedtime 30 Tablet 11    hydrALAZINE (APRESOLINE) 25 MG Tab Take 1 tablet by mouth 3 times a day with food 90 Tablet 11    omeprazole (PRILOSEC) 20 MG delayed-release capsule Take 1 capsule by mouth once a day 30 Capsule 11       Current list of administered Medications:    Current Facility-Administered Medications:     hydrALAZINE (Apresoline) injection 20 mg, 20 mg, Intravenous, Q6HRS PRN, Sebastien Mercado M.D., 20 mg at 04/12/25 0922    Current Outpatient Medications:     amLODIPine (NORVASC) 10 MG Tab, Take 1 tablet by mouth once a day, Disp: 30 Tablet, Rfl: 11    atorvastatin (LIPITOR) 40 MG Tab, Take 1 tablet by mouth every night at bedtime, Disp: 30 Tablet, Rfl: 11    hydrALAZINE  "(APRESOLINE) 25 MG Tab, Take 1 tablet by mouth 3 times a day with food, Disp: 90 Tablet, Rfl: 11    omeprazole (PRILOSEC) 20 MG delayed-release capsule, Take 1 capsule by mouth once a day, Disp: 30 Capsule, Rfl: 11    Past Medical History:   Diagnosis Date    Chickenpox     CVA (cerebral vascular accident) (HCC)     Hypertension     MI (myocardial infarction) (HCC)        No past surgical history on file.    No family history on file.  Patient family history was personally reviewed, no pertinent family history to current presentation    Social History     Tobacco Use    Smoking status: Some Days     Types: Cigarettes    Smokeless tobacco: Never   Substance Use Topics    Alcohol use: Yes     Alcohol/week: 1.2 oz     Types: 2 Standard drinks or equivalent per week     Comment: OCC    Drug use: Not Currently     Comment: marijuana       ALLERGIES:  Allergies   Allergen Reactions    Other Food Anaphylaxis     All fresh fruit causes throat swelling per brother.        Review of systems:  A complete review of symptoms was reviewed with patient. This is reviewed in H&P and PMH. ALL OTHERS reviewed and negative    Physical exam:  Patient Vitals for the past 24 hrs:   BP Temp Temp src Pulse Resp SpO2 Height Weight   04/12/25 1003 (!) 203/105 -- -- 61 15 97 % -- --   04/12/25 0953 (!) 225/94 -- -- 65 17 97 % -- --   04/12/25 0847 (!) 199/99 36.2 °C (97.2 °F) Temporal (!) 50 18 92 % 1.753 m (5' 9\") 86.6 kg (191 lb)     General: No acute distress.   EYES: no jaundice  HEENT: OP clear   Neck: No bruits No JVD.   CVS: RRR. S1 + S2. No M/R/G  Resp: CTAB. No wheezing or crackles/rhonchi.  Abdomen: Soft, NT, ND,  Skin: Grossly nothing acute no obvious rashes  Neurological: Alert, Moves all extremities, no cranial nerve defects on limited exam  Extremities: Pulse 2+ in b/l LE.  No edema. No cyanosis.       Data:  Laboratory studies personally reviewed by me:  Recent Results (from the past 24 hours)   POCT glucose device results    " Collection Time: 04/12/25  9:38 AM   Result Value Ref Range    POC Glucose, Blood 93 65 - 99 mg/dL   CBC with Differential    Collection Time: 04/12/25 10:11 AM   Result Value Ref Range    WBC 7.8 4.8 - 10.8 K/uL    RBC 3.42 (L) 4.70 - 6.10 M/uL    Hemoglobin 13.2 (L) 14.0 - 18.0 g/dL    Hematocrit 35.4 (L) 42.0 - 52.0 %    .5 (H) 81.4 - 97.8 fL    MCH 38.6 (H) 27.0 - 33.0 pg    MCHC 37.3 (H) 32.3 - 36.5 g/dL    RDW 52.9 (H) 35.9 - 50.0 fL    Platelet Count 141 (L) 164 - 446 K/uL    MPV 9.2 9.0 - 12.9 fL    Neutrophils-Polys 81.40 (H) 44.00 - 72.00 %    Lymphocytes 11.90 (L) 22.00 - 41.00 %    Monocytes 4.90 0.00 - 13.40 %    Eosinophils 1.20 0.00 - 6.90 %    Basophils 0.30 0.00 - 1.80 %    Immature Granulocytes 0.30 0.00 - 0.90 %    Nucleated RBC 0.00 0.00 - 0.20 /100 WBC    Neutrophils (Absolute) 6.33 1.82 - 7.42 K/uL    Lymphs (Absolute) 0.92 (L) 1.00 - 4.80 K/uL    Monos (Absolute) 0.38 0.00 - 0.85 K/uL    Eos (Absolute) 0.09 0.00 - 0.51 K/uL    Baso (Absolute) 0.02 0.00 - 0.12 K/uL    Immature Granulocytes (abs) 0.02 0.00 - 0.11 K/uL    NRBC (Absolute) 0.00 K/uL   Comp Metabolic Panel    Collection Time: 04/12/25 10:11 AM   Result Value Ref Range    Sodium 138 135 - 145 mmol/L    Potassium 3.2 (L) 3.6 - 5.5 mmol/L    Chloride 104 96 - 112 mmol/L    Co2 24 20 - 33 mmol/L    Anion Gap 10.0 7.0 - 16.0    Glucose 100 (H) 65 - 99 mg/dL    Bun 14 8 - 22 mg/dL    Creatinine 1.84 (H) 0.50 - 1.40 mg/dL    Calcium 9.2 8.5 - 10.5 mg/dL    Correct Calcium 9.0 8.5 - 10.5 mg/dL    AST(SGOT) 21 12 - 45 U/L    ALT(SGPT) 11 2 - 50 U/L    Alkaline Phosphatase 66 30 - 99 U/L    Total Bilirubin 1.6 (H) 0.1 - 1.5 mg/dL    Albumin 4.3 3.2 - 4.9 g/dL    Total Protein 7.2 6.0 - 8.2 g/dL    Globulin 2.9 1.9 - 3.5 g/dL    A-G Ratio 1.5 g/dL   Diagnostic Alcohol    Collection Time: 04/12/25 10:11 AM   Result Value Ref Range    Diagnostic Alcohol <10.1 <10.1 mg/dL   ESTIMATED GFR    Collection Time: 04/12/25 10:11 AM   Result Value  Ref Range    GFR (CKD-EPI) 42 (A) >60 mL/min/1.73 m 2   EKG    Collection Time: 25 11:43 AM   Result Value Ref Range    Report       [facility] Emergency Dept.    Test Date:  2025  Pt Name:    MIRANDA REARDON                Department: ER  MRN:        [id number]                      Room:       [facility]  Gender:     Male                         Technician: [id number]  :        1965                   Requested By:MAILE SIBLEY  Order #:    [id number]                    Reading MD: MAILE SIBLEY MD    Measurements  Intervals                                Axis  Rate:       44                           P:          38  IA:         159                          QRS:        -24  QRSD:       97                           T:          32  QT:         485  QTc:        415    Interpretive Statements  Sinus bradycardia  LVH with secondary repolarization abnormality  Compared to ECG 2024 16:36:20  Left ventricular hypertrophy now present  Early repolarization now present  Electronically Signed On 2025 11:43:10 PDT by MAILE SIBLEY MD         Imaging:  DX-CHEST-PORTABLE (1 VIEW)   Final Result         1. No acute cardiopulmonary abnormalities identified.                             EKG 2025: personally reviewed by me Sinus bradycardia, LVH with secondary repolarization abnormality    Echo dated 2024  CONCLUSIONS  Compared to the prior study on 2023, LVEF and wall motion is   improved.  The ejection fraction is measured to be 60% by Guadalupe's biplane.  Grade I diastolic dysfunction.     Nuclear stress test 2024  NUCLEAR IMAGING INTERPRETATION   No evidence of significant jeopardized viable myocardium or prior myocardial    infarction.   Normal left ventricular size, ejection fraction, and wall motion.   ECG INTERPRETATION   Nondiagnostic stress ECG with Regadenoson.    All pertinent features of laboratory and imaging reviewed including primary images where  applicable      Active Problems:    Syncope (POA: Yes)    Hypertensive urgency (POA: Unknown)  Resolved Problems:    * No resolved hospital problems. *      Assessment / Plan:  Syncope  Hypertensive urgency  Bradycardia  History of cardiomyopathy  I personally reviewed the EKG, which shows sinus bradycardia with normal QRS.  Patient did have history of Takotsubo cardiomyopathy per Dr. Milton's note.  Echo in 2021 showed inferior hypokinesis/thinning but LVEF preserved.  -Will plan to treat hypertensive urgency with non-AV mayco blockers.  -Continue home amlodipine 10 mg daily  -Increase hydralazine to 50 mg 3 times daily  -Consider adding losartan 25 mg daily (start low due to kidney dysfunction with GFR 42 noted on labs today).  -Will plan to repeat echocardiogram to reassess LVEF and any structural abnormalities.  -Continue to monitor on telemetry.  -Heart rate improved with improved BP.  Consider treadmill EKG to assess for chronotropic competence when patient is more clinically stable.  Based on echo results, may be reasonable to plan for ischemic workup with left heart cath.    I personally discussed his case with  Dr Sebastien Mercado M.D.      It is my pleasure to participate in the care of Mr. Hopper.  Please do not hesitate to contact me with questions or concerns.    Vidal Prado MD   Cardiologist [facility]    4/12/2025    Please note that this dictation was created using voice recognition software. There may be errors I did not discover before finalizing the note.

## 2025-04-13 ENCOUNTER — APPOINTMENT (OUTPATIENT)
Dept: CARDIOLOGY | Facility: MEDICAL CENTER | Age: 60
End: 2025-04-13
Attending: STUDENT IN AN ORGANIZED HEALTH CARE EDUCATION/TRAINING PROGRAM
Payer: COMMERCIAL

## 2025-04-13 PROBLEM — R94.31 ABNORMAL ECG: Status: ACTIVE | Noted: 2025-04-13

## 2025-04-13 LAB
ALBUMIN SERPL BCP-MCNC: 3.8 G/DL (ref 3.2–4.9)
BUN SERPL-MCNC: 15 MG/DL (ref 8–22)
CALCIUM ALBUM COR SERPL-MCNC: 9.1 MG/DL (ref 8.5–10.5)
CALCIUM SERPL-MCNC: 8.9 MG/DL (ref 8.5–10.5)
CHLORIDE SERPL-SCNC: 107 MMOL/L (ref 96–112)
CO2 SERPL-SCNC: 24 MMOL/L (ref 20–33)
CORTIS SERPL-MCNC: 6.1 UG/DL (ref 0–23)
CREAT SERPL-MCNC: 1.75 MG/DL (ref 0.5–1.4)
ERYTHROCYTE [DISTWIDTH] IN BLOOD BY AUTOMATED COUNT: 55.4 FL (ref 35.9–50)
GFR SERPLBLD CREATININE-BSD FMLA CKD-EPI: 45 ML/MIN/1.73 M 2
GLUCOSE SERPL-MCNC: 100 MG/DL (ref 65–99)
HCT VFR BLD AUTO: 33.3 % (ref 42–52)
HGB BLD-MCNC: 12 G/DL (ref 14–18)
LV EJECT FRACT  99904: 55
LV EJECT FRACT MOD 2C 99903: 52.05
LV EJECT FRACT MOD 4C 99902: 45.25
LV EJECT FRACT MOD BP 99901: 48.78
MCH RBC QN AUTO: 37.9 PG (ref 27–33)
MCHC RBC AUTO-ENTMCNC: 36 G/DL (ref 32.3–36.5)
MCV RBC AUTO: 105 FL (ref 81.4–97.8)
NT-PROBNP SERPL IA-MCNC: 253 PG/ML (ref 0–125)
PHOSPHATE SERPL-MCNC: 2.8 MG/DL (ref 2.5–4.5)
PLATELET # BLD AUTO: 134 K/UL (ref 164–446)
PMV BLD AUTO: 9.4 FL (ref 9–12.9)
POTASSIUM SERPL-SCNC: 3.8 MMOL/L (ref 3.6–5.5)
RBC # BLD AUTO: 3.17 M/UL (ref 4.7–6.1)
SODIUM SERPL-SCNC: 139 MMOL/L (ref 135–145)
TROPONIN T SERPL-MCNC: 29 NG/L (ref 6–19)
TROPONIN T SERPL-MCNC: 32 NG/L (ref 6–19)
TSH SERPL DL<=0.005 MIU/L-ACNC: 0.59 UIU/ML (ref 0.38–5.33)
VIT B12 SERPL-MCNC: <150 PG/ML (ref 211–911)
WBC # BLD AUTO: 5.9 K/UL (ref 4.8–10.8)

## 2025-04-13 PROCEDURE — 700102 HCHG RX REV CODE 250 W/ 637 OVERRIDE(OP): Mod: UD | Performed by: NURSE PRACTITIONER

## 2025-04-13 PROCEDURE — 84443 ASSAY THYROID STIM HORMONE: CPT

## 2025-04-13 PROCEDURE — 82607 VITAMIN B-12: CPT

## 2025-04-13 PROCEDURE — 85027 COMPLETE CBC AUTOMATED: CPT

## 2025-04-13 PROCEDURE — 84484 ASSAY OF TROPONIN QUANT: CPT

## 2025-04-13 PROCEDURE — 99233 SBSQ HOSP IP/OBS HIGH 50: CPT | Performed by: STUDENT IN AN ORGANIZED HEALTH CARE EDUCATION/TRAINING PROGRAM

## 2025-04-13 PROCEDURE — 96372 THER/PROPH/DIAG INJ SC/IM: CPT

## 2025-04-13 PROCEDURE — 700111 HCHG RX REV CODE 636 W/ 250 OVERRIDE (IP): Mod: UD | Performed by: STUDENT IN AN ORGANIZED HEALTH CARE EDUCATION/TRAINING PROGRAM

## 2025-04-13 PROCEDURE — A9270 NON-COVERED ITEM OR SERVICE: HCPCS | Mod: UD | Performed by: NURSE PRACTITIONER

## 2025-04-13 PROCEDURE — 700102 HCHG RX REV CODE 250 W/ 637 OVERRIDE(OP): Performed by: HOSPITALIST

## 2025-04-13 PROCEDURE — 82533 TOTAL CORTISOL: CPT

## 2025-04-13 PROCEDURE — 83880 ASSAY OF NATRIURETIC PEPTIDE: CPT

## 2025-04-13 PROCEDURE — 80069 RENAL FUNCTION PANEL: CPT

## 2025-04-13 PROCEDURE — 93306 TTE W/DOPPLER COMPLETE: CPT

## 2025-04-13 PROCEDURE — 96375 TX/PRO/DX INJ NEW DRUG ADDON: CPT | Mod: XU

## 2025-04-13 PROCEDURE — A9270 NON-COVERED ITEM OR SERVICE: HCPCS | Mod: UD | Performed by: INTERNAL MEDICINE

## 2025-04-13 PROCEDURE — A9270 NON-COVERED ITEM OR SERVICE: HCPCS | Performed by: HOSPITALIST

## 2025-04-13 PROCEDURE — 93306 TTE W/DOPPLER COMPLETE: CPT | Mod: 26 | Performed by: INTERNAL MEDICINE

## 2025-04-13 PROCEDURE — 700111 HCHG RX REV CODE 636 W/ 250 OVERRIDE (IP): Mod: UD | Performed by: HOSPITALIST

## 2025-04-13 PROCEDURE — 99233 SBSQ HOSP IP/OBS HIGH 50: CPT | Mod: FS | Performed by: INTERNAL MEDICINE

## 2025-04-13 PROCEDURE — 700102 HCHG RX REV CODE 250 W/ 637 OVERRIDE(OP): Mod: UD | Performed by: INTERNAL MEDICINE

## 2025-04-13 PROCEDURE — G0378 HOSPITAL OBSERVATION PER HR: HCPCS

## 2025-04-13 RX ORDER — ATORVASTATIN CALCIUM 40 MG/1
40 TABLET, FILM COATED ORAL EVERY EVENING
Status: DISCONTINUED | OUTPATIENT
Start: 2025-04-13 | End: 2025-04-14 | Stop reason: HOSPADM

## 2025-04-13 RX ORDER — ASPIRIN 81 MG/1
81 TABLET ORAL DAILY
Status: DISCONTINUED | OUTPATIENT
Start: 2025-04-14 | End: 2025-04-14 | Stop reason: HOSPADM

## 2025-04-13 RX ORDER — MULTIVITAMIN WITH IRON
1000 TABLET ORAL DAILY
Status: DISCONTINUED | OUTPATIENT
Start: 2025-04-14 | End: 2025-04-14 | Stop reason: HOSPADM

## 2025-04-13 RX ORDER — CYANOCOBALAMIN 1000 UG/ML
1000 INJECTION, SOLUTION INTRAMUSCULAR; SUBCUTANEOUS ONCE
Status: COMPLETED | OUTPATIENT
Start: 2025-04-13 | End: 2025-04-13

## 2025-04-13 RX ORDER — AMLODIPINE BESYLATE 5 MG/1
5 TABLET ORAL
Status: DISCONTINUED | OUTPATIENT
Start: 2025-04-13 | End: 2025-04-14 | Stop reason: HOSPADM

## 2025-04-13 RX ADMIN — ENOXAPARIN SODIUM 40 MG: 100 INJECTION SUBCUTANEOUS at 16:41

## 2025-04-13 RX ADMIN — LABETALOL HYDROCHLORIDE 10 MG: 5 INJECTION INTRAVENOUS at 20:26

## 2025-04-13 RX ADMIN — ATORVASTATIN CALCIUM 40 MG: 40 TABLET, FILM COATED ORAL at 18:51

## 2025-04-13 RX ADMIN — LOSARTAN POTASSIUM 25 MG: 25 TABLET, FILM COATED ORAL at 06:02

## 2025-04-13 RX ADMIN — AMLODIPINE BESYLATE 5 MG: 5 TABLET ORAL at 11:24

## 2025-04-13 RX ADMIN — CYANOCOBALAMIN 1000 MCG: 1000 INJECTION, SOLUTION INTRAMUSCULAR; SUBCUTANEOUS at 12:18

## 2025-04-13 ASSESSMENT — PAIN DESCRIPTION - PAIN TYPE
TYPE: ACUTE PAIN
TYPE: ACUTE PAIN

## 2025-04-13 ASSESSMENT — ENCOUNTER SYMPTOMS
WHEEZING: 0
NAUSEA: 0
ABDOMINAL PAIN: 0
CHOKING: 0
CHEST TIGHTNESS: 0
STRIDOR: 0
FEVER: 0
SHORTNESS OF BREATH: 0
VOMITING: 0
COUGH: 0
APNEA: 0

## 2025-04-13 ASSESSMENT — FIBROSIS 4 INDEX: FIB4 SCORE: 2.56

## 2025-04-13 NOTE — CARE PLAN
The patient is Stable - Low risk of patient condition declining or worsening    Shift Goals  Clinical Goals: VSS, no syncopal epidsodes  Patient Goals: comfort  Family Goals: liane    Progress made toward(s) clinical / shift goals:      Patient is not progressing towards the following goals:      Problem: Knowledge Deficit - Standard  Goal: Patient and family/care givers will demonstrate understanding of plan of care, disease process/condition, diagnostic tests and medications  Outcome: Progressing     Problem: Fall Risk  Goal: Patient will remain free from falls  Outcome: Progressing     Problem: Communication  Goal: The ability to communicate needs accurately and effectively will improve  Outcome: Progressing     Problem: Safety  Goal: Will remain free from injury  Outcome: Progressing  Goal: Will remain free from falls  Outcome: Progressing     Problem: Pain Management  Goal: Pain level will decrease to patient's comfort goal  Outcome: Progressing     Problem: Infection  Goal: Will remain free from infection  Outcome: Progressing     Problem: Psychosocial Needs:  Goal: Level of anxiety will decrease  Outcome: Progressing     Problem: Bowel/Gastric:  Goal: Normal bowel function is maintained or improved  Outcome: Progressing  Goal: Will not experience complications related to bowel motility  Outcome: Progressing

## 2025-04-13 NOTE — DISCHARGE PLANNING
Patient rolled back to observation/outpatient status per attending MD determination (Dr. Nixon) and UR committee MD secondary review (). UPS completed.

## 2025-04-13 NOTE — CARE PLAN
The patient is Stable - Low risk of patient condition declining or worsening    Shift Goals  Clinical Goals: Syncopal episode  Patient Goals: Rest  Family Goals: GAYATRI    Progress made toward(s) clinical / shift goals:    Problem: Knowledge Deficit - Standard  Goal: Patient and family/care givers will demonstrate understanding of plan of care, disease process/condition, diagnostic tests and medications  Outcome: Progressing     Problem: Fall Risk  Goal: Patient will remain free from falls  Outcome: Progressing     Problem: Communication  Goal: The ability to communicate needs accurately and effectively will improve  Outcome: Progressing     Problem: Safety  Goal: Will remain free from injury  Outcome: Progressing  Goal: Will remain free from falls  Outcome: Progressing     Problem: Pain Management  Goal: Pain level will decrease to patient's comfort goal  Outcome: Progressing     Problem: Infection  Goal: Will remain free from infection  Outcome: Progressing     Problem: Psychosocial Needs:  Goal: Level of anxiety will decrease  Outcome: Progressing     Problem: Bowel/Gastric:  Goal: Normal bowel function is maintained or improved  Outcome: Progressing  Goal: Will not experience complications related to bowel motility  Outcome: Progressing       Patient is not progressing towards the following goals:

## 2025-04-13 NOTE — PROGRESS NOTES
Assumed care of patient at 1900. Received bedside report from day RN Radha. Patient A&Ox4, on Room air , Reporting a pain level of denies. Call light within reach, belongings within reach, fall precautions in place, bed in lowest position. Bed alarm on  Patient does not have any other needs at this time.     POC was discussed with patient. All questions were answered. Patient verbalized understanding.

## 2025-04-13 NOTE — PROGRESS NOTES
Cardiology Follow Up Progress Note    Date of Service  4/13/2025    Attending Physician  Nimisha Nixon M.D.    Chief Complaint     Syncope, bradycardia, hypertensive emergency    HPI  Paul Hopper is a 59 y.o. male with stress cardiomyopathy 2022 in the setting of acute CVA, CVA 2022, CKD, reports prior syncope 2 years ago admitted 4/12/2025 with recurrent syncope.      Found to be hypertensive in ED, , bradycardic, HR 40 to 50s.      Interim Events  No overnight cardiac events  Telemetry-SB, HR 40-50  HR with exertion 70s    Amlodipine added  Echo pending    Review of Systems  Review of Systems   Respiratory:  Negative for apnea, cough, choking, chest tightness, shortness of breath, wheezing and stridor.        Vital signs in last 24 hours  Temp:  [36.5 °C (97.7 °F)-37.2 °C (99 °F)] 36.8 °C (98.3 °F)  Pulse:  [69-88] 69  Resp:  [18-19] 18  BP: (141-185)/(71-90) 162/90  SpO2:  [96 %-99 %] 96 %    Physical Exam  Physical Exam  Cardiovascular:      Rate and Rhythm: Normal rate and regular rhythm.      Pulses: Normal pulses.      Comments: HR 70s with exertion  Pulmonary:      Effort: Pulmonary effort is normal.   Skin:     General: Skin is warm.   Neurological:      Mental Status: He is alert. Mental status is at baseline.   Psychiatric:         Mood and Affect: Mood normal.         Lab Review  Lab Results   Component Value Date/Time    WBC 5.9 04/13/2025 05:08 AM    RBC 3.17 (L) 04/13/2025 05:08 AM    HEMOGLOBIN 12.0 (L) 04/13/2025 05:08 AM    HEMATOCRIT 33.3 (L) 04/13/2025 05:08 AM    .0 (H) 04/13/2025 05:08 AM    MCH 37.9 (H) 04/13/2025 05:08 AM    MCHC 36.0 04/13/2025 05:08 AM    MPV 9.4 04/13/2025 05:08 AM      Lab Results   Component Value Date/Time    SODIUM 139 04/13/2025 05:08 AM    POTASSIUM 3.8 04/13/2025 05:08 AM    CHLORIDE 107 04/13/2025 05:08 AM    CO2 24 04/13/2025 05:08 AM    GLUCOSE 100 (H) 04/13/2025 05:08 AM    BUN 15 04/13/2025 05:08 AM    CREATININE 1.75 (H)  04/13/2025 05:08 AM      Lab Results   Component Value Date/Time    ASTSGOT 21 04/12/2025 10:11 AM    ALTSGPT 11 04/12/2025 10:11 AM     Lab Results   Component Value Date/Time    CHOLSTRLTOT 104 02/26/2024 07:30 PM    LDL 60 02/26/2024 07:30 PM    HDL 24 (A) 02/26/2024 07:30 PM    TRIGLYCERIDE 102 02/26/2024 07:30 PM    TROPONINT 32 (H) 04/13/2025 07:49 AM       Recent Labs     04/13/25  0508   NTPROBNP 253*       Cardiac Imaging and Procedures Review  EKG: Sinus bradycardia, HR 40s    Echocardiogram: Pending        Imaging  Chest X-Ray: No acute cardiopulmonary abnormalities       Stress Test: 2/27/2024  No evidence of significant jeopardized viable myocardium or prior myocardial    infarction.   Normal left ventricular size, ejection fraction, and wall motion.   ECG INTERPRETATION   Nondiagnostic stress ECG with Regadenoson.       Assessment/Plan  - Syncope .  - Bradycardia.  - Hypertensive emergency.  - Prior history of stress cardiomyopathy by echocardiogram 2022, normal MPI.  - Ran out of BP medications for about a year no follow-up with PCP.    Recommendations  - Hemodynamically stable.  - HR 70s with exertion ambulated in the unit without difficulty.  - SBP 160s on low-dose losartan.  - Add amlodipine 5 mg daily.  - Follow-up on echocardiogram  - Recent stress test 2/27/2024 revealed no evidence of ischemia.  - Likely needs ZIO on discharge.  - Further recommendations once echo result is available.  -      Cardiology will follow along    I personally spent a total of 15 minutes which includes face-to-face time and non-face-to-face time spent on preparing to see the patient, reviewing hospital notes and tests, obtaining history from the patient, performing a medically appropriate exam, counseling and educating the patient, ordering medications/tests/procedures/referrals as clinically indicated, and documenting information in the electronic medical record.     Thank you for allowing me to participate in the  care of this patient.      Please contact me with any questions.    DARIEN Tran.   Cardiologist, [facility]  [phone]

## 2025-04-13 NOTE — PROGRESS NOTES
Hospital Medicine Daily Progress Note    Date of Service  4/13/2025    Chief Complaint  Paul Hopper is a 59 y.o. male admitted 4/12/2025 with syncope    Hospital Course  Paul Hopper is a 59-year-old male with PMHx CVA with mild right sided deficits, hypertension.  Admitted 4/12 for syncope.    Per history-  Patient was sitting at computer filling out his paperwork when he passed out.  His boss found him slumped over.  2 years ago this also happened to him.  He does have a history of stroke with mild residual right-sided deficits.      In the ED: SBP greater than 220.  Patient states he ran out of his medication 1 year ago.  Creatinine 1.84.  Potassium 2.3.  UDS negative.    Interval Problem Update  4/13: Vitals notable for improvement in SBP overnight.  Now ranging 140s to 150s.  Heart rate 70s to 80s.  Creatinine 1.75 from 1.84.  Echocardiogram is pending.  Discussed with cardiology; await echocardiogram results for further determination of care.    I have discussed this patient's plan of care and discharge plan at IDT rounds today with Case Management, Nursing, Nursing leadership, and other members of the IDT team.    Consultants/Specialty  cardiology    Code Status  Full Code    Disposition  The patient is not medically cleared for discharge to home or a post-acute facility.      I have placed the appropriate orders for post-discharge needs.    Review of Systems  Review of Systems   Constitutional:  Negative for fever and malaise/fatigue.   Respiratory:  Negative for shortness of breath.    Cardiovascular:  Negative for chest pain and leg swelling.   Gastrointestinal:  Negative for abdominal pain, nausea and vomiting.        Physical Exam  Temp:  [36.5 °C (97.7 °F)-37.2 °C (99 °F)] 36.8 °C (98.3 °F)  Pulse:  [69-88] 69  Resp:  [18-19] 18  BP: (141-185)/(71-90) 162/90  SpO2:  [96 %-99 %] 96 %    Physical Exam  Vitals and nursing note reviewed.   Constitutional:       General: He is not in acute distress.      Appearance: Normal appearance. He is not ill-appearing.   Cardiovascular:      Rate and Rhythm: Normal rate and regular rhythm.      Heart sounds: Murmur heard.   Pulmonary:      Effort: Pulmonary effort is normal.      Breath sounds: Normal breath sounds.   Abdominal:      General: Bowel sounds are normal. There is no distension.      Palpations: Abdomen is soft.      Tenderness: There is no abdominal tenderness.   Musculoskeletal:         General: No swelling.   Skin:     General: Skin is warm and dry.   Neurological:      Mental Status: He is alert and oriented to person, place, and time. Mental status is at baseline.   Psychiatric:         Mood and Affect: Mood normal.         Behavior: Behavior normal.         Fluids    Intake/Output Summary (Last 24 hours) at 4/13/2025 1200  Last data filed at 4/12/2025 1507  Gross per 24 hour   Intake 200 ml   Output --   Net 200 ml        Laboratory  Recent Labs     04/12/25  1011 04/13/25  0508   WBC 7.8 5.9   RBC 3.42* 3.17*   HEMOGLOBIN 13.2* 12.0*   HEMATOCRIT 35.4* 33.3*   .5* 105.0*   MCH 38.6* 37.9*   MCHC 37.3* 36.0   RDW 52.9* 55.4*   PLATELETCT 141* 134*   MPV 9.2 9.4     Recent Labs     04/12/25  1011 04/13/25  0508   SODIUM 138 139   POTASSIUM 3.2* 3.8   CHLORIDE 104 107   CO2 24 24   GLUCOSE 100* 100*   BUN 14 15   CREATININE 1.84* 1.75*   CALCIUM 9.2 8.9                   Imaging  DX-CHEST-PORTABLE (1 VIEW)   Final Result         1. No acute cardiopulmonary abnormalities identified.                     EC-ECHOCARDIOGRAM COMPLETE W/O CONT    (Results Pending)        Assessment/Plan  Sinus bradycardia- (present on admission)  Assessment & Plan  Down to 40s in ER  -Continuous telemetry monitoring  - Echocardiogram pending    Noncompliance with medication treatment due to underuse of medication- (present on admission)  Assessment & Plan  Ran out of meds and did not have a PCP  See PCP and hypertensive urgency problem    Does not have primary care provider-  (present on admission)  Assessment & Plan  Left message for scheduling to give patient a new PCP    Hypertensive urgency- (present on admission)  Assessment & Plan  Ran out of blood pressure medications about a year ago and has not been able to fill them as he does not have a PCP  -Start amlodipine 5  - Continue monitor closely    B12 deficiency- (present on admission)  Assessment & Plan  B12 less than 150  - Start replacement    Syncope- (present on admission)  Assessment & Plan  Second event; previous event was 2 years ago  Preceded by sweating  - Continuous telemetry monitoring; monitoring for arrhythmia  - Echocardiogram is pending  - Cardiology following    Hypothyroidism- (present on admission)  Assessment & Plan  TSH 0.590  Not currently on replacement therapy    Primary hypertension- (present on admission)  Assessment & Plan  SBP approximately 220 in the ED  SBP now ranging 140-150  - Start amlodipine  - IV antihypertensives for SBP greater than 180    Abnormal echocardiogram- (present on admission)  Assessment & Plan  Echo 2/2024: EF 60%; grade 1 diastolic dysfunction  - Repeat echo is pending         VTE prophylaxis:   SCDs/TEDs   enoxaparin ppx      I have performed a physical exam and reviewed and updated ROS and Plan today (4/13/2025). In review of yesterday's note (4/12/2025), there are no changes except as documented above.

## 2025-04-14 ENCOUNTER — PHARMACY VISIT (OUTPATIENT)
Dept: PHARMACY | Facility: MEDICAL CENTER | Age: 60
End: 2025-04-14
Payer: COMMERCIAL

## 2025-04-14 ENCOUNTER — NON-PROVIDER VISIT (OUTPATIENT)
Dept: CARDIOLOGY | Facility: MEDICAL CENTER | Age: 60
End: 2025-04-14

## 2025-04-14 VITALS
BODY MASS INDEX: 25.44 KG/M2 | OXYGEN SATURATION: 100 % | SYSTOLIC BLOOD PRESSURE: 171 MMHG | WEIGHT: 171.74 LBS | DIASTOLIC BLOOD PRESSURE: 87 MMHG | TEMPERATURE: 98.1 F | RESPIRATION RATE: 15 BRPM | HEART RATE: 60 BPM | HEIGHT: 69 IN

## 2025-04-14 LAB
ALBUMIN SERPL BCP-MCNC: 3.7 G/DL (ref 3.2–4.9)
BUN SERPL-MCNC: 16 MG/DL (ref 8–22)
CALCIUM ALBUM COR SERPL-MCNC: 9.3 MG/DL (ref 8.5–10.5)
CALCIUM SERPL-MCNC: 9.1 MG/DL (ref 8.5–10.5)
CHLORIDE SERPL-SCNC: 106 MMOL/L (ref 96–112)
CO2 SERPL-SCNC: 23 MMOL/L (ref 20–33)
CREAT SERPL-MCNC: 1.61 MG/DL (ref 0.5–1.4)
ERYTHROCYTE [DISTWIDTH] IN BLOOD BY AUTOMATED COUNT: 53.4 FL (ref 35.9–50)
GFR SERPLBLD CREATININE-BSD FMLA CKD-EPI: 49 ML/MIN/1.73 M 2
GLUCOSE SERPL-MCNC: 118 MG/DL (ref 65–99)
HCT VFR BLD AUTO: 33.3 % (ref 42–52)
HGB BLD-MCNC: 12.1 G/DL (ref 14–18)
MCH RBC QN AUTO: 37.8 PG (ref 27–33)
MCHC RBC AUTO-ENTMCNC: 36.3 G/DL (ref 32.3–36.5)
MCV RBC AUTO: 104.1 FL (ref 81.4–97.8)
PHOSPHATE SERPL-MCNC: 3.8 MG/DL (ref 2.5–4.5)
PLATELET # BLD AUTO: 129 K/UL (ref 164–446)
PMV BLD AUTO: 9.5 FL (ref 9–12.9)
POTASSIUM SERPL-SCNC: 3.7 MMOL/L (ref 3.6–5.5)
RBC # BLD AUTO: 3.2 M/UL (ref 4.7–6.1)
SODIUM SERPL-SCNC: 139 MMOL/L (ref 135–145)
WBC # BLD AUTO: 5.2 K/UL (ref 4.8–10.8)

## 2025-04-14 PROCEDURE — 99239 HOSP IP/OBS DSCHRG MGMT >30: CPT | Performed by: STUDENT IN AN ORGANIZED HEALTH CARE EDUCATION/TRAINING PROGRAM

## 2025-04-14 PROCEDURE — 80069 RENAL FUNCTION PANEL: CPT

## 2025-04-14 PROCEDURE — 700102 HCHG RX REV CODE 250 W/ 637 OVERRIDE(OP): Mod: UD | Performed by: INTERNAL MEDICINE

## 2025-04-14 PROCEDURE — 700102 HCHG RX REV CODE 250 W/ 637 OVERRIDE(OP): Mod: UD | Performed by: HOSPITALIST

## 2025-04-14 PROCEDURE — A9270 NON-COVERED ITEM OR SERVICE: HCPCS | Mod: UD | Performed by: HOSPITALIST

## 2025-04-14 PROCEDURE — A9270 NON-COVERED ITEM OR SERVICE: HCPCS | Mod: UD | Performed by: INTERNAL MEDICINE

## 2025-04-14 PROCEDURE — 700102 HCHG RX REV CODE 250 W/ 637 OVERRIDE(OP): Mod: UD | Performed by: STUDENT IN AN ORGANIZED HEALTH CARE EDUCATION/TRAINING PROGRAM

## 2025-04-14 PROCEDURE — RXMED WILLOW AMBULATORY MEDICATION CHARGE: Performed by: STUDENT IN AN ORGANIZED HEALTH CARE EDUCATION/TRAINING PROGRAM

## 2025-04-14 PROCEDURE — 99232 SBSQ HOSP IP/OBS MODERATE 35: CPT | Mod: FS

## 2025-04-14 PROCEDURE — 700102 HCHG RX REV CODE 250 W/ 637 OVERRIDE(OP): Mod: UD | Performed by: NURSE PRACTITIONER

## 2025-04-14 PROCEDURE — 85027 COMPLETE CBC AUTOMATED: CPT

## 2025-04-14 PROCEDURE — A9270 NON-COVERED ITEM OR SERVICE: HCPCS | Mod: UD | Performed by: NURSE PRACTITIONER

## 2025-04-14 PROCEDURE — A9270 NON-COVERED ITEM OR SERVICE: HCPCS | Mod: UD | Performed by: STUDENT IN AN ORGANIZED HEALTH CARE EDUCATION/TRAINING PROGRAM

## 2025-04-14 PROCEDURE — G0378 HOSPITAL OBSERVATION PER HR: HCPCS

## 2025-04-14 RX ORDER — ATORVASTATIN CALCIUM 40 MG/1
40 TABLET, FILM COATED ORAL EVERY EVENING
Qty: 100 TABLET | Refills: 3 | Status: SHIPPED | OUTPATIENT
Start: 2025-04-14 | End: 2026-05-19

## 2025-04-14 RX ORDER — AMLODIPINE BESYLATE 5 MG/1
5 TABLET ORAL DAILY
Qty: 100 TABLET | Refills: 3 | Status: SHIPPED | OUTPATIENT
Start: 2025-04-15 | End: 2025-04-22 | Stop reason: SDUPTHER

## 2025-04-14 RX ORDER — LOSARTAN POTASSIUM 25 MG/1
25 TABLET ORAL DAILY
Qty: 100 TABLET | Refills: 3 | Status: SHIPPED | OUTPATIENT
Start: 2025-04-15 | End: 2025-04-29

## 2025-04-14 RX ORDER — ASPIRIN 81 MG/1
81 TABLET ORAL DAILY
Qty: 100 TABLET | Refills: 3 | Status: SHIPPED | OUTPATIENT
Start: 2025-04-15

## 2025-04-14 RX ADMIN — ASPIRIN 81 MG: 81 TABLET, COATED ORAL at 05:32

## 2025-04-14 RX ADMIN — CYANOCOBALAMIN TAB 500 MCG 1000 MCG: 500 TAB at 05:32

## 2025-04-14 RX ADMIN — AMLODIPINE BESYLATE 5 MG: 5 TABLET ORAL at 05:32

## 2025-04-14 RX ADMIN — LOSARTAN POTASSIUM 25 MG: 25 TABLET, FILM COATED ORAL at 05:32

## 2025-04-14 ASSESSMENT — PAIN DESCRIPTION - PAIN TYPE: TYPE: ACUTE PAIN

## 2025-04-14 ASSESSMENT — FIBROSIS 4 INDEX: FIB4 SCORE: 2.8

## 2025-04-14 NOTE — PROGRESS NOTES
Pt discharged from unit. All belongings with pt. PIV not present. Discharge paperwork reviewed with patient, all questions answered, and paperwork signed. One copy with patient, and one copy kept to be scanned into patient's chart.   M2B delivered and reviewed.

## 2025-04-14 NOTE — PROGRESS NOTES
Cardiac event monitor was placed. The patient was provided with instructions for use, symptom recording and how to return the device to the  upon completion. Serial number: [id number].

## 2025-04-14 NOTE — DIETARY
"Nutrition Services: Initial Assessment     Day 0 of admit. Paul Hopper is 59 y.o., male with admitting DX of Hypertensive emergency [I16.1].    Consult Received for: MST - Malnutrition Screening Tool    Current Hospital Problems List: Abnormal ECG, Hypertensive urgency. Sinus bradycardia, history of stroke, B12 deficiency, Syncope and collapse, primary hypertension. Hypothyroidism.        Nutrition Assessment:      Height: 175.3 cm (5' 9\")  Weight: 77.9 kg (171 lb 11.8 oz)  Weight taken via: Stand Up Scale  BMI Calculated: 25.39  BMI Classification: Overweight       Weight Readings from Last 5 encounters:   Wt Readings from Last 5 Encounters:   04/14/25 77.9 kg (171 lb 11.8 oz)   02/27/24 86.8 kg (191 lb 5.8 oz)   02/22/24 88.5 kg (195 lb 1.7 oz)   03/28/23 89 kg (196 lb 3.4 oz)   03/13/23 90.5 kg (199 lb 8.3 oz)     Weight loss > 1 year 24 lbs (12.3% ) not significant     Objective:   Pertinent Medical Hx: Phmx Stroke, CVA  Pertinent Labs: RBC 3.20, Hemoglobin 12.1 Hematocrit 33.3,.1, MCH 37.8, RDW 53.4, Platelet count 129. Glucose 118, Creatinine 1.61, GFR 49  Pertinent Meds: Statin, Vitamin B12 , Cozzar, senna   Skin/Wounds:  n/a   Food Allergies: NKFA  Last BM:  Type 4: Like a sausage or snake, smooth and soft  04/13/25       Current Diet Order/Intake:   Regular diet    Subjective:     Patient admitted for syncope, symptoms have since resolved per patient report. 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 States he only eat when feeling hungry, typically consuming one full meal per day with snacks in between.No fat or muscle loss observed on NFPE. Patient agreed added Ensure once daily.    Patient reported UBW: n/a   Dietary Recall/Energy Intake: Patient reports a loss of appetite since starting a graveyard shift approximately 5-6 weeks ago.He attributes this to generalized weakness and difficulty maintaining a structured " eating schedule. He reports consuming only 1 full meal per day, with snacks in between.    This indicates Insufficient energy intake.     Nutrition Focused Physical Exam (NFPE)  Weight Loss:  Weight loss> 1 year not significant   Muscle Mass: Well Nourished  Subcutaneous Fat: Well Nourished  Fluid Accumulation: n/a   Reduced  Strength: N/A in acute care setting.    Nutrition Diagnosis:      Inadequate Oral Intake related to generalized weakness as evidenced by < 50% of estimated needs > 1 month .      based on RD assessment at this time, Patient does not meet criteria in congruence with ASPEN/Academy guidelines for malnutrition    Nutrition Interventions:      Recommend Ensure Plus High Protein (provides 350 calories) 20 g protein per 8 fl oz) QD.  Patient aware of active plan of care as appropriate.       Nutrition Monitoring and Evaluation:      Monitor nutrition POC, goal for > 50 % intake from meals and supplements.  Additional fluids per MD/DO  Monitor vital signs pertinent to nutrition.    RD following and will provide updated recommendations as indicated.      Rosie THOMPSON/AND CRITERIA FOR MALNUTRITION

## 2025-04-14 NOTE — DISCHARGE SUMMARY
Discharge Summary    CHIEF COMPLAINT ON ADMISSION  Chief Complaint   Patient presents with    Syncope     Pt's boss found him slumped over at work. Pt came to-does not recall event. Incontinence of bowel and bladder. HX MI, CVA.        Reason for Admission  ems     Admission Date  4/12/2025    CODE STATUS  Full Code    HPI & HOSPITAL COURSE    Paul Hopper is a 59-year-old male with PMHx CVA with mild right sided deficits, hypertension.  Admitted 4/12 for syncope.    Per history-  Patient was sitting at computer filling out his paperwork when he passed out.  His boss found him slumped over.  2 years ago this also happened to him.  He does have a history of stroke with mild residual right-sided deficits.      In the ED: SBP greater than 220.  Patient states he ran out of his medication 1 year ago.  Creatinine 1.84.  Potassium 2.3.  UDS negative.    Echocardiogram: EF 55%; no significant valvular or wall motion abnormalities noted.  Syncope is believed to be secondary to vasovagal response.    Patient was restarted on amlodipine 5 mg and losartan 25 mg.  He had improvement in his blood pressures.  I encouraged patient to keep a blood pressure log at home to bring to his cardiology appointment in approximately 1 month.  He is also discharged home with a Zio patch.  A referral has been placed for patient to establish with a primary care physician.    All medications were ordered and delivered to patient prior to discharge.    Therefore, he is discharged in good and stable condition to home with close outpatient follow-up.    The patient met 2-midnight criteria for an inpatient stay at the time of discharge.    Discharge Date  4/14/2025    FOLLOW UP ITEMS POST DISCHARGE  Establish with primary care physician as soon as possible  Follow-up with cardiology 1 month    DISCHARGE DIAGNOSES  Principal Problem:    Syncope and collapse (POA: Yes)  Active Problems:    Primary hypertension (POA: Yes)    Hypothyroidism (POA:  Yes)    B12 deficiency (POA: Yes)    History of stroke (POA: Yes)    Hypertensive urgency (POA: Yes)    Does not have primary care provider (POA: Yes)    Noncompliance with medication treatment due to underuse of medication (POA: Yes)    Sinus bradycardia (POA: Yes)    Abnormal ECG (POA: Yes)  Resolved Problems:    * No resolved hospital problems. *      FOLLOW UP  No future appointments.  [facility], Emergency Dept  [address]  [place] [address] [phone]  Go to  If symptoms worsen      MEDICATIONS ON DISCHARGE     Medication List        START taking these medications        Instructions   amLODIPine 5 MG Tabs  Start taking on: April 15, 2025  Commonly known as: Norvasc   Take 1 Tablet by mouth every day.  Dose: 5 mg     Aspirin Low Dose 81 MG EC tablet  Start taking on: April 15, 2025  Generic drug: aspirin   Take 1 Tablet by mouth every day.  Dose: 81 mg     atorvastatin 40 MG Tabs  Commonly known as: Lipitor   Take 1 Tablet by mouth every evening.  Dose: 40 mg     cyanocobalamin 1000 MCG Tabs  Start taking on: April 15, 2025  Commonly known as: Vitamin B12   Take 1 Tablet by mouth every day.  Dose: 1,000 mcg     losartan 25 MG Tabs  Start taking on: April 15, 2025  Commonly known as: Cozaar   Take 1 Tablet by mouth every day.  Dose: 25 mg              Allergies  Allergies   Allergen Reactions    Other Food Anaphylaxis     All fresh fruit causes throat swelling per brother.        DIET  Orders Placed This Encounter   Procedures    Diet Order Diet: Regular; Miscellaneous modifications: (optional): No Decaf, No Caffeine(for test) (No caffeine for 12 hours prior to exam (decaf, coffee, cola, tea, chocolate))     Standing Status:   Standing     Number of Occurrences:   1     Diet::   Regular [1]     Miscellaneous modifications: (optional):   No Decaf, No Caffeine(for test) [11]              No caffeine for 12 hours prior to exam (decaf, coffee, cola, tea, chocolate)        ACTIVITY  As tolerated.  Weight bearing as tolerated    CONSULTATIONS  Cardiology     PROCEDURES  None     LABORATORY  Lab Results   Component Value Date    SODIUM 139 04/14/2025    POTASSIUM 3.7 04/14/2025    CHLORIDE 106 04/14/2025    CO2 23 04/14/2025    GLUCOSE 118 (H) 04/14/2025    BUN 16 04/14/2025    CREATININE 1.61 (H) 04/14/2025        Lab Results   Component Value Date    WBC 5.2 04/14/2025    HEMOGLOBIN 12.1 (L) 04/14/2025    HEMATOCRIT 33.3 (L) 04/14/2025    PLATELETCT 129 (L) 04/14/2025      EC-ECHOCARDIOGRAM COMPLETE W/O CONT   Final Result      DX-CHEST-PORTABLE (1 VIEW)   Final Result         1. No acute cardiopulmonary abnormalities identified.                           Total time of the discharge process exceeds 43 minutes.

## 2025-04-14 NOTE — DISCHARGE PLANNING
Care Transition Team Assessment    Patient, Paul Hopper, is a 59-year-old admitted for syncope. Patient is alert and oriented x4; information was given by the patient. Please see pt's H&P for prior medical history. Patient reports living with roommates [address] [place] [address]. Pt confirmed contacts on facesheet. Brother [phone]. Patient does not have a PCP. Patient reports good support from family, friends, and roommates. Patient does have transportation once medically cleared for discharge, roommate can assist with transport. Patient reports no concerns with food insecurity. Confirmed medical insurance is [employer]. Preferred pharmacy is [facility] at [address] [place] [address]. Patient is independent. Denies mental health and substance abuse concerns.    Identified DC needs at this time:    Transportation, SW provided patient with number to call for transportation services  PCP, appointment on AVS.    Information Source  Information Given By: Patient  Who is responsible for making decisions for patient? : Patient    Readmission Evaluation  Is this a readmission?: No    Elopement Risk  Legal Hold: No  Ambulatory or Self Mobile in Wheelchair: Yes  Disoriented: No  Psychiatric Symptoms: None  History of Wandering: No  Elopement this Admit: No  Vocalizing Wanting to Leave: No  Displays Behaviors, Body Language Wanting to Leave: No-Not at Risk for Elopement  Elopement Risk: Not at Risk for Elopement    Interdisciplinary Discharge Planning  Lives with - Patient's Self Care Capacity: Unrelated Adult  Patient or legal guardian wants to designate a caregiver: No  Support Systems: Family Member(s), Friends / Neighbors  Housing / Facility: 1 Story Apartment / Condo    Discharge Preparedness  What is your plan after discharge?: Home with help  What are your discharge supports?: Other (comment), Sibling  (Friends/roommates)  Prior Functional Level: Ambulatory, Independent with Activities of Daily Living, Independent with Medication Management    Functional Assesment  Prior Functional Level: Ambulatory, Independent with Activities of Daily Living, Independent with Medication Management    Vision / Hearing Impairment  Right Eye Vision: Impaired, Wears Glasses  Left Eye Vision: Impaired, Wears Glasses    Domestic Abuse  Have you ever been the victim of abuse or violence?: No  Possible Abuse/Neglect Reported to:: Not Applicable    Psychological Assessment  History of Substance Abuse: None (Denies SA)  History of Psychiatric Problems: No    Anticipated Discharge Information  Discharge Disposition: Discharged to home/self care (01)

## 2025-04-14 NOTE — PROGRESS NOTES
Cardiology Follow Up Progress Note    Date of Service  4/14/2025    Attending Physician  Nimisha Nixon M.D.    Chief Complaint   Syncope, bradycardia, hypertensive emergency    HPI  Paul Hopper is a 59 y.o. male with stress cardiomyopathy 2022 in the setting of acute CVA, CVA 2022, CKD, reports prior syncope 2 years ago admitted 4/12/2025 with recurrent syncope.      Found to be hypertensive in ED, , bradycardic, HR 40 to 50s.    Interim Events  4/14/2025: No acute events overnight.  No current cardiac complaints. No chest pain or palpitations. No shortness of breath, dyspnea on exertion, orthopnea or PND. No lower extremity edema. No dizziness or lightheadedness. No syncope or presyncope.    Tele: Sinus roberto/ rhythm 50's-60's  BP: 160's-170's   Labs: Cr baseline (1.61)  K: 3.7  Trop: 32>29  BNP: 253    Review of Systems  Negative besides as outlined above    Vital signs in last 24 hours  Temp:  [36.6 °C (97.9 °F)-36.9 °C (98.4 °F)] 36.7 °C (98.1 °F)  Pulse:  [52-69] 60  Resp:  [15-18] 15  BP: (164-180)/() 171/87  SpO2:  [95 %-100 %] 100 %    Physical Exam  Physical Exam  Constitutional:       General: He is not in acute distress.  HENT:      Mouth/Throat:      Mouth: Mucous membranes are moist.   Neck:      Vascular: No carotid bruit.   Cardiovascular:      Rate and Rhythm: Normal rate and regular rhythm.      Comments: 2/6 systolic murmur best heard at apex  Pulmonary:      Effort: Pulmonary effort is normal.      Breath sounds: Normal breath sounds.   Abdominal:      General: There is no distension.      Tenderness: There is no abdominal tenderness. There is no guarding.   Musculoskeletal:      Right lower leg: No edema.      Left lower leg: No edema.   Skin:     Capillary Refill: Capillary refill takes less than 2 seconds.   Neurological:      Mental Status: He is alert.         Lab Review  Lab Results   Component Value Date/Time    WBC 5.2 04/14/2025 12:17 AM    RBC 3.20 (L) 04/14/2025  12:17 AM    HEMOGLOBIN 12.1 (L) 2025 12:17 AM    HEMATOCRIT 33.3 (L) 2025 12:17 AM    .1 (H) 2025 12:17 AM    MCH 37.8 (H) 2025 12:17 AM    MCHC 36.3 2025 12:17 AM    MPV 9.5 2025 12:17 AM      Lab Results   Component Value Date/Time    SODIUM 139 2025 12:17 AM    POTASSIUM 3.7 2025 12:17 AM    CHLORIDE 106 2025 12:17 AM    CO2 23 2025 12:17 AM    GLUCOSE 118 (H) 2025 12:17 AM    BUN 16 2025 12:17 AM    CREATININE 1.61 (H) 2025 12:17 AM      Lab Results   Component Value Date/Time    ASTSGOT 21 2025 10:11 AM    ALTSGPT 11 2025 10:11 AM     Lab Results   Component Value Date/Time    CHOLSTRLTOT 104 2024 07:30 PM    LDL 60 2024 07:30 PM    HDL 24 (A) 2024 07:30 PM    TRIGLYCERIDE 102 2024 07:30 PM    TROPONINT 29 (H) 2025 11:53 AM       Recent Labs     25  0508   NTPROBNP 253*       Cardiac Imaging and Procedures Review  [id number]  Sinus bradycardia   LVH with secondary repolarization abnormality   Compared to ECG 2024 16:36:20   Left ventricular hypertrophy now present   Early repolarization now present     Echocardiogram: 2025  Left ventricular systolic function is normal.  No significant valvular disease.    Imaging  Chest X-Ray: 2025  1. No acute cardiopulmonary abnormalities identified.    Stress Test: 2024  NUCLEAR IMAGING INTERPRETATION   No evidence of significant jeopardized viable myocardium or prior myocardial    infarction.   Normal left ventricular size, ejection fraction, and wall motion.  ECG INTERPRETATION   Nondiagnostic stress ECG with Regadenoson    Assessment/Plan  #Syncope  #Bradycardia  #Hypertensive emergency  #History of stroke    Recommendations:  -Patient is doing well this morning with no current cardiac complaints. Appears euvolemic on exam.  -States he stopped taking hypertensive medications years ago due to running out and not  having PCP to refill medications.  -Discussed plan for placing Zio patch prior to discharge for syncopal episodes. Sounds likely to be vasovagal in nature. Patient is able to come to outpatient clinic in 1 month to discuss results and possibly uptitrate current hypertensive medications.  -Consider loop recorder after Zio if it is unremarkable given patients history of stroke and recent syncopal episodes.   -Continue amlodipine 5 mg and losartan 25 mg. Blood pressure still in the 160s 170s however better to titrate hypertensive medications as outpatient. ARB and CCB just started again within the last 48 hours prior to discharge.   -Continue aspirin 81 mg and atorvastatin 40 mg for secondary prevention of stroke.  -Request sent for outpatient follow-up with me in 1 month.    Cardiology will sign off, place Zio patch later this morning prior to discharge.    Please see Dr. Roblero's attestation for further details and MDM.     I personally spent a total of 15 minutes which includes face-to-face time and non-face-to-face time spent on preparing to see the patient, reviewing hospital notes and tests, obtaining history from the patient, performing a medically appropriate exam, counseling and educating the patient, ordering medications/tests/procedures/referrals as clinically indicated, and documenting information in the electronic medical record.    Thank you for allowing me to participate in the care of Paul Horta Ward .    Brown Tillman PA-C, Cardiology  [facility], [facility]  [address] [address]  Phone: [phone]  Fax: [phone]    PLEASE NOTE: This note was created using voice recognition software. I have made every reasonable attempt to correct obvious errors, but I expect that there are errors of grammar and possibly content that I did not discover before finalizing the note.

## 2025-04-14 NOTE — PROGRESS NOTES
Bedside shift report received form day shift RN. Pt care assumed. A&O x 4. Calm and cooperative. No complaints of pain. Bed in low and locked position. Call light and belongings within reach. All pt concerns addressed. No further assistance needed at this time.

## 2025-04-14 NOTE — CARE PLAN
The patient is Stable - Low risk of patient condition declining or worsening    Shift Goals  Clinical Goals: monitor VS  Patient Goals: rest  Family Goals: liane    Progress made toward(s) clinical / shift goals:    Problem: Knowledge Deficit - Standard  Goal: Patient and family/care givers will demonstrate understanding of plan of care, disease process/condition, diagnostic tests and medications  Outcome: Progressing     Problem: Fall Risk  Goal: Patient will remain free from falls  Outcome: Progressing     Problem: Hemodynamics  Goal: Patient's hemodynamics, fluid balance and neurologic status will be stable or improve  Outcome: Progressing       Patient is not progressing towards the following goals:

## 2025-04-21 ENCOUNTER — TELEPHONE (OUTPATIENT)
Dept: HEALTH INFORMATION MANAGEMENT | Facility: OTHER | Age: 60
End: 2025-04-21
Payer: COMMERCIAL

## 2025-04-22 ENCOUNTER — OFFICE VISIT (OUTPATIENT)
Dept: MEDICAL GROUP | Facility: CLINIC | Age: 60
End: 2025-04-22
Payer: COMMERCIAL

## 2025-04-22 VITALS
DIASTOLIC BLOOD PRESSURE: 101 MMHG | HEIGHT: 69 IN | HEART RATE: 69 BPM | RESPIRATION RATE: 20 BRPM | SYSTOLIC BLOOD PRESSURE: 175 MMHG | OXYGEN SATURATION: 95 % | WEIGHT: 181 LBS | BODY MASS INDEX: 26.81 KG/M2 | TEMPERATURE: 97.7 F

## 2025-04-22 DIAGNOSIS — R55 SYNCOPE AND COLLAPSE: ICD-10-CM

## 2025-04-22 DIAGNOSIS — I16.0 HYPERTENSIVE URGENCY: ICD-10-CM

## 2025-04-22 DIAGNOSIS — R00.1 SINUS BRADYCARDIA: ICD-10-CM

## 2025-04-22 DIAGNOSIS — R55 SYNCOPE, UNSPECIFIED SYNCOPE TYPE: ICD-10-CM

## 2025-04-22 PROCEDURE — 3080F DIAST BP >= 90 MM HG: CPT | Performed by: FAMILY MEDICINE

## 2025-04-22 PROCEDURE — 3077F SYST BP >= 140 MM HG: CPT | Performed by: FAMILY MEDICINE

## 2025-04-22 PROCEDURE — 99204 OFFICE O/P NEW MOD 45 MIN: CPT | Performed by: FAMILY MEDICINE

## 2025-04-22 RX ORDER — AMLODIPINE BESYLATE 10 MG/1
10 TABLET ORAL DAILY
Qty: 100 TABLET | Refills: 3 | Status: SHIPPED | OUTPATIENT
Start: 2025-04-22 | End: 2026-05-27

## 2025-04-22 ASSESSMENT — FIBROSIS 4 INDEX: FIB4 SCORE: 2.8

## 2025-04-22 NOTE — PROGRESS NOTES
"Subjective:   CC: Follow-up recent hospitalization    HPI:     Problem   Hypertensive Urgency    Paul comes in to follow-up her recent hospitalization for hypertensive urgency and a syncopal event.  He is alone for today's visit.  He states he feels well and has no symptoms at all.  He states he has had no recurrence of any syncope.  He states he is taking his medications as prescribed with the help of his roommate.  Chaim cannot name any of his medications nor can he describe exactly when he takes them.    In the hospital it seems his blood pressure came under better control and he had no recurrent syncopal events.  He did have a cardiology consultation in the supposed to have cardiology follow-up.  He does have a Zio patch.       Syncope and Collapse    He has had no recurrence of syncope.  Per the discharge summary they attributed it to vasovagal syncope although he does have some significant bradycardia that was noted in the hospitalization.  He is not bradycardic here and he has had no symptoms of presyncope or syncope since leaving the hospital.         Current Outpatient Medications Ordered in Epic   Medication Sig Dispense Refill    amLODIPine (NORVASC) 10 MG Tab Take 1 Tablet by mouth every day. 100 Tablet 3    aspirin 81 MG EC tablet Take 1 Tablet by mouth every day. 100 Tablet 3    atorvastatin (LIPITOR) 40 MG Tab Take 1 Tablet by mouth every evening. 100 Tablet 3    cyanocobalamin (VITAMIN B12) 1000 MCG Tab Take 1 Tablet by mouth every day. 30 Tablet 3    losartan (COZAAR) 25 MG Tab Take 1 Tablet by mouth every day. 100 Tablet 3     No current Epic-ordered facility-administered medications on file.         ROS:  Gen: no fevers/chills  Pulm: no sob, no cough  CV: no chest pain, no palpitations  GI: no nausea/vomiting, no diarrhea        Objective:     Exam:  BP (!) 175/101   Pulse 69   Temp 36.5 °C (97.7 °F) (Temporal)   Resp 20   Ht 1.753 m (5' 9\")   Wt 82.1 kg (181 lb)   SpO2 95%   BMI 26.73 kg/m² "  Body mass index is 26.73 kg/m².    Gen: Alert and oriented, No apparent distress.  Neck: Neck is supple without lymphadenopathy.  Lungs: Normal effort, CTA bilaterally, no wheezes, rhonchi, or rales  CV: Regular rate and rhythm. No murmurs, rubs, or gallops.  Ext: No edema.      Assessment & Plan:     59 y.o. male with the following -     Problem List Items Addressed This Visit       Syncope and collapse    Will get him an appointment with cardiology to see if he needs any further workup.  He does also have a history of Takotsubocardiomyopathy, and cerebrovascular accident.         Hypertensive urgency    His blood pressure is still very poorly controlled.  Obviously compliance with medications is in question.  I increase his amlodipine to 10 mg daily.  I recommended a follow-up visit in 1 week with his medications being brought in as well as his blood pressure log as he is following this at home.  He also will bring his roommate and who helps him with his medical care.  I have rereferred him to cardiology.         Relevant Medications    amLODIPine (NORVASC) 10 MG Tab    Other Relevant Orders    REFERRAL TO CARDIOLOGY    Sinus bradycardia    Relevant Medications    amLODIPine (NORVASC) 10 MG Tab    Other Relevant Orders    REFERRAL TO CARDIOLOGY     Other Visit Diagnoses         Syncope, unspecified syncope type        Relevant Orders    REFERRAL TO CARDIOLOGY          Reviewed hospital DC summary, cardiology consult and labs.        Return in about 1 week (around 4/29/2025).

## 2025-04-22 NOTE — ASSESSMENT & PLAN NOTE
His blood pressure is still very poorly controlled.  Obviously compliance with medications is in question.  I increase his amlodipine to 10 mg daily.  I recommended a follow-up visit in 1 week with his medications being brought in as well as his blood pressure log as he is following this at home.  He also will bring his roommate and who helps him with his medical care.  I have rereferred him to cardiology.

## 2025-04-22 NOTE — ASSESSMENT & PLAN NOTE
Will get him an appointment with cardiology to see if he needs any further workup.  He does also have a history of Takotsubocardiomyopathy, and cerebrovascular accident.

## 2025-04-29 ENCOUNTER — HOSPITAL ENCOUNTER (OUTPATIENT)
Facility: MEDICAL CENTER | Age: 60
End: 2025-04-29
Attending: FAMILY MEDICINE
Payer: COMMERCIAL

## 2025-04-29 ENCOUNTER — OFFICE VISIT (OUTPATIENT)
Dept: MEDICAL GROUP | Facility: CLINIC | Age: 60
End: 2025-04-29
Payer: COMMERCIAL

## 2025-04-29 VITALS
DIASTOLIC BLOOD PRESSURE: 88 MMHG | SYSTOLIC BLOOD PRESSURE: 142 MMHG | WEIGHT: 174 LBS | HEIGHT: 69 IN | TEMPERATURE: 98.7 F | BODY MASS INDEX: 25.77 KG/M2 | HEART RATE: 81 BPM | RESPIRATION RATE: 20 BRPM | OXYGEN SATURATION: 95 %

## 2025-04-29 DIAGNOSIS — Z13.220 LIPID SCREENING: ICD-10-CM

## 2025-04-29 DIAGNOSIS — Z13.1 DIABETES MELLITUS SCREENING: ICD-10-CM

## 2025-04-29 DIAGNOSIS — I10 PRIMARY HYPERTENSION: ICD-10-CM

## 2025-04-29 DIAGNOSIS — Z11.4 SCREENING FOR HIV (HUMAN IMMUNODEFICIENCY VIRUS): ICD-10-CM

## 2025-04-29 DIAGNOSIS — N18.31 STAGE 3A CHRONIC KIDNEY DISEASE: ICD-10-CM

## 2025-04-29 DIAGNOSIS — I16.0 HYPERTENSIVE URGENCY: ICD-10-CM

## 2025-04-29 DIAGNOSIS — Z11.59 NEED FOR HEPATITIS C SCREENING TEST: ICD-10-CM

## 2025-04-29 LAB
ANION GAP SERPL CALC-SCNC: 11 MMOL/L (ref 7–16)
BUN SERPL-MCNC: 14 MG/DL (ref 8–22)
CALCIUM SERPL-MCNC: 8.8 MG/DL (ref 8.5–10.5)
CHLORIDE SERPL-SCNC: 106 MMOL/L (ref 96–112)
CHOLEST SERPL-MCNC: 76 MG/DL (ref 100–199)
CO2 SERPL-SCNC: 23 MMOL/L (ref 20–33)
CREAT SERPL-MCNC: 1.54 MG/DL (ref 0.5–1.4)
EST. AVERAGE GLUCOSE BLD GHB EST-MCNC: 94 MG/DL
FASTING STATUS PATIENT QL REPORTED: NORMAL
GFR SERPLBLD CREATININE-BSD FMLA CKD-EPI: 52 ML/MIN/1.73 M 2
GLUCOSE SERPL-MCNC: 97 MG/DL (ref 65–99)
HBA1C MFR BLD: 4.9 % (ref 4–5.6)
HCV AB SER QL: NORMAL
HDLC SERPL-MCNC: 33 MG/DL
HIV 1+2 AB+HIV1 P24 AG SERPL QL IA: NORMAL
LDLC SERPL CALC-MCNC: 34 MG/DL
POTASSIUM SERPL-SCNC: 3.7 MMOL/L (ref 3.6–5.5)
SODIUM SERPL-SCNC: 140 MMOL/L (ref 135–145)
TRIGL SERPL-MCNC: 45 MG/DL (ref 0–149)

## 2025-04-29 PROCEDURE — 80061 LIPID PANEL: CPT

## 2025-04-29 PROCEDURE — 86803 HEPATITIS C AB TEST: CPT

## 2025-04-29 PROCEDURE — 87389 HIV-1 AG W/HIV-1&-2 AB AG IA: CPT

## 2025-04-29 PROCEDURE — 36415 COLL VENOUS BLD VENIPUNCTURE: CPT

## 2025-04-29 PROCEDURE — 80048 BASIC METABOLIC PNL TOTAL CA: CPT

## 2025-04-29 PROCEDURE — 83036 HEMOGLOBIN GLYCOSYLATED A1C: CPT

## 2025-04-29 RX ORDER — LOSARTAN POTASSIUM 50 MG/1
50 TABLET ORAL DAILY
Qty: 100 TABLET | Refills: 3 | Status: SHIPPED | OUTPATIENT
Start: 2025-04-29 | End: 2026-06-03

## 2025-04-29 ASSESSMENT — FIBROSIS 4 INDEX: FIB4 SCORE: 2.8

## 2025-04-29 NOTE — ASSESSMENT & PLAN NOTE
His blood pressure is improving.  I think it will require more medication to be at goal.  We will increase his losartan from 25 to 50 mg.  I am also ordering a repeat basic metabolic panel as he has some chronic kidney disease evident.

## 2025-04-29 NOTE — ASSESSMENT & PLAN NOTE
He is avoiding nephrotoxins.  We are working at the blood pressure.  Depending on how his course goes with his renal function may consider imaging, but I suspect he has had medical renal impairment for some time.

## 2025-04-29 NOTE — PROGRESS NOTES
"Subjective:   CC: Hypertension    HPI:     Problem   Stage 3a Chronic Kidney Disease    This is from labs done while in the hospital with a hypertensive urgency.  I will repeat the labs.  I think the priority is to control his blood pressure.  Also am checking an A1c.  Will also check his lipid panel.     Primary Hypertension    The patient comes in to follow-up hypertension.  He brings in his medications today.  He states he is compliant with them.  He was recently hospitalized for a hypertensive urgency and syncopal event.  He has had no recurrence of syncope.  His blood pressure log from his home monitoring he also brought in today and it shows improving blood pressures averaging in the 140s over 90s.  He does not specifically have an exercise program but he does walk a mile and a half to and from work 4 days a week which has a fair amount of exercise.  He does not eat a special diet.  He has a friend that helps him with his appointments and medications.  But Paul is here to alone today.         Current Outpatient Medications Ordered in Epic   Medication Sig Dispense Refill    losartan (COZAAR) 50 MG Tab Take 1 Tablet by mouth every day. 100 Tablet 3    amLODIPine (NORVASC) 10 MG Tab Take 1 Tablet by mouth every day. 100 Tablet 3    aspirin 81 MG EC tablet Take 1 Tablet by mouth every day. 100 Tablet 3    atorvastatin (LIPITOR) 40 MG Tab Take 1 Tablet by mouth every evening. 100 Tablet 3     No current Epic-ordered facility-administered medications on file.         ROS:  Gen: no fevers/chills  Pulm: no sob, no cough  CV: no chest pain, no palpitations  GI: no nausea/vomiting, no diarrhea        Objective:     Exam:  BP (!) 142/88 (BP Location: Left arm, Patient Position: Sitting, BP Cuff Size: Adult)   Pulse 81   Temp 37.1 °C (98.7 °F) (Temporal)   Resp 20   Ht 1.753 m (5' 9\")   Wt 78.9 kg (174 lb)   SpO2 95%   BMI 25.70 kg/m²  Body mass index is 25.7 kg/m².    Gen: Alert and oriented, No apparent " distress.  Neck: Neck is supple without lymphadenopathy.  Lungs: Normal effort, CTA bilaterally, no wheezes, rhonchi, or rales  CV: Regular rate and rhythm.  Ext: Trace bilateral lower extremity edema      Assessment & Plan:     59 y.o. male with the following -     Problem List Items Addressed This Visit       Primary hypertension    His blood pressure is improving.  I think it will require more medication to be at goal.  We will increase his losartan from 25 to 50 mg.  I am also ordering a repeat basic metabolic panel as he has some chronic kidney disease evident.         Relevant Medications    losartan (COZAAR) 50 MG Tab    Other Relevant Orders    Basic Metabolic Panel    Hypertensive urgency    Relevant Medications    losartan (COZAAR) 50 MG Tab    Stage 3a chronic kidney disease    He is avoiding nephrotoxins.  We are working at the blood pressure.  Depending on how his course goes with his renal function may consider imaging, but I suspect he has had medical renal impairment for some time.          Other Visit Diagnoses         Screening for HIV (human immunodeficiency virus)        Relevant Orders    HIV AG/AB COMBO ASSAY SCREENING      Need for hepatitis C screening test        Relevant Orders    HEP C VIRUS ANTIBODY      Diabetes mellitus screening        Relevant Orders    HEMOGLOBIN A1C      Lipid screening        Relevant Orders    Lipid Profile                  Return in about 1 month (around 5/29/2025).

## 2025-04-30 ENCOUNTER — RESULTS FOLLOW-UP (OUTPATIENT)
Dept: MEDICAL GROUP | Facility: CLINIC | Age: 60
End: 2025-04-30

## 2025-05-02 ENCOUNTER — HOSPITAL ENCOUNTER (EMERGENCY)
Facility: MEDICAL CENTER | Age: 60
End: 2025-05-02
Attending: EMERGENCY MEDICINE
Payer: COMMERCIAL

## 2025-05-02 ENCOUNTER — APPOINTMENT (OUTPATIENT)
Dept: RADIOLOGY | Facility: MEDICAL CENTER | Age: 60
End: 2025-05-02
Attending: EMERGENCY MEDICINE
Payer: COMMERCIAL

## 2025-05-02 VITALS
BODY MASS INDEX: 24.91 KG/M2 | WEIGHT: 174 LBS | RESPIRATION RATE: 15 BRPM | HEART RATE: 53 BPM | HEIGHT: 70 IN | DIASTOLIC BLOOD PRESSURE: 92 MMHG | TEMPERATURE: 98 F | SYSTOLIC BLOOD PRESSURE: 179 MMHG | OXYGEN SATURATION: 99 %

## 2025-05-02 DIAGNOSIS — R42 DIZZINESS: ICD-10-CM

## 2025-05-02 LAB
ALBUMIN SERPL BCP-MCNC: 4 G/DL (ref 3.2–4.9)
ALBUMIN/GLOB SERPL: 1.4 G/DL
ALP SERPL-CCNC: 60 U/L (ref 30–99)
ALT SERPL-CCNC: 15 U/L (ref 2–50)
ANION GAP SERPL CALC-SCNC: 10 MMOL/L (ref 7–16)
AST SERPL-CCNC: 20 U/L (ref 12–45)
BASOPHILS # BLD AUTO: 0.7 % (ref 0–1.8)
BASOPHILS # BLD: 0.06 K/UL (ref 0–0.12)
BILIRUB SERPL-MCNC: 0.9 MG/DL (ref 0.1–1.5)
BUN SERPL-MCNC: 17 MG/DL (ref 8–22)
CALCIUM ALBUM COR SERPL-MCNC: 8.8 MG/DL (ref 8.5–10.5)
CALCIUM SERPL-MCNC: 8.8 MG/DL (ref 8.5–10.5)
CHLORIDE SERPL-SCNC: 103 MMOL/L (ref 96–112)
CO2 SERPL-SCNC: 27 MMOL/L (ref 20–33)
CREAT SERPL-MCNC: 1.86 MG/DL (ref 0.5–1.4)
EKG IMPRESSION: NORMAL
EOSINOPHIL # BLD AUTO: 0.33 K/UL (ref 0–0.51)
EOSINOPHIL NFR BLD: 4 % (ref 0–6.9)
ERYTHROCYTE [DISTWIDTH] IN BLOOD BY AUTOMATED COUNT: 48.5 FL (ref 35.9–50)
GFR SERPLBLD CREATININE-BSD FMLA CKD-EPI: 41 ML/MIN/1.73 M 2
GLOBULIN SER CALC-MCNC: 2.9 G/DL (ref 1.9–3.5)
GLUCOSE SERPL-MCNC: 135 MG/DL (ref 65–99)
HCT VFR BLD AUTO: 33.6 % (ref 42–52)
HGB BLD-MCNC: 12.1 G/DL (ref 14–18)
IMM GRANULOCYTES # BLD AUTO: 0.02 K/UL (ref 0–0.11)
IMM GRANULOCYTES NFR BLD AUTO: 0.2 % (ref 0–0.9)
LYMPHOCYTES # BLD AUTO: 1.99 K/UL (ref 1–4.8)
LYMPHOCYTES NFR BLD: 24.2 % (ref 22–41)
MCH RBC QN AUTO: 36.9 PG (ref 27–33)
MCHC RBC AUTO-ENTMCNC: 36 G/DL (ref 32.3–36.5)
MCV RBC AUTO: 102.4 FL (ref 81.4–97.8)
MONOCYTES # BLD AUTO: 0.46 K/UL (ref 0–0.85)
MONOCYTES NFR BLD AUTO: 5.6 % (ref 0–13.4)
NEUTROPHILS # BLD AUTO: 5.38 K/UL (ref 1.82–7.42)
NEUTROPHILS NFR BLD: 65.3 % (ref 44–72)
NRBC # BLD AUTO: 0 K/UL
NRBC BLD-RTO: 0 /100 WBC (ref 0–0.2)
NT-PROBNP SERPL IA-MCNC: 57 PG/ML (ref 0–125)
PLATELET # BLD AUTO: 200 K/UL (ref 164–446)
PMV BLD AUTO: 9 FL (ref 9–12.9)
POTASSIUM SERPL-SCNC: 3.4 MMOL/L (ref 3.6–5.5)
PROT SERPL-MCNC: 6.9 G/DL (ref 6–8.2)
RBC # BLD AUTO: 3.28 M/UL (ref 4.7–6.1)
SODIUM SERPL-SCNC: 140 MMOL/L (ref 135–145)
TROPONIN T SERPL-MCNC: 7 NG/L (ref 6–19)
TROPONIN T SERPL-MCNC: <6 NG/L (ref 6–19)
WBC # BLD AUTO: 8.2 K/UL (ref 4.8–10.8)

## 2025-05-02 PROCEDURE — 36415 COLL VENOUS BLD VENIPUNCTURE: CPT

## 2025-05-02 PROCEDURE — 700117 HCHG RX CONTRAST REV CODE 255: Performed by: EMERGENCY MEDICINE

## 2025-05-02 PROCEDURE — 99284 EMERGENCY DEPT VISIT MOD MDM: CPT

## 2025-05-02 PROCEDURE — 93005 ELECTROCARDIOGRAM TRACING: CPT | Mod: TC | Performed by: EMERGENCY MEDICINE

## 2025-05-02 PROCEDURE — 70498 CT ANGIOGRAPHY NECK: CPT

## 2025-05-02 PROCEDURE — 83880 ASSAY OF NATRIURETIC PEPTIDE: CPT

## 2025-05-02 PROCEDURE — 84484 ASSAY OF TROPONIN QUANT: CPT

## 2025-05-02 PROCEDURE — 71045 X-RAY EXAM CHEST 1 VIEW: CPT

## 2025-05-02 PROCEDURE — 85025 COMPLETE CBC W/AUTO DIFF WBC: CPT

## 2025-05-02 PROCEDURE — 80053 COMPREHEN METABOLIC PANEL: CPT

## 2025-05-02 RX ADMIN — IOHEXOL 80 ML: 350 INJECTION, SOLUTION INTRAVENOUS at 09:18

## 2025-05-02 ASSESSMENT — HEART SCORE
RISK FACTORS: >2 RISK FACTORS OR HX OF ATHEROSCLEROTIC DISEASE
TROPONIN: LESS THAN OR EQUAL TO NORMAL LIMIT
ECG: NORMAL
HEART SCORE: 3
AGE: 45-64
HISTORY: SLIGHTLY SUSPICIOUS

## 2025-05-02 ASSESSMENT — FIBROSIS 4 INDEX: FIB4 SCORE: 2.8

## 2025-05-02 NOTE — DISCHARGE INSTRUCTIONS
Please follow-up closely with cardiology.  Make sure you turn in your Zio patch.  Please turn it in today.  If you develop symptoms again and they are not resolving please return to the emergency department.  Your workup today was reassuring.

## 2025-05-02 NOTE — ED NOTES
Pt back from CT  Second troponin collected and sent  Patient updated on plan of care, all questions answered at this time.     Becki locked and in the lowest position. Pt connected to continuous monitor with call light and personal belongings within reach.

## 2025-05-02 NOTE — ED NOTES
Bedside report received from off going RN/tech: Peggy  RN, assumed care of patient.  POC discussed with patient. Call light within reach, all needs addressed at this time.       Fall risk interventions in place: Move the patient closer to the nurse's station, Patient's personal possessions are with in their safe reach, Place socks on patient, Place fall risk sign on patient's door, Give patient urinal if applicable, Keep floor surfaces clean and dry, and Accompanied to restroom (all applicable per [place] Fall risk assessment)   Continuous monitoring: Cardiac Leads, Pulse Ox, or Blood Pressure  IVF/IV medications: Not Applicable   Oxygen: Room Air  Bedside sitter: Not Applicable   Isolation: Not Applicable

## 2025-05-02 NOTE — ED TRIAGE NOTES
Pt BIB [facility] from work due to dizziness. Pt was feeling dizzy and was sweaty, so he sat down and called EMS. Denies LOC. A&OX4. Speaking in clear and complete sentences. Denies dizziness at this time.

## 2025-05-02 NOTE — ED PROVIDER NOTES
ED Provider Note    CHIEF COMPLAINT  Chief Complaint   Patient presents with    Dizziness       EXTERNAL RECORDS REVIEWED  Inpatient Notes inpatient note 4/12/2025, patient admitted for syncope, he passed out while working.  Workup was reassuring outside of potassium of 2.3 and elevated blood pressure in the context of poor medication compliance.  Symptoms were thought to be secondary to vasovagal syndrome and he was discharged in good condition with a Zio patch.      HPI/ROS      Paul Hopper is a 59 y.o. male who presents chief complaint of dizziness.  Patient with history of prior CVA and ongoing mild right-sided deficits.  Patient reports feeling dizzy similar to his recent hospitalization approximately 4 weeks ago.  At that time patient was admitted for a syncopal episode, and diagnosed with vasovagal syncope after echocardiogram returned normal.  He was discharged home with a Zio patch but is not turning this in until late May.  Patient reports that today he was at work, where he was sitting and having a normal day when he developed some pain in the back of his neck, had some sweatiness, and then felt like he was going to pass out.  Patient reports that he told his boss and therefore he was sent to the emergency department.  Patient reports that all the symptoms have since resolved.  He denies associated palpitations.  Denies associated chest pain or shortness of breath.  Neck pain was in the back of his neck.  He denies any neck stiffness.  Patient denies any fevers or chills.  Denies history of headache.  He does have a remote history of a CVA but denies any new or worsening weakness.      PAST MEDICAL HISTORY   has a past medical history of Chickenpox, CVA (cerebral vascular accident) (HCC), Hypertension, and MI (myocardial infarction) ([facility]).    SURGICAL HISTORY  patient denies any surgical history    FAMILY HISTORY  No family history on file.    SOCIAL HISTORY  Social History     Tobacco Use     "Smoking status: Some Days     Types: Cigarettes    Smokeless tobacco: Never   Vaping Use    Vaping status: Not on file   Substance and Sexual Activity    Alcohol use: Yes     Alcohol/week: 1.2 oz     Types: 2 Standard drinks or equivalent per week     Comment: OCC    Drug use: Not Currently     Comment: marijuana    Sexual activity: Not on file       CURRENT MEDICATIONS  Home Medications       Reviewed by Peggy Lomax R.N. (Registered Nurse) on 05/02/25 at 0711  Med List Status: <None>     Medication Last Dose Status   amLODIPine (NORVASC) 10 MG Tab  Active   aspirin 81 MG EC tablet  Active   atorvastatin (LIPITOR) 40 MG Tab  Active   losartan (COZAAR) 50 MG Tab  Active                    ALLERGIES  Allergies   Allergen Reactions    Other Food Anaphylaxis     All fresh fruit causes throat swelling per brother.        PHYSICAL EXAM  VITAL SIGNS: /74   Pulse 64   Temp 36.4 °C (97.5 °F) (Oral)   Resp 20   Ht 1.778 m (5' 10\")   Wt 78.9 kg (174 lb)   SpO2 94%   BMI 24.97 kg/m²    Physical Exam  Constitutional:       Appearance: Normal appearance.   HENT:      Head: Normocephalic.      Right Ear: Tympanic membrane normal.      Left Ear: Tympanic membrane normal.      Nose: Nose normal.      Mouth/Throat:      Mouth: Mucous membranes are moist.   Eyes:      Extraocular Movements: Extraocular movements intact.      Pupils: Pupils are equal, round, and reactive to light.   Neck:      Vascular: No carotid bruit.   Cardiovascular:      Rate and Rhythm: Normal rate and regular rhythm.   Pulmonary:      Effort: Pulmonary effort is normal. No respiratory distress.      Breath sounds: Normal breath sounds. No stridor. No wheezing or rales.   Chest:      Chest wall: No tenderness.   Abdominal:      General: Abdomen is flat. There is no distension.      Palpations: Abdomen is soft. There is no mass.      Tenderness: There is no abdominal tenderness.   Musculoskeletal:      Cervical back: Normal range of motion and " neck supple. No rigidity or tenderness.   Lymphadenopathy:      Cervical: No cervical adenopathy.   Skin:     General: Skin is warm.      Capillary Refill: Capillary refill takes less than 2 seconds.   Neurological:      General: No focal deficit present.      Mental Status: He is alert and oriented to person, place, and time.      Comments: Distal and proximal strength 5/5 in upper and lower extremities. Normal gait. No dysmetria. No sensation deficits. No visual field deficits. Cranial nerves intact.        Psychiatric:         Mood and Affect: Mood normal.           EKG/LABS  Results for orders placed or performed during the hospital encounter of 05/02/25   CBC with Differential    Collection Time: 05/02/25  7:08 AM   Result Value Ref Range    WBC 8.2 4.8 - 10.8 K/uL    RBC 3.28 (L) 4.70 - 6.10 M/uL    Hemoglobin 12.1 (L) 14.0 - 18.0 g/dL    Hematocrit 33.6 (L) 42.0 - 52.0 %    .4 (H) 81.4 - 97.8 fL    MCH 36.9 (H) 27.0 - 33.0 pg    MCHC 36.0 32.3 - 36.5 g/dL    RDW 48.5 35.9 - 50.0 fL    Platelet Count 200 164 - 446 K/uL    MPV 9.0 9.0 - 12.9 fL    Neutrophils-Polys 65.30 44.00 - 72.00 %    Lymphocytes 24.20 22.00 - 41.00 %    Monocytes 5.60 0.00 - 13.40 %    Eosinophils 4.00 0.00 - 6.90 %    Basophils 0.70 0.00 - 1.80 %    Immature Granulocytes 0.20 0.00 - 0.90 %    Nucleated RBC 0.00 0.00 - 0.20 /100 WBC    Neutrophils (Absolute) 5.38 1.82 - 7.42 K/uL    Lymphs (Absolute) 1.99 1.00 - 4.80 K/uL    Monos (Absolute) 0.46 0.00 - 0.85 K/uL    Eos (Absolute) 0.33 0.00 - 0.51 K/uL    Baso (Absolute) 0.06 0.00 - 0.12 K/uL    Immature Granulocytes (abs) 0.02 0.00 - 0.11 K/uL    NRBC (Absolute) 0.00 K/uL   Complete Metabolic Panel (CMP)    Collection Time: 05/02/25  7:08 AM   Result Value Ref Range    Sodium 140 135 - 145 mmol/L    Potassium 3.4 (L) 3.6 - 5.5 mmol/L    Chloride 103 96 - 112 mmol/L    Co2 27 20 - 33 mmol/L    Anion Gap 10.0 7.0 - 16.0    Glucose 135 (H) 65 - 99 mg/dL    Bun 17 8 - 22 mg/dL     Creatinine 1.86 (H) 0.50 - 1.40 mg/dL    Calcium 8.8 8.5 - 10.5 mg/dL    Correct Calcium 8.8 8.5 - 10.5 mg/dL    AST(SGOT) 20 12 - 45 U/L    ALT(SGPT) 15 2 - 50 U/L    Alkaline Phosphatase 60 30 - 99 U/L    Total Bilirubin 0.9 0.1 - 1.5 mg/dL    Albumin 4.0 3.2 - 4.9 g/dL    Total Protein 6.9 6.0 - 8.2 g/dL    Globulin 2.9 1.9 - 3.5 g/dL    A-G Ratio 1.4 g/dL   proBrain Natriuretic Peptide, NT (BNP)    Collection Time: 25  7:08 AM   Result Value Ref Range    NT-proBNP 57 0 - 125 pg/mL   Troponins NOW    Collection Time: 25  7:08 AM   Result Value Ref Range    Troponin T 7 6 - 19 ng/L   ESTIMATED GFR    Collection Time: 25  7:08 AM   Result Value Ref Range    GFR (CKD-EPI) 41 (A) >60 mL/min/1.73 m 2   Troponins in two (2) hours    Collection Time: 25  9:27 AM   Result Value Ref Range    Troponin T <6 6 - 19 ng/L   EKG    Collection Time: 25 10:16 AM   Result Value Ref Range    Report       [facility] Emergency Dept.    Test Date:  2025  Pt Name:    MIRANDA REARDON                Department: ER  MRN:        [id number]                      Room:        13  Gender:     Male                         Technician: [id number]  :        [id number]                   Requested By:MINDI RODRIGUEZ  Order #:    [id number]                    Reading MD: Mallorie Crawley MD    Measurements  Intervals                                Axis  Rate:       56                           P:          26  NH:         160                          QRS:        83  QRSD:       94                           T:          46  QT:         435  QTc:        420    Interpretive Statements  Patient EKG is normal sinus rhythm, normal axis normal intervals no ST  changes consistent with acute regional ischemia  Electronically Signed On 2025 10:16:27 PDT by Mallorie Crawley MD         I have independently interpreted this EKG    RADIOLOGY/PROCEDURES   I have independently interpreted the diagnostic imaging  associated with this visit and am waiting the final reading from the radiologist.   My preliminary interpretation is as follows: Unremarkable chest x-ray    Radiologist interpretation:  CT-CTA NECK WITH & W/O-POST PROCESSING   Final Result      CT angiogram of the neck within normal limits.      DX-CHEST-PORTABLE (1 VIEW)   Final Result         1. No acute cardiopulmonary abnormalities identified.                           COURSE & MEDICAL DECISION MAKING    ASSESSMENT, COURSE AND PLAN  Care Narrative: Very well-appearing patient here with very reassuring exam.  This simply could be a recurrent vasovagal episode.  Given discharge and neck pain will check troponin.  This will need to be trended as patient is within few hours of onset.  EKG is very reassuring.  Patient will have CTA of his neck to evaluate for possible significant vascular abnormality as a cause of his symptoms.  Patient with recent very reassuring echocardiogram and telemetry monitoring admission.  Patient basic labs are all very reassuring.  Telemetry monitoring remains normal without any evidence of ectopy or arrhythmia.  Patient CTAs are unremarkable.  Patient remains very comfortable.  He was ambulated in the emergency department without any issue or imbalance.  Patient will turn his to his Zio patch today.  He remains with very reassuring exam.  Troponins remained flat.  Patient to follow-up with primary care provider and cardiology regarding his Zio patch results.    CHEST PAIN:   HEART Score for Major Cardiac Events  HEART Score     History: Slightly suspicious  ECG: Normal  Age: 45-64  Risk Factors: >2 risk factors or hx of atherosclerotic disease  Troponin: Less than or equal to normal limit    Heart Score: 3    Total Score   0-3 Points = Low Score, risk of MACE 0.9-1.7%.  4-6 Points = Moderate Score, risk of MACE 12-16.6%  7-10 Points = High Score, risk of MACE 50-65%              DISPOSITION AND DISCUSSIONS      Decision tools and  prescription drugs considered including, but not limited to: Heart score of 3    FINAL DIAGNOSIS  1. Dizziness

## 2025-05-09 ENCOUNTER — HOSPITAL ENCOUNTER (EMERGENCY)
Facility: MEDICAL CENTER | Age: 60
End: 2025-05-09
Attending: EMERGENCY MEDICINE
Payer: COMMERCIAL

## 2025-05-09 ENCOUNTER — APPOINTMENT (OUTPATIENT)
Dept: RADIOLOGY | Facility: MEDICAL CENTER | Age: 60
End: 2025-05-09
Attending: EMERGENCY MEDICINE
Payer: COMMERCIAL

## 2025-05-09 VITALS
OXYGEN SATURATION: 100 % | WEIGHT: 174 LBS | TEMPERATURE: 98 F | SYSTOLIC BLOOD PRESSURE: 135 MMHG | HEART RATE: 50 BPM | BODY MASS INDEX: 24.91 KG/M2 | DIASTOLIC BLOOD PRESSURE: 84 MMHG | HEIGHT: 70 IN | RESPIRATION RATE: 17 BRPM

## 2025-05-09 DIAGNOSIS — R42 DIZZINESS: ICD-10-CM

## 2025-05-09 DIAGNOSIS — R55 NEAR SYNCOPE: ICD-10-CM

## 2025-05-09 LAB
ANION GAP SERPL CALC-SCNC: 9 MMOL/L (ref 7–16)
BASOPHILS # BLD AUTO: 0.6 % (ref 0–1.8)
BASOPHILS # BLD: 0.05 K/UL (ref 0–0.12)
BUN SERPL-MCNC: 17 MG/DL (ref 8–22)
CALCIUM SERPL-MCNC: 9.1 MG/DL (ref 8.5–10.5)
CHLORIDE SERPL-SCNC: 104 MMOL/L (ref 96–112)
CO2 SERPL-SCNC: 26 MMOL/L (ref 20–33)
CREAT SERPL-MCNC: 1.72 MG/DL (ref 0.5–1.4)
EKG IMPRESSION: NORMAL
EOSINOPHIL # BLD AUTO: 0.19 K/UL (ref 0–0.51)
EOSINOPHIL NFR BLD: 2.2 % (ref 0–6.9)
ERYTHROCYTE [DISTWIDTH] IN BLOOD BY AUTOMATED COUNT: 47.1 FL (ref 35.9–50)
GFR SERPLBLD CREATININE-BSD FMLA CKD-EPI: 46 ML/MIN/1.73 M 2
GLUCOSE SERPL-MCNC: 106 MG/DL (ref 65–99)
HCT VFR BLD AUTO: 36.3 % (ref 42–52)
HGB BLD-MCNC: 12.9 G/DL (ref 14–18)
IMM GRANULOCYTES # BLD AUTO: 0.03 K/UL (ref 0–0.11)
IMM GRANULOCYTES NFR BLD AUTO: 0.3 % (ref 0–0.9)
LYMPHOCYTES # BLD AUTO: 1.28 K/UL (ref 1–4.8)
LYMPHOCYTES NFR BLD: 14.7 % (ref 22–41)
MCH RBC QN AUTO: 36 PG (ref 27–33)
MCHC RBC AUTO-ENTMCNC: 35.5 G/DL (ref 32.3–36.5)
MCV RBC AUTO: 101.4 FL (ref 81.4–97.8)
MONOCYTES # BLD AUTO: 0.37 K/UL (ref 0–0.85)
MONOCYTES NFR BLD AUTO: 4.2 % (ref 0–13.4)
NEUTROPHILS # BLD AUTO: 6.81 K/UL (ref 1.82–7.42)
NEUTROPHILS NFR BLD: 78 % (ref 44–72)
NRBC # BLD AUTO: 0 K/UL
NRBC BLD-RTO: 0 /100 WBC (ref 0–0.2)
PLATELET # BLD AUTO: 183 K/UL (ref 164–446)
PMV BLD AUTO: 8.6 FL (ref 9–12.9)
POTASSIUM SERPL-SCNC: 3.5 MMOL/L (ref 3.6–5.5)
RBC # BLD AUTO: 3.58 M/UL (ref 4.7–6.1)
SODIUM SERPL-SCNC: 139 MMOL/L (ref 135–145)
TROPONIN T SERPL-MCNC: <6 NG/L (ref 6–19)
WBC # BLD AUTO: 8.7 K/UL (ref 4.8–10.8)

## 2025-05-09 PROCEDURE — 93005 ELECTROCARDIOGRAM TRACING: CPT | Mod: TC | Performed by: EMERGENCY MEDICINE

## 2025-05-09 PROCEDURE — 700105 HCHG RX REV CODE 258: Mod: UD | Performed by: EMERGENCY MEDICINE

## 2025-05-09 PROCEDURE — 70498 CT ANGIOGRAPHY NECK: CPT

## 2025-05-09 PROCEDURE — 85025 COMPLETE CBC W/AUTO DIFF WBC: CPT

## 2025-05-09 PROCEDURE — 84484 ASSAY OF TROPONIN QUANT: CPT

## 2025-05-09 PROCEDURE — 700117 HCHG RX CONTRAST REV CODE 255: Performed by: EMERGENCY MEDICINE

## 2025-05-09 PROCEDURE — 80048 BASIC METABOLIC PNL TOTAL CA: CPT

## 2025-05-09 PROCEDURE — 99284 EMERGENCY DEPT VISIT MOD MDM: CPT

## 2025-05-09 PROCEDURE — 93005 ELECTROCARDIOGRAM TRACING: CPT | Mod: TC

## 2025-05-09 PROCEDURE — 36415 COLL VENOUS BLD VENIPUNCTURE: CPT

## 2025-05-09 PROCEDURE — 96360 HYDRATION IV INFUSION INIT: CPT

## 2025-05-09 RX ORDER — SODIUM CHLORIDE 9 MG/ML
1000 INJECTION, SOLUTION INTRAVENOUS ONCE
Status: COMPLETED | OUTPATIENT
Start: 2025-05-09 | End: 2025-05-09

## 2025-05-09 RX ADMIN — SODIUM CHLORIDE 1000 ML: 9 INJECTION, SOLUTION INTRAVENOUS at 12:26

## 2025-05-09 RX ADMIN — IOHEXOL 80 ML: 350 INJECTION, SOLUTION INTRAVENOUS at 13:45

## 2025-05-09 ASSESSMENT — FIBROSIS 4 INDEX: FIB4 SCORE: 1.47

## 2025-05-09 NOTE — ED PROVIDER NOTES
ER Provider Note    Scribed for Sharif Feilciano M.D. by Anaid Fregoso. 5/9/2025   10:10 AM    Primary Care Provider: Thad Cao M.D.    CHIEF COMPLAINT  Chief Complaint   Patient presents with    Near Syncopal     Pt WC to triage for above. Reports feeling like he was going to pass out. Denies LOC. Reports intermittent dizziness. Denies chest pain or SOB.      EXTERNAL RECORDS REVIEWED  Outpatient Notes The patient was seen here 5 days ago for dizziness. Basic metabolic panel was reassuring. Telemetry was normal without any evidence of ectopy or arrhythmia. CTA's were unremarkable. Patient turned in his Zio patch today.    HPI/ROS  LIMITATION TO HISTORY   Select: : None  OUTSIDE HISTORIAN(S):  None    Paul Hopper is a 59 y.o. male who presents to the ED for evaluation of near syncopal episode. The patient reports that he was seen here last week for the same thing and has been seen here multiple times for the same thing. Patient notes that he has a Zio-patch and had come back to [facility] to return it and was told to keep it and give it to his cardiologist. He reports that the Zio-patch is at home now. Patient also notes that he has been having some posterior neck swelling, adding that when he looks down he will feel lightheaded. 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 When the patient's head is up, he reports that he will sometimes feel lightheaded. Denies any loss of consciousness. Denies any chest pain or shortness of breath. Denies any history of diabetes. The patient has no known drug allergies.     PAST MEDICAL HISTORY  Past Medical History:   Diagnosis Date    Chickenpox     CVA (cerebral vascular accident) (HCC)     Hypertension     MI (myocardial infarction) (HCC)      SURGICAL HISTORY  History reviewed. No pertinent surgical history.    FAMILY HISTORY  History reviewed. No pertinent family history.    SOCIAL HISTORY    "reports that he has been smoking cigarettes. He has never used smokeless tobacco. He reports current alcohol use of about 1.2 oz of alcohol per week. He reports that he does not currently use drugs.    CURRENT MEDICATIONS  Previous Medications    AMLODIPINE (NORVASC) 10 MG TAB    Take 1 Tablet by mouth every day.    ASPIRIN 81 MG EC TABLET    Take 1 Tablet by mouth every day.    ATORVASTATIN (LIPITOR) 40 MG TAB    Take 1 Tablet by mouth every evening.    LOSARTAN (COZAAR) 50 MG TAB    Take 1 Tablet by mouth every day.       ALLERGIES  Allergies   Allergen Reactions    Other Food Anaphylaxis     All fresh fruit causes throat swelling per brother.         PHYSICAL EXAM  /74   Pulse (!) 51   Temp 36.5 °C (97.7 °F) (Temporal)   Resp 16   Ht 1.778 m (5' 10\")   Wt 78.9 kg (174 lb)   SpO2 95%   BMI 24.97 kg/m²      Nursing note and vitals reviewed.  Constitutional: Well-developed and well-nourished. No distress.   HENT: Head is normocephalic and atraumatic. Oropharynx is clear and moist without exudate or erythema. No ptosis.  No bruit noted.  Eyes: Pupils are equal, round, and reactive to light. Conjunctiva are normal.   Cardiovascular: Normal rate and regular rhythm. No murmur heard. Normal radial pulses.  Pulmonary/Chest: Breath sounds normal. No wheezes or rales.   Abdominal: Soft and non-tender. No distention    Musculoskeletal: Extremities exhibit normal range of motion without edema or tenderness.   Neurological: Awake, alert and oriented to person, place, and time. No focal deficits noted.  Skin: Skin is warm and dry. No rash.   Psychiatric: Normal mood and affect. Appropriate for clinical situation    DIAGNOSTIC STUDIES    Labs:   Results for orders placed or performed during the hospital encounter of 05/09/25   EKG    Collection Time: 05/09/25  9:50 AM   Result Value Ref Range    Report       [facility] Emergency Dept.    Test Date:  2025-05-09  Pt Name:    MIRANDA REARDON           "      Department: ER  MRN:        [id number]                      Room:  Gender:     Male                         Technician: [id number]  :        1965                   Requested By:ER TRIAGE PROTOCOL  Order #:    [id number]                    Reading MD: JAQUI WARD MD    Measurements  Intervals                                Axis  Rate:       47                           P:          41  WV:         157                          QRS:        -9  QRSD:       99                           T:          25  QT:         445  QTc:        394    Interpretive Statements  Sinus bradycardia  Compared to ECG 2025 07:49:14  Sinus rhythm no longer present  ST (T wave) deviation no longer present  Possible ischemia no longer present  Electronically Signed On 2025 09:50:02 PDT by JAQUI WARD MD     CBC WITH DIFFERENTIAL    Collection Time: 25 10:32 AM   Result Value Ref Range    WBC 8.7 4.8 - 10.8 K/uL    RBC 3.58 (L) 4.70 - 6.10 M/uL    Hemoglobin 12.9 (L) 14.0 - 18.0 g/dL    Hematocrit 36.3 (L) 42.0 - 52.0 %    .4 (H) 81.4 - 97.8 fL    MCH 36.0 (H) 27.0 - 33.0 pg    MCHC 35.5 32.3 - 36.5 g/dL    RDW 47.1 35.9 - 50.0 fL    Platelet Count 183 164 - 446 K/uL    MPV 8.6 (L) 9.0 - 12.9 fL    Neutrophils-Polys 78.00 (H) 44.00 - 72.00 %    Lymphocytes 14.70 (L) 22.00 - 41.00 %    Monocytes 4.20 0.00 - 13.40 %    Eosinophils 2.20 0.00 - 6.90 %    Basophils 0.60 0.00 - 1.80 %    Immature Granulocytes 0.30 0.00 - 0.90 %    Nucleated RBC 0.00 0.00 - 0.20 /100 WBC    Neutrophils (Absolute) 6.81 1.82 - 7.42 K/uL    Lymphs (Absolute) 1.28 1.00 - 4.80 K/uL    Monos (Absolute) 0.37 0.00 - 0.85 K/uL    Eos (Absolute) 0.19 0.00 - 0.51 K/uL    Baso (Absolute) 0.05 0.00 - 0.12 K/uL    Immature Granulocytes (abs) 0.03 0.00 - 0.11 K/uL    NRBC (Absolute) 0.00 K/uL   BASIC METABOLIC PANEL    Collection Time: 25 10:32 AM   Result Value Ref Range    Sodium 139 135 - 145 mmol/L    Potassium 3.5 (L) 3.6 - 5.5 mmol/L     Chloride 104 96 - 112 mmol/L    Co2 26 20 - 33 mmol/L    Glucose 106 (H) 65 - 99 mg/dL    Bun 17 8 - 22 mg/dL    Creatinine 1.72 (H) 0.50 - 1.40 mg/dL    Calcium 9.1 8.5 - 10.5 mg/dL    Anion Gap 9.0 7.0 - 16.0   TROPONIN    Collection Time: 05/09/25 10:32 AM   Result Value Ref Range    Troponin T <6 6 - 19 ng/L   ESTIMATED GFR    Collection Time: 05/09/25 10:32 AM   Result Value Ref Range    GFR (CKD-EPI) 46 (A) >60 mL/min/1.73 m 2     EKG:   I have independently interpreted this EKG as detailed above.     Radiology:   This attending emergency physician has independently interpreted the diagnostic imaging associated with this visit and is awaiting the final reading from the radiologist.   Preliminary interpretation is a follows: CTA of the neck showed no significant hemodynamically significant stenosis    Radiologist interpretation:   CT-CTA NECK WITH & W/O-POST PROCESSING   Final Result      1.  Scattered atherosclerosis without occlusion or hemodynamically significant stenosis of the neck arteries.           ASSESSMENT AND PLAN    10:10 AM - Patient was evaluated at bedside. Discussed the plan of care, including ordering imaging, labs and EKG to further evaluate. Ordered for CT-CTA neck with & w/o-post processing, CBC with diff, Basic metabolic panel, Troponin, EKG to evaluate. Patient verbalizes understanding and support with my plan of care. Differential diagnoses include but not limited to: arrhythmia, anemia, electrolyte abnormality, carotid artery stenosis.     Hydration: Based on the patient's presentation of Other symptomatic treatment of lightheadedness the patient was given IV fluids. IV Hydration was used because oral hydration was not adequate alone. Upon recheck following hydration, the patient was improved.    Patient will continue to follow-up with cardiology as planned.  He will return his Zio patch for evaluation.  Encouraged the patient to call cardiology clinic to see if they can see him more  promptly.  Here the patient's workup is reassuring.  No significant anemia or electrolyte abnormality.  EKG shows no arrhythmia.  CT scan unremarkable as above.  Patient may have carotid bulb sensitivity given his symptoms with flexion of the neck.    1:36 PM - Patient was reevaluated at bedside. Discussed lab, radiology and EKG results with the patient. I discussed plan for discharge and follow up as outlined below. The patient is stable for discharge at this time and will return for any new or worsening symptoms. Patient verbalizes understanding and support with my plan for discharge.      DISPOSITION AND DISCUSSIONS    I have discussed management of the patient with the following physicians and JIM's:  None    Discussion of management with other [facility] or appropriate source(s): None       The patient will return for new or worsening symptoms and is stable at the time of discharge.    The patient is referred to a primary physician for blood pressure management, diabetic screening, and for all other preventative health concerns.    DISPOSITION:  Patient will be discharged home in stable condition.    FOLLOW UP:  Thad Cao M.D.  [address] [facility] [id number] [phone]    Schedule an appointment as soon as possible for a visit       [facility], Emergency Dept  [address] [facility] [address] [phone]    If symptoms worsen      FINAL DIAGNOSIS  1. Near syncope    2. Dizziness         Anaid COLUNGA (Scribe), am scribing for, and in the presence of, Sharif Feliciano M.D..    Electronically signed by: Anaid Nye), 5/9/2025    Sharif COLUNGA M.D. personally performed the services described in this documentation, as scribed by Anaid Fregoso in my presence, and it is both accurate and complete.      The note accurately reflects work and decisions made by me.  Sharif Feliciano M.D.  5/9/2025  2:27 PM

## 2025-05-09 NOTE — ED TRIAGE NOTES
Chief Complaint   Patient presents with    Near Syncopal     Pt WC to triage for above. Reports feeling like he was going to pass out. Denies LOC. Reports intermittent dizziness. Denies chest pain or SOB.      Pt arrives for above.     Pt seen here last week and last month for the same. Pt states they did not find anything. Pt sent home with zio patch, states he is supposed to turn it in when he sees the cardiologist at the end of the month.     Pt A&Ox4, GCS 15 in triage, educated on triage process, returned to ED lobby.

## 2025-05-28 NOTE — PROGRESS NOTES
Medical decision making:  -Fortunately, patient is doing much better today and has not had any syncopal or presyncopal episodes since being in the ED on 5/9.   -Unfortunately, he has not turned in his Zio patch. Apparently there was some confusion about what he should do with it. May also be some element of cognitive impairment. He told me he will mail it off today.  -Blood pressure is well-controlled consistently under 130/80 at home. Continue amlodipine 5 mg and losartan 25 mg. Patient has plenty of refills.  -Cholesterol well-controlled LDL 34  -I will see him back in 1 month after Zio patch has been read.    Problem list:  1. Primary hypertension    2. Cerebrovascular accident (CVA), unspecified mechanism (HCC)    3. Syncope and collapse    4. Stage 3a chronic kidney disease       Chief Complaint:   Chief Complaint   Patient presents with    Follow-Up     Primary hypertension      Hyperlipidemia       History of Present Illness:  Paul Hopper is a 59 y.o. male with PMH of stress cardiomyopathy in 2022 in the setting of acute CVA, CKD, HTN, and prior syncope in 2023 who returns for hospital follow up after syncopal episode on 4/12/25.    He had recurrent syncope, 2 episodes total on 4/12/25. Both associated with symptoms that could be vasovagal with diaphoresis, and sensation that he needed to go to the bathroom. Both times he was in a situation where he could not use evasive maneuvers. Discharged home after short stay in the hospital with 2 weeks Zio patch monitor. Was resumed on blood pressure medications at this time including amlodipine 5 mg and losartan 25 mg.  Unfortunately, we do not have the results of his cardiac monitor at time of his appointment. He will mail today.     Since being seen in the hospital in April he has had 2 ED visits on 5/2/2025 and 5/9/2025 both for dizziness, lightheadedness, presyncopal episodes. Both ED workups were benign and patient was sent home and told to follow-up  with cardiology. Per these notes patient seems to be confused about what to do with the Zio patch as he has been holding onto it for much longer than instructed and still had it with him on 5/9/2025.    Today he is feeling well, doing much better than he was when he was in the hospital. Has not had an syncopal or presyncopal episode since 5/9. States his BP has been much better controlled with consistent readings under 130/80 at home.     No family history of premature coronary artery disease.  No prior smoking history.  No history of diabetes.  No history of autoimmune disease such as lupus or rheumatoid arthritis.  No history of chest radiation or cardiotoxic chemotherapy.  No ETOH overuse.  Drinks 2-3 sodas a day with occasional energy drink  No recreation substance use.  No hx asthma.  Covid, maybe 90% better. Has headaches.     Not limited by chest pain, pressure or tightness with activity.  No significant dyspnea on exertion, orthopnea or lower extremity swelling.  No significant palpitations, lightheadedness.  No symptoms of leg claudication.  Residual right extremity weakness related to stroke    Wt Readings from Last 5 Encounters:   05/29/25 80.3 kg (177 lb)   05/09/25 78.9 kg (174 lb)   05/02/25 78.9 kg (174 lb)   04/29/25 78.9 kg (174 lb)   04/22/25 82.1 kg (181 lb)     DATA REVIEWED by me:  ECG   5/9/25  Sinus bradycardia   Compared to ECG 05/02/2025 07:49:14   Sinus rhythm no longer present   ST (T wave) deviation no longer present   Possible ischemia no longer present   5/2/25  Patient EKG is normal sinus rhythm, normal axis normal intervals no ST   changes consistent with acute regional ischemia   4/12/25  Sinus bradycardia   LVH with secondary repolarization abnormality   Compared to ECG 02/26/2024 16:36:20   Left ventricular hypertrophy now present   Early repolarization now present     Echo  4/13/25  Left ventricular systolic function is normal.  No significant valvular disease.     Stress  "test  2/27/24  NUCLEAR IMAGING INTERPRETATION   No evidence of significant jeopardized viable myocardium or prior myocardial    infarction.   Normal left ventricular size, ejection fraction, and wall motion.  ECG INTERPRETATION   Nondiagnostic stress ECG with Regadenoson.    Most recent labs:       Lab Results   Component Value Date/Time    WBC 8.7 05/09/2025 10:32 AM    HEMOGLOBIN 12.9 (L) 05/09/2025 10:32 AM    HEMATOCRIT 36.3 (L) 05/09/2025 10:32 AM    .4 (H) 05/09/2025 10:32 AM    INR 1.22 (H) 03/11/2023 12:02 PM      Lab Results   Component Value Date/Time    SODIUM 139 05/09/2025 10:32 AM    POTASSIUM 3.5 (L) 05/09/2025 10:32 AM    CHLORIDE 104 05/09/2025 10:32 AM    CO2 26 05/09/2025 10:32 AM    GLUCOSE 106 (H) 05/09/2025 10:32 AM    BUN 17 05/09/2025 10:32 AM    CREATININE 1.72 (H) 05/09/2025 10:32 AM      Lab Results   Component Value Date/Time    ASTSGOT 20 05/02/2025 07:08 AM    ALTSGPT 15 05/02/2025 07:08 AM    ALBUMIN 4.0 05/02/2025 07:08 AM      Lab Results   Component Value Date/Time    CHOLSTRLTOT 76 (L) 04/29/2025 09:54 AM    LDL 34 04/29/2025 09:54 AM    HDL 33 (A) 04/29/2025 09:54 AM    TRIGLYCERIDE 45 04/29/2025 09:54 AM     No results for input(s): \"NTPROBNP\", \"TROPONINT\" in the last 72 hours.      Past Medical History[1]  Past Surgical History[2]  No family history on file.  Social History     Socioeconomic History    Marital status: Single     Spouse name: Not on file    Number of children: Not on file    Years of education: Not on file    Highest education level: Not on file   Occupational History    Not on file   Tobacco Use    Smoking status: Some Days     Types: Cigarettes    Smokeless tobacco: Never   Vaping Use    Vaping status: Not on file   Substance and Sexual Activity    Alcohol use: Not Currently     Alcohol/week: 1.2 oz     Types: 2 Standard drinks or equivalent per week     Comment: OCC    Drug use: Not Currently     Comment: marijuana    Sexual activity: Not on file " "  Other Topics Concern    Not on file   Social History Narrative    Not on file     Social Drivers of Health     Financial Resource Strain: Not on file   Food Insecurity: No Food Insecurity (4/12/2025)    Hunger Vital Sign     Worried About Running Out of Food in the Last Year: Never true     Ran Out of Food in the Last Year: Never true   Transportation Needs: No Transportation Needs (4/12/2025)    PRAPARE - Transportation     Lack of Transportation (Medical): No     Lack of Transportation (Non-Medical): No   Physical Activity: Not on file   Stress: Not on file   Social Connections: Not on file   Intimate Partner Violence: Not At Risk (4/12/2025)    Humiliation, Afraid, Rape, and Kick questionnaire     Fear of Current or Ex-Partner: No     Emotionally Abused: No     Physically Abused: No     Sexually Abused: No   Housing Stability: Low Risk  (4/12/2025)    Housing Stability Vital Sign     Unable to Pay for Housing in the Last Year: No     Number of Times Moved in the Last Year: 0     Homeless in the Last Year: No     Allergies[3]    Current Medications[4]    ROS  All others systems reviewed and negative.    /78 (BP Location: Left arm, Patient Position: Sitting, BP Cuff Size: Adult)   Pulse 70   Resp 18   Ht 1.778 m (5' 10\")   Wt 80.3 kg (177 lb)   SpO2 98%  Body mass index is 25.4 kg/m².    General: No acute distress. Well nourished.  HEENT: EOM grossly intact, no scleral icterus, no pharyngeal erythema.   Neck:  No JVD, no bruits, trachea midline  CVS: RRR. Normal S1, S2. No M/R/G. No LE edema.  2+ radial pulses, 2+ PT pulses  Resp: CTAB. No wheezing or crackles/rhonchi. Normal respiratory effort.  Abdomen: Soft, NT, no latha hepatomegaly.  MSK/Ext: No clubbing or cyanosis.  Skin: Warm and dry, no rashes.  Neurological: CN III-XII grossly intact. No focal deficits.   Psych: A&O x 3, appropriate affect, good judgement      Return in about 4 weeks (around 6/26/2025) for Zio patch discussion .    It is my " pleasure to participate in the care of Mr. Hopper.  Please do not hesitate to contact me with questions or concerns.    Brown Tillman PA-C  [facility]    Please note that this dictation was created using voice recognition software. I have made every reasonable attempt to correct obvious errors, but it is possible there are errors of grammar and possibly content that I did not discover before finalizing the note.         [1]   Past Medical History:  Diagnosis Date    Chickenpox     CVA (cerebral vascular accident) (HCC)     Hypertension     MI (myocardial infarction) (HCC)    [2] No past surgical history on file.  [3]   Allergies  Allergen Reactions    Other Food Anaphylaxis     All fresh fruit causes throat swelling per brother.    [4]   Current Outpatient Medications   Medication Sig Dispense Refill    losartan (COZAAR) 50 MG Tab Take 1 Tablet by mouth every day. 100 Tablet 3    amLODIPine (NORVASC) 10 MG Tab Take 1 Tablet by mouth every day. 100 Tablet 3    aspirin 81 MG EC tablet Take 1 Tablet by mouth every day. 100 Tablet 3    atorvastatin (LIPITOR) 40 MG Tab Take 1 Tablet by mouth every evening. 100 Tablet 3     No current facility-administered medications for this visit.

## 2025-05-28 NOTE — PROGRESS NOTES
14 day Zio placed by [facility], patient has not sent monitor back and did not read instructions about the monitor. He will bring in on 05/29/25 appt with [facility].

## 2025-05-29 ENCOUNTER — OFFICE VISIT (OUTPATIENT)
Dept: CARDIOLOGY | Facility: MEDICAL CENTER | Age: 60
End: 2025-05-29
Payer: COMMERCIAL

## 2025-05-29 VITALS
SYSTOLIC BLOOD PRESSURE: 126 MMHG | HEIGHT: 70 IN | WEIGHT: 177 LBS | BODY MASS INDEX: 25.34 KG/M2 | DIASTOLIC BLOOD PRESSURE: 78 MMHG | OXYGEN SATURATION: 98 % | HEART RATE: 70 BPM | RESPIRATION RATE: 18 BRPM

## 2025-05-29 DIAGNOSIS — R55 SYNCOPE AND COLLAPSE: ICD-10-CM

## 2025-05-29 DIAGNOSIS — I63.9 CEREBROVASCULAR ACCIDENT (CVA), UNSPECIFIED MECHANISM (HCC): ICD-10-CM

## 2025-05-29 DIAGNOSIS — I10 PRIMARY HYPERTENSION: Primary | ICD-10-CM

## 2025-05-29 DIAGNOSIS — N18.31 STAGE 3A CHRONIC KIDNEY DISEASE: ICD-10-CM

## 2025-05-29 PROCEDURE — 99212 OFFICE O/P EST SF 10 MIN: CPT

## 2025-05-29 ASSESSMENT — FIBROSIS 4 INDEX: FIB4 SCORE: 1.61

## 2025-06-02 ENCOUNTER — APPOINTMENT (OUTPATIENT)
Dept: MEDICAL GROUP | Facility: CLINIC | Age: 60
End: 2025-06-02
Payer: COMMERCIAL

## 2025-06-02 VITALS
RESPIRATION RATE: 12 BRPM | HEART RATE: 64 BPM | WEIGHT: 172 LBS | OXYGEN SATURATION: 97 % | TEMPERATURE: 97.9 F | SYSTOLIC BLOOD PRESSURE: 114 MMHG | BODY MASS INDEX: 24.62 KG/M2 | DIASTOLIC BLOOD PRESSURE: 77 MMHG | HEIGHT: 70 IN

## 2025-06-02 DIAGNOSIS — I10 PRIMARY HYPERTENSION: Primary | ICD-10-CM

## 2025-06-02 PROCEDURE — 99213 OFFICE O/P EST LOW 20 MIN: CPT | Performed by: FAMILY MEDICINE

## 2025-06-02 PROCEDURE — 3078F DIAST BP <80 MM HG: CPT | Performed by: FAMILY MEDICINE

## 2025-06-02 PROCEDURE — 3074F SYST BP LT 130 MM HG: CPT | Performed by: FAMILY MEDICINE

## 2025-06-02 ASSESSMENT — FIBROSIS 4 INDEX: FIB4 SCORE: 1.61

## 2025-06-02 NOTE — PROGRESS NOTES
"Subjective:   CC: Follow-up hypertension    HPI:     Problem   Primary Hypertension    The patient comes in to follow-up hypertension.  He brings in his medications today.  He states he is compliant with them.  He was seen in the emergency room a month ago for presyncopal episode and had a negative workup.  He is also subsequently followed up with cardiology.  He reports to me that he has mailed in his Zio patch but there are no results yet.  He states he feels well and has no complaints today.  His labs were reviewed from last month showing mild but improved anemia, stable chronic kidney disease.  He also had imaging of his neck which showed no significant stenosis.         Current Medications and Prescriptions Ordered in Epic[1]      ROS:  Gen: no fevers/chills  Pulm: no sob, no cough  CV: no chest pain, no palpitations  GI: no nausea/vomiting, no diarrhea        Objective:     Exam:  /77 (BP Location: Left arm, Patient Position: Sitting, BP Cuff Size: Adult)   Pulse 64   Temp 36.6 °C (97.9 °F) (Temporal)   Resp 12   Ht 1.778 m (5' 10\")   Wt 78 kg (172 lb)   SpO2 97%   BMI 24.68 kg/m²  Body mass index is 24.68 kg/m².    Gen: Alert and oriented, No apparent distress.  Neck: Neck is supple without lymphadenopathy.  Lungs: Normal effort, CTA bilaterally, no wheezes, rhonchi, or rales  CV: Regular rate and rhythm. No murmurs, rubs, or gallops.  Ext: No edema.      Assessment & Plan:     59 y.o. male with the following -     Problem List Items Addressed This Visit       Primary hypertension - Primary    His blood pressure is well-controlled.  He does continue to have at least presyncope.  I am not sure if this relates to hydration while he is working?  We did discuss this.  He does have pretty poor fluid intake while at work but we are going to work on that.  Follow-up with me in 3 months.  Follow-up with cardiology next month.                  Return in about 3 months (around 9/2/2025).               [1] "   Current Outpatient Medications Ordered in Epic   Medication Sig Dispense Refill    losartan (COZAAR) 50 MG Tab Take 1 Tablet by mouth every day. 100 Tablet 3    amLODIPine (NORVASC) 10 MG Tab Take 1 Tablet by mouth every day. 100 Tablet 3    aspirin 81 MG EC tablet Take 1 Tablet by mouth every day. 100 Tablet 3    atorvastatin (LIPITOR) 40 MG Tab Take 1 Tablet by mouth every evening. 100 Tablet 3     No current Epic-ordered facility-administered medications on file.

## 2025-06-02 NOTE — ASSESSMENT & PLAN NOTE
His blood pressure is well-controlled.  He does continue to have at least presyncope.  I am not sure if this relates to hydration while he is working?  We did discuss this.  He does have pretty poor fluid intake while at work but we are going to work on that.  Follow-up with me in 3 months.  Follow-up with cardiology next month.

## 2025-06-10 ENCOUNTER — TELEPHONE (OUTPATIENT)
Dept: CARDIOLOGY | Facility: MEDICAL CENTER | Age: 60
End: 2025-06-10
Payer: COMMERCIAL

## 2025-06-11 ENCOUNTER — RESULTS FOLLOW-UP (OUTPATIENT)
Dept: CARDIOLOGY | Facility: MEDICAL CENTER | Age: 60
End: 2025-06-11
Payer: COMMERCIAL

## 2025-06-11 NOTE — TELEPHONE ENCOUNTER
Great news, your Zio patch heart monitor came back with no significant arrhythmia that would explain your symptoms. Therefore, there is nothing to change on our end for now. Please let me know if you need anything in the future.     Best,  Brown Tillman P.A.-C.

## 2025-06-12 NOTE — TELEPHONE ENCOUNTER
----- Message from Physician Assistant Brown Tillman P.A.-C. sent at 6/11/2025  1:49 PM PDT -----       Great news, your Zio patch heart monitor came back with no significant arrhythmia that would explain your symptoms. Therefore, there is nothing to change on our end for now. Please let me know if you   need anything in the future.      Best,  Brown Tillman P.A.-C.        ----- Message -----  From: Sheila Loredo  Sent: 6/10/2025   1:26 PM PDT  To: Brown Tillman P.A.-C.

## 2025-06-12 NOTE — TELEPHONE ENCOUNTER
Phone Number Called: [phone]    Call outcome: Spoke to patient regarding message below.    Message: Called to discuss heart monitor results. Pt would like to still see ZO for a follow-up. He will schedule.

## 2025-06-18 ENCOUNTER — APPOINTMENT (OUTPATIENT)
Dept: RADIOLOGY | Facility: MEDICAL CENTER | Age: 60
End: 2025-06-18
Attending: EMERGENCY MEDICINE
Payer: COMMERCIAL

## 2025-06-18 ENCOUNTER — HOSPITAL ENCOUNTER (OUTPATIENT)
Facility: MEDICAL CENTER | Age: 60
End: 2025-06-19
Attending: EMERGENCY MEDICINE | Admitting: FAMILY MEDICINE
Payer: COMMERCIAL

## 2025-06-18 DIAGNOSIS — R07.9 CHEST PAIN, UNSPECIFIED TYPE: Primary | ICD-10-CM

## 2025-06-18 DIAGNOSIS — I16.0 HYPERTENSIVE URGENCY: ICD-10-CM

## 2025-06-18 LAB
ALBUMIN SERPL BCP-MCNC: 3.9 G/DL (ref 3.2–4.9)
ALBUMIN/GLOB SERPL: 1.6 G/DL
ALP SERPL-CCNC: 72 U/L (ref 30–99)
ALT SERPL-CCNC: 12 U/L (ref 2–50)
ANION GAP SERPL CALC-SCNC: 10 MMOL/L (ref 7–16)
AST SERPL-CCNC: 14 U/L (ref 12–45)
BASOPHILS # BLD AUTO: 0.6 % (ref 0–1.8)
BASOPHILS # BLD: 0.05 K/UL (ref 0–0.12)
BILIRUB SERPL-MCNC: 0.9 MG/DL (ref 0.1–1.5)
BUN SERPL-MCNC: 9 MG/DL (ref 8–22)
CALCIUM ALBUM COR SERPL-MCNC: 8.6 MG/DL (ref 8.5–10.5)
CALCIUM SERPL-MCNC: 8.5 MG/DL (ref 8.5–10.5)
CHLORIDE SERPL-SCNC: 103 MMOL/L (ref 96–112)
CO2 SERPL-SCNC: 25 MMOL/L (ref 20–33)
CREAT SERPL-MCNC: 1.58 MG/DL (ref 0.5–1.4)
EKG IMPRESSION: NORMAL
EOSINOPHIL # BLD AUTO: 0.11 K/UL (ref 0–0.51)
EOSINOPHIL NFR BLD: 1.3 % (ref 0–6.9)
ERYTHROCYTE [DISTWIDTH] IN BLOOD BY AUTOMATED COUNT: 44.9 FL (ref 35.9–50)
GFR SERPLBLD CREATININE-BSD FMLA CKD-EPI: 50 ML/MIN/1.73 M 2
GLOBULIN SER CALC-MCNC: 2.5 G/DL (ref 1.9–3.5)
GLUCOSE SERPL-MCNC: 146 MG/DL (ref 65–99)
HCT VFR BLD AUTO: 34.2 % (ref 42–52)
HGB BLD-MCNC: 12.6 G/DL (ref 14–18)
IMM GRANULOCYTES # BLD AUTO: 0.04 K/UL (ref 0–0.11)
IMM GRANULOCYTES NFR BLD AUTO: 0.5 % (ref 0–0.9)
LYMPHOCYTES # BLD AUTO: 1.27 K/UL (ref 1–4.8)
LYMPHOCYTES NFR BLD: 15.4 % (ref 22–41)
MCH RBC QN AUTO: 36 PG (ref 27–33)
MCHC RBC AUTO-ENTMCNC: 36.8 G/DL (ref 32.3–36.5)
MCV RBC AUTO: 97.7 FL (ref 81.4–97.8)
MONOCYTES # BLD AUTO: 0.38 K/UL (ref 0–0.85)
MONOCYTES NFR BLD AUTO: 4.6 % (ref 0–13.4)
NEUTROPHILS # BLD AUTO: 6.42 K/UL (ref 1.82–7.42)
NEUTROPHILS NFR BLD: 77.6 % (ref 44–72)
NRBC # BLD AUTO: 0 K/UL
NRBC BLD-RTO: 0 /100 WBC (ref 0–0.2)
NT-PROBNP SERPL IA-MCNC: 58 PG/ML (ref 0–125)
PLATELET # BLD AUTO: 165 K/UL (ref 164–446)
PMV BLD AUTO: 8.4 FL (ref 9–12.9)
POTASSIUM SERPL-SCNC: 3.3 MMOL/L (ref 3.6–5.5)
PROT SERPL-MCNC: 6.4 G/DL (ref 6–8.2)
RBC # BLD AUTO: 3.5 M/UL (ref 4.7–6.1)
SODIUM SERPL-SCNC: 138 MMOL/L (ref 135–145)
TROPONIN T SERPL-MCNC: 6 NG/L (ref 6–19)
TROPONIN T SERPL-MCNC: <6 NG/L (ref 6–19)
WBC # BLD AUTO: 8.3 K/UL (ref 4.8–10.8)

## 2025-06-18 PROCEDURE — G0378 HOSPITAL OBSERVATION PER HR: HCPCS

## 2025-06-18 PROCEDURE — 700111 HCHG RX REV CODE 636 W/ 250 OVERRIDE (IP): Mod: UD | Performed by: EMERGENCY MEDICINE

## 2025-06-18 PROCEDURE — 84484 ASSAY OF TROPONIN QUANT: CPT | Mod: 91

## 2025-06-18 PROCEDURE — 71045 X-RAY EXAM CHEST 1 VIEW: CPT

## 2025-06-18 PROCEDURE — 36415 COLL VENOUS BLD VENIPUNCTURE: CPT

## 2025-06-18 PROCEDURE — 93005 ELECTROCARDIOGRAM TRACING: CPT | Mod: TC | Performed by: EMERGENCY MEDICINE

## 2025-06-18 PROCEDURE — 700111 HCHG RX REV CODE 636 W/ 250 OVERRIDE (IP): Mod: JZ,UD

## 2025-06-18 PROCEDURE — 80053 COMPREHEN METABOLIC PANEL: CPT

## 2025-06-18 PROCEDURE — 700102 HCHG RX REV CODE 250 W/ 637 OVERRIDE(OP): Mod: UD

## 2025-06-18 PROCEDURE — 83880 ASSAY OF NATRIURETIC PEPTIDE: CPT

## 2025-06-18 PROCEDURE — A9270 NON-COVERED ITEM OR SERVICE: HCPCS | Mod: UD

## 2025-06-18 PROCEDURE — 96374 THER/PROPH/DIAG INJ IV PUSH: CPT

## 2025-06-18 PROCEDURE — 700102 HCHG RX REV CODE 250 W/ 637 OVERRIDE(OP): Performed by: EMERGENCY MEDICINE

## 2025-06-18 PROCEDURE — 96372 THER/PROPH/DIAG INJ SC/IM: CPT | Mod: XU

## 2025-06-18 PROCEDURE — A9270 NON-COVERED ITEM OR SERVICE: HCPCS | Performed by: EMERGENCY MEDICINE

## 2025-06-18 PROCEDURE — RXMED WILLOW AMBULATORY MEDICATION CHARGE

## 2025-06-18 PROCEDURE — 99285 EMERGENCY DEPT VISIT HI MDM: CPT

## 2025-06-18 PROCEDURE — 85025 COMPLETE CBC W/AUTO DIFF WBC: CPT

## 2025-06-18 RX ORDER — MIDAZOLAM HYDROCHLORIDE 1 MG/ML
1 INJECTION INTRAMUSCULAR; INTRAVENOUS ONCE
Status: DISCONTINUED | OUTPATIENT
Start: 2025-06-18 | End: 2025-06-18

## 2025-06-18 RX ORDER — LOSARTAN POTASSIUM 50 MG/1
50 TABLET ORAL DAILY
Status: DISCONTINUED | OUTPATIENT
Start: 2025-06-18 | End: 2025-06-19 | Stop reason: HOSPADM

## 2025-06-18 RX ORDER — AMLODIPINE BESYLATE 10 MG/1
10 TABLET ORAL DAILY
Status: DISCONTINUED | OUTPATIENT
Start: 2025-06-18 | End: 2025-06-19 | Stop reason: HOSPADM

## 2025-06-18 RX ORDER — ONDANSETRON 2 MG/ML
4 INJECTION INTRAMUSCULAR; INTRAVENOUS ONCE
Status: COMPLETED | OUTPATIENT
Start: 2025-06-18 | End: 2025-06-18

## 2025-06-18 RX ORDER — OMEPRAZOLE 20 MG/1
20 CAPSULE, DELAYED RELEASE ORAL DAILY
Qty: 30 CAPSULE | Refills: 0 | Status: SHIPPED | OUTPATIENT
Start: 2025-06-18

## 2025-06-18 RX ORDER — ASPIRIN 325 MG
325 TABLET ORAL ONCE
Status: COMPLETED | OUTPATIENT
Start: 2025-06-18 | End: 2025-06-18

## 2025-06-18 RX ORDER — ACETAMINOPHEN 325 MG/1
650 TABLET ORAL EVERY 6 HOURS PRN
Status: DISCONTINUED | OUTPATIENT
Start: 2025-06-18 | End: 2025-06-19 | Stop reason: HOSPADM

## 2025-06-18 RX ORDER — ATORVASTATIN CALCIUM 40 MG/1
40 TABLET, FILM COATED ORAL EVERY EVENING
Status: DISCONTINUED | OUTPATIENT
Start: 2025-06-18 | End: 2025-06-19 | Stop reason: HOSPADM

## 2025-06-18 RX ORDER — ENOXAPARIN SODIUM 100 MG/ML
40 INJECTION SUBCUTANEOUS DAILY
Status: DISCONTINUED | OUTPATIENT
Start: 2025-06-18 | End: 2025-06-19 | Stop reason: HOSPADM

## 2025-06-18 RX ORDER — OMEPRAZOLE 20 MG/1
20 CAPSULE, DELAYED RELEASE ORAL DAILY
Status: DISCONTINUED | OUTPATIENT
Start: 2025-06-18 | End: 2025-06-19 | Stop reason: HOSPADM

## 2025-06-18 RX ADMIN — ENOXAPARIN SODIUM 40 MG: 100 INJECTION SUBCUTANEOUS at 17:14

## 2025-06-18 RX ADMIN — ATORVASTATIN CALCIUM 40 MG: 40 TABLET, FILM COATED ORAL at 17:15

## 2025-06-18 RX ADMIN — ONDANSETRON 4 MG: 2 INJECTION INTRAMUSCULAR; INTRAVENOUS at 07:00

## 2025-06-18 RX ADMIN — LIDOCAINE HYDROCHLORIDE 30 ML: 20 SOLUTION ORAL; TOPICAL at 08:26

## 2025-06-18 RX ADMIN — AMLODIPINE BESYLATE 10 MG: 10 TABLET ORAL at 13:51

## 2025-06-18 RX ADMIN — ASPIRIN 325 MG: 325 TABLET ORAL at 08:26

## 2025-06-18 RX ADMIN — LOSARTAN POTASSIUM 50 MG: 50 TABLET, FILM COATED ORAL at 13:51

## 2025-06-18 ASSESSMENT — COGNITIVE AND FUNCTIONAL STATUS - GENERAL
SUGGESTED CMS G CODE MODIFIER DAILY ACTIVITY: CH
SUGGESTED CMS G CODE MODIFIER MOBILITY: CH
DAILY ACTIVITIY SCORE: 24
MOBILITY SCORE: 24

## 2025-06-18 ASSESSMENT — LIFESTYLE VARIABLES
TOTAL SCORE: 0
HOW MANY TIMES IN THE PAST YEAR HAVE YOU HAD 5 OR MORE DRINKS IN A DAY: 0
CONSUMPTION TOTAL: NEGATIVE
AVERAGE NUMBER OF DAYS PER WEEK YOU HAVE A DRINK CONTAINING ALCOHOL: 0
HAVE YOU EVER FELT YOU SHOULD CUT DOWN ON YOUR DRINKING: NO
EVER FELT BAD OR GUILTY ABOUT YOUR DRINKING: NO
DOES PATIENT WANT TO STOP DRINKING: NO
ALCOHOL_USE: YES
TOTAL SCORE: 0
TOTAL SCORE: 0
HAVE PEOPLE ANNOYED YOU BY CRITICIZING YOUR DRINKING: NO
ON A TYPICAL DAY WHEN YOU DRINK ALCOHOL HOW MANY DRINKS DO YOU HAVE: 0
EVER HAD A DRINK FIRST THING IN THE MORNING TO STEADY YOUR NERVES TO GET RID OF A HANGOVER: NO

## 2025-06-18 ASSESSMENT — SOCIAL DETERMINANTS OF HEALTH (SDOH)
WITHIN THE LAST YEAR, HAVE YOU BEEN KICKED, HIT, SLAPPED, OR OTHERWISE PHYSICALLY HURT BY YOUR PARTNER OR EX-PARTNER?: PATIENT DECLINED
WITHIN THE LAST YEAR, HAVE YOU BEEN AFRAID OF YOUR PARTNER OR EX-PARTNER?: PATIENT DECLINED
IN THE PAST 12 MONTHS, HAS THE ELECTRIC, GAS, OIL, OR WATER COMPANY THREATENED TO SHUT OFF SERVICE IN YOUR HOME?: NO
WITHIN THE LAST YEAR, HAVE TO BEEN RAPED OR FORCED TO HAVE ANY KIND OF SEXUAL ACTIVITY BY YOUR PARTNER OR EX-PARTNER?: PATIENT DECLINED
WITHIN THE LAST YEAR, HAVE YOU BEEN HUMILIATED OR EMOTIONALLY ABUSED IN OTHER WAYS BY YOUR PARTNER OR EX-PARTNER?: PATIENT DECLINED
WITHIN THE PAST 12 MONTHS, YOU WORRIED THAT YOUR FOOD WOULD RUN OUT BEFORE YOU GOT THE MONEY TO BUY MORE: NEVER TRUE
WITHIN THE PAST 12 MONTHS, THE FOOD YOU BOUGHT JUST DIDN'T LAST AND YOU DIDN'T HAVE MONEY TO GET MORE: NEVER TRUE

## 2025-06-18 ASSESSMENT — HEART SCORE
HEART SCORE: 4
TROPONIN: LESS THAN OR EQUAL TO NORMAL LIMIT
HISTORY: MODERATELY SUSPICIOUS
AGE: 45-64
RISK FACTORS: >2 RISK FACTORS OR HX OF ATHEROSCLEROTIC DISEASE
ECG: NORMAL

## 2025-06-18 ASSESSMENT — PAIN DESCRIPTION - PAIN TYPE
TYPE: ACUTE PAIN
TYPE: ACUTE PAIN

## 2025-06-18 ASSESSMENT — FIBROSIS 4 INDEX
FIB4 SCORE: 1.61
FIB4 SCORE: 1.4

## 2025-06-18 NOTE — ED NOTES
Bedside report received from off going RN/tech: Nila, assumed care of patient.  POC discussed with patient. Call light within reach, all needs addressed at this time.       Fall risk interventions in place: Patient's personal possessions are with in their safe reach, Give patient urinal if applicable, Keep floor surfaces clean and dry, and Accompanied to restroom (all applicable per [facility] Fall risk assessment)   Continuous monitoring: Cardiac Leads, Pulse Ox, or Blood Pressure  IVF/IV medications: Not Applicable   Oxygen: Room Air  Bedside sitter: Not Applicable   Isolation: Not Applicable

## 2025-06-18 NOTE — H&P
[facility]     PATIENT ID:  NAME:  Paul Hopper  MRN:               [id number]  YOB: 1965    Date of Admission: 6/18/2025     Attending: Joe Lynch MD    Resident: De Gaines MD    Primary Care Physician:  Thad Cao M.D.    CC:    Chief Complaint   Patient presents with    Chest Pain     Patient reports waking up at 0100 with epigastric and chest pain. Patient also complaining of nausea and vomiting. Attempted taking ASA at home but vomited all the ASA. EMS gave 0.4 Nitro.       HPI: Paul Hopper is a 59 y.o. male with PMH of CVA, reported MI, B12/folate deficiency, macrocytic anemia, hyperlipidemia, hypothyroidism, left ventricular hypokinesis on echocardiogram, hypertension, 3A CKD who presented 6/18 with chest pain with associated nausea and vomiting that woke him up at roughly 0100.  The patient attempted to take an aspirin but had an episode of emesis mediately following, round 4 to 5 AM when the pain was continuing he decided to present to the ED via EMS.  He reports that the chest pain is largely resolved at this point.  He reports most of the pain was epigastric but does endorse some pain in the chest as well.  He is unclear whether there is any radiation to the back but thinks possibly there was.  Before going to bed he had [employer] chicken sandwich and egg rolls for dinner.  He reports this does not feel like his prior MI    ROS notable for epigastric and chest pain, emesis with nausea.    ROS negative for headache, fever, chills, shortness of breath, diarrhea, rashes, sick contacts    ERCourse:  Afebrile, heart rate 46-62, RR 16-20 on room air 98+ percent, 117-164/70-90.  He received a GI cocktail, 325 mg of aspirin, and Zofran.  No leukocytosis, H/H 12.6/34.6, platelets 165.  CMP notable for potassium of 3.3, creatinine of 1.58 with a GFR of 50 and a BUN of 9 and within normal limits AST, ALT, alkaline phosphatase and total bili.   "Initial troponin of 6, follow-up troponin less than 6, BNP 58.  CXR with no acute cardiopulmonary disease appreciated.  EKG demonstrates NSR with a rate of 51, normal QRS, normal intervals, no ST segment elevation or depression, normal T waves and overall no acute ischemic changes appreciated    REVIEW OF SYSTEMS:   Ten systems reviewed and were negative except as noted in the HPI.                PAST MEDICAL HISTORY:  Past Medical History[1]    PAST SURGICAL HISTORY:  Past Surgical History[2]    FAMILY HISTORY:  History reviewed. No pertinent family history.    SOCIAL HISTORY:   Pt lives in [place], works at [employer]  Smoking denies  Etoh use currently denies, does report that he used to drink much more frequently  Drug use denies including marijuana    DIET:   Orders Placed This Encounter   Procedures    Diet Order Diet: Cardiac     Standing Status:   Standing     Number of Occurrences:   1     Diet::   Cardiac [6]       ALLERGIES:  Allergies[3]    OUTPATIENT MEDICATIONS:  Medications - Current, Listed Continuously[4]    PHYSICAL EXAM:  Vitals:    25 1103 25 1111 25 1123 25 1351   BP: (!) 170/105 (!) 184/94 (!) 178/96 (!) 189/86   Pulse: (!) 55  62    Resp: 16  16    Temp:   36.6 °C (97.9 °F)    TempSrc:   Temporal    SpO2: 95%  95%    Weight:   78.6 kg (173 lb 4.5 oz)    Height:   1.778 m (5' 10\")    , Temp (24hrs), Av.8 °C (98.2 °F), Min:36.6 °C (97.9 °F), Max:36.9 °C (98.4 °F)  , Pulse Oximetry: 95 %, O2 (LPM): 0, O2 Delivery Device: None - Room Air    General: Pt resting in NAD, cooperative   Skin:  Pink, warm and dry.  No rashes  HEENT: NC/AT. EOMI. MMM. No nasal discharge. Oropharynx nonerythematous without exudate/plaques  Neck:  Supple without lymphadenopathy or rigidity.  Lungs:  Symmetrical.  CTAB with no adventitious breath sounds.  Good air movement   Cardiovascular:  Normal S1/S2, RRR without M/R/G.  Abdomen:  BS+, Soft, nondistended, no masses appreciated, tenderness to deep " palpation in the epigastric region  Extremities:  Full range of motion. No gross deformities noted. 2+ radial pulses   : No CVA tenderness  CNS:  A&Ox4, follows commands         LAB TESTS:   Recent Labs     06/18/25  0636   WBC 8.3   RBC 3.50*   HEMOGLOBIN 12.6*   HEMATOCRIT 34.2*   MCV 97.7   MCH 36.0*   RDW 44.9   PLATELETCT 165   MPV 8.4*   NEUTSPOLYS 77.60*   LYMPHOCYTES 15.40*   MONOCYTES 4.60   EOSINOPHILS 1.30   BASOPHILS 0.60         Recent Labs     06/18/25  0636   SODIUM 138   POTASSIUM 3.3*   CHLORIDE 103   CO2 25   BUN 9   CREATININE 1.58*   CALCIUM 8.5   ALBUMIN 3.9       CULTURES:   Results       ** No results found for the last 168 hours. **            IMAGES:  DX-CHEST-PORTABLE (1 VIEW)   Final Result         1.  No acute cardiopulmonary disease.          CONSULTS:   None    ASSESSMENT/PLAN: 59 y.o. male admitted for ACS rule out after waking up with chest pain epigastric pain with associated nausea and vomiting; reassuring EKG, troponin trend and improvement following GI cocktail.    Chest pain  #Epigastric pain  #Nausea with emesis  Patient presents after being awoken at 0100 6/18 with chest pain associated epigastric pain with nausea and vomiting.  Night prior he had fast food egg rolls and chicken burger.  Had 1 episode of emesis.  During history taking he reports this did not feel like prior MI.  Initial troponin 6, follow-up troponin less than 6.  EKG nonischemic.  Minimal electrolyte derangements aside from mild hypokalemia of 3.3, otherwise laboratories reassuring.  Exam also reassuring.  I discussed with patient his reassuring workup and provided an opportunity for overnight admission and observation versus discharge later this afternoon after observing during the day; patient and this provider using shared decision making decided to move forward with discharge later this afternoon.  -EKG reassuring and nonischemic  -Troponins negative x 2  -Improvement of symptoms with GI  cocktail  -Shared decision making: Plan for discharge this afternoon if observation continues to be reassuring  -Recommend follow-up with PCP within a week to discuss possible PPI treatment, will be started on omeprazole at discharge  -Recommend improved diet, no fluids 2 hours prior to bed      Core Measures:  Fluids: P.o.  Lines: PIV  Abx: None  Diet: Cardiac  PPX: Lovenox  DISPO: Likely afternoon discharge    CODE STATUS: Full    De Gaines MD  PGY2  [facility]         [1]   Past Medical History:  Diagnosis Date    Chickenpox     CVA (cerebral vascular accident) (HCC)     Hypertension     MI (myocardial infarction) (HCC)    [2] History reviewed. No pertinent surgical history.  [3]   Allergies  Allergen Reactions    Other Food Anaphylaxis     All fresh fruit causes throat swelling per brother.    [4]   Current Facility-Administered Medications:     amLODIPine (Norvasc) tablet 10 mg, 10 mg, Oral, DAILY, De Gaines M.D., 10 mg at 06/18/25 1351    atorvastatin (Lipitor) tablet 40 mg, 40 mg, Oral, Q EVENING, De Gaines M.D.    losartan (Cozaar) tablet 50 mg, 50 mg, Oral, DAILY, De Gaines M.D., 50 mg at 06/18/25 1351    enoxaparin (Lovenox) inj 40 mg, 40 mg, Subcutaneous, DAILY AT 1800, De Gaines M.D.    acetaminophen (Tylenol) tablet 650 mg, 650 mg, Oral, Q6HRS PRN, De Gaines M.D.    [Held by provider] omeprazole (PriLOSEC) capsule 20 mg, 20 mg, Oral, DAILY, De Gaines M.D.

## 2025-06-18 NOTE — ED NOTES
Pharmacy Medication Reconciliation      ~Medication reconciliation updated and complete per patient at bedside & patient home pharmacy   ~Allergies have been verified and updated   ~No oral ABX within the last 30 days  ~Is dispense history available in EPIC: yes   ~Patient home pharmacy :  [facility]       ~Anticoagulants (rivaroxaban, apixaban, edoxaban, dabigatran, warfarin, enoxaparin) taken in the last 14 days? No

## 2025-06-18 NOTE — ED TRIAGE NOTES
"Chief Complaint   Patient presents with    Chest Pain     Patient reports waking up at 0100 with epigastric and chest pain. Patient also complaining of nausea and vomiting. Attempted taking ASA at home but vomited all the ASA. EMS gave 0.4 Nitro.       Pt is alert and oriented, speaking in full sentences, follows commands and responds appropriately to questions. Resperations are even and unlabored.         Patient and staff wearing appropriate PPE.    Ht 1.778 m (5' 10\")   Wt 79.4 kg (175 lb)    "

## 2025-06-18 NOTE — ED NOTES
Patient transported to T217 with transport tech. All belongings in possession left with patient.

## 2025-06-18 NOTE — ED PROVIDER NOTES
ED Provider Note    CHIEF COMPLAINT  Chief Complaint   Patient presents with    Chest Pain     Patient reports waking up at 0100 with epigastric and chest pain. Patient also complaining of nausea and vomiting. Attempted taking ASA at home but vomited all the ASA. EMS gave 0.4 Nitro.       HPI/ROS    Paul Hopper is a 59 y.o. male who presents with chest pain.  The patient states he awoke around 1 AM with substernal chest discomfort.  Describes it is epigastric pain.  He has not had any associated apnea nor diet.  He did have associated nausea and vomiting.  EMS did administer aspirin nitroglycerin.  He states he have cardiac disease.  He denies recent heavy alcohol consumption.  Does not have any pain or swelling to his lower extremities.    PAST MEDICAL HISTORY   has a past medical history of Chickenpox, CVA (cerebral vascular accident) (HCC), Hypertension, and MI (myocardial infarction) (HCC).    SURGICAL HISTORY  patient denies any surgical history    FAMILY HISTORY  History reviewed. No pertinent family history.    SOCIAL HISTORY  Social History     Tobacco Use    Smoking status: Some Days     Types: Cigarettes    Smokeless tobacco: Never   Vaping Use    Vaping status: Not on file   Substance and Sexual Activity    Alcohol use: Not Currently     Alcohol/week: 1.2 oz     Types: 2 Standard drinks or equivalent per week     Comment: OCC    Drug use: Not Currently     Comment: marijuana    Sexual activity: Not on file       CURRENT MEDICATIONS  Home Medications       Reviewed by Benjamin Cleveland R.N. (Registered Nurse) on 06/18/25 at 0660  Med List Status: Not Addressed     Medication Last Dose Status   amLODIPine (NORVASC) 10 MG Tab  Active   aspirin 81 MG EC tablet  Active   atorvastatin (LIPITOR) 40 MG Tab  Active   losartan (COZAAR) 50 MG Tab  Active                  Audit from Redirected Encounters    **Home medications have not yet been reviewed for this encounter**         ALLERGIES  Allergies[1]    PHYSICAL  "EXAM  VITAL SIGNS: /70   Pulse (!) 49   Temp 36.9 °C (98.4 °F)   Resp 20   Ht 1.778 m (5' 10\")   Wt 79.4 kg (175 lb)   SpO2 98%   BMI 25.11 kg/m²    In general the patient appears uncomfortable    HEENT unremarkable    Pulmonary the patient's lungs are clear to auscultation bilaterally    Cardiovascular S1-S2 with a regular rate and rhythm    GI the patient does have some epigastric discomfort with no distention    Skin no rashes, pallor, no jaundice    Extremities no distal edema    Neurologic examination is grossly intact    EKG/LABS  Results for orders placed or performed during the hospital encounter of 06/18/25   CBC with Differential    Collection Time: 06/18/25  6:36 AM   Result Value Ref Range    WBC 8.3 4.8 - 10.8 K/uL    RBC 3.50 (L) 4.70 - 6.10 M/uL    Hemoglobin 12.6 (L) 14.0 - 18.0 g/dL    Hematocrit 34.2 (L) 42.0 - 52.0 %    MCV 97.7 81.4 - 97.8 fL    MCH 36.0 (H) 27.0 - 33.0 pg    MCHC 36.8 (H) 32.3 - 36.5 g/dL    RDW 44.9 35.9 - 50.0 fL    Platelet Count 165 164 - 446 K/uL    MPV 8.4 (L) 9.0 - 12.9 fL    Neutrophils-Polys 77.60 (H) 44.00 - 72.00 %    Lymphocytes 15.40 (L) 22.00 - 41.00 %    Monocytes 4.60 0.00 - 13.40 %    Eosinophils 1.30 0.00 - 6.90 %    Basophils 0.60 0.00 - 1.80 %    Immature Granulocytes 0.50 0.00 - 0.90 %    Nucleated RBC 0.00 0.00 - 0.20 /100 WBC    Neutrophils (Absolute) 6.42 1.82 - 7.42 K/uL    Lymphs (Absolute) 1.27 1.00 - 4.80 K/uL    Monos (Absolute) 0.38 0.00 - 0.85 K/uL    Eos (Absolute) 0.11 0.00 - 0.51 K/uL    Baso (Absolute) 0.05 0.00 - 0.12 K/uL    Immature Granulocytes (abs) 0.04 0.00 - 0.11 K/uL    NRBC (Absolute) 0.00 K/uL   Complete Metabolic Panel (CMP)    Collection Time: 06/18/25  6:36 AM   Result Value Ref Range    Sodium 138 135 - 145 mmol/L    Potassium 3.3 (L) 3.6 - 5.5 mmol/L    Chloride 103 96 - 112 mmol/L    Co2 25 20 - 33 mmol/L    Anion Gap 10.0 7.0 - 16.0    Glucose 146 (H) 65 - 99 mg/dL    Bun 9 8 - 22 mg/dL    Creatinine 1.58 (H) 0.50 " - 1.40 mg/dL    Calcium 8.5 8.5 - 10.5 mg/dL    Correct Calcium 8.6 8.5 - 10.5 mg/dL    AST(SGOT) 14 12 - 45 U/L    ALT(SGPT) 12 2 - 50 U/L    Alkaline Phosphatase 72 30 - 99 U/L    Total Bilirubin 0.9 0.1 - 1.5 mg/dL    Albumin 3.9 3.2 - 4.9 g/dL    Total Protein 6.4 6.0 - 8.2 g/dL    Globulin 2.5 1.9 - 3.5 g/dL    A-G Ratio 1.6 g/dL   proBrain Natriuretic Peptide, NT (BNP)    Collection Time: 25  6:36 AM   Result Value Ref Range    NT-proBNP 58 0 - 125 pg/mL   Troponins NOW    Collection Time: 25  6:36 AM   Result Value Ref Range    Troponin T 6 6 - 19 ng/L   ESTIMATED GFR    Collection Time: 25  6:36 AM   Result Value Ref Range    GFR (CKD-EPI) 50 (A) >60 mL/min/1.73 m 2   EKG    Collection Time: 25  8:08 AM   Result Value Ref Range    Report       [facility] Emergency Dept.    Test Date:  2025  Pt Name:    MIRANDA REARDON                Department: ER  MRN:        [id number]                      Room:       [facility]  Gender:     Male                         Technician:  :        1965                   Requested By:ER TRIAGE PROTOCOL  Order #:    [id number]                    Reading MD: PAMELA KNIGHT MD    Measurements  Intervals                                Axis  Rate:       51                           P:          0  FL:         0                            QRS:        -3  QRSD:       99                           T:          33  QT:         474  QTc:        437    Interpretive Statements  twelve-lead EKG shows a normal sinus rhythm with a ventricular rate of 51,  normal QRS, normal intervals, no ST segment elevation or depression, normal T  waves.  Overall no acute ischemic change  Electronically Signed On 2025 08:08:16 PDT by PAMELA KNIGHT MD         I have independently interpreted this EKG    RADIOLOGY/PROCEDURES     Radiologist interpretation:  DX-CHEST-PORTABLE (1 VIEW)   Final Result         1.  No acute cardiopulmonary disease.         HEART Score: 4      COURSE & MEDICAL DECISION MAKING    This a 59-year-old male who presents to the emerged part with chest discomfort his EKG and troponin does not support ischemia.  He does have significant risk factors including known coronary disease.  He has a heart score of 4.  He does have significant nausea and vomiting.  Gastritis and esophagitis would be in the differential.  The patient did receive aspirin prior to arrival but he did have some emesis.  His emesis has been controlled with Zofran and will rebolus the patient with aspirin.  Also administer GI cocktail for potential esophagitis.  At the time of admission he is resting comfortably.  Will admit the patient to the CDU for further cardiac rule out.    FINAL DIAGNOSIS  Chest pain    Disposition  The patient will be admitted in stable condition     Electronically signed by: Nik Pina M.D., 6/18/2025 6:30 AM           [1]   Allergies  Allergen Reactions    Other Food Anaphylaxis     All fresh fruit causes throat swelling per brother.

## 2025-06-18 NOTE — ASSESSMENT & PLAN NOTE
#Epigastric pain  #Nausea with emesis  Patient presents after being awoken at 0100 6/18 with chest pain associated epigastric pain with nausea and vomiting.  Night prior he had fast food egg rolls and chicken burger.  Had 1 episode of emesis.  During history taking he reports this did not feel like prior MI.  Initial troponin 6, follow-up troponin less than 6.  EKG nonischemic.  Minimal electrolyte derangements aside from mild hypokalemia of 3.3, otherwise laboratories reassuring.  Exam also reassuring.  I discussed with patient his reassuring workup and provided an opportunity for overnight admission and observation versus discharge later this afternoon after observing during the day; patient and this provider using shared decision making decided to move forward with discharge later this afternoon.  -EKG reassuring and nonischemic  -Troponins negative x 2  -Improvement of symptoms with GI cocktail  -Shared decision making: Plan for discharge this afternoon if observation continues to be reassuring  -Recommend follow-up with PCP within a week to discuss possible PPI treatment, will be started on omeprazole at discharge  -Recommend improved diet, no fluids 2 hours prior to bed

## 2025-06-18 NOTE — H&P
[facility] MEDICINE HISTORY AND PHYSICAL     PATIENT ID:  NAME:  Paul Hopper  MRN:               [id number]  YOB: 1965    Date of Admission: 6/18/2025       Primary Care Physician:  Thad Cao M.D.    CC: Chest pain    HPI: Paul Hopper is a 59 y.o. male who presented with chest pain that started at 1am 6/18/25. He initially took and aspirin to try and relieve the pain, subsequently calling EMS at 5am after the pain did not subside. He states that his appetite has decreased since Monday and he had his first big meal last night which was 3 egg rolls and a chicken sandwich from [employer]. He has never had pain like this before and the pain does not feel similar to his previous heart attack. As he was describing his chest pain he was pointing to his epigastrium and said that it was all the way down to his umbilicus. Upon further questioning he stated that he has had some back pain that started around the same time as his stomach pain. He had one episode of emesis prior to EMS arrival that he describes as minimal in volume and light green. He denies any chest pain, nausea, diarrhea, HA, changes in vision, shoulder or jaw pain, or fever and chills.    REVIEW OF SYSTEMS:   Ten systems reviewed and were negative except as noted in the HPI.                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  Smoking- Denies  Etoh use- Denies  Drug use- Denies    DIET:   Orders  "Placed This Encounter   Procedures    Diet Order Diet: Cardiac     Standing Status:   Standing     Number of Occurrences:   1     Diet::   Cardiac [6]       ALLERGIES:  NKDA    OUTPATIENT MEDICATIONS:  Amlodipine 10 mg  Aspirin 81 mg  Atorvastatin 40 mg  Losartan 50 mg    PHYSICAL EXAM:  Vitals:    25 1103 25 1111 25 1123 25 1351   BP: (!) 170/105 (!) 184/94 (!) 178/96 (!) 189/86   Pulse: (!) 55  62    Resp: 16  16    Temp:   36.6 °C (97.9 °F)    TempSrc:   Temporal    SpO2: 95%  95%    Weight:   78.6 kg (173 lb 4.5 oz)    Height:   1.778 m (5' 10\")    , Temp (24hrs), Av.8 °C (98.2 °F), Min:36.6 °C (97.9 °F), Max:36.9 °C (98.4 °F)  , Pulse Oximetry: 95 %, O2 (LPM): 0, O2 Delivery Device: None - Room Air    General: Pt resting in NAD, cooperative   Skin:  Pink, warm and dry.  No rashes  HEENT: NC/AT. PERRL. EOMI. MMM. No nasal discharge.   Lungs:  Symmetrical.  CTAB with no adventitious breath sounds.  Good air movement   Cardiovascular:  Normal S1/S2, RRR systolic murmur appreciated  Abdomen:  Normal bowel sounds, heart sounds heard during abdominal auscultation soft, non distended, epigastric pain with palpation, negative kaplan sign  Extremities:  Full range of motion. No gross deformities noted. 2+ pulses in UE and LE  CNS:  A&Ox4, non focal       ERCourse:  Pt received aspirin and nitro en route with EMS, Hb 12.6, Hct 34.2, MCV 97.7, K 3.3, Glucose 146, Creatinine 1.58, BUN 9. BNP 58, Troponin 6, EKG showed sinus rhythm with no signs of acute ischemia. CXR showed no acute cardiopulmonary disease. Pt received zofran, and GI cocktail     LAB TESTS:   Recent Labs     25  0636   WBC 8.3   RBC 3.50*   HEMOGLOBIN 12.6*   HEMATOCRIT 34.2*   MCV 97.7   MCH 36.0*   RDW 44.9   PLATELETCT 165   MPV 8.4*   NEUTSPOLYS 77.60*   LYMPHOCYTES 15.40*   MONOCYTES 4.60   EOSINOPHILS 1.30   BASOPHILS 0.60         Recent Labs     25  0636   SODIUM 138   POTASSIUM 3.3*   CHLORIDE 103   CO2 25 "   BUN 9   CREATININE 1.58*   CALCIUM 8.5   ALBUMIN 3.9       IMAGES:  CXR: No acute cardiopulmonary abnormality    CONSULTS:   None    ASSESSMENT/PLAN: 59 y.o. male admitted for ACS rule out    #Chest pain  Patient presents with cc of chest pain. After interview and exam pain more likely to be gastrointestinal. Pt had a large greasy meal the night prior to presentation and described burning stomach pain during interview. He states that this pain did not feel similar to his previous MI. ACS workup in the ED included an EKG that showed normal sinus rhythm and no acute ischemic changes. Troponin and repeat troponin WNL and denial of CP, SOB, diaphoresis, or radiating pain increase suspicion for gastrointestinal pain rather than cardiovascular etiology. Other differentials considered were pancreatitis, aortic dissection, cholecystitis. Given fatty meal from last night, resolution of pain, lack of multiple risk factors for AAA, and negative [place] sign, gastritis/PUD remains at the top of the differential.  -Pain resolution with GI cocktail  -Recommend starting PPI and limiting large, fatty meals  -Discussed return precautions of symptoms that would be concerning for cardiac etiology which include but are not limited to; substernal chest pain, radiating pain to shoulder or jaw, diaphoresis, tachycardia/bradycardia, or symptoms that feel similar to previous MI.    #Hypertension  Pt has chronic hypertension for which he is on Amlodipine, and Losartan. He monitors his blood pressures at home and states that they are normally in the 120's systolic over low to mid 70's diastolic  -BP at bedside was 164/90   -Pt stated that he takes his blood pressure meds in the morning and at night and did not miss his dose last night  -Recommend continued monitoring of blood pressure at home  -Pt sees cardiology and is supposed to see them in the next coming weeks    Core Measures:  Fluids: PO  Lines: PIV  Abx: None   Diet: Regular  PPX:  None  DISPO: Discharge home given relief of symptoms and low suspicion of ACS    CODE STATUS: Full      Oscar Villalobos, Student

## 2025-06-18 NOTE — PROGRESS NOTES
4 Eyes Skin Assessment Completed by OBEY Pugh and OBEY Goldsmith.    Skin assessment is primarily focused on high risk bony prominences. Pay special attention to skin beneath and around medical devices, high risk bony prominences, skin to skin areas and areas where the patient lacks sensation to feel pain and areas where the patient previously had breakdown.     Head (Occipital):  WDL   Ears (Under Medical Devices): WDL   Nose (Under Medical Devices): WDL   Mouth:  WDL   Neck: WDL   Breast/Chest:  WDL   Shoulder Blades:  WDL   Spine:   WDL   (R) Arm/Elbow/Hand: WDL   (L) Arm/Elbow/Hand: WDL   Abdomen: WDL   Pannus/Groin:  WDL   Sacrum/Coccyx:   WDL   (R) Ischial Tuberosity (Sit Bones):  WDL   (L) Ischial Tuberosity (Sit Bones):  WDL   (R) Leg:  WDL   (L) Leg:  WDL   (R) Heel:  WDL   (R) Foot/Toe: Dry   (L) Heel: WDL   (L) Foot/Toe:  Dry       DEVICES IN USE:   Respiratory Devices:  NA, patient on room air  Feeding Devices:  N/A   Lines & BP Monitoring Devices:  Peripheral IV, BP cuff, and Pulse ox    Orthopedic Devices:  N/A  Miscellaneous Devices:  N/A    PROTOCOL INTERVENTIONS:   Standard/Trauma Bed:  Already in place    WOUND PHOTOS:   N/A no wounds identified    WOUND CONSULT:   N/A, no advanced wound care needs identified

## 2025-06-19 ENCOUNTER — PHARMACY VISIT (OUTPATIENT)
Dept: PHARMACY | Facility: MEDICAL CENTER | Age: 60
End: 2025-06-19
Payer: COMMERCIAL

## 2025-06-19 VITALS
BODY MASS INDEX: 24.81 KG/M2 | HEART RATE: 61 BPM | DIASTOLIC BLOOD PRESSURE: 73 MMHG | TEMPERATURE: 98.2 F | WEIGHT: 173.28 LBS | SYSTOLIC BLOOD PRESSURE: 123 MMHG | OXYGEN SATURATION: 95 % | HEIGHT: 70 IN | RESPIRATION RATE: 14 BRPM

## 2025-06-19 PROCEDURE — A9270 NON-COVERED ITEM OR SERVICE: HCPCS | Mod: UD

## 2025-06-19 PROCEDURE — 700102 HCHG RX REV CODE 250 W/ 637 OVERRIDE(OP): Mod: UD

## 2025-06-19 PROCEDURE — 99239 HOSP IP/OBS DSCHRG MGMT >30: CPT | Performed by: INTERNAL MEDICINE

## 2025-06-19 PROCEDURE — RXMED WILLOW AMBULATORY MEDICATION CHARGE: Performed by: INTERNAL MEDICINE

## 2025-06-19 PROCEDURE — G0378 HOSPITAL OBSERVATION PER HR: HCPCS

## 2025-06-19 RX ORDER — ASPIRIN 81 MG/1
81 TABLET ORAL DAILY
Qty: 100 TABLET | Refills: 3 | Status: SHIPPED | OUTPATIENT
Start: 2025-06-19

## 2025-06-19 RX ORDER — ATORVASTATIN CALCIUM 40 MG/1
40 TABLET, FILM COATED ORAL EVERY EVENING
Qty: 100 TABLET | Refills: 3 | Status: SHIPPED | OUTPATIENT
Start: 2025-06-19 | End: 2026-07-24

## 2025-06-19 RX ADMIN — LOSARTAN POTASSIUM 50 MG: 50 TABLET, FILM COATED ORAL at 05:41

## 2025-06-19 RX ADMIN — AMLODIPINE BESYLATE 10 MG: 10 TABLET ORAL at 05:41

## 2025-06-19 ASSESSMENT — PAIN DESCRIPTION - PAIN TYPE: TYPE: ACUTE PAIN

## 2025-06-19 NOTE — PROGRESS NOTES
Discharge orders received.  Patient arrived to the discharge lounge.  PIV removed by discharge RN. Meds to beds medications verified by discharge RN, bag of medications given to patient.  Instructions given, medications reviewed and general discharge education provided to patient.  Follow up appointments discussed.  Patient verbalized understanding of dc instructions and prescriptions.  Patient signed discharge instructions.  Patient verbalized he had all belongings with him, Denied having any home medications locked in our inpatient pharmacy that  he needs back. Patient ambulated with steady gait from discharge lounge to private vehicle. Patient left via car with friend to home in stable condition.

## 2025-06-19 NOTE — PROGRESS NOTES
Monitor summary: SR/SB 52-80, NJ 0.14, QRS 0.09, QT 0.54 with frequent PVCs and rare couplets sec per strip from monitor room.

## 2025-06-19 NOTE — PROGRESS NOTES
Bedside shift report received from Ernestina night shift RN. Patient A&Ox4, in bed resting comfortably. Bed in lowest, locked position with call light and belongings within reach. Fall precautions reviewed with patient. Patient updated on POC.

## 2025-06-19 NOTE — CARE PLAN
The patient is Stable - Low risk of patient condition declining or worsening    Shift Goals  Clinical Goals: monitor bp, monitor for chest pain  Patient Goals: feel better  Family Goals: GAYATRI    Progress made toward(s) clinical / shift goals:    Problem: Knowledge Deficit - Standard  Goal: Patient and family/care givers will demonstrate understanding of plan of care, disease process/condition, diagnostic tests and medications  Description: Target End Date:  1-3 days or as soon as patient condition allows    Document in Patient Education    1.  Patient and family/caregiver oriented to unit, equipment, visitation policy and means for communicating concern  2.  Complete/review Learning Assessment  3.  Assess knowledge level of disease process/condition, treatment plan, diagnostic tests and medications  4.  Explain disease process/condition, treatment plan, diagnostic tests and medications  Outcome: Progressing       Patient is not progressing towards the following goals:

## 2025-06-19 NOTE — CARE PLAN
The patient is Stable - Low risk of patient condition declining or worsening    Shift Goals  Clinical Goals: Monitor BP, Tele monitoring  Patient Goals: Go home  Family Goals: GAYATRI    Progress made toward(s) clinical / shift goals:    Problem: Knowledge Deficit - Standard  Goal: Patient and family/care givers will demonstrate understanding of plan of care, disease process/condition, diagnostic tests and medications  Description: Target End Date:  1-3 days or as soon as patient condition allows    Document in Patient Education    1.  Patient and family/caregiver oriented to unit, equipment, visitation policy and means for communicating concern  2.  Complete/review Learning Assessment  3.  Assess knowledge level of disease process/condition, treatment plan, diagnostic tests and medications  4.  Explain disease process/condition, treatment plan, diagnostic tests and medications  Outcome: Progressing       Patient is not progressing towards the following goals:

## 2025-06-19 NOTE — DISCHARGE SUMMARY
Discharge Summary    CHIEF COMPLAINT ON ADMISSION  Chief Complaint   Patient presents with    Chest Pain     Patient reports waking up at 0100 with epigastric and chest pain. Patient also complaining of nausea and vomiting. Attempted taking ASA at home but vomited all the ASA. EMS gave 0.4 Nitro.       Reason for Admission  EMS     Admission Date  6/18/2025    CODE STATUS  Full Code    HPI & HOSPITAL COURSE  This is a 59 y.o. male here with chest pain associated with nausea and vomiting.  He reported substernal and epigastric pain.  He denies any fevers chills.  In the ER his vitals were stable.  His troponins were trended and remained negative.  His EKG was negative for ischemia.  Patient recently had a nuclear medicine cardiac stress test and 2D echo that were also negative.  He is monitored in the clinical decision unit with continuous cardiac monitoring.  Patient was given GI cocktail and started on omeprazole with improvement of his symptoms.  His symptoms are likely secondary to gastritis.  The next day his symptoms had completely resolved and his vitals remained stable.  He will be discharged home with close outpatient follow-up with his PCP      Therefore, he is discharged in good and stable condition to home with close outpatient follow-up.    The patient recovered much more quickly than anticipated on admission.    Discharge Date  6/19/25    FOLLOW UP ITEMS POST DISCHARGE  PCP    DISCHARGE DIAGNOSES  Principal Problem (Resolved):    Chest pain (POA: Yes)  Active Problems:    * No active hospital problems. *      FOLLOW UP  No future appointments.  No follow-up provider specified.    MEDICATIONS ON DISCHARGE     Medication List        START taking these medications        Instructions   omeprazole 20 MG delayed-release capsule  Commonly known as: PriLOSEC   Take 1 Capsule by mouth every day.  Dose: 20 mg            CONTINUE taking these medications        Instructions   amLODIPine 10 MG Tabs  Commonly known  as: Norvasc   Take 1 Tablet by mouth every day.  Dose: 10 mg     aspirin 81 MG EC tablet   Take 1 Tablet by mouth every day.  Dose: 81 mg     atorvastatin 40 MG Tabs  Commonly known as: Lipitor   Take 1 Tablet by mouth every evening.  Dose: 40 mg     losartan 50 MG Tabs  Commonly known as: Cozaar   Take 1 Tablet by mouth every day.  Dose: 50 mg              Allergies  Allergies[1]    DIET  Orders Placed This Encounter   Procedures    Diet Order Diet: Cardiac     Standing Status:   Standing     Number of Occurrences:   1     Diet::   Cardiac [6]       ACTIVITY  As tolerated.  Weight bearing as tolerated    CONSULTATIONS  none    PROCEDURES  none    LABORATORY  Lab Results   Component Value Date    SODIUM 138 06/18/2025    POTASSIUM 3.3 (L) 06/18/2025    CHLORIDE 103 06/18/2025    CO2 25 06/18/2025    GLUCOSE 146 (H) 06/18/2025    BUN 9 06/18/2025    CREATININE 1.58 (H) 06/18/2025        Lab Results   Component Value Date    WBC 8.3 06/18/2025    HEMOGLOBIN 12.6 (L) 06/18/2025    HEMATOCRIT 34.2 (L) 06/18/2025    PLATELETCT 165 06/18/2025        Total time of the discharge process exceeds 32 minutes.       [1]   Allergies  Allergen Reactions    Other Food Anaphylaxis     All fresh fruit causes throat swelling per brother.

## 2025-06-21 ENCOUNTER — APPOINTMENT (OUTPATIENT)
Dept: RADIOLOGY | Facility: MEDICAL CENTER | Age: 60
End: 2025-06-21
Attending: STUDENT IN AN ORGANIZED HEALTH CARE EDUCATION/TRAINING PROGRAM
Payer: COMMERCIAL

## 2025-06-21 ENCOUNTER — HOSPITAL ENCOUNTER (OUTPATIENT)
Facility: MEDICAL CENTER | Age: 60
End: 2025-06-22
Attending: STUDENT IN AN ORGANIZED HEALTH CARE EDUCATION/TRAINING PROGRAM | Admitting: FAMILY MEDICINE
Payer: COMMERCIAL

## 2025-06-21 DIAGNOSIS — R10.13 EPIGASTRIC PAIN: ICD-10-CM

## 2025-06-21 DIAGNOSIS — Z12.11 COLON CANCER SCREENING: ICD-10-CM

## 2025-06-21 DIAGNOSIS — E87.6 HYPOKALEMIA: ICD-10-CM

## 2025-06-21 DIAGNOSIS — R11.2 NAUSEA AND VOMITING, UNSPECIFIED VOMITING TYPE: ICD-10-CM

## 2025-06-21 DIAGNOSIS — K80.50 BILIARY COLIC: Primary | ICD-10-CM

## 2025-06-21 DIAGNOSIS — K80.20 GALLSTONES: ICD-10-CM

## 2025-06-21 LAB
ALBUMIN SERPL BCP-MCNC: 4.2 G/DL (ref 3.2–4.9)
ALBUMIN/GLOB SERPL: 1.3 G/DL
ALP SERPL-CCNC: 77 U/L (ref 30–99)
ALT SERPL-CCNC: 11 U/L (ref 2–50)
ANION GAP SERPL CALC-SCNC: 14 MMOL/L (ref 7–16)
AST SERPL-CCNC: 20 U/L (ref 12–45)
BASOPHILS # BLD AUTO: 0.3 % (ref 0–1.8)
BASOPHILS # BLD: 0.03 K/UL (ref 0–0.12)
BILIRUB SERPL-MCNC: 1.1 MG/DL (ref 0.1–1.5)
BUN SERPL-MCNC: 23 MG/DL (ref 8–22)
CALCIUM ALBUM COR SERPL-MCNC: 8.9 MG/DL (ref 8.5–10.5)
CALCIUM SERPL-MCNC: 9.1 MG/DL (ref 8.5–10.5)
CHLORIDE SERPL-SCNC: 102 MMOL/L (ref 96–112)
CO2 SERPL-SCNC: 22 MMOL/L (ref 20–33)
CREAT SERPL-MCNC: 2.03 MG/DL (ref 0.5–1.4)
EKG IMPRESSION: NORMAL
EOSINOPHIL # BLD AUTO: 0.08 K/UL (ref 0–0.51)
EOSINOPHIL NFR BLD: 0.8 % (ref 0–6.9)
ERYTHROCYTE [DISTWIDTH] IN BLOOD BY AUTOMATED COUNT: 45.9 FL (ref 35.9–50)
GFR SERPLBLD CREATININE-BSD FMLA CKD-EPI: 37 ML/MIN/1.73 M 2
GLOBULIN SER CALC-MCNC: 3.3 G/DL (ref 1.9–3.5)
GLUCOSE SERPL-MCNC: 125 MG/DL (ref 65–99)
HCT VFR BLD AUTO: 34.9 % (ref 42–52)
HGB BLD-MCNC: 12.8 G/DL (ref 14–18)
IMM GRANULOCYTES # BLD AUTO: 0.04 K/UL (ref 0–0.11)
IMM GRANULOCYTES NFR BLD AUTO: 0.4 % (ref 0–0.9)
LIPASE SERPL-CCNC: 25 U/L (ref 11–82)
LYMPHOCYTES # BLD AUTO: 0.85 K/UL (ref 1–4.8)
LYMPHOCYTES NFR BLD: 8.6 % (ref 22–41)
MCH RBC QN AUTO: 36.1 PG (ref 27–33)
MCHC RBC AUTO-ENTMCNC: 36.7 G/DL (ref 32.3–36.5)
MCV RBC AUTO: 98.3 FL (ref 81.4–97.8)
MONOCYTES # BLD AUTO: 0.37 K/UL (ref 0–0.85)
MONOCYTES NFR BLD AUTO: 3.7 % (ref 0–13.4)
NEUTROPHILS # BLD AUTO: 8.56 K/UL (ref 1.82–7.42)
NEUTROPHILS NFR BLD: 86.2 % (ref 44–72)
NRBC # BLD AUTO: 0 K/UL
NRBC BLD-RTO: 0 /100 WBC (ref 0–0.2)
NT-PROBNP SERPL IA-MCNC: 68 PG/ML (ref 0–125)
PLATELET # BLD AUTO: 196 K/UL (ref 164–446)
PMV BLD AUTO: 8.8 FL (ref 9–12.9)
POTASSIUM SERPL-SCNC: 3.2 MMOL/L (ref 3.6–5.5)
PROT SERPL-MCNC: 7.5 G/DL (ref 6–8.2)
RBC # BLD AUTO: 3.55 M/UL (ref 4.7–6.1)
SODIUM SERPL-SCNC: 138 MMOL/L (ref 135–145)
TROPONIN T SERPL-MCNC: 6 NG/L (ref 6–19)
TROPONIN T SERPL-MCNC: 7 NG/L (ref 6–19)
WBC # BLD AUTO: 9.9 K/UL (ref 4.8–10.8)

## 2025-06-21 PROCEDURE — A9270 NON-COVERED ITEM OR SERVICE: HCPCS | Performed by: STUDENT IN AN ORGANIZED HEALTH CARE EDUCATION/TRAINING PROGRAM

## 2025-06-21 PROCEDURE — 85025 COMPLETE CBC W/AUTO DIFF WBC: CPT

## 2025-06-21 PROCEDURE — 36415 COLL VENOUS BLD VENIPUNCTURE: CPT

## 2025-06-21 PROCEDURE — 700117 HCHG RX CONTRAST REV CODE 255: Mod: UD | Performed by: STUDENT IN AN ORGANIZED HEALTH CARE EDUCATION/TRAINING PROGRAM

## 2025-06-21 PROCEDURE — A9270 NON-COVERED ITEM OR SERVICE: HCPCS

## 2025-06-21 PROCEDURE — 700102 HCHG RX REV CODE 250 W/ 637 OVERRIDE(OP): Performed by: STUDENT IN AN ORGANIZED HEALTH CARE EDUCATION/TRAINING PROGRAM

## 2025-06-21 PROCEDURE — 84484 ASSAY OF TROPONIN QUANT: CPT

## 2025-06-21 PROCEDURE — 700111 HCHG RX REV CODE 636 W/ 250 OVERRIDE (IP): Mod: JZ | Performed by: STUDENT IN AN ORGANIZED HEALTH CARE EDUCATION/TRAINING PROGRAM

## 2025-06-21 PROCEDURE — 700111 HCHG RX REV CODE 636 W/ 250 OVERRIDE (IP): Mod: JZ

## 2025-06-21 PROCEDURE — 71045 X-RAY EXAM CHEST 1 VIEW: CPT

## 2025-06-21 PROCEDURE — 99285 EMERGENCY DEPT VISIT HI MDM: CPT

## 2025-06-21 PROCEDURE — 770006 HCHG ROOM/CARE - MED/SURG/GYN SEMI*

## 2025-06-21 PROCEDURE — 83690 ASSAY OF LIPASE: CPT

## 2025-06-21 PROCEDURE — 93005 ELECTROCARDIOGRAM TRACING: CPT | Mod: TC | Performed by: STUDENT IN AN ORGANIZED HEALTH CARE EDUCATION/TRAINING PROGRAM

## 2025-06-21 PROCEDURE — 700105 HCHG RX REV CODE 258

## 2025-06-21 PROCEDURE — 700105 HCHG RX REV CODE 258: Mod: UD | Performed by: STUDENT IN AN ORGANIZED HEALTH CARE EDUCATION/TRAINING PROGRAM

## 2025-06-21 PROCEDURE — 700102 HCHG RX REV CODE 250 W/ 637 OVERRIDE(OP)

## 2025-06-21 PROCEDURE — 74177 CT ABD & PELVIS W/CONTRAST: CPT

## 2025-06-21 PROCEDURE — 93005 ELECTROCARDIOGRAM TRACING: CPT | Mod: TC

## 2025-06-21 PROCEDURE — 83880 ASSAY OF NATRIURETIC PEPTIDE: CPT

## 2025-06-21 PROCEDURE — 80053 COMPREHEN METABOLIC PANEL: CPT

## 2025-06-21 RX ORDER — LOSARTAN POTASSIUM 50 MG/1
50 TABLET ORAL DAILY
Status: DISCONTINUED | OUTPATIENT
Start: 2025-06-22 | End: 2025-06-22 | Stop reason: HOSPADM

## 2025-06-21 RX ORDER — LABETALOL HYDROCHLORIDE 5 MG/ML
10 INJECTION, SOLUTION INTRAVENOUS EVERY 4 HOURS PRN
Status: DISCONTINUED | OUTPATIENT
Start: 2025-06-21 | End: 2025-06-22 | Stop reason: HOSPADM

## 2025-06-21 RX ORDER — PANTOPRAZOLE SODIUM 40 MG/10ML
40 INJECTION, POWDER, LYOPHILIZED, FOR SOLUTION INTRAVENOUS DAILY
Status: DISCONTINUED | OUTPATIENT
Start: 2025-06-22 | End: 2025-06-22

## 2025-06-21 RX ORDER — POLYETHYLENE GLYCOL 3350 17 G/17G
1 POWDER, FOR SOLUTION ORAL
Status: DISCONTINUED | OUTPATIENT
Start: 2025-06-21 | End: 2025-06-22 | Stop reason: HOSPADM

## 2025-06-21 RX ORDER — ATORVASTATIN CALCIUM 40 MG/1
40 TABLET, FILM COATED ORAL EVERY EVENING
Status: DISCONTINUED | OUTPATIENT
Start: 2025-06-22 | End: 2025-06-22 | Stop reason: HOSPADM

## 2025-06-21 RX ORDER — SODIUM CHLORIDE, SODIUM LACTATE, POTASSIUM CHLORIDE, CALCIUM CHLORIDE 600; 310; 30; 20 MG/100ML; MG/100ML; MG/100ML; MG/100ML
1000 INJECTION, SOLUTION INTRAVENOUS ONCE
Status: COMPLETED | OUTPATIENT
Start: 2025-06-21 | End: 2025-06-21

## 2025-06-21 RX ORDER — AMLODIPINE BESYLATE 10 MG/1
10 TABLET ORAL DAILY
Status: DISCONTINUED | OUTPATIENT
Start: 2025-06-22 | End: 2025-06-22 | Stop reason: HOSPADM

## 2025-06-21 RX ORDER — CEFTRIAXONE 2 G/1
2000 INJECTION, POWDER, FOR SOLUTION INTRAMUSCULAR; INTRAVENOUS ONCE
Status: DISCONTINUED | OUTPATIENT
Start: 2025-06-21 | End: 2025-06-21

## 2025-06-21 RX ORDER — SODIUM CHLORIDE, SODIUM LACTATE, POTASSIUM CHLORIDE, CALCIUM CHLORIDE 600; 310; 30; 20 MG/100ML; MG/100ML; MG/100ML; MG/100ML
INJECTION, SOLUTION INTRAVENOUS CONTINUOUS
Status: DISCONTINUED | OUTPATIENT
Start: 2025-06-21 | End: 2025-06-22

## 2025-06-21 RX ORDER — POTASSIUM CHLORIDE 1500 MG/1
40 TABLET, EXTENDED RELEASE ORAL ONCE
Status: COMPLETED | OUTPATIENT
Start: 2025-06-21 | End: 2025-06-21

## 2025-06-21 RX ORDER — AMOXICILLIN 250 MG
2 CAPSULE ORAL EVERY EVENING
Status: DISCONTINUED | OUTPATIENT
Start: 2025-06-21 | End: 2025-06-22 | Stop reason: HOSPADM

## 2025-06-21 RX ORDER — METRONIDAZOLE 500 MG/100ML
500 INJECTION, SOLUTION INTRAVENOUS ONCE
Status: DISCONTINUED | OUTPATIENT
Start: 2025-06-21 | End: 2025-06-21

## 2025-06-21 RX ORDER — ACETAMINOPHEN 325 MG/1
650 TABLET ORAL EVERY 6 HOURS PRN
Status: DISCONTINUED | OUTPATIENT
Start: 2025-06-21 | End: 2025-06-22 | Stop reason: HOSPADM

## 2025-06-21 RX ORDER — OXYCODONE HYDROCHLORIDE 5 MG/1
5 TABLET ORAL
Refills: 0 | Status: DISCONTINUED | OUTPATIENT
Start: 2025-06-21 | End: 2025-06-22 | Stop reason: HOSPADM

## 2025-06-21 RX ORDER — HYDROMORPHONE HYDROCHLORIDE 1 MG/ML
0.5 INJECTION, SOLUTION INTRAMUSCULAR; INTRAVENOUS; SUBCUTANEOUS
Status: DISCONTINUED | OUTPATIENT
Start: 2025-06-21 | End: 2025-06-22 | Stop reason: HOSPADM

## 2025-06-21 RX ORDER — ENOXAPARIN SODIUM 100 MG/ML
40 INJECTION SUBCUTANEOUS DAILY
Status: DISCONTINUED | OUTPATIENT
Start: 2025-06-21 | End: 2025-06-22 | Stop reason: HOSPADM

## 2025-06-21 RX ORDER — OXYCODONE HYDROCHLORIDE 10 MG/1
10 TABLET ORAL
Refills: 0 | Status: DISCONTINUED | OUTPATIENT
Start: 2025-06-21 | End: 2025-06-22 | Stop reason: HOSPADM

## 2025-06-21 RX ADMIN — POTASSIUM CHLORIDE 40 MEQ: 1500 TABLET, EXTENDED RELEASE ORAL at 20:16

## 2025-06-21 RX ADMIN — SODIUM CHLORIDE, POTASSIUM CHLORIDE, SODIUM LACTATE AND CALCIUM CHLORIDE: 600; 310; 30; 20 INJECTION, SOLUTION INTRAVENOUS at 23:40

## 2025-06-21 RX ADMIN — ENOXAPARIN SODIUM 40 MG: 100 INJECTION SUBCUTANEOUS at 23:34

## 2025-06-21 RX ADMIN — IOHEXOL 97 ML: 350 INJECTION, SOLUTION INTRAVENOUS at 20:15

## 2025-06-21 RX ADMIN — LIDOCAINE HYDROCHLORIDE 30 ML: 20 SOLUTION ORAL; TOPICAL at 19:24

## 2025-06-21 RX ADMIN — FENTANYL CITRATE 25 MCG: 50 INJECTION, SOLUTION INTRAMUSCULAR; INTRAVENOUS at 23:33

## 2025-06-21 RX ADMIN — SODIUM CHLORIDE, POTASSIUM CHLORIDE, SODIUM LACTATE AND CALCIUM CHLORIDE 1000 ML: 600; 310; 30; 20 INJECTION, SOLUTION INTRAVENOUS at 20:17

## 2025-06-21 RX ADMIN — DOCUSATE SODIUM 50 MG AND SENNOSIDES 8.6 MG 2 TABLET: 8.6; 5 TABLET, FILM COATED ORAL at 23:34

## 2025-06-21 ASSESSMENT — SOCIAL DETERMINANTS OF HEALTH (SDOH)
WITHIN THE LAST YEAR, HAVE YOU BEEN AFRAID OF YOUR PARTNER OR EX-PARTNER?: NO
WITHIN THE LAST YEAR, HAVE YOU BEEN AFRAID OF YOUR PARTNER OR EX-PARTNER?: NO
IN THE PAST 12 MONTHS, HAS THE ELECTRIC, GAS, OIL, OR WATER COMPANY THREATENED TO SHUT OFF SERVICE IN YOUR HOME?: NO
WITHIN THE LAST YEAR, HAVE YOU BEEN KICKED, HIT, SLAPPED, OR OTHERWISE PHYSICALLY HURT BY YOUR PARTNER OR EX-PARTNER?: NO
WITHIN THE LAST YEAR, HAVE YOU BEEN HUMILIATED OR EMOTIONALLY ABUSED IN OTHER WAYS BY YOUR PARTNER OR EX-PARTNER?: NO
WITHIN THE LAST YEAR, HAVE TO BEEN RAPED OR FORCED TO HAVE ANY KIND OF SEXUAL ACTIVITY BY YOUR PARTNER OR EX-PARTNER?: NO
WITHIN THE LAST YEAR, HAVE YOU BEEN HUMILIATED OR EMOTIONALLY ABUSED IN OTHER WAYS BY YOUR PARTNER OR EX-PARTNER?: NO
WITHIN THE LAST YEAR, HAVE YOU BEEN KICKED, HIT, SLAPPED, OR OTHERWISE PHYSICALLY HURT BY YOUR PARTNER OR EX-PARTNER?: NO
WITHIN THE LAST YEAR, HAVE TO BEEN RAPED OR FORCED TO HAVE ANY KIND OF SEXUAL ACTIVITY BY YOUR PARTNER OR EX-PARTNER?: NO
WITHIN THE PAST 12 MONTHS, YOU WORRIED THAT YOUR FOOD WOULD RUN OUT BEFORE YOU GOT THE MONEY TO BUY MORE: NEVER TRUE
WITHIN THE PAST 12 MONTHS, THE FOOD YOU BOUGHT JUST DIDN'T LAST AND YOU DIDN'T HAVE MONEY TO GET MORE: NEVER TRUE

## 2025-06-21 ASSESSMENT — COGNITIVE AND FUNCTIONAL STATUS - GENERAL
MOBILITY SCORE: 24
SUGGESTED CMS G CODE MODIFIER DAILY ACTIVITY: CH
SUGGESTED CMS G CODE MODIFIER MOBILITY: CH
DAILY ACTIVITIY SCORE: 24

## 2025-06-21 ASSESSMENT — LIFESTYLE VARIABLES
HOW MANY TIMES IN THE PAST YEAR HAVE YOU HAD 5 OR MORE DRINKS IN A DAY: 0
EVER HAD A DRINK FIRST THING IN THE MORNING TO STEADY YOUR NERVES TO GET RID OF A HANGOVER: NO
TOTAL SCORE: 0
ON A TYPICAL DAY WHEN YOU DRINK ALCOHOL HOW MANY DRINKS DO YOU HAVE: 0
EVER FELT BAD OR GUILTY ABOUT YOUR DRINKING: NO
CONSUMPTION TOTAL: NEGATIVE
HAVE PEOPLE ANNOYED YOU BY CRITICIZING YOUR DRINKING: NO
DOES PATIENT WANT TO STOP DRINKING: NO
ALCOHOL_USE: YES
TOTAL SCORE: 0
HAVE YOU EVER FELT YOU SHOULD CUT DOWN ON YOUR DRINKING: NO
AVERAGE NUMBER OF DAYS PER WEEK YOU HAVE A DRINK CONTAINING ALCOHOL: 0
TOTAL SCORE: 0

## 2025-06-21 ASSESSMENT — FIBROSIS 4 INDEX
FIB4 SCORE: 1.4
FIB4 SCORE: 1.75

## 2025-06-21 ASSESSMENT — PAIN DESCRIPTION - PAIN TYPE
TYPE: ACUTE PAIN
TYPE: ACUTE PAIN

## 2025-06-22 ENCOUNTER — ANESTHESIA EVENT (OUTPATIENT)
Dept: SURGERY | Facility: MEDICAL CENTER | Age: 60
End: 2025-06-22
Payer: COMMERCIAL

## 2025-06-22 ENCOUNTER — ANESTHESIA (OUTPATIENT)
Dept: SURGERY | Facility: MEDICAL CENTER | Age: 60
End: 2025-06-22
Payer: COMMERCIAL

## 2025-06-22 VITALS
BODY MASS INDEX: 24.74 KG/M2 | HEIGHT: 70 IN | DIASTOLIC BLOOD PRESSURE: 69 MMHG | WEIGHT: 172.84 LBS | RESPIRATION RATE: 18 BRPM | TEMPERATURE: 96.9 F | HEART RATE: 60 BPM | OXYGEN SATURATION: 97 % | SYSTOLIC BLOOD PRESSURE: 138 MMHG

## 2025-06-22 PROBLEM — R10.13 EPIGASTRIC PAIN: Status: RESOLVED | Noted: 2025-06-21 | Resolved: 2025-06-22

## 2025-06-22 LAB
ALBUMIN SERPL BCP-MCNC: 3.8 G/DL (ref 3.2–4.9)
ALBUMIN/GLOB SERPL: 1.3 G/DL
ALP SERPL-CCNC: 62 U/L (ref 30–99)
ALT SERPL-CCNC: 12 U/L (ref 2–50)
ANION GAP SERPL CALC-SCNC: 12 MMOL/L (ref 7–16)
AST SERPL-CCNC: 20 U/L (ref 12–45)
BASOPHILS # BLD AUTO: 0.2 % (ref 0–1.8)
BASOPHILS # BLD: 0.02 K/UL (ref 0–0.12)
BILIRUB SERPL-MCNC: 1.2 MG/DL (ref 0.1–1.5)
BUN SERPL-MCNC: 18 MG/DL (ref 8–22)
CALCIUM ALBUM COR SERPL-MCNC: 9 MG/DL (ref 8.5–10.5)
CALCIUM SERPL-MCNC: 8.8 MG/DL (ref 8.5–10.5)
CHLORIDE SERPL-SCNC: 104 MMOL/L (ref 96–112)
CO2 SERPL-SCNC: 24 MMOL/L (ref 20–33)
CREAT SERPL-MCNC: 1.69 MG/DL (ref 0.5–1.4)
EOSINOPHIL # BLD AUTO: 0.06 K/UL (ref 0–0.51)
EOSINOPHIL NFR BLD: 0.7 % (ref 0–6.9)
ERYTHROCYTE [DISTWIDTH] IN BLOOD BY AUTOMATED COUNT: 46.5 FL (ref 35.9–50)
GFR SERPLBLD CREATININE-BSD FMLA CKD-EPI: 47 ML/MIN/1.73 M 2
GLOBULIN SER CALC-MCNC: 2.9 G/DL (ref 1.9–3.5)
GLUCOSE SERPL-MCNC: 106 MG/DL (ref 65–99)
HCT VFR BLD AUTO: 33.5 % (ref 42–52)
HGB BLD-MCNC: 12.2 G/DL (ref 14–18)
IMM GRANULOCYTES # BLD AUTO: 0.03 K/UL (ref 0–0.11)
IMM GRANULOCYTES NFR BLD AUTO: 0.3 % (ref 0–0.9)
LYMPHOCYTES # BLD AUTO: 1.54 K/UL (ref 1–4.8)
LYMPHOCYTES NFR BLD: 17.1 % (ref 22–41)
MCH RBC QN AUTO: 36.2 PG (ref 27–33)
MCHC RBC AUTO-ENTMCNC: 36.4 G/DL (ref 32.3–36.5)
MCV RBC AUTO: 99.4 FL (ref 81.4–97.8)
MONOCYTES # BLD AUTO: 0.78 K/UL (ref 0–0.85)
MONOCYTES NFR BLD AUTO: 8.6 % (ref 0–13.4)
NEUTROPHILS # BLD AUTO: 6.6 K/UL (ref 1.82–7.42)
NEUTROPHILS NFR BLD: 73.1 % (ref 44–72)
NRBC # BLD AUTO: 0 K/UL
NRBC BLD-RTO: 0 /100 WBC (ref 0–0.2)
PLATELET # BLD AUTO: 166 K/UL (ref 164–446)
PMV BLD AUTO: 8.6 FL (ref 9–12.9)
POTASSIUM SERPL-SCNC: 4 MMOL/L (ref 3.6–5.5)
PROT SERPL-MCNC: 6.7 G/DL (ref 6–8.2)
RBC # BLD AUTO: 3.37 M/UL (ref 4.7–6.1)
SODIUM SERPL-SCNC: 140 MMOL/L (ref 135–145)
WBC # BLD AUTO: 9 K/UL (ref 4.8–10.8)

## 2025-06-22 PROCEDURE — 80053 COMPREHEN METABOLIC PANEL: CPT

## 2025-06-22 PROCEDURE — 160193 HCHG PACU STANDARD - 1ST 60 MINS: Performed by: INTERNAL MEDICINE

## 2025-06-22 PROCEDURE — A9270 NON-COVERED ITEM OR SERVICE: HCPCS | Mod: UD

## 2025-06-22 PROCEDURE — 160002 HCHG RECOVERY MINUTES (STAT): Performed by: INTERNAL MEDICINE

## 2025-06-22 PROCEDURE — 700101 HCHG RX REV CODE 250: Performed by: STUDENT IN AN ORGANIZED HEALTH CARE EDUCATION/TRAINING PROGRAM

## 2025-06-22 PROCEDURE — 700102 HCHG RX REV CODE 250 W/ 637 OVERRIDE(OP)

## 2025-06-22 PROCEDURE — 36415 COLL VENOUS BLD VENIPUNCTURE: CPT

## 2025-06-22 PROCEDURE — 160048 HCHG OR STATISTICAL LEVEL 1-5: Performed by: INTERNAL MEDICINE

## 2025-06-22 PROCEDURE — 43235 EGD DIAGNOSTIC BRUSH WASH: CPT | Performed by: INTERNAL MEDICINE

## 2025-06-22 PROCEDURE — 700111 HCHG RX REV CODE 636 W/ 250 OVERRIDE (IP): Mod: JZ | Performed by: STUDENT IN AN ORGANIZED HEALTH CARE EDUCATION/TRAINING PROGRAM

## 2025-06-22 PROCEDURE — 160015 HCHG STAT PREOP MINUTES: Performed by: INTERNAL MEDICINE

## 2025-06-22 PROCEDURE — 700102 HCHG RX REV CODE 250 W/ 637 OVERRIDE(OP): Mod: UD

## 2025-06-22 PROCEDURE — 700105 HCHG RX REV CODE 258: Performed by: STUDENT IN AN ORGANIZED HEALTH CARE EDUCATION/TRAINING PROGRAM

## 2025-06-22 PROCEDURE — G0378 HOSPITAL OBSERVATION PER HR: HCPCS

## 2025-06-22 PROCEDURE — 160197 HCHG MAC ANESTHESIA: Performed by: INTERNAL MEDICINE

## 2025-06-22 PROCEDURE — 700111 HCHG RX REV CODE 636 W/ 250 OVERRIDE (IP): Mod: UD

## 2025-06-22 PROCEDURE — 85025 COMPLETE CBC W/AUTO DIFF WBC: CPT

## 2025-06-22 PROCEDURE — 160202 HCHG ENDO MINUTES - 1ST 30 MINS LEVEL 3: Performed by: INTERNAL MEDICINE

## 2025-06-22 PROCEDURE — A9270 NON-COVERED ITEM OR SERVICE: HCPCS

## 2025-06-22 RX ORDER — HYDROMORPHONE HYDROCHLORIDE 1 MG/ML
0.4 INJECTION, SOLUTION INTRAMUSCULAR; INTRAVENOUS; SUBCUTANEOUS
Status: DISCONTINUED | OUTPATIENT
Start: 2025-06-22 | End: 2025-06-22 | Stop reason: HOSPADM

## 2025-06-22 RX ORDER — HYDRALAZINE HYDROCHLORIDE 20 MG/ML
5 INJECTION INTRAMUSCULAR; INTRAVENOUS
Status: DISCONTINUED | OUTPATIENT
Start: 2025-06-22 | End: 2025-06-22 | Stop reason: HOSPADM

## 2025-06-22 RX ORDER — LIDOCAINE HYDROCHLORIDE 20 MG/ML
INJECTION, SOLUTION EPIDURAL; INFILTRATION; INTRACAUDAL; PERINEURAL PRN
Status: DISCONTINUED | OUTPATIENT
Start: 2025-06-22 | End: 2025-06-22 | Stop reason: SURG

## 2025-06-22 RX ORDER — LABETALOL HYDROCHLORIDE 5 MG/ML
5 INJECTION, SOLUTION INTRAVENOUS
Status: DISCONTINUED | OUTPATIENT
Start: 2025-06-22 | End: 2025-06-22 | Stop reason: HOSPADM

## 2025-06-22 RX ORDER — ALBUTEROL SULFATE 5 MG/ML
2.5 SOLUTION RESPIRATORY (INHALATION)
Status: DISCONTINUED | OUTPATIENT
Start: 2025-06-22 | End: 2025-06-22 | Stop reason: HOSPADM

## 2025-06-22 RX ORDER — PANTOPRAZOLE SODIUM 40 MG/10ML
40 INJECTION, POWDER, LYOPHILIZED, FOR SOLUTION INTRAVENOUS 2 TIMES DAILY
Status: DISCONTINUED | OUTPATIENT
Start: 2025-06-22 | End: 2025-06-22

## 2025-06-22 RX ORDER — HYDROMORPHONE HYDROCHLORIDE 1 MG/ML
0.1 INJECTION, SOLUTION INTRAMUSCULAR; INTRAVENOUS; SUBCUTANEOUS
Status: DISCONTINUED | OUTPATIENT
Start: 2025-06-22 | End: 2025-06-22 | Stop reason: HOSPADM

## 2025-06-22 RX ORDER — HALOPERIDOL 5 MG/ML
1 INJECTION INTRAMUSCULAR
Status: DISCONTINUED | OUTPATIENT
Start: 2025-06-22 | End: 2025-06-22 | Stop reason: HOSPADM

## 2025-06-22 RX ORDER — HYDROMORPHONE HYDROCHLORIDE 1 MG/ML
0.2 INJECTION, SOLUTION INTRAMUSCULAR; INTRAVENOUS; SUBCUTANEOUS
Status: DISCONTINUED | OUTPATIENT
Start: 2025-06-22 | End: 2025-06-22 | Stop reason: HOSPADM

## 2025-06-22 RX ORDER — OXYCODONE HCL 5 MG/5 ML
5 SOLUTION, ORAL ORAL
Status: DISCONTINUED | OUTPATIENT
Start: 2025-06-22 | End: 2025-06-22 | Stop reason: HOSPADM

## 2025-06-22 RX ORDER — ONDANSETRON 2 MG/ML
4 INJECTION INTRAMUSCULAR; INTRAVENOUS
Status: DISCONTINUED | OUTPATIENT
Start: 2025-06-22 | End: 2025-06-22 | Stop reason: HOSPADM

## 2025-06-22 RX ORDER — OXYCODONE HCL 5 MG/5 ML
10 SOLUTION, ORAL ORAL
Status: DISCONTINUED | OUTPATIENT
Start: 2025-06-22 | End: 2025-06-22 | Stop reason: HOSPADM

## 2025-06-22 RX ORDER — SODIUM CHLORIDE, SODIUM LACTATE, POTASSIUM CHLORIDE, CALCIUM CHLORIDE 600; 310; 30; 20 MG/100ML; MG/100ML; MG/100ML; MG/100ML
INJECTION, SOLUTION INTRAVENOUS CONTINUOUS
Status: DISCONTINUED | OUTPATIENT
Start: 2025-06-22 | End: 2025-06-22 | Stop reason: HOSPADM

## 2025-06-22 RX ORDER — SODIUM CHLORIDE, SODIUM LACTATE, POTASSIUM CHLORIDE, CALCIUM CHLORIDE 600; 310; 30; 20 MG/100ML; MG/100ML; MG/100ML; MG/100ML
INJECTION, SOLUTION INTRAVENOUS
Status: DISCONTINUED | OUTPATIENT
Start: 2025-06-22 | End: 2025-06-22 | Stop reason: SURG

## 2025-06-22 RX ORDER — DIPHENHYDRAMINE HYDROCHLORIDE 50 MG/ML
12.5 INJECTION, SOLUTION INTRAMUSCULAR; INTRAVENOUS
Status: DISCONTINUED | OUTPATIENT
Start: 2025-06-22 | End: 2025-06-22 | Stop reason: HOSPADM

## 2025-06-22 RX ORDER — FAMOTIDINE 20 MG/1
20 TABLET, FILM COATED ORAL DAILY
Status: DISCONTINUED | OUTPATIENT
Start: 2025-06-22 | End: 2025-06-22 | Stop reason: HOSPADM

## 2025-06-22 RX ADMIN — AMLODIPINE BESYLATE 10 MG: 10 TABLET ORAL at 05:50

## 2025-06-22 RX ADMIN — SODIUM CHLORIDE, POTASSIUM CHLORIDE, SODIUM LACTATE AND CALCIUM CHLORIDE: 600; 310; 30; 20 INJECTION, SOLUTION INTRAVENOUS at 12:48

## 2025-06-22 RX ADMIN — PROPOFOL 50 MG: 10 INJECTION, EMULSION INTRAVENOUS at 12:54

## 2025-06-22 RX ADMIN — PROPOFOL 120 MG: 10 INJECTION, EMULSION INTRAVENOUS at 12:50

## 2025-06-22 RX ADMIN — PROPOFOL 30 MG: 10 INJECTION, EMULSION INTRAVENOUS at 12:52

## 2025-06-22 RX ADMIN — PANTOPRAZOLE SODIUM 40 MG: 40 INJECTION, POWDER, FOR SOLUTION INTRAVENOUS at 05:50

## 2025-06-22 RX ADMIN — LIDOCAINE HYDROCHLORIDE 100 MG: 20 INJECTION, SOLUTION EPIDURAL; INFILTRATION; INTRACAUDAL; PERINEURAL at 12:50

## 2025-06-22 RX ADMIN — LOSARTAN POTASSIUM 50 MG: 50 TABLET, FILM COATED ORAL at 05:50

## 2025-06-22 RX ADMIN — FAMOTIDINE 20 MG: 20 TABLET, FILM COATED ORAL at 16:09

## 2025-06-22 ASSESSMENT — PAIN DESCRIPTION - PAIN TYPE
TYPE: ACUTE PAIN

## 2025-06-22 ASSESSMENT — PAIN SCALES - GENERAL: PAIN_LEVEL: 0

## 2025-06-22 NOTE — ED NOTES
PIV established. Patient medicated per MAR. Patient tolerated well, updated on plan of care, call bell within reach.

## 2025-06-22 NOTE — DISCHARGE INSTRUCTIONS
Diet    Resume your normal diet as tolerated.  A diet low in cholesterol, fat, and sodium is recommended for good health.     Activity    Resume Your Normal Activity    You may resume your normal activity as tolerated.  Rest as needed.      Paul Hopper was seen and treated at [facility]. Please excuse him for the dates below from work duties:   06/19/2025 06/21/2025   He may return to work without limitations or restrictions.

## 2025-06-22 NOTE — OP REPORT
OPERATIVE REPORT    PATIENT:   Paul Hopper   1965       PREOPERATIVE DIAGNOSES/INDICATIONS: epigastric pain,  duodenal inflammation on imaging    POSTOPERATIVE DIAGNOSIS: small hiatal hernia    PROCEDURE:  ESOPHAGOGASTRODUODENOSCOPY    PHYSICIAN:  Cheyanne Rodriguez MD    ANESTHESIA:  Per anesthesiologist.    LOCATION: [facility]    CONSENT:  OBTAINED. The risks, benefits and alternatives of the procedure were discussed in details. The risks include and are not limited to bleeding, infection, perforation, missed lesions, and sedations risks (cardiopulmonary compromise and allergic reaction to medications).    DESCRIPTION: The patient presented to the procedure room.  After routine checkup was performed, patient was brought into the endoscopy suite.  Patient was placed on his left lateral decubitus position. A bite block was placed in patient's mouth. Patient was sedated by anesthesia.  Vital signs were monitored throughout the procedure.  Oxygenation support was provided throughout the procedure. Upper endoscope was inserted into patient's mouth and advanced to the second portion of the duodenum under direct visualization.      Once the site was reached and examined, the upper endoscope was withdrawn.  Retroflexion was made within the stomach.  The stomach was decompressed, scope was withdrawn and the procedure was terminated.     The patient tolerated the procedure well.  There were no immediate complications.    OPERATIVE FINDINGS:    1. Esophagus: normal  2. Stomach: small hiatal hernia  3. Duodenum: normal    RECOMMENDATIONS:  --Change to famotidine 40 mg daily while undergoing evaluation. Avoid PPI with low B12  --Macrocytic anemia with history of folate and B12 deficiency.  Consider checking MMA and homocysteine, antibody to IF.  May not be taking his supplemental medications  --resume diet

## 2025-06-22 NOTE — ASSESSMENT & PLAN NOTE
Elevated BP today in the ED, highest of 203/91.  This could be secondary to pain.  Patient on amlodipine 10 mg and losartan 50 mg daily which he states he did take today.  Will resume this regimen on admission.  Will also add as needed labetalol for SBP greater than 180 or DBP greater than 110.  Have pain regimen in place for patient's epigastric pain which will also help control his blood pressure

## 2025-06-22 NOTE — ANESTHESIA POSTPROCEDURE EVALUATION
Patient: Paul Hopper    Procedure Summary       Date: 06/22/25 Room / Location: [facility] 06 / SURGERY [facility]    Anesthesia Start: 1248 Anesthesia Stop: 1303    Procedure: GASTROSCOPY (Esophagus) Diagnosis: (Hiatal Hernia)    Surgeons: Cheyanne Rodriguez M.D. Responsible Provider: Rosendo Unger M.D.    Anesthesia Type: MAC ASA Status: 3 - Emergent            Final Anesthesia Type: MAC  Last vitals  BP   Blood Pressure: 134/79    Temp   36.5 °C (97.7 °F)    Pulse   (!) 57   Resp   14    SpO2   96 %      Anesthesia Post Evaluation    Patient location during evaluation: PACU  Patient participation: complete - patient participated  Level of consciousness: sleepy but conscious  Pain score: 0    Airway patency: patent  Anesthetic complications: no  Cardiovascular status: hemodynamically stable  Respiratory status: acceptable  Hydration status: euvolemic    PONV: none          No notable events documented.     Nurse Pain Score: 0 (NPRS)

## 2025-06-22 NOTE — ED TRIAGE NOTES
Chief Complaint   Patient presents with    Heartburn     Pt bib ems reports heart burn x 2 days    Epigastric Pain     Reports epigastric pain x 2 days. Took omperazole w/o relief    Chest Pain     Reports substernal chest pain x 2 days describe as burning and has been constant the last 3 hrs.     Has hx of MI. Aaox4. Gcs of 15.  Educated on triage process. Instructed to notify staff for any worsening symptoms. Denies any recent travel. Denies exposure to known covid positive patients. Denies any respiratory symptoms.

## 2025-06-22 NOTE — PROGRESS NOTES
4 Eyes Skin Assessment Completed by OBEY Rivera and OBEY Piedra.    Skin assessment is primarily focused on high risk bony prominences. Pay special attention to skin beneath and around medical devices, high risk bony prominences, skin to skin areas and areas where the patient lacks sensation to feel pain and areas where the patient previously had breakdown.     Head (Occipital):  Jaundice   Ears (Under Medical Devices): WDL   Nose (Under Medical Devices): WDL   Mouth:  WDL   Neck: WDL   Breast/Chest:  WDL   Shoulder Blades:  WDL   Spine:   WDL   (R) Arm/Elbow/Hand: WDL   (L) Arm/Elbow/Hand: WDL   Abdomen: WDL   Pannus/Groin:  WDL   Sacrum/Coccyx:   Pink and Blanching   (R) Ischial Tuberosity (Sit Bones):  WDL   (L) Ischial Tuberosity (Sit Bones):  WDL   (R) Leg:  Pink and Scar on knee    (L) Leg:  Pink and Scar on knee    (R) Heel:  Pink and Blanching   (R) Foot/Toe: WDL   (L) Heel: Pink and Blanching   (L) Foot/Toe:  WDL       DEVICES IN USE:   Respiratory Devices:  NA, patient on room air  Feeding Devices:  N/A   Lines & BP Monitoring Devices:  N/A    Orthopedic Devices:  N/A  Miscellaneous Devices:  N/A    PROTOCOL INTERVENTIONS:   Standard/Trauma Bed:  Already in place    WOUND PHOTOS:   Completed and in EPIC     WOUND CONSULT:   N/A, no advanced wound care needs identified

## 2025-06-22 NOTE — DISCHARGE PLANNING
Patient status updated to OBSERVATION per attending physician determination, Dr. Carmina Pollock, and UR committee MD secondary review, Dr. Jayden Forman. Patient Status Update completed.

## 2025-06-22 NOTE — ED PROVIDER NOTES
ED Provider Note    CHIEF COMPLAINT  Chief Complaint   Patient presents with    Heartburn     Pt bib ems reports heart burn x 2 days    Epigastric Pain     Reports epigastric pain x 2 days. Took omperazole w/o relief    Chest Pain     Reports substernal chest pain x 2 days describe as burning and has been constant the last 3 hrs.       EXTERNAL RECORDS REVIEWED  Inpatient Notes patient is admitted for chest pain on 2025 for chest pain associate with nausea and vomiting.  He was admitted and his troponins were trended and remained negative.  EKG shows no ischemia.  He was discharged home and was diagnosed with gastritis    HPI/ROS  LIMITATION TO HISTORY   Select: : None  OUTSIDE HISTORIAN(S):  None    Paul Hopper is a 59 y.o. male who presents For evaluation of epigastric pain that started around 1 PM today.  He states that he ate waffles and wood around 11 AM.  He took a nap and he was awoken by the nap.  He did vomit once.  He notes that he has been more constipated today.  He states that he passed black stool today.  He states that his pain radiates down his abdomen.  He thinks that he has had an endoscopy before but he is not quite sure.  He does take daily baby aspirin.  He denies any fevers, painful urination, leg pain or leg swelling. He denies alcohol use.     PAST MEDICAL HISTORY   has a past medical history of Chickenpox, CVA (cerebral vascular accident) (HCC), Hypertension, and MI (myocardial infarction) ([facility]).    SURGICAL HISTORY  patient denies any surgical history    FAMILY HISTORY  History reviewed. No pertinent family history.    SOCIAL HISTORY  Social History     Tobacco Use    Smoking status: Former     Current packs/day: 0.00     Types: Cigarettes     Quit date: 1990     Years since quittin.4    Smokeless tobacco: Never   Vaping Use    Vaping status: Not on file   Substance and Sexual Activity    Alcohol use: Not Currently     Alcohol/week: 1.2 oz     Types: 2 Standard  "drinks or equivalent per week     Comment: OCC    Drug use: Not Currently     Comment: marijuana    Sexual activity: Not on file       CURRENT MEDICATIONS  Home Medications       Reviewed by Karina Badillo R.N. (Registered Nurse) on 06/21/25 at 1820  Med List Status: <None>     Medication Last Dose Status   amLODIPine (NORVASC) 10 MG Tab  Active   aspirin 81 MG EC tablet  Active   atorvastatin (LIPITOR) 40 MG Tab  Active   losartan (COZAAR) 50 MG Tab  Active   omeprazole (PRILOSEC) 20 MG delayed-release capsule  Active                  Audit from Redirected Encounters    **Home medications have not yet been reviewed for this encounter**         ALLERGIES  Allergies[1]    PHYSICAL EXAM  VITAL SIGNS: BP (!) 147/76   Pulse 60   Temp 35.8 °C (96.5 °F) (Temporal)   Resp 16   Ht 1.778 m (5' 10\")   Wt 78.5 kg (173 lb)   SpO2 100%   BMI 24.82 kg/m²      Constitutional: Well developed, Well nourished, No acute distress, Non-toxic appearance.   HEENT: Normocephalic, Atraumatic,  external ears normal, pharynx pink,  Mucous  Membranes moist, No rhinorrhea or mucosal edema  Cardiovascular: Regular Rate and Rhythm, No murmurs,  rubs, or gallops.   Thorax & Lungs: Lungs clear to auscultation bilaterally, No respiratory distress, No wheezes, rhales or rhonchi, No chest wall tenderness.   Abdomen:  Soft, epigastric TTP, otherwise abdomen nontender non distended,  No pulsatile masses., no rebound guarding or peritoneal signs.   Skin: Warm, Dry, No erythema, No rash,   Back:  No CVA tenderness,  No spinal tenderness, bony crepitance step offs or instability.   Extremities: Equal, intact distal pulses, No cyanosis, clubbing or edema,  No tenderness.   Musculoskeletal: Good range of motion in all major joints. No tenderness to palpation or major deformities noted.   Rectal: With male tech chaperone, Brown stool in rectal vault, guaiac negative, no melena or gross blood  Neurologic: Alert & oriented No focal deficits " noted.  Psychiatric: Affect normal, Judgment normal, Mood normal.      EKG/LABS  Results for orders placed or performed during the hospital encounter of 06/21/25   CBC with Differential    Collection Time: 06/21/25  6:35 PM   Result Value Ref Range    WBC 9.9 4.8 - 10.8 K/uL    RBC 3.55 (L) 4.70 - 6.10 M/uL    Hemoglobin 12.8 (L) 14.0 - 18.0 g/dL    Hematocrit 34.9 (L) 42.0 - 52.0 %    MCV 98.3 (H) 81.4 - 97.8 fL    MCH 36.1 (H) 27.0 - 33.0 pg    MCHC 36.7 (H) 32.3 - 36.5 g/dL    RDW 45.9 35.9 - 50.0 fL    Platelet Count 196 164 - 446 K/uL    MPV 8.8 (L) 9.0 - 12.9 fL    Neutrophils-Polys 86.20 (H) 44.00 - 72.00 %    Lymphocytes 8.60 (L) 22.00 - 41.00 %    Monocytes 3.70 0.00 - 13.40 %    Eosinophils 0.80 0.00 - 6.90 %    Basophils 0.30 0.00 - 1.80 %    Immature Granulocytes 0.40 0.00 - 0.90 %    Nucleated RBC 0.00 0.00 - 0.20 /100 WBC    Neutrophils (Absolute) 8.56 (H) 1.82 - 7.42 K/uL    Lymphs (Absolute) 0.85 (L) 1.00 - 4.80 K/uL    Monos (Absolute) 0.37 0.00 - 0.85 K/uL    Eos (Absolute) 0.08 0.00 - 0.51 K/uL    Baso (Absolute) 0.03 0.00 - 0.12 K/uL    Immature Granulocytes (abs) 0.04 0.00 - 0.11 K/uL    NRBC (Absolute) 0.00 K/uL   Complete Metabolic Panel (CMP)    Collection Time: 06/21/25  6:35 PM   Result Value Ref Range    Sodium 138 135 - 145 mmol/L    Potassium 3.2 (L) 3.6 - 5.5 mmol/L    Chloride 102 96 - 112 mmol/L    Co2 22 20 - 33 mmol/L    Anion Gap 14.0 7.0 - 16.0    Glucose 125 (H) 65 - 99 mg/dL    Bun 23 (H) 8 - 22 mg/dL    Creatinine 2.03 (H) 0.50 - 1.40 mg/dL    Calcium 9.1 8.5 - 10.5 mg/dL    Correct Calcium 8.9 8.5 - 10.5 mg/dL    AST(SGOT) 20 12 - 45 U/L    ALT(SGPT) 11 2 - 50 U/L    Alkaline Phosphatase 77 30 - 99 U/L    Total Bilirubin 1.1 0.1 - 1.5 mg/dL    Albumin 4.2 3.2 - 4.9 g/dL    Total Protein 7.5 6.0 - 8.2 g/dL    Globulin 3.3 1.9 - 3.5 g/dL    A-G Ratio 1.3 g/dL   proBrain Natriuretic Peptide, NT (BNP)    Collection Time: 06/21/25  6:35 PM   Result Value Ref Range    NT-proBNP 68 0  - 125 pg/mL   Troponins NOW    Collection Time: 25  6:35 PM   Result Value Ref Range    Troponin T 7 6 - 19 ng/L   LIPASE    Collection Time: 25  6:35 PM   Result Value Ref Range    Lipase 25 11 - 82 U/L   ESTIMATED GFR    Collection Time: 25  6:35 PM   Result Value Ref Range    GFR (CKD-EPI) 37 (A) >60 mL/min/1.73 m 2   EKG (NOW)    Collection Time: 25  6:50 PM   Result Value Ref Range    Report       [facility] Emergency Dept.    Test Date:  2025  Pt Name:    MIRANDA REARDON                Department: ER  MRN:        [id number]                      Room:       [facility]  Gender:     Male                         Technician: [id number]  :        1965                   Requested By:ER TRIAGE PROTOCOL  Order #:    [id number]                    Reading MD: Yarely Mo    Measurements  Intervals                                Axis  Rate:       61                           P:          37  SD:         65                           QRS:        1  QRSD:       94                           T:          0  QT:         419  QTc:        422    Interpretive Statements  Sinus rhythm  Short SD interval  Borderline T wave abnormalities  No ST elevation  Compared to ECG 2025 06:21:56  No significant changes  Electronically Signed On 2025 18:50:19 PDT by Yarely Mo     Troponins in two (2) hours    Collection Time: 25  8:22 PM   Result Value Ref Range    Troponin T 6 6 - 19 ng/L       I have independently interpreted this EKG    RADIOLOGY/PROCEDURES   I have independently interpreted the diagnostic imaging associated with this visit and am waiting the final reading from the radiologist.   My preliminary interpretation is as follows: no free air in abdomen    Radiologist interpretation:  CT-ABDOMEN-PELVIS WITH   Final Result      1.  Acute cholecystitis.   2.  Likely reactive inflammation of the duodenum.   3.  Severe distal colonic diverticulosis.   4.  Trace fluid  in the pelvis, likely reactive.         DX-CHEST-PORTABLE (1 VIEW)   Final Result         1. No acute cardiopulmonary abnormalities are identified.          COURSE & MEDICAL DECISION MAKING    ASSESSMENT, COURSE AND PLAN  Care Narrative:   This is a 59-year-old male with history as noted above who is presenting for evaluation of epigastric pain that radiates to his lower abdomen that started today.  He had an episode of vomiting.  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  Rectal exam with brown stool that is guaiac negative.  On arrival, his vital signs are stable.  Differential diagnose includes not limited to pancreatitis, gastritis, perforated ulcer, PUD, cholecystitis, cholangitis, ACS.    Labs with no leukocytosis.  Stable anemia seen.  Lipase is normal.  Initial troponin is 7, repeat troponin is 6, EKG is nonischemic, doubt ACS.  LFTs within normal limits.  CT abdomen pelvis was obtained and shows evidence of acute cholecystitis with likely reactive inflammation of the duodenum.    9:20 PM I discussed with surgery, Dr. Aggarwal, who reviewed the CAT scan of the abdomen.  He does not feel that the CAT scan is consistent with acute cholecystitis.  He recommends readmission to medicine, GI consultation for potential EGD and can consider HIDA scan if symptoms are persistent.    I discussed with [facility] family medicine resident who agreed to admit the patient to hospital.    Patient is aware of results of and plan for admission to the hospital for further testing.  He is agreeable to plan with no further questions.                DISPOSITION AND DISCUSSIONS  I have discussed management of the patient with the following physicians and JIM's:    General surgery - Dr. Aggarwal  [facility] Family Medicine    Discussion of management with other [facility] or appropriate source(s): None     Escalation of care considered, and ultimately not performed: None    Barriers to  care at this time, including but not limited to: None.     Decision tools and prescription drugs considered including, but not limited to: None.    DISPOSITION:  Patient will be hospitalized by [facility] in guarded condition.      FINAL DIAGNOSIS  1. Epigastric pain Acute   2. Nausea and vomiting, unspecified vomiting type Acute   3. Hypokalemia Acute        Electronically signed by: Yarely Mo M.D., 6/21/2025 6:46 PM           [1]   Allergies  Allergen Reactions    Other Food Anaphylaxis     All fresh fruit causes throat swelling per brother.

## 2025-06-22 NOTE — ED NOTES
Pt to S[facility] via pt transport. Pt has all belongings at time of transfer. No belongings left in room after transfer.

## 2025-06-22 NOTE — PROGRESS NOTES
DATE: 6/21/2025      INTERVAL EVENTS:  Patient seen and examined.  Clinical history, laboratory indices, and imaging studies reviewed.  The patient's clinical presentation is atypical for acute cholecystitis and more consistent with gastritis or ulcer disease.  However, he does have cholelithiasis and a family history of such.  If a more convincing and consistent history of postprandial right upper quadrant colicky pain associated with nausea and vomiting becomes evident, additional consideration for cholecystectomy is warranted.  HIDA scan may help to further elucidate acute cholecystitis, but seems unnecessary at this time.       ____________________________________     Jones Aggarwal M.D.    DD: 6/21/2025  10:55 PM

## 2025-06-22 NOTE — H&P
"      [facility] MEDICINE H&P        PATIENT ID:  NAME:  Paul Hopper  MRN:               [id number]  YOB: 1965    Date of Admission: 6/21/2025     Attending: Carmina Pollock M.d.    Resident: Zulema Rodrigues D.O.    Primary Care Physician:  Thad Cao M.D.    CC:  epigastric pain     HPI: Paul Hopper is a 59 y.o. male w/ PMHx of CVA, reported MI, B12/folate deficiency, macrocytic anemia, hyperlipidemia, hypothyroidism, left ventricular hypokinesis on echocardiogram, hypertension, 3A CKD  who presented with sudden onset 10 out of 10 epigastric pain that started at 1 PM this afternoon.  Patient states that he was taking a nap prior to his night shift at [employer] ([place] when he had sudden onset epigastric pain.  He had eaten a waffle wood and potatoes at [employer] diner prior to his nap.  He states that this meal was pretty \"light\" for him.  Denies radiation of this pain.  Describes it as sharp in nature.  Had nausea and vomiting associated, vomited twice around 3 PM today.  Mildly relieved with GI cocktail that he was given in the ED, pain is now at a 6 or 7 out of 10.  Denies nausea at this time.  States that he had this on 6/18 when he was admitted to the hospital as well.  He states that at that time it just went away on its own.  Patient's mother has history of cholecystitis.      ERCourse:  Vital signs in the ED significant for blood pressure of 203/9100 [place], 147/76 at its lowest dose of 58 at its lowest.  SpO2 stable, temp stable.    Opponent negative at 6, CMP remarkable for potassium of 3.2, glucose of 125, BUN of 23, creatinine of 2.03, otherwise within normal limits.  BNP within normal limits, lipase within normal limits, CBC without signs of elevated white blood cell count.    CT abdomen pelvis:  1.  Acute cholecystitis.  2.  Likely reactive inflammation of the duodenum.  3.  Severe distal colonic diverticulosis.  4.  Trace fluid in the pelvis, likely reactive.    DX " chest: WNL    REVIEW OF SYSTEMS:   Ten systems reviewed and were negative except as noted in the HPI.                PAST MEDICAL HISTORY:  Past Medical History[1]    PAST SURGICAL HISTORY:  Past Surgical History[2]    FAMILY HISTORY:  History reviewed. No pertinent family history.    SOCIAL HISTORY:   Pt lives in home with 2 other roommates  Smoking denies  Etoh use denies  Drug use denies    DIET:   Orders Placed This Encounter   Procedures    Diet Order Diet: Clear Liquid     Standing Status:   Standing     Number of Occurrences:   1     Diet::   Clear Liquid [10]       ALLERGIES:  Allergies[3]    OUTPATIENT MEDICATIONS:  Medications - Current, Listed Continuously[4]    PHYSICAL EXAM:  Vitals:    25 2030 25 2100 25 2130   BP: (!) 203/91 (!) 176/85 (!) 177/85 (!) 164/76   Pulse: 73 67 63 65   Resp: 16 17 15 16   Temp:       TempSrc:       SpO2: 95% 98% 98% 97%   Weight:       Height:       , Temp (24hrs), Av.8 °C (96.5 °F), Min:35.8 °C (96.5 °F), Max:35.8 °C (96.5 °F)  , Pulse Oximetry: 97 %, O2 (LPM): 0, O2 Delivery Device: None - Room Air    General: Pt resting in NAD, cooperative   Skin:  Pink, warm and dry.  No rashes  HEENT: NC/AT. PERRL. EOMI. MMM. No nasal discharge. Oropharynx nonerythematous without exudate/plaques  Neck:  Supple without lymphadenopathy or rigidity. No JVD   Lungs:  Symmetrical.  CTAB with no W/R/R.  Good air movement   Cardiovascular:  S1/S2 RRR without M/R/G.  Abdomen:  Abdomen is soft, tenderness to right epigastric region, without rebound or guarding, negative [place] sign, +BS. No masses noted.  Extremities:  Full range of motion. No gross deformities noted. 2+ pulses in all extremities. No C/C/E   Spine:  Straight without vertebral anomalies.  CNS:  Muscle tone is normal. Cranial nerves II-XII grossly intact. 2+ DTRs.         LAB TESTS:   Recent Labs     25  1835   WBC 9.9   RBC 3.55*   HEMOGLOBIN 12.8*   HEMATOCRIT 34.9*   MCV 98.3*   MCH  36.1*   RDW 45.9   PLATELETCT 196   MPV 8.8*   NEUTSPOLYS 86.20*   LYMPHOCYTES 8.60*   MONOCYTES 3.70   EOSINOPHILS 0.80   BASOPHILS 0.30         Recent Labs     06/21/25  1835   SODIUM 138   POTASSIUM 3.2*   CHLORIDE 102   CO2 22   BUN 23*   CREATININE 2.03*   CALCIUM 9.1   ALBUMIN 4.2       CULTURES:   Results       ** No results found for the last 168 hours. **            IMAGES:  CT-ABDOMEN-PELVIS WITH   Final Result      1.  Acute cholecystitis.   2.  Likely reactive inflammation of the duodenum.   3.  Severe distal colonic diverticulosis.   4.  Trace fluid in the pelvis, likely reactive.         DX-CHEST-PORTABLE (1 VIEW)   Final Result         1. No acute cardiopulmonary abnormalities are identified.            ASSESSMENT/PLAN: 59 y.o. male admitted for epigastric pain.    * Epigastric pain- (present on admission)  Assessment & Plan  Acute and severe onset at 1 PM today.  States that he had this pain prior 3 days ago.  He was admitted to the CDU for ACS rule out which was negative.  Patient states that his epigastric pain resolved on its own at that time.  Mildly relieved with GI cocktail.  LFTs not showing signs of obstruction.  CT abdomen pelvis with impression showing acute cholecystitis.  ERP reached out to surgery to valuate patient, they looked at patient's CT and did not feel that it was acute cholecystitis, felt that patient would be better off having GI consult in the a.m.    Plan  - Day team to consult GI for further evaluation  - Will start patient on clear liquid diet  -Pain regimen in place  -40 mg IV Protonix daily  -MIVF, LR at 125 cc/h    Primary hypertension- (present on admission)  Assessment & Plan  Elevated BP today in the ED, highest of 203/91.  This could be secondary to pain.  Patient on amlodipine 10 mg and losartan 50 mg daily which he states he did take today.  Will resume this regimen on admission.  Will also add as needed labetalol for SBP greater than 180 or DBP greater than 110.   Have pain regimen in place for patient's epigastric pain which will also help control his blood pressure          Core Measures  IV Fluids: LR at 125 cc/h  Antbx: None  DVT ppx: Lovenox, SCDs  Code status: Full code  Dispo: inpatient     Zulema Rodrigues D.O., PGY-2  [facility]           [1]   Past Medical History:  Diagnosis Date    Chickenpox     CVA (cerebral vascular accident) (HCC)     Hypertension     MI (myocardial infarction) (HCC)    [2] History reviewed. No pertinent surgical history.  [3]   Allergies  Allergen Reactions    Other Food Anaphylaxis     All fresh fruit causes throat swelling per brother.    [4]   Current Facility-Administered Medications:     fentaNYL (Sublimaze) injection 50 mcg, 50 mcg, Intravenous, Once, Yarely Mo M.D.    fentaNYL (Sublimaze) injection 25 mcg, 25 mcg, Intravenous, Once, Yarely Mo M.D.    lactated ringers infusion, , Intravenous, Continuous, Zulema Rodrigues D.O.    acetaminophen (Tylenol) tablet 650 mg, 650 mg, Oral, Q6HRS PRN, Zulema Rodrigues D.O.    senna-docusate (Pericolace Or Senokot S) 8.6-50 MG per tablet 2 Tablet, 2 Tablet, Oral, Q EVENING **AND** polyethylene glycol/lytes (Miralax) Packet 1 Packet, 1 Packet, Oral, QDAY PRN, EZRA GrayO.    [START ON 6/22/2025] pantoprazole (Protonix) injection 40 mg, 40 mg, Intravenous, DAILY, EZRA GrayO.    oxyCODONE immediate-release (Roxicodone) tablet 5 mg, 5 mg, Oral, Q3HRS PRN **OR** oxyCODONE immediate-release (Roxicodone) tablet 10 mg, 10 mg, Oral, Q3HRS PRN **OR** HYDROmorphone (Dilaudid) injection 0.5 mg, 0.5 mg, Intravenous, Q3HRS PRN, ERZA GrayO.    labetalol (Normodyne/Trandate) injection 10 mg, 10 mg, Intravenous, Q4HRS PRN, Zulema N Lilia, D.O.    enoxaparin (Lovenox) inj 40 mg, 40 mg, Subcutaneous, DAILY AT 1800, Zulema Rodrigues D.O.    Current Outpatient Medications:     atorvastatin (LIPITOR) 40 MG Tab, Take 1 Tablet by  mouth every evening., Disp: 100 Tablet, Rfl: 3    aspirin 81 MG EC tablet, Take 1 Tablet by mouth every day., Disp: 100 Tablet, Rfl: 3    omeprazole (PRILOSEC) 20 MG delayed-release capsule, Take 1 Capsule by mouth every day., Disp: 30 Capsule, Rfl: 0    losartan (COZAAR) 50 MG Tab, Take 1 Tablet by mouth every day., Disp: 100 Tablet, Rfl: 3    amLODIPine (NORVASC) 10 MG Tab, Take 1 Tablet by mouth every day., Disp: 100 Tablet, Rfl: 3

## 2025-06-22 NOTE — ANESTHESIA PREPROCEDURE EVALUATION
Case: [id number] Date/Time: 06/22/25 1232    Procedure: GASTROSCOPY (Esophagus)    Anesthesia type: MAC    Pre-op diagnosis: Epigastric pain    Location: [facility] OR 06 / SURGERY [facility]    Surgeons: Cheyanne Rodriguez M.D.            Relevant Problems   NEURO   (positive) CVA (cerebral vascular accident) (HCC)      CARDIAC   (positive) Hypertensive urgency   (positive) Primary hypertension   (positive) Sinus bradycardia      GI   (positive) GERD (gastroesophageal reflux disease)         (positive) Stage 3a chronic kidney disease      ENDO   (positive) Hypothyroidism      Other   (positive) Epigastric pain     No prior anesthetic complications. Appropriately NPO.    Vitals:    06/22/25 1142   BP: 134/79   Pulse: (!) 57   Resp: 14   Temp: 36.5 °C (97.7 °F)   SpO2: 96%        Recent Labs     06/21/25 1835 06/22/25  0308   WBC 9.9 9.0   RBC 3.55* 3.37*   HEMOGLOBIN 12.8* 12.2*   HEMATOCRIT 34.9* 33.5*   MCV 98.3* 99.4*   MCH 36.1* 36.2*   RDW 45.9 46.5   PLATELETCT 196 166   MPV 8.8* 8.6*   NEUTSPOLYS 86.20* 73.10*   LYMPHOCYTES 8.60* 17.10*   MONOCYTES 3.70 8.60   EOSINOPHILS 0.80 0.70   BASOPHILS 0.30 0.20       Recent Labs     06/21/25 1835 06/22/25  0308   SODIUM 138 140   POTASSIUM 3.2* 4.0   CHLORIDE 102 104   CO2 22 24   GLUCOSE 125* 106*   BUN 23* 18     Physical Exam    Airway   Mallampati: II  TM distance: >3 FB  Neck ROM: full       Cardiovascular - normal exam  Rhythm: regular  Rate: normal    (-) murmur     Dental   Comments: Many broken/chipped teeth           Pulmonary - normal examBreath sounds clear to auscultation     Abdominal    Neurological - normal exam                   Anesthesia Plan    ASA 3- EMERGENT (acute epigastric pain)   ASA physical status 3 criteria: CVA or TIA - history (> 3 months)    Plan - MAC               Induction: intravenous    Postoperative Plan: Postoperative administration of opioids is intended.    Pertinent diagnostic labs and testing reviewed    Informed  Consent:    Anesthetic plan and risks discussed with patient.    Use of blood products discussed with: patient whom consented to blood products.

## 2025-06-22 NOTE — CONSULTS
Date of Consultation:  6/22/2025    Patient: : Paul Hopper  MRN: [id number]    Referring Physician:  Dr Pollock     GI:Cooper Bailey M.D.     Reason for Consultation: Epigastric pain    History of Present Illness:   59-year-old male admitted overnight for epigastric pain.  Patient endorses nausea with intermittent vomiting.  No hematemesis.  Patient also having some chest pain.  He was given a GI cocktail in the ED last night and he says that has helped.  Patient denies endoscopic history to include EGD or colonoscopy.  No family history of GI malignancy.  CT scan suggested the possibility of cholecystitis.  Surgery has recommended evaluation for peptic ulcer disease, gastritis/esophagitis prior to further workup for gallbladder pain as the pain is not really consistent with biliary colic.  Patient feeling okay this morning.  No melena.  No hematemesis.  He is NPO.      Past Medical History:   Diagnosis Date    Chickenpox     CVA (cerebral vascular accident) (HCC)     Hypertension     MI (myocardial infarction) (HCC)        Past Surgical History[1]    History reviewed. No pertinent family history.    Social History[2]    Current Meds (name, sig, last dose):     Current Facility-Administered Medications:     pantoprazole    fentaNYL    LR    acetaminophen    senna-docusate **AND** polyethylene glycol/lytes    oxyCODONE immediate-release **OR** oxyCODONE immediate-release **OR** HYDROmorphone    labetalol    [Held by provider] enoxaparin (LOVENOX) injection    amLODIPine    atorvastatin    losartan      ROS  10 systems reviewed and are negative unless otherwise noted in history of present illness.    Physical Exam:  Vitals:    06/21/25 2312 06/21/25 2333 06/22/25 0446 06/22/25 0722   BP: (!) 175/86  (!) 142/75 (!) 146/72   Pulse: 64  77 63   Resp:  16 18 17   Temp: 36.5 °C (97.7 °F)  37.2 °C (99 °F) 36.4 °C (97.5 °F)   TempSrc: Temporal  Temporal Temporal   SpO2: 97%  95% 96%   Weight: 78.4 kg (172 lb 13.5 oz)     "  Height: 1.778 m (5' 10\")          Physical Exam  Vitals and nursing note reviewed.   Cardiovascular:      Rate and Rhythm: Normal rate.   Pulmonary:      Effort: Pulmonary effort is normal.   Abdominal:      General: There is no distension.      Palpations: Abdomen is soft.      Tenderness: There is no abdominal tenderness.   Skin:     General: Skin is warm and dry.   Neurological:      General: No focal deficit present.      Mental Status: He is alert and oriented to person, place, and time.   Psychiatric:         Thought Content: Thought content normal.           Labs:  Recent Labs     06/21/25 1835 06/22/25  0308   SODIUM 138 140   POTASSIUM 3.2* 4.0   CHLORIDE 102 104   CO2 22 24   BUN 23* 18   CREATININE 2.03* 1.69*   CALCIUM 9.1 8.8     Recent Labs     06/21/25 1835 06/22/25  0308   ALTSGPT 11 12   ASTSGOT 20 20   ALKPHOSPHAT 77 62   TBILIRUBIN 1.1 1.2   LIPASE 25  --    GLUCOSE 125* 106*     Recent Labs     06/21/25 1835 06/22/25  0308   WBC 9.9 9.0   NEUTSPOLYS 86.20* 73.10*   LYMPHOCYTES 8.60* 17.10*   MONOCYTES 3.70 8.60   EOSINOPHILS 0.80 0.70   BASOPHILS 0.30 0.20   ASTSGOT 20 20   ALTSGPT 11 12   ALKPHOSPHAT 77 62   TBILIRUBIN 1.1 1.2     Recent Labs     06/21/25 1835 06/22/25  0308   RBC 3.55* 3.37*   HEMOGLOBIN 12.8* 12.2*   HEMATOCRIT 34.9* 33.5*   PLATELETCT 196 166     Recent Results (from the past 24 hours)   CBC with Differential    Collection Time: 06/21/25  6:35 PM   Result Value Ref Range    WBC 9.9 4.8 - 10.8 K/uL    RBC 3.55 (L) 4.70 - 6.10 M/uL    Hemoglobin 12.8 (L) 14.0 - 18.0 g/dL    Hematocrit 34.9 (L) 42.0 - 52.0 %    MCV 98.3 (H) 81.4 - 97.8 fL    MCH 36.1 (H) 27.0 - 33.0 pg    MCHC 36.7 (H) 32.3 - 36.5 g/dL    RDW 45.9 35.9 - 50.0 fL    Platelet Count 196 164 - 446 K/uL    MPV 8.8 (L) 9.0 - 12.9 fL    Neutrophils-Polys 86.20 (H) 44.00 - 72.00 %    Lymphocytes 8.60 (L) 22.00 - 41.00 %    Monocytes 3.70 0.00 - 13.40 %    Eosinophils 0.80 0.00 - 6.90 %    Basophils 0.30 0.00 - " 1.80 %    Immature Granulocytes 0.40 0.00 - 0.90 %    Nucleated RBC 0.00 0.00 - 0.20 /100 WBC    Neutrophils (Absolute) 8.56 (H) 1.82 - 7.42 K/uL    Lymphs (Absolute) 0.85 (L) 1.00 - 4.80 K/uL    Monos (Absolute) 0.37 0.00 - 0.85 K/uL    Eos (Absolute) 0.08 0.00 - 0.51 K/uL    Baso (Absolute) 0.03 0.00 - 0.12 K/uL    Immature Granulocytes (abs) 0.04 0.00 - 0.11 K/uL    NRBC (Absolute) 0.00 K/uL   Complete Metabolic Panel (CMP)    Collection Time: 25  6:35 PM   Result Value Ref Range    Sodium 138 135 - 145 mmol/L    Potassium 3.2 (L) 3.6 - 5.5 mmol/L    Chloride 102 96 - 112 mmol/L    Co2 22 20 - 33 mmol/L    Anion Gap 14.0 7.0 - 16.0    Glucose 125 (H) 65 - 99 mg/dL    Bun 23 (H) 8 - 22 mg/dL    Creatinine 2.03 (H) 0.50 - 1.40 mg/dL    Calcium 9.1 8.5 - 10.5 mg/dL    Correct Calcium 8.9 8.5 - 10.5 mg/dL    AST(SGOT) 20 12 - 45 U/L    ALT(SGPT) 11 2 - 50 U/L    Alkaline Phosphatase 77 30 - 99 U/L    Total Bilirubin 1.1 0.1 - 1.5 mg/dL    Albumin 4.2 3.2 - 4.9 g/dL    Total Protein 7.5 6.0 - 8.2 g/dL    Globulin 3.3 1.9 - 3.5 g/dL    A-G Ratio 1.3 g/dL   proBrain Natriuretic Peptide, NT (BNP)    Collection Time: 25  6:35 PM   Result Value Ref Range    NT-proBNP 68 0 - 125 pg/mL   Troponins NOW    Collection Time: 25  6:35 PM   Result Value Ref Range    Troponin T 7 6 - 19 ng/L   LIPASE    Collection Time: 25  6:35 PM   Result Value Ref Range    Lipase 25 11 - 82 U/L   ESTIMATED GFR    Collection Time: 25  6:35 PM   Result Value Ref Range    GFR (CKD-EPI) 37 (A) >60 mL/min/1.73 m 2   EKG (NOW)    Collection Time: 25  6:50 PM   Result Value Ref Range    Report       [facility]    Test Date:  2025  Pt Name:    MIRANDA REARDON                Department: ER  MRN:        [id number]                      Room:       [facility]  Gender:     Male                         Technician: [id number]  :        1965                   Requested By:ER TRIAGE  PROTOCOL  Order #:    [id number]                    Reading MD: Yarely Mo    Measurements  Intervals                                Axis  Rate:       61                           P:          37  VT:         65                           QRS:        1  QRSD:       94                           T:          0  QT:         419  QTc:        422    Interpretive Statements  Sinus rhythm  Short VT interval  Borderline T wave abnormalities  No ST elevation  Compared to ECG 06/18/2025 06:21:56  No significant changes  Electronically Signed On 06- 18:50:19 PDT by Yarely Mo     Troponins in two (2) hours    Collection Time: 06/21/25  8:22 PM   Result Value Ref Range    Troponin T 6 6 - 19 ng/L   Comp Metabolic Panel (CMP)    Collection Time: 06/22/25  3:08 AM   Result Value Ref Range    Sodium 140 135 - 145 mmol/L    Potassium 4.0 3.6 - 5.5 mmol/L    Chloride 104 96 - 112 mmol/L    Co2 24 20 - 33 mmol/L    Anion Gap 12.0 7.0 - 16.0    Glucose 106 (H) 65 - 99 mg/dL    Bun 18 8 - 22 mg/dL    Creatinine 1.69 (H) 0.50 - 1.40 mg/dL    Calcium 8.8 8.5 - 10.5 mg/dL    Correct Calcium 9.0 8.5 - 10.5 mg/dL    AST(SGOT) 20 12 - 45 U/L    ALT(SGPT) 12 2 - 50 U/L    Alkaline Phosphatase 62 30 - 99 U/L    Total Bilirubin 1.2 0.1 - 1.5 mg/dL    Albumin 3.8 3.2 - 4.9 g/dL    Total Protein 6.7 6.0 - 8.2 g/dL    Globulin 2.9 1.9 - 3.5 g/dL    A-G Ratio 1.3 g/dL   CBC with Differential    Collection Time: 06/22/25  3:08 AM   Result Value Ref Range    WBC 9.0 4.8 - 10.8 K/uL    RBC 3.37 (L) 4.70 - 6.10 M/uL    Hemoglobin 12.2 (L) 14.0 - 18.0 g/dL    Hematocrit 33.5 (L) 42.0 - 52.0 %    MCV 99.4 (H) 81.4 - 97.8 fL    MCH 36.2 (H) 27.0 - 33.0 pg    MCHC 36.4 32.3 - 36.5 g/dL    RDW 46.5 35.9 - 50.0 fL    Platelet Count 166 164 - 446 K/uL    MPV 8.6 (L) 9.0 - 12.9 fL    Neutrophils-Polys 73.10 (H) 44.00 - 72.00 %    Lymphocytes 17.10 (L) 22.00 - 41.00 %    Monocytes 8.60 0.00 - 13.40 %    Eosinophils 0.70 0.00 - 6.90 %    Basophils  0.20 0.00 - 1.80 %    Immature Granulocytes 0.30 0.00 - 0.90 %    Nucleated RBC 0.00 0.00 - 0.20 /100 WBC    Neutrophils (Absolute) 6.60 1.82 - 7.42 K/uL    Lymphs (Absolute) 1.54 1.00 - 4.80 K/uL    Monos (Absolute) 0.78 0.00 - 0.85 K/uL    Eos (Absolute) 0.06 0.00 - 0.51 K/uL    Baso (Absolute) 0.02 0.00 - 0.12 K/uL    Immature Granulocytes (abs) 0.03 0.00 - 0.11 K/uL    NRBC (Absolute) 0.00 K/uL   ESTIMATED GFR    Collection Time: 06/22/25  3:08 AM   Result Value Ref Range    GFR (CKD-EPI) 47 (A) >60 mL/min/1.73 m 2         Imaging:  CT-ABDOMEN-PELVIS WITH  Narrative: 6/21/2025 7:31 PM    HISTORY/REASON FOR EXAM:  upper abdominal pain radiating down his abdomen.    TECHNIQUE/EXAM DESCRIPTION:   CT scan of the abdomen and pelvis with contrast.    Contrast-enhanced helical scanning was obtained from the diaphragmatic domes through the pubic symphysis following the bolus administration of nonionic contrast without complication.    97 mL of Omnipaque 350 nonionic contrast was administered without complication.    Low dose optimization technique was utilized for this CT exam including automated exposure control and adjustment of the mA and/or kV according to patient size.    COMPARISON: CT abdomen pelvis 3/10/2023    FINDINGS:  Lower Chest: Unremarkable.    Liver: Normal.    Spleen: Unremarkable.    Pancreas: Unremarkable.    Gallbladder: Cholelithiasis with pericholecystic fat stranding.    Biliary: Nondilated.    Adrenal glands: Normal.    Kidneys: No hydronephrosis. Simple right renal cyst does not require follow-up.    Bowel: Some inflammation adjacent to the duodenum. Severe distal colonic diverticulosis. Normal appendix.    Lymph nodes: No adenopathy.    Vasculature: Unremarkable.    Peritoneum: Unremarkable without ascites.    Musculoskeletal: No acute or destructive process.    Pelvis: Trace fluid in the pelvis.  Impression: 1.  Acute cholecystitis.  2.  Likely reactive inflammation of the duodenum.  3.   Severe distal colonic diverticulosis.  4.  Trace fluid in the pelvis, likely reactive.  DX-CHEST-PORTABLE (1 VIEW)  Narrative: 6/21/2025 6:59 PM    HISTORY/REASON FOR EXAM:  Chest Pain    TECHNIQUE/EXAM DESCRIPTION AND NUMBER OF VIEWS:  Single portable view of the chest.    COMPARISON: None    FINDINGS:    No pulmonary infiltrates or consolidations are noted.  No pleural effusion. No pneumothorax.    Normal cardiopericardial silhouette.  Impression: 1. No acute cardiopulmonary abnormalities are identified.        MDM Data Review:  -Records reviewed and summarized in current documentation  -I personally reviewed and interpreted the laboratory results  -I personally reviewed the radiology images  -I have personally reviewed medications    Hospital Problem List:  Active Hospital Problems    Diagnosis     Epigastric pain [R10.13]     Primary hypertension [I10]        Impressions:  59-year-old male with epigastric pain, chest pain, nausea with vomiting.  Surgery is evaluated the patient for the possibility of cholecystitis.  They have recommended workup for peptic ulcer disease.  Patient is currently NPO.  Patient denies history of prior endoscopic evaluation.  He is willing to proceed forward with EGD.    The risk, benefits, and alternatives were discussed in detail. Risks include bowel perforation, procedure related bleeding event, infection, inability to safely complete the exam, sedation related complications. The patient, understanding the discussion, consents to proceed forward.        Recommendations:  N.p.o. status  EGD today with Dr Rodriguez  Further recommendations to follow at time of endoscopy  Standard reflux precautions  Elevation of the head of the bed to 30 degrees  Daily PPI therapy  Community GI referral at time of discharge.  Patient should have screening colonoscopy.         [1] History reviewed. No pertinent surgical history.  [2]   Social History  Socioeconomic History    Marital status: Single    Tobacco Use    Smoking status: Former     Current packs/day: 0.00     Types: Cigarettes     Quit date: 1990     Years since quittin.4    Smokeless tobacco: Never   Substance and Sexual Activity    Alcohol use: Not Currently     Alcohol/week: 1.2 oz     Types: 2 Standard drinks or equivalent per week     Comment: OCC    Drug use: Not Currently     Comment: marijuana     Social Drivers of Health     Food Insecurity: No Food Insecurity (2025)    Hunger Vital Sign     Worried About Running Out of Food in the Last Year: Never true     Ran Out of Food in the Last Year: Never true   Transportation Needs: No Transportation Needs (2025)    PRAPARE - Transportation     Lack of Transportation (Medical): No     Lack of Transportation (Non-Medical): No   Intimate Partner Violence: Not At Risk (2025)    Humiliation, Afraid, Rape, and Kick questionnaire     Fear of Current or Ex-Partner: No     Emotionally Abused: No     Physically Abused: No     Sexually Abused: No   Housing Stability: Low Risk  (2025)    Housing Stability Vital Sign     Unable to Pay for Housing in the Last Year: No     Number of Times Moved in the Last Year: 0     Homeless in the Last Year: No

## 2025-06-22 NOTE — PROGRESS NOTES
Patient A&Ox4, denies N/V and pain.    1600:  tolerating diet, denies pain, calling ride for DC home.

## 2025-06-22 NOTE — ASSESSMENT & PLAN NOTE
Acute and severe onset at 1 PM today.  States that he had this pain prior 3 days ago.  He was admitted to the CDU for ACS rule out which was negative.  Patient states that his epigastric pain resolved on its own at that time.  Mildly relieved with GI cocktail.  LFTs not showing signs of obstruction.  CT abdomen pelvis with impression showing acute cholecystitis.  ERP reached out to surgery to valuate patient, they looked at patient's CT and did not feel that it was acute cholecystitis, felt that patient would be better off having GI consult in the a.m.    Plan  - Day team to consult GI for further evaluation  - Will start patient on clear liquid diet  -Pain regimen in place  -40 mg IV Protonix daily  -MIVF, LR at 125 cc/h

## 2025-06-22 NOTE — DISCHARGE SUMMARY
[facility] DISCHARGE SUMMARY     Admit Date:  6/21/2025       Discharge Date:   6/22/2025    Attending: Carmina Pollock M.d.     Resident: Brittany Holman D.O. (PGY-1)    PATIENT: Paul Hopper; [id number]; 1965    Diagnoses (includes active and resolved):  Principal Problem (Resolved):    Epigastric pain (POA: Yes)  Active Problems:    Primary hypertension (POA: Yes)      Problem List[1]     Hospital Summary (Brief Narrative):       Paul  is a 59 y.o.  Male who was admitted on 6/21/2025 for abdominal pain.  Patient presented to the emergency department with recurrent severe epigastric pain that started yesterday after he had a meal consisting of waffles and wood at work.  He was recently admitted to [facility] for similar symptoms on 6/18/25 and had cardiac workup including echocardiogram which were largely unremarkable.  He was discharged at that time with omeprazole which she had been taking at home.  On this admission, symptoms mildly relieved with GI cocktail in the ED.  LFTs not showing signs of obstruction.  CT abdomen and pelvis with the impression of gallstones and possibly acute cholecystitis.  General surgery was consulted from the emergency department who did not feel clinical picture is consistent with acute cholecystitis especially given no leukocytosis and low suspicion on physical exam. They recommended GI evaluation and patient underwent upper endoscopy today which showed no significant findings.  He reports having no pain today and was ready to discharge home.  He is encouraged to continue his home omeprazole.  It is likely his abdominal pain is secondary to biliary colic given his gallbadder stones on imaging.  I discussed diet and lifestyle interventions for symptom control and will be placing referral to general surgery for patient to see [facility] for elective outpatient cholecystectomy.  He is also due for colonoscopy, I have placed a referral to GI for  outpatient elective colonoscopy for colon cancer screening if this cannot be done, our clinic will be able to place a new referral for him in the future.    Patient will be discharged in good and stable condition.    Consultants:      Gastroenterology  General surgery    Procedures:        Upper endoscopy 6/22-Dr. Rodriguez     Discharge Medications:        Medication Reconciliation Completed     Medication List        CONTINUE taking these medications        Instructions   amLODIPine 10 MG Tabs  Commonly known as: Norvasc   Take 1 Tablet by mouth every day.  Dose: 10 mg     Aspirin Low Dose 81 MG EC tablet  Generic drug: aspirin   Take 1 Tablet by mouth every day.  Dose: 81 mg     atorvastatin 40 MG Tabs  Commonly known as: Lipitor   Take 1 Tablet by mouth every evening.  Dose: 40 mg     losartan 50 MG Tabs  Commonly known as: Cozaar   Take 1 Tablet by mouth every day.  Dose: 50 mg     omeprazole 20 MG delayed-release capsule  Commonly known as: PriLOSEC   Take 1 Capsule by mouth every day.  Dose: 20 mg              Discharge Criteria: The patient recovered much more quickly than anticipated on admission.    Disposition:  to home with close outpatient follow-up    Diet:  regular diet; avoid greasy, fatty, spicy foods    Activity:  Activity as tolerated.    Code Status: Full Code      PCP: Thad Cao M.D.    Follow Up:  Thad Cao M.D.  [address] [facility] [id number] [phone]    Go to       Future Appointments   Date Time Provider Department Center   7/1/2025  8:45 AM Brown Tillman P.A.-C. [facility] None        Instructions:       The patient was instructed to return to the ER in the event of worsening symptoms. I have counseled the patient on the importance of compliance and the patient has agreed to proceed with all medical recommendations and follow up plan indicated above.   The patient understands that all medications come with benefits and risks. Risks may include permanent injury  or death and these risks can be minimized with close reassessment and monitoring.            #########################################################  Subjective: Patient is feeling well today.  Reports no pain.  Has not had anything to eat or drink since last night.  No more nausea or vomiting.    OBJECTIVE:     Vitals:    06/22/25 1349 06/22/25 1430 06/22/25 1459 06/22/25 1529   BP: 121/72 (!) 141/70 (!) 154/80 (!) 146/70   Pulse: (!) 50 (!) 54 (!) 50 (!) 56   Resp: 18 19 18 18   Temp:  36.3 °C (97.4 °F) 36.4 °C (97.5 °F) 36.6 °C (97.8 °F)   TempSrc:  Temporal Temporal Temporal   SpO2: 99% 96% 93% 96%   Weight:       Height:           Intake/Output Summary (Last 24 hours) at 6/22/2025 1538  Last data filed at 6/22/2025 1258  Gross per 24 hour   Intake 150 ml   Output 0 ml   Net 150 ml       Physical Exam  Gen:  NAD, obese  HEENT: MMM, EOMI  Cardio: RRR, clear s1/s2, no murmur  Resp:  Equal bilat, clear to auscultation  GI/: Soft, non-distended, minimal tenderness to palpation greatest in epigastric region, negative [place] sign, normal bowel sounds, no guarding/rebound  Neuro: Non-focal, Gross intact, no deficits  Skin/Extremities: Cap refill <3sec, warm/well perfused, no rash, normal extremities    LABS:  Recent Labs     06/21/25  1835 06/22/25  0308   WBC 9.9 9.0   RBC 3.55* 3.37*   HEMOGLOBIN 12.8* 12.2*   HEMATOCRIT 34.9* 33.5*   MCV 98.3* 99.4*   MCH 36.1* 36.2*   RDW 45.9 46.5   PLATELETCT 196 166   MPV 8.8* 8.6*   NEUTSPOLYS 86.20* 73.10*   LYMPHOCYTES 8.60* 17.10*   MONOCYTES 3.70 8.60   EOSINOPHILS 0.80 0.70   BASOPHILS 0.30 0.20     Recent Labs     06/21/25  1835 06/22/25  0308   SODIUM 138 140   POTASSIUM 3.2* 4.0   CHLORIDE 102 104   CO2 22 24   BUN 23* 18   CREATININE 2.03* 1.69*   CALCIUM 9.1 8.8   ALBUMIN 4.2 3.8     Estimated GFR/CRCL = Estimated Creatinine Clearance: 49.8 mL/min (A) (by C-G formula based on SCr of 1.69 mg/dL (H)).  Recent Labs     06/21/25  1835 06/22/25  0308   GLUCOSE 125*  "106*     Recent Labs     06/21/25  1835 06/22/25  0308   ASTSGOT 20 20   ALTSGPT 11 12   TBILIRUBIN 1.1 1.2   ALKPHOSPHAT 77 62   GLOBULIN 3.3 2.9             No results for input(s): \"INR\", \"APTT\", \"DDIMER\", \"FIBRINOGEN\" in the last 72 hours.    MICROBIOLOGY:   Results       ** No results found for the last 336 hours. **              IMAGING:   CT-ABDOMEN-PELVIS WITH   Final Result      1.  Acute cholecystitis.   2.  Likely reactive inflammation of the duodenum.   3.  Severe distal colonic diverticulosis.   4.  Trace fluid in the pelvis, likely reactive.         DX-CHEST-PORTABLE (1 VIEW)   Final Result         1. No acute cardiopulmonary abnormalities are identified.          Brittany Holman DO, PGY-2  [facility]             [1]   Patient Active Problem List  Diagnosis    CVA (cerebral vascular accident) (HCC)    Chest pain due to myocardial ischemia    Seizure-like activity (HCC)    Primary hypertension    Hypothyroidism    Syncope and collapse    Cervical stenosis of spinal canal    B12 deficiency    Folate deficiency    History of stroke    Sepsis associated hypotension (HCC)    Cellulitis of right lower extremity    Recurrent or persistent right lower extremity cellulitis    Macrocytic anemia    Left ventricular hypokinesis    Noncompliance    Hyperlipidemia    GERD (gastroesophageal reflux disease)    Acute metabolic encephalopathy    Fever    Hypokalemia    Hypertensive urgency    Does not have primary care provider    Noncompliance with medication treatment due to underuse of medication    Sinus bradycardia    Abnormal ECG    Stage 3a chronic kidney disease     "

## 2025-06-22 NOTE — ANESTHESIA TIME REPORT
Anesthesia Start and Stop Event Times       Date Time Event    6/22/2025 1242 Ready for Procedure     1248 Anesthesia Start     1303 Anesthesia Stop          Responsible Staff  06/22/25      Name Role Begin End    Rosendo Unger M.D. Anesth 1248 1303          Overtime Reason:  no overtime (within assigned shift)    Comments:

## 2025-06-30 NOTE — PROGRESS NOTES
Medical decision making:  -Managing hypertension, syncope and collapse, history of CVA.  -Comes in today for discussion of findings on 2-week heart monitor. Fortunately, heart monitor was relatively benign with no abnormalities that would be causing syncopal episodes. TTE was also grossly normal.   -He has been feeling well from a cardiac standpoint since last time I saw him. Denies any palpitations or heart racing. Denies any syncopal or presyncopal episodes. Denies any chest pain or shortness of breath.  -Notes that has been in the hospital twice in the past month or so for concern of gallstones. Per his report he is getting worked up by his GI currently  -Notes from the ED reveal benign cardiac workup with no changes in EKG or elevations in troponin.  -Blood pressure has been well-controlled consistently under 130/80 at home. Continue amlodipine 10 mg and losartan 50 mg. BP meds being managed by PCP.  -History of CVA continue aspirin 81 mg and Lipitor 40 mg. LDL well-controlled at 34.  -Syncopal episodes may be related to vasovagal and/or pain response secondary to gallstones. No clear cardiac etiology for her syncopal episodes.  -RTC with me PRN    Problem list:  1. Syncope and collapse    2. Primary hypertension    3. Stage 3a chronic kidney disease    4. Cerebrovascular accident (CVA), unspecified mechanism (HCC)      Chief Complaint:   Chief Complaint   Patient presents with    Hypertension     F/V DX: Primary hypertension       History of Present Illness:  Paul Hopper is a 59 y.o. male with PMH of stress cardiomyopathy in 2022 in the setting of acute CVA, CKD, HTN, and prior syncope in 2023 who returns for hospital follow up after syncopal episode on 4/12/25.    He had recurrent syncope, 2 episodes total on 4/12/25. Both associated with symptoms that could be vasovagal with diaphoresis, and sensation that he needed to go to the bathroom. Both times he was in a situation where he could not use  evasive maneuvers. Discharged home after short stay in the hospital with 2 weeks Zio patch monitor. Was resumed on blood pressure medications at this time including amlodipine 5 mg and losartan 25 mg.  Heart monitor results are reassuring for no cardiac arrhythmias that could be causing syncopal episodes.     Since being seen in the hospital in April he had 2 ED visits on 5/2/2025 and 5/9/2025 both for dizziness, lightheadedness, presyncopal episodes. Both ED workups were benign and patient was sent home and told to follow-up with cardiology. Per these notes patient seems to be confused about what to do with the Zio patch as he has been holding onto it for much longer than instructed and still had it with him on 5/9/2025.    Since last visit he has had 2 ED visits for heartburn. Underwent EGD which was overall benign. Given advice to change dietary habits and to continue PPI. Troponin and EKG's done at these visits were both negative for ischemia.   Per his report, he is being worked up for gallstones and needs to have his gallbladder removed.  Also per his report he is having a colonoscopy here soon.    Today he is feeling well, doing much better than he was when he was in the hospital. Has not had an syncopal or presyncopal episode since 5/9. States his BP has been much better controlled with consistent readings under 130/80 at home.     No family history of premature coronary artery disease.  No prior smoking history.  No history of diabetes.  No history of autoimmune disease such as lupus or rheumatoid arthritis.  No history of chest radiation or cardiotoxic chemotherapy.  No ETOH overuse.  Drinks 2-3 sodas a day with occasional energy drink  No recreation substance use.  No hx asthma.  Covid, maybe 90% better. Has headaches.     Not limited by chest pain, pressure or tightness with activity.  No significant dyspnea on exertion, orthopnea or lower extremity swelling.  No significant palpitations,  lightheadedness.  No symptoms of leg claudication.  Residual right extremity weakness related to stroke    Wt Readings from Last 5 Encounters:   07/01/25 78 kg (172 lb)   06/21/25 78.4 kg (172 lb 13.5 oz)   06/18/25 78.6 kg (173 lb 4.5 oz)   06/02/25 78 kg (172 lb)   05/29/25 80.3 kg (177 lb)     DATA REVIEWED by me:  ECG   6/21/25  Sinus rhythm 61  5/9/25  Sinus bradycardia   Compared to ECG 05/02/2025 07:49:14   Sinus rhythm no longer present   ST (T wave) deviation no longer present   Possible ischemia no longer present   5/2/25  Patient EKG is normal sinus rhythm, normal axis normal intervals no ST   changes consistent with acute regional ischemia   4/12/25  Sinus bradycardia   LVH with secondary repolarization abnormality   Compared to ECG 02/26/2024 16:36:20   Left ventricular hypertrophy now present   Early repolarization now present     [facility] 14 day monitor beginning 4/14/2025  1.  Sinus rhythm with appropriate heart rate range. Average 71, range 36 to 161 bpm.   2.  Normal sinus node and AV node conduction normal. No pauses.   3.  Rare PACs.   4.  Rare PVCs.   5.  Single 4 beat atrial run.  No sustained rhythm changes.   6.  Single patient trigger during sinus, rate 88 bpm.  No symptoms reported.     Echo  4/13/25  Left ventricular systolic function is normal.  No significant valvular disease.     Stress test  2/27/24  NUCLEAR IMAGING INTERPRETATION   No evidence of significant jeopardized viable myocardium or prior myocardial    infarction.   Normal left ventricular size, ejection fraction, and wall motion.  ECG INTERPRETATION   Nondiagnostic stress ECG with Regadenoson.    Most recent labs:       Lab Results   Component Value Date/Time    WBC 9.0 06/22/2025 03:08 AM    HEMOGLOBIN 12.2 (L) 06/22/2025 03:08 AM    HEMATOCRIT 33.5 (L) 06/22/2025 03:08 AM    MCV 99.4 (H) 06/22/2025 03:08 AM    INR 1.22 (H) 03/11/2023 12:02 PM      Lab Results   Component Value Date/Time    SODIUM 140 06/22/2025 03:08 AM     "POTASSIUM 4.0 2025 03:08 AM    CHLORIDE 104 2025 03:08 AM    CO2 24 2025 03:08 AM    GLUCOSE 106 (H) 2025 03:08 AM    BUN 18 2025 03:08 AM    CREATININE 1.69 (H) 2025 03:08 AM      Lab Results   Component Value Date/Time    ASTSGOT 20 2025 03:08 AM    ALTSGPT 12 2025 03:08 AM    ALBUMIN 3.8 2025 03:08 AM      Lab Results   Component Value Date/Time    CHOLSTRLTOT 76 (L) 2025 09:54 AM    LDL 34 2025 09:54 AM    HDL 33 (A) 2025 09:54 AM    TRIGLYCERIDE 45 2025 09:54 AM     No results for input(s): \"NTPROBNP\", \"TROPONINT\" in the last 72 hours.      Past Medical History[1]  Past Surgical History[2]  History reviewed. No pertinent family history.  Social History     Socioeconomic History    Marital status: Single     Spouse name: Not on file    Number of children: Not on file    Years of education: Not on file    Highest education level: Not on file   Occupational History    Not on file   Tobacco Use    Smoking status: Former     Current packs/day: 0.00     Types: Cigarettes     Quit date: 1990     Years since quittin.5    Smokeless tobacco: Never   Vaping Use    Vaping status: Not on file   Substance and Sexual Activity    Alcohol use: Not Currently     Alcohol/week: 1.2 oz     Types: 2 Standard drinks or equivalent per week     Comment: OCC    Drug use: Not Currently     Comment: marijuana    Sexual activity: Not on file   Other Topics Concern    Not on file   Social History Narrative    Not on file     Social Drivers of Health     Financial Resource Strain: Not on file   Food Insecurity: No Food Insecurity (2025)    Hunger Vital Sign     Worried About Running Out of Food in the Last Year: Never true     Ran Out of Food in the Last Year: Never true   Transportation Needs: No Transportation Needs (2025)    PRAPARE - Transportation     Lack of Transportation (Medical): No     Lack of Transportation (Non-Medical): No " "  Physical Activity: Not on file   Stress: Not on file   Social Connections: Not on file   Intimate Partner Violence: Not At Risk (6/21/2025)    Humiliation, Afraid, Rape, and Kick questionnaire     Fear of Current or Ex-Partner: No     Emotionally Abused: No     Physically Abused: No     Sexually Abused: No   Housing Stability: Low Risk  (6/21/2025)    Housing Stability Vital Sign     Unable to Pay for Housing in the Last Year: No     Number of Times Moved in the Last Year: 0     Homeless in the Last Year: No     Allergies[3]    Current Medications[4]    ROS  All others systems reviewed and negative.    /78 (BP Location: Left arm, Patient Position: Sitting, BP Cuff Size: Adult)   Pulse 60   Resp 14   Ht 1.778 m (5' 10\")   Wt 78 kg (172 lb)   SpO2 96%  Body mass index is 24.68 kg/m².    General: No acute distress. Well nourished.  HEENT: EOM grossly intact, no scleral icterus, no pharyngeal erythema.   Neck: No JVD, no bruits, trachea midline  CVS: RRR. Normal S1, S2. No M/R/G. No LE edema.  2+ radial pulses, 2+ PT pulses  Resp: CTAB. No wheezing or crackles/rhonchi. Normal respiratory effort.  Abdomen: Soft, minor TTP over subxiphoid, no latha hepatomegaly. Healing bruise noted at right lower quadrant next to umbilicus.  MSK/Ext: No clubbing or cyanosis.  Skin: Warm and dry, no rashes.  Neurological: CN III-XII grossly intact. No focal deficits.   Psych: A&O x 3, appropriate affect, good judgement    Return if symptoms worsen or fail to improve.    It is my pleasure to participate in the care of Mr. Hopper.  Please do not hesitate to contact me with questions or concerns.    Brown Tillman PA-C  [facility]    Please note that this dictation was created using voice recognition software. I have made every reasonable attempt to correct obvious errors, but it is possible there are errors of grammar and possibly content that I did not discover before finalizing the " note.         [1]   Past Medical History:  Diagnosis Date    Chickenpox     CVA (cerebral vascular accident) (HCC)     Hypertension     MI (myocardial infarction) (HCC)    [2]   Past Surgical History:  Procedure Laterality Date    NE UPPER GI ENDOSCOPY,DIAGNOSIS N/A 6/22/2025    Procedure: GASTROSCOPY;  Surgeon: Cheyanne Rodriguez M.D.;  Location: SURGERY [facility];  Service: Gastroenterology   [3]   Allergies  Allergen Reactions    Other Food Anaphylaxis     All fresh fruit causes throat swelling per brother.    [4]   Current Outpatient Medications   Medication Sig Dispense Refill    atorvastatin (LIPITOR) 40 MG Tab Take 1 Tablet by mouth every evening. 100 Tablet 3    aspirin 81 MG EC tablet Take 1 Tablet by mouth every day. 100 Tablet 3    omeprazole (PRILOSEC) 20 MG delayed-release capsule Take 1 Capsule by mouth every day. 30 Capsule 0    losartan (COZAAR) 50 MG Tab Take 1 Tablet by mouth every day. 100 Tablet 3    amLODIPine (NORVASC) 10 MG Tab Take 1 Tablet by mouth every day. 100 Tablet 3     No current facility-administered medications for this visit.

## 2025-07-01 ENCOUNTER — OFFICE VISIT (OUTPATIENT)
Facility: MEDICAL CENTER | Age: 60
End: 2025-07-01
Payer: COMMERCIAL

## 2025-07-01 VITALS
BODY MASS INDEX: 24.62 KG/M2 | RESPIRATION RATE: 14 BRPM | WEIGHT: 172 LBS | SYSTOLIC BLOOD PRESSURE: 118 MMHG | OXYGEN SATURATION: 96 % | HEART RATE: 60 BPM | HEIGHT: 70 IN | DIASTOLIC BLOOD PRESSURE: 78 MMHG

## 2025-07-01 DIAGNOSIS — N18.31 STAGE 3A CHRONIC KIDNEY DISEASE: ICD-10-CM

## 2025-07-01 DIAGNOSIS — I63.9 CEREBROVASCULAR ACCIDENT (CVA), UNSPECIFIED MECHANISM (HCC): ICD-10-CM

## 2025-07-01 DIAGNOSIS — R55 SYNCOPE AND COLLAPSE: Primary | ICD-10-CM

## 2025-07-01 DIAGNOSIS — I10 PRIMARY HYPERTENSION: ICD-10-CM

## 2025-07-01 PROCEDURE — 99212 OFFICE O/P EST SF 10 MIN: CPT

## 2025-07-01 PROCEDURE — 3074F SYST BP LT 130 MM HG: CPT

## 2025-07-01 PROCEDURE — 99214 OFFICE O/P EST MOD 30 MIN: CPT

## 2025-07-01 PROCEDURE — 3078F DIAST BP <80 MM HG: CPT

## 2025-07-01 ASSESSMENT — FIBROSIS 4 INDEX: FIB4 SCORE: 1.98

## 2025-07-02 NOTE — PROGRESS NOTES
Subjective:   7/3/2025  3:27 PM  Primary care physician:Thad Cao M.D.  Referring Provider: Brittany Holman D.O       Chief Complaint:   Chief Complaint   Patient presents with    New Patient     cholecystitis and colon cancer screening.     Diagnosis:   1. Encounter for colorectal cancer screening        2. Cholecystitis            History of presenting illness:  Paul Hopper is a pleasant 59 y.o. year old male who presented with cholecystitis noted on a CT scan on  and would also like to discuss colonoscopy screening. Patient was in the ER on  with epigastric pain and underwent an upper endoscopy which was normal and demonstrated a small hiatal hernia.       Past Medical History[1]  Past Surgical History[2]  Allergies[3]  Encounter Medications[4]  Social History     Socioeconomic History    Marital status: Single     Spouse name: Not on file    Number of children: Not on file    Years of education: Not on file    Highest education level: Not on file   Occupational History    Not on file   Tobacco Use    Smoking status: Former     Current packs/day: 0.00     Types: Cigarettes     Quit date: 1990     Years since quittin.5    Smokeless tobacco: Never   Vaping Use    Vaping status: Not on file   Substance and Sexual Activity    Alcohol use: Not Currently     Alcohol/week: 1.2 oz     Types: 2 Standard drinks or equivalent per week     Comment: OCC    Drug use: Not Currently     Comment: marijuana    Sexual activity: Not on file   Other Topics Concern    Not on file   Social History Narrative    Not on file     Social Drivers of Health     Financial Resource Strain: Not on file   Food Insecurity: No Food Insecurity (2025)    Hunger Vital Sign     Worried About Running Out of Food in the Last Year: Never true     Ran Out of Food in the Last Year: Never true   Transportation Needs: No Transportation Needs (2025)    PRAPARE - Transportation     Lack of Transportation (Medical): No  "    Lack of Transportation (Non-Medical): No   Physical Activity: Not on file   Stress: Not on file   Social Connections: Not on file   Intimate Partner Violence: Not At Risk (6/21/2025)    Humiliation, Afraid, Rape, and Kick questionnaire     Fear of Current or Ex-Partner: No     Emotionally Abused: No     Physically Abused: No     Sexually Abused: No   Housing Stability: Low Risk  (6/21/2025)    Housing Stability Vital Sign     Unable to Pay for Housing in the Last Year: No     Number of Times Moved in the Last Year: 0     Homeless in the Last Year: No      Tobacco Use History[5]  Social History     Substance and Sexual Activity   Alcohol Use Not Currently    Alcohol/week: 1.2 oz    Types: 2 Standard drinks or equivalent per week    Comment: OCC     Social History     Substance and Sexual Activity   Drug Use Not Currently    Comment: marijuana        No family history on file.    Review of Systems   Constitutional:  Negative for chills, fever, malaise/fatigue and weight loss.   Eyes:  Negative for blurred vision.   Respiratory:  Negative for cough, hemoptysis and shortness of breath.    Cardiovascular:  Negative for chest pain and leg swelling.   Gastrointestinal:  Negative for blood in stool, constipation, diarrhea, nausea and vomiting.   Genitourinary:  Negative for dysuria, hematuria and urgency.   Musculoskeletal:  Negative for back pain, falls and joint pain.   Skin:  Negative for rash.   Neurological:  Negative for dizziness, weakness and headaches.   Endo/Heme/Allergies: Negative.  Does not bruise/bleed easily.   Psychiatric/Behavioral:  Negative for depression. The patient is not nervous/anxious.         Objective:   /80 (BP Location: Left arm, Patient Position: Sitting, BP Cuff Size: Adult)   Pulse 74   Temp 37.1 °C (98.8 °F) (Temporal)   Ht 1.778 m (5' 10\")   Wt 78.8 kg (173 lb 13.3 oz)   SpO2 98%   BMI 24.94 kg/m²     Physical Exam  Vitals and nursing note reviewed.   Constitutional:       " Appearance: Normal appearance. He is normal weight.   HENT:      Head: Normocephalic and atraumatic.      Nose: Nose normal.      Mouth/Throat:      Mouth: Mucous membranes are moist.   Eyes:      Extraocular Movements: Extraocular movements intact.   Cardiovascular:      Rate and Rhythm: Normal rate.      Pulses: Normal pulses.   Pulmonary:      Effort: Pulmonary effort is normal.      Breath sounds: Normal breath sounds.   Abdominal:      General: Abdomen is flat.      Palpations: Abdomen is soft.   Musculoskeletal:         General: Normal range of motion.      Cervical back: Normal range of motion.   Skin:     General: Skin is warm and dry.   Neurological:      Mental Status: He is alert and oriented to person, place, and time.   Psychiatric:         Mood and Affect: Mood normal.         Behavior: Behavior normal.         Thought Content: Thought content normal.         Judgment: Judgment normal.         Labs:   Latest Reference Range & Units 06/22/25 03:08   WBC 4.8 - 10.8 K/uL 9.0   RBC 4.70 - 6.10 M/uL 3.37 (L)   Hemoglobin 14.0 - 18.0 g/dL 12.2 (L)   Hematocrit 42.0 - 52.0 % 33.5 (L)   MCV 81.4 - 97.8 fL 99.4 (H)   MCH 27.0 - 33.0 pg 36.2 (H)   MCHC 32.3 - 36.5 g/dL 36.4   RDW 35.9 - 50.0 fL 46.5   Platelet Count 164 - 446 K/uL 166   MPV 9.0 - 12.9 fL 8.6 (L)   Neutrophils-Polys 44.00 - 72.00 % 73.10 (H)   Neutrophils (Absolute) 1.82 - 7.42 K/uL 6.60   Lymphocytes 22.00 - 41.00 % 17.10 (L)   Lymphs (Absolute) 1.00 - 4.80 K/uL 1.54   Monocytes 0.00 - 13.40 % 8.60   Monos (Absolute) 0.00 - 0.85 K/uL 0.78   Eosinophils 0.00 - 6.90 % 0.70   Eos (Absolute) 0.00 - 0.51 K/uL 0.06   Basophils 0.00 - 1.80 % 0.20   Baso (Absolute) 0.00 - 0.12 K/uL 0.02   Immature Granulocytes 0.00 - 0.90 % 0.30   Immature Granulocytes (abs) 0.00 - 0.11 K/uL 0.03   Nucleated RBC 0.00 - 0.20 /100 WBC 0.00   NRBC (Absolute) K/uL 0.00   Sodium 135 - 145 mmol/L 140   Potassium 3.6 - 5.5 mmol/L 4.0   Chloride 96 - 112 mmol/L 104   Co2 20 -  33 mmol/L 24   Anion Gap 7.0 - 16.0  12.0   Glucose 65 - 99 mg/dL 106 (H)   Bun 8 - 22 mg/dL 18   Creatinine 0.50 - 1.40 mg/dL 1.69 (H)   GFR (CKD-EPI) >60 mL/min/1.73 m 2 47 !   Calcium 8.5 - 10.5 mg/dL 8.8   Correct Calcium 8.5 - 10.5 mg/dL 9.0   AST(SGOT) 12 - 45 U/L 20   ALT(SGPT) 2 - 50 U/L 12   Alkaline Phosphatase 30 - 99 U/L 62   Total Bilirubin 0.1 - 1.5 mg/dL 1.2   Albumin 3.2 - 4.9 g/dL 3.8   Total Protein 6.0 - 8.2 g/dL 6.7   Globulin 1.9 - 3.5 g/dL 2.9   A-G Ratio g/dL 1.3     Imaging:  CT abdomen/pelvis 6/21/2025 7:31 PM     HISTORY/REASON FOR EXAM:  upper abdominal pain radiating down his abdomen.    TECHNIQUE/EXAM DESCRIPTION:   CT scan of the abdomen and pelvis with contrast.     Contrast-enhanced helical scanning was obtained from the diaphragmatic domes through the pubic symphysis following the bolus administration of nonionic contrast without complication.     97 mL of Omnipaque 350 nonionic contrast was administered without complication.     Low dose optimization technique was utilized for this CT exam including automated exposure control and adjustment of the mA and/or kV according to patient size.     COMPARISON: CT abdomen pelvis 3/10/2023     FINDINGS:  Lower Chest: Unremarkable.     Liver: Normal.     Spleen: Unremarkable.     Pancreas: Unremarkable.     Gallbladder: Cholelithiasis with pericholecystic fat stranding.     Biliary: Nondilated.     Adrenal glands: Normal.     Kidneys: No hydronephrosis. Simple right renal cyst does not require follow-up.     Bowel: Some inflammation adjacent to the duodenum. Severe distal colonic diverticulosis. Normal appendix.     Lymph nodes: No adenopathy.     Vasculature: Unremarkable.     Peritoneum: Unremarkable without ascites.     Musculoskeletal: No acute or destructive process.     Pelvis: Trace fluid in the pelvis.     IMPRESSION:  1.  Acute cholecystitis.  2.  Likely reactive inflammation of the duodenum.  3.  Severe distal colonic  diverticulosis.  4.  Trace fluid in the pelvis, likely reactive.    Chest Xray 06/21/2025     HISTORY/REASON FOR EXAM:  Chest Pain     TECHNIQUE/EXAM DESCRIPTION AND NUMBER OF VIEWS:  Single portable view of the chest.     COMPARISON: None     FINDINGS:  No pulmonary infiltrates or consolidations are noted.  No pleural effusion. No pneumothorax.     Normal cardiopericardial silhouette.     IMPRESSION:  1. No acute cardiopulmonary abnormalities are identified.      CTA Neck 5/9/2025 12:34 PM     HISTORY/REASON FOR EXAM:  dizziness and near syncope with head/neck in flexion     TECHNIQUE/EXAM DESCRIPTION:  CT angiogram of the neck with contrast.     Postcontrast images were obtained of the neck from the great vessels through the skull base following the power injection of nonionic contrast at 5.0 mL/sec. Thin-section helical images were obtained with overlapping reconstruction interval. Coronal and   oblique multiplanar volume reformats were generated.     Cervical internal carotid artery percent stenosis is calculated using the standard method according to the NASCET criteria wherein a segment of uniform caliber mid or distal cervical internal carotid is used as the reference denominator.     3D angiographic images were reviewed on PACS.  Maximum intensity projection (MIP) images were generated and reviewed     80 mL of Omnipaque 350 nonionic contrast was injected intravenously.     Low dose optimization technique was utilized for this CT exam including automated exposure control and adjustment of the mA and/or kV according to patient size.     COMPARISON:  5/2/2025     FINDINGS:  Aortic arch: conventional branching pattern.     There is atherosclerotic plaque in the proximal supraaortic arteries with less than 50% stenosis, most prominently in the proximal left subclavian artery.     Right common carotid artery: Patent     Right internal carotid artery: Atherosclerotic plaque without significant stenosis (less than  50%). There is partially visualized distal right internal carotid artery cavernous segment plaque and probable stenosis     Left common carotid artery is patent.     Left internal carotid artery: Atherosclerotic plaque without significant stenosis (less than 50%).     The right vertebral artery is patent without dissection or stenosis.     The left vertebral artery is patent without dissection or stenosis.     Vertebrobasilar confluence: The vertebrobasilar confluence appears normal.     3 mm noncalcified right upper lobe pulmonary nodule and was probably present in 2021 and probably postinflammatory.     Dental disease.     3D angiographic/MIP images of the vasculature confirm the vascular findings as described above.     IMPRESSION:  1.  Scattered atherosclerosis without occlusion or hemodynamically significant stenosis of the neck arteries.      Procedures:  EGD 06/21/2025  OPERATIVE FINDINGS:     1. Esophagus: normal  2. Stomach: small hiatal hernia  3. Duodenum: normal    Diagnosis:     1. Encounter for colorectal cancer screening        2. Cholecystitis                Medical Decision Making:  Today's Assessment / Status / Plan:        I discussed with the patient that there was no evidence of cholecystitis and that gallbladder sludge is equivocal in terms as a causative agent for her right upper quadrant pain.  We discussed the role of empiric cholecystectomy and the patient strongly desires to pursue surgery.  I described to the patient that minimally invasive cholecystectomy is now the standard of care for gallbladder removal.  We discussed complications and I explained that the observed complication rate is approximately 2-1/2%.  We discussed bleeding, abscess, bile leak or biliary injury and/or bowel injury as possible complications.  Risks, benefits, and alternatives were discussed with consenting person(s). Consenting person(s) were given an opportunity to ask questions and discuss other options. Risks  including but not limited to failed or incomplete planned procedure, ineffective therapy, perforation, infection, bleeding, missed lesion(s), missed diagnosis, cardiac and/or pulmonary event, aspiration, stroke, possible need for surgery, hospitalization possibly prolonged, discomfort, unsuccessful and/or incomplete procedure, possible need for repeat procedures and/or additional testings, damage to adjacent organs and/or vascular structures, medication reaction, disability, death, and other adverse events possibly life-threatening. Discussion was undertaken with Layman's terms. Consenting persons stated understanding and acceptance of these risks, and wished to proceed. Consent was given in clear state of mind.  Oscar Andres MD PhD  [facility]  Colon and Rectal Surgeon  [phone]           [1]   Past Medical History:  Diagnosis Date    Chickenpox     CVA (cerebral vascular accident) (HCC)     Hypertension     MI (myocardial infarction) (HCC)    [2]   Past Surgical History:  Procedure Laterality Date    [place] UPPER GI ENDOSCOPY,DIAGNOSIS N/A 6/22/2025    Procedure: GASTROSCOPY;  Surgeon: Cheyanne Rodriguez M.D.;  Location: SURGERY [facility];  Service: Gastroenterology   [3]   Allergies  Allergen Reactions    Other Food Anaphylaxis     All fresh fruit causes throat swelling per brother.    [4]   Outpatient Encounter Medications as of 7/3/2025   Medication Sig Dispense Refill    atorvastatin (LIPITOR) 40 MG Tab Take 1 Tablet by mouth every evening. 100 Tablet 3    aspirin 81 MG EC tablet Take 1 Tablet by mouth every day. 100 Tablet 3    omeprazole (PRILOSEC) 20 MG delayed-release capsule Take 1 Capsule by mouth every day. 30 Capsule 0    losartan (COZAAR) 50 MG Tab Take 1 Tablet by mouth every day. 100 Tablet 3    amLODIPine (NORVASC) 10 MG Tab Take 1 Tablet by mouth every day. 100 Tablet 3     No facility-administered encounter medications on file as of 7/3/2025.   [5]   Social History  Tobacco Use    Smoking Status Former    Current packs/day: 0.00    Types: Cigarettes    Quit date: 1990    Years since quittin.5   Smokeless Tobacco Never

## 2025-07-03 ENCOUNTER — OFFICE VISIT (OUTPATIENT)
Facility: MEDICAL CENTER | Age: 60
End: 2025-07-03
Payer: COMMERCIAL

## 2025-07-03 VITALS
HEART RATE: 74 BPM | SYSTOLIC BLOOD PRESSURE: 122 MMHG | WEIGHT: 173.83 LBS | HEIGHT: 70 IN | BODY MASS INDEX: 24.89 KG/M2 | OXYGEN SATURATION: 98 % | DIASTOLIC BLOOD PRESSURE: 80 MMHG | TEMPERATURE: 98.8 F

## 2025-07-03 DIAGNOSIS — K81.9 CHOLECYSTITIS: ICD-10-CM

## 2025-07-03 DIAGNOSIS — Z12.12 ENCOUNTER FOR COLORECTAL CANCER SCREENING: Primary | ICD-10-CM

## 2025-07-03 DIAGNOSIS — Z12.11 ENCOUNTER FOR COLORECTAL CANCER SCREENING: Primary | ICD-10-CM

## 2025-07-03 PROCEDURE — 3074F SYST BP LT 130 MM HG: CPT | Performed by: SURGERY

## 2025-07-03 PROCEDURE — 3079F DIAST BP 80-89 MM HG: CPT | Performed by: SURGERY

## 2025-07-03 PROCEDURE — 99204 OFFICE O/P NEW MOD 45 MIN: CPT | Performed by: SURGERY

## 2025-07-03 ASSESSMENT — FIBROSIS 4 INDEX: FIB4 SCORE: 1.98

## 2025-07-07 ASSESSMENT — ENCOUNTER SYMPTOMS
FALLS: 0
CONSTIPATION: 0
CHILLS: 0
DIZZINESS: 0
COUGH: 0
HEADACHES: 0
NAUSEA: 0
DIARRHEA: 0
WEAKNESS: 0
DEPRESSION: 0
HEMOPTYSIS: 0
BLURRED VISION: 0
WEIGHT LOSS: 0
BACK PAIN: 0
SHORTNESS OF BREATH: 0
VOMITING: 0
FEVER: 0
BLOOD IN STOOL: 0
BRUISES/BLEEDS EASILY: 0
NERVOUS/ANXIOUS: 0

## 2025-07-28 ENCOUNTER — PHARMACY VISIT (OUTPATIENT)
Dept: PHARMACY | Facility: MEDICAL CENTER | Age: 60
End: 2025-07-28
Payer: COMMERCIAL

## 2025-07-28 PROCEDURE — RXMED WILLOW AMBULATORY MEDICATION CHARGE: Performed by: INTERNAL MEDICINE

## 2025-07-31 ENCOUNTER — APPOINTMENT (OUTPATIENT)
Dept: ADMISSIONS | Facility: MEDICAL CENTER | Age: 60
End: 2025-07-31
Attending: SURGERY
Payer: COMMERCIAL

## 2025-08-05 ENCOUNTER — PRE-ADMISSION TESTING (OUTPATIENT)
Dept: ADMISSIONS | Facility: MEDICAL CENTER | Age: 60
End: 2025-08-05
Attending: SURGERY

## 2025-08-07 ENCOUNTER — PRE-ADMISSION TESTING (OUTPATIENT)
Dept: ADMISSIONS | Facility: MEDICAL CENTER | Age: 60
End: 2025-08-07
Attending: SURGERY

## 2025-08-15 ENCOUNTER — HOSPITAL ENCOUNTER (OUTPATIENT)
Facility: MEDICAL CENTER | Age: 60
End: 2025-08-15
Attending: SURGERY | Admitting: SURGERY

## 2025-08-15 ENCOUNTER — ANESTHESIA EVENT (OUTPATIENT)
Dept: SURGERY | Facility: MEDICAL CENTER | Age: 60
End: 2025-08-15

## 2025-08-15 ENCOUNTER — ANESTHESIA (OUTPATIENT)
Dept: SURGERY | Facility: MEDICAL CENTER | Age: 60
End: 2025-08-15

## 2025-08-15 ENCOUNTER — PHARMACY VISIT (OUTPATIENT)
Dept: PHARMACY | Facility: MEDICAL CENTER | Age: 60
End: 2025-08-15
Payer: COMMERCIAL

## 2025-08-15 VITALS
TEMPERATURE: 97 F | HEART RATE: 68 BPM | RESPIRATION RATE: 18 BRPM | BODY MASS INDEX: 24.95 KG/M2 | WEIGHT: 168.43 LBS | DIASTOLIC BLOOD PRESSURE: 69 MMHG | OXYGEN SATURATION: 94 % | HEIGHT: 69 IN | SYSTOLIC BLOOD PRESSURE: 127 MMHG

## 2025-08-15 DIAGNOSIS — G89.18 POSTOPERATIVE PAIN: Primary | ICD-10-CM

## 2025-08-15 PROBLEM — K81.9 CHOLECYSTITIS: Chronic | Status: ACTIVE | Noted: 2025-08-15

## 2025-08-15 LAB — PATHOLOGY CONSULT NOTE: NORMAL

## 2025-08-15 PROCEDURE — 160047 HCHG PACU  - EA ADDL 30 MINS PHASE II: Performed by: SURGERY

## 2025-08-15 PROCEDURE — 160015 HCHG STAT PREOP MINUTES: Performed by: SURGERY

## 2025-08-15 PROCEDURE — 160025 RECOVERY II MINUTES (STATS): Performed by: SURGERY

## 2025-08-15 PROCEDURE — 160031 HCHG SURGERY MINUTES - 1ST 30 MINS LEVEL 5: Performed by: SURGERY

## 2025-08-15 PROCEDURE — 502714 HCHG ROBOTIC SURGERY SERVICES: Performed by: SURGERY

## 2025-08-15 PROCEDURE — 700102 HCHG RX REV CODE 250 W/ 637 OVERRIDE(OP): Performed by: ANESTHESIOLOGY

## 2025-08-15 PROCEDURE — 160048 HCHG OR STATISTICAL LEVEL 1-5: Performed by: SURGERY

## 2025-08-15 PROCEDURE — 160046 HCHG PACU - 1ST 60 MINS PHASE II: Performed by: SURGERY

## 2025-08-15 PROCEDURE — 160042 HCHG SURGERY MINUTES - EA ADDL 1 MIN LEVEL 5: Performed by: SURGERY

## 2025-08-15 PROCEDURE — 700104 HCHG RX REV CODE 254: Performed by: SURGERY

## 2025-08-15 PROCEDURE — 700101 HCHG RX REV CODE 250: Performed by: SURGERY

## 2025-08-15 PROCEDURE — 700111 HCHG RX REV CODE 636 W/ 250 OVERRIDE (IP): Performed by: ANESTHESIOLOGY

## 2025-08-15 PROCEDURE — 88304 TISSUE EXAM BY PATHOLOGIST: CPT | Mod: 26 | Performed by: PATHOLOGY

## 2025-08-15 PROCEDURE — 88304 TISSUE EXAM BY PATHOLOGIST: CPT | Performed by: PATHOLOGY

## 2025-08-15 PROCEDURE — 160002 HCHG RECOVERY MINUTES (STAT): Performed by: SURGERY

## 2025-08-15 PROCEDURE — RXMED WILLOW AMBULATORY MEDICATION CHARGE: Performed by: SURGERY

## 2025-08-15 PROCEDURE — A9270 NON-COVERED ITEM OR SERVICE: HCPCS | Performed by: ANESTHESIOLOGY

## 2025-08-15 PROCEDURE — 700101 HCHG RX REV CODE 250: Performed by: ANESTHESIOLOGY

## 2025-08-15 PROCEDURE — 160194 HCHG PACU STANDARD - EA ADDL 30 MINS: Performed by: SURGERY

## 2025-08-15 PROCEDURE — 160191 HCHG ANESTHESIA STANDARD: Performed by: SURGERY

## 2025-08-15 PROCEDURE — 700105 HCHG RX REV CODE 258: Performed by: SURGERY

## 2025-08-15 PROCEDURE — 160193 HCHG PACU STANDARD - 1ST 60 MINS: Performed by: SURGERY

## 2025-08-15 RX ORDER — DIPHENHYDRAMINE HYDROCHLORIDE 50 MG/ML
12.5 INJECTION, SOLUTION INTRAMUSCULAR; INTRAVENOUS
Status: DISCONTINUED | OUTPATIENT
Start: 2025-08-15 | End: 2025-08-15 | Stop reason: HOSPADM

## 2025-08-15 RX ORDER — ONDANSETRON 2 MG/ML
INJECTION INTRAMUSCULAR; INTRAVENOUS PRN
Status: DISCONTINUED | OUTPATIENT
Start: 2025-08-15 | End: 2025-08-15 | Stop reason: SURG

## 2025-08-15 RX ORDER — HYDROMORPHONE HYDROCHLORIDE 1 MG/ML
0.1 INJECTION, SOLUTION INTRAMUSCULAR; INTRAVENOUS; SUBCUTANEOUS
Status: DISCONTINUED | OUTPATIENT
Start: 2025-08-15 | End: 2025-08-15 | Stop reason: HOSPADM

## 2025-08-15 RX ORDER — EPHEDRINE SULFATE 50 MG/ML
5 INJECTION, SOLUTION INTRAVENOUS
Status: DISCONTINUED | OUTPATIENT
Start: 2025-08-15 | End: 2025-08-15 | Stop reason: HOSPADM

## 2025-08-15 RX ORDER — SODIUM CHLORIDE, SODIUM LACTATE, POTASSIUM CHLORIDE, CALCIUM CHLORIDE 600; 310; 30; 20 MG/100ML; MG/100ML; MG/100ML; MG/100ML
INJECTION, SOLUTION INTRAVENOUS CONTINUOUS
Status: DISCONTINUED | OUTPATIENT
Start: 2025-08-15 | End: 2025-08-15 | Stop reason: HOSPADM

## 2025-08-15 RX ORDER — METHOCARBAMOL 100 MG/ML
1000 INJECTION, SOLUTION INTRAMUSCULAR; INTRAVENOUS ONCE
Status: COMPLETED | OUTPATIENT
Start: 2025-08-15 | End: 2025-08-15

## 2025-08-15 RX ORDER — OXYCODONE HCL 5 MG/5 ML
5 SOLUTION, ORAL ORAL
Status: COMPLETED | OUTPATIENT
Start: 2025-08-15 | End: 2025-08-15

## 2025-08-15 RX ORDER — CEFAZOLIN SODIUM 1 G/3ML
INJECTION, POWDER, FOR SOLUTION INTRAMUSCULAR; INTRAVENOUS PRN
Status: DISCONTINUED | OUTPATIENT
Start: 2025-08-15 | End: 2025-08-15 | Stop reason: SURG

## 2025-08-15 RX ORDER — MEPERIDINE HYDROCHLORIDE 50 MG/ML
12.5 INJECTION INTRAMUSCULAR; INTRAVENOUS; SUBCUTANEOUS
Status: DISCONTINUED | OUTPATIENT
Start: 2025-08-15 | End: 2025-08-15 | Stop reason: HOSPADM

## 2025-08-15 RX ORDER — ONDANSETRON 2 MG/ML
4 INJECTION INTRAMUSCULAR; INTRAVENOUS
Status: COMPLETED | OUTPATIENT
Start: 2025-08-15 | End: 2025-08-15

## 2025-08-15 RX ORDER — INDOCYANINE GREEN AND WATER 25 MG
2.5 KIT INJECTION ONCE
Status: COMPLETED | OUTPATIENT
Start: 2025-08-15 | End: 2025-08-15

## 2025-08-15 RX ORDER — HALOPERIDOL 5 MG/ML
1 INJECTION INTRAMUSCULAR
Status: COMPLETED | OUTPATIENT
Start: 2025-08-15 | End: 2025-08-15

## 2025-08-15 RX ORDER — SODIUM CHLORIDE, SODIUM LACTATE, POTASSIUM CHLORIDE, CALCIUM CHLORIDE 600; 310; 30; 20 MG/100ML; MG/100ML; MG/100ML; MG/100ML
INJECTION, SOLUTION INTRAVENOUS CONTINUOUS
Status: ACTIVE | OUTPATIENT
Start: 2025-08-15 | End: 2025-08-15

## 2025-08-15 RX ORDER — OXYCODONE HCL 5 MG/5 ML
10 SOLUTION, ORAL ORAL
Status: COMPLETED | OUTPATIENT
Start: 2025-08-15 | End: 2025-08-15

## 2025-08-15 RX ORDER — HYDRALAZINE HYDROCHLORIDE 20 MG/ML
5 INJECTION INTRAMUSCULAR; INTRAVENOUS
Status: DISCONTINUED | OUTPATIENT
Start: 2025-08-15 | End: 2025-08-15 | Stop reason: HOSPADM

## 2025-08-15 RX ORDER — ALBUTEROL SULFATE 5 MG/ML
2.5 SOLUTION RESPIRATORY (INHALATION)
Status: DISCONTINUED | OUTPATIENT
Start: 2025-08-15 | End: 2025-08-15 | Stop reason: HOSPADM

## 2025-08-15 RX ORDER — HYDROMORPHONE HYDROCHLORIDE 1 MG/ML
0.2 INJECTION, SOLUTION INTRAMUSCULAR; INTRAVENOUS; SUBCUTANEOUS
Status: DISCONTINUED | OUTPATIENT
Start: 2025-08-15 | End: 2025-08-15 | Stop reason: HOSPADM

## 2025-08-15 RX ORDER — BUPIVACAINE HYDROCHLORIDE AND EPINEPHRINE 5; 5 MG/ML; UG/ML
INJECTION, SOLUTION EPIDURAL; INTRACAUDAL; PERINEURAL
Status: DISCONTINUED | OUTPATIENT
Start: 2025-08-15 | End: 2025-08-15 | Stop reason: HOSPADM

## 2025-08-15 RX ORDER — DEXAMETHASONE SODIUM PHOSPHATE 4 MG/ML
INJECTION, SOLUTION INTRA-ARTICULAR; INTRALESIONAL; INTRAMUSCULAR; INTRAVENOUS; SOFT TISSUE PRN
Status: DISCONTINUED | OUTPATIENT
Start: 2025-08-15 | End: 2025-08-15 | Stop reason: SURG

## 2025-08-15 RX ORDER — ROCURONIUM BROMIDE 10 MG/ML
INJECTION, SOLUTION INTRAVENOUS PRN
Status: DISCONTINUED | OUTPATIENT
Start: 2025-08-15 | End: 2025-08-15 | Stop reason: SURG

## 2025-08-15 RX ORDER — HYDROMORPHONE HYDROCHLORIDE 1 MG/ML
0.4 INJECTION, SOLUTION INTRAMUSCULAR; INTRAVENOUS; SUBCUTANEOUS
Status: DISCONTINUED | OUTPATIENT
Start: 2025-08-15 | End: 2025-08-15 | Stop reason: HOSPADM

## 2025-08-15 RX ORDER — LIDOCAINE HYDROCHLORIDE 20 MG/ML
INJECTION, SOLUTION EPIDURAL; INFILTRATION; INTRACAUDAL; PERINEURAL PRN
Status: DISCONTINUED | OUTPATIENT
Start: 2025-08-15 | End: 2025-08-15 | Stop reason: SURG

## 2025-08-15 RX ORDER — OXYCODONE AND ACETAMINOPHEN 5; 325 MG/1; MG/1
1 TABLET ORAL EVERY 6 HOURS PRN
Qty: 20 TABLET | Refills: 0 | Status: SHIPPED | OUTPATIENT
Start: 2025-08-15 | End: 2025-08-20

## 2025-08-15 RX ADMIN — SODIUM CHLORIDE, POTASSIUM CHLORIDE, SODIUM LACTATE AND CALCIUM CHLORIDE: 600; 310; 30; 20 INJECTION, SOLUTION INTRAVENOUS at 10:54

## 2025-08-15 RX ADMIN — CEFAZOLIN 2 G: 1 INJECTION, POWDER, FOR SOLUTION INTRAMUSCULAR; INTRAVENOUS at 11:31

## 2025-08-15 RX ADMIN — HALOPERIDOL LACTATE 1 MG: 5 INJECTION, SOLUTION INTRAMUSCULAR at 13:00

## 2025-08-15 RX ADMIN — LIDOCAINE HYDROCHLORIDE 100 MG: 20 INJECTION, SOLUTION EPIDURAL; INFILTRATION; INTRACAUDAL; PERINEURAL at 11:29

## 2025-08-15 RX ADMIN — HALOPERIDOL LACTATE 1 MG: 5 INJECTION, SOLUTION INTRAMUSCULAR at 12:39

## 2025-08-15 RX ADMIN — ONDANSETRON 4 MG: 2 INJECTION INTRAMUSCULAR; INTRAVENOUS at 12:37

## 2025-08-15 RX ADMIN — HYDRALAZINE HYDROCHLORIDE 5 MG: 20 INJECTION INTRAMUSCULAR; INTRAVENOUS at 12:51

## 2025-08-15 RX ADMIN — ROCURONIUM BROMIDE 50 MG: 10 INJECTION INTRAVENOUS at 11:29

## 2025-08-15 RX ADMIN — PROPOFOL 150 MG: 10 INJECTION, EMULSION INTRAVENOUS at 11:29

## 2025-08-15 RX ADMIN — FENTANYL CITRATE 50 MCG: 50 INJECTION, SOLUTION INTRAMUSCULAR; INTRAVENOUS at 11:29

## 2025-08-15 RX ADMIN — DEXAMETHASONE SODIUM PHOSPHATE 4 MG: 4 INJECTION INTRA-ARTICULAR; INTRALESIONAL; INTRAMUSCULAR; INTRAVENOUS; SOFT TISSUE at 11:29

## 2025-08-15 RX ADMIN — FENTANYL CITRATE 50 MCG: 50 INJECTION, SOLUTION INTRAMUSCULAR; INTRAVENOUS at 12:37

## 2025-08-15 RX ADMIN — METHOCARBAMOL 1000 MG: 100 INJECTION INTRAMUSCULAR; INTRAVENOUS at 13:05

## 2025-08-15 RX ADMIN — FENTANYL CITRATE 50 MCG: 50 INJECTION, SOLUTION INTRAMUSCULAR; INTRAVENOUS at 12:33

## 2025-08-15 RX ADMIN — OXYCODONE HYDROCHLORIDE 10 MG: 5 SOLUTION ORAL at 12:33

## 2025-08-15 RX ADMIN — INDOCYANINE GREEN AND WATER 2.5 MG: KIT at 10:54

## 2025-08-15 RX ADMIN — SUGAMMADEX 200 MG: 100 INJECTION, SOLUTION INTRAVENOUS at 12:08

## 2025-08-15 RX ADMIN — ONDANSETRON 4 MG: 2 INJECTION INTRAMUSCULAR; INTRAVENOUS at 11:29

## 2025-08-15 ASSESSMENT — PAIN DESCRIPTION - PAIN TYPE
TYPE: SURGICAL PAIN

## 2025-08-15 ASSESSMENT — FIBROSIS 4 INDEX
FIB4 SCORE: 2.02
FIB4 SCORE: 2.02

## 2025-08-15 ASSESSMENT — PAIN SCALES - GENERAL: PAIN_LEVEL: 2

## (undated) DEVICE — OBTURATOR BLADELESS STANDARD 8MM (6EA/BX)

## (undated) DEVICE — BUTTON ENDOSCOPY DISPOSABLE

## (undated) DEVICE — DRAPE ARM BOX OF 20

## (undated) DEVICE — SET LEADWIRE 5 LEAD BEDSIDE DISPOSABLE ECG (1SET OF 5/EA)

## (undated) DEVICE — ELECTRODE DUAL RETURN W/ CORD - (50/PK)

## (undated) DEVICE — SET EXTENSION WITH 2 PORTS (48EA/CA) ***PART #2C8610 IS A SUBSTITUTE*****

## (undated) DEVICE — Device

## (undated) DEVICE — GLOVE SZ 7 BIOGEL PI MICRO - PF LF (50PR/BX 4BX/CA)

## (undated) DEVICE — DERMABOND ADVANCED - (12EA/BX)

## (undated) DEVICE — PORT AUXILLARY WATER (50EA/BX)

## (undated) DEVICE — COVER LIGHT HANDLE ALC PLUS DISP (18EA/BX)

## (undated) DEVICE — TUBING CLEARLINK DUO-VENT - C-FLO (48EA/CA)

## (undated) DEVICE — GLOVE SIZE 7.0 SURGEON ACCELERATOR FREE GREEN (50PR/BX 4BX/CA)

## (undated) DEVICE — COVER TIP ENDOWRIST HOT SHEAR - (10EA/BX) DA VINCI

## (undated) DEVICE — CHLORAPREP 26 ML APPLICATOR - ORANGE TINT(25/CA)

## (undated) DEVICE — BIPOLAR FORCE DA VINCI 12X'S REISABLE

## (undated) DEVICE — BLADE SURGICAL #15 - (50/BX 3BX/CA)

## (undated) DEVICE — GLOVE BIOGEL SZ 7 SURGICAL PF LTX - (50PR/BX 4BX/CA)

## (undated) DEVICE — NEPTUNE 4 PORT MANIFOLD - (20/PK)

## (undated) DEVICE — FILM CASSETTE ENDO

## (undated) DEVICE — SLEEVE VASO DVT COMPRESSION CALF MED - (10PR/CA)

## (undated) DEVICE — TOWEL STOP TIMEOUT SAFETY FLAG (40EA/CA)

## (undated) DEVICE — DRESSING TRANSPARENT FILM TEGADERM 2.375 X 2.75" (100EA/BX)"

## (undated) DEVICE — MASK PANORAMIC OXYGEN PRO2 (30EA/CA)

## (undated) DEVICE — GLOVE BIOGEL PI INDICATOR SZ 7.0 SURGICAL PF LF - (50/BX 4BX/CA)

## (undated) DEVICE — WATER IRRIGATION STERILE 1000ML (12EA/CA)

## (undated) DEVICE — CLIP APPLIER LARGE DA VINCI 100X'S REUSABLE

## (undated) DEVICE — GOWN WARMING STANDARD FLEX - (30/CA)

## (undated) DEVICE — GLOVE BIOGEL SZ 8 SURGICAL PF LTX - (50PR/BX 4BX/CA)

## (undated) DEVICE — KIT CUSTOM PROCEDURE SINGLE FOR ENDO (15/CA)

## (undated) DEVICE — SENSOR OXIMETER ADULT SPO2 RD SET (20EA/BX)

## (undated) DEVICE — COVER MAYO STAND X-LG - (22EA/CA)

## (undated) DEVICE — CLIP HEMOLOCK PURPLE - (14/BX)

## (undated) DEVICE — LACTATED RINGERS INJ 1000 ML - (14EA/CA 60CA/PF)

## (undated) DEVICE — ELECTRODE 850 FOAM ADHESIVE - HYDROGEL RADIOTRNSPRNT (50/PK)

## (undated) DEVICE — SODIUM CHL IRRIGATION 0.9% 1000ML (12EA/CA)

## (undated) DEVICE — BLOCK BITE MAXI DENTAL RETENTION RIM (100EA/BX)

## (undated) DEVICE — DRAPE COLUMN BOX OF 20

## (undated) DEVICE — IRRIGATOR SUCTION ENDOWRIST DISPOSABLE OD8 MM (6EA/BX)

## (undated) DEVICE — SUTURE 0 VICRYL PLUS UR-6 - 27 INCH (36/BX)

## (undated) DEVICE — TUBE CONNECTING SUCTION - CLEAR PLASTIC STERILE 72 IN (50EA/CA)

## (undated) DEVICE — BAG RETRIEVAL 5MM (10EA/BX)

## (undated) DEVICE — TROCAR 5X100 NON BLADED Z-TH - READ KII (6/BX)

## (undated) DEVICE — SPONGE GAUZESTER. 2X2 4-PL - (2/PK 50PK/BX 30BX/CS)

## (undated) DEVICE — SEAL UNIVERSAL 5MM-12MM (10EA/BX)

## (undated) DEVICE — CANISTER SUCTION RIGID RED 1500CC (40EA/CA)

## (undated) DEVICE — SHEARS MONOPOLAR CURVED DA VINCI 10X'S REUSABLE

## (undated) DEVICE — CANISTER SUCTION 3000ML MECHANICAL FILTER AUTO SHUTOFF MEDI-VAC NONSTERILE LF DISP (40EA/CA)

## (undated) DEVICE — SYSTEM CLEARIFY VISUALIZATION (10EA/PK)